# Patient Record
Sex: MALE | Race: WHITE | NOT HISPANIC OR LATINO | Employment: OTHER
[De-identification: names, ages, dates, MRNs, and addresses within clinical notes are randomized per-mention and may not be internally consistent; named-entity substitution may affect disease eponyms.]

---

## 2017-03-23 ENCOUNTER — RECORDS - HEALTHEAST (OUTPATIENT)
Dept: ADMINISTRATIVE | Facility: OTHER | Age: 72
End: 2017-03-23

## 2017-05-31 ENCOUNTER — OFFICE VISIT - HEALTHEAST (OUTPATIENT)
Dept: INTERNAL MEDICINE | Facility: CLINIC | Age: 72
End: 2017-05-31

## 2017-05-31 DIAGNOSIS — R51.9 HEAD PAIN: ICD-10-CM

## 2017-05-31 DIAGNOSIS — I34.1 MITRAL VALVE PROLAPSE: ICD-10-CM

## 2017-06-07 ENCOUNTER — HOSPITAL ENCOUNTER (OUTPATIENT)
Dept: CARDIOLOGY | Facility: HOSPITAL | Age: 72
Discharge: HOME | End: 2017-06-07
Attending: INTERNAL MEDICINE

## 2017-06-07 DIAGNOSIS — I34.1 MITRAL VALVE PROLAPSE: ICD-10-CM

## 2017-06-07 LAB
AORTIC ROOT: 3.9 CM
AORTIC VALVE MEAN VELOCITY: 88.8 CM/S
ASCENDING AORTA: 3.5 CM
AV DIMENSIONLESS INDEX VTI: 0.7
AV MEAN GRADIENT: 4 MMHG
AV PEAK GRADIENT: 6.9 MMHG
AV VALVE AREA: 3.1 CM2
AV VELOCITY RATIO: 0.8
BSA FOR ECHO PROCEDURE: 2.26 M2
CV BLOOD PRESSURE: NORMAL MMHG
CV ECHO HEIGHT: 72 IN
CV ECHO WEIGHT: 222 LBS
DOP CALC AO PEAK VEL: 131 CM/S
DOP CALC AO VTI: 25.3 CM
DOP CALC LVOT AREA: 4.15 CM2
DOP CALC LVOT DIAMETER: 2.3 CM
DOP CALC LVOT PEAK VEL: 103 CM/S
DOP CALC LVOT STROKE VOLUME: 77.2 CM3
DOP CALCLVOT PEAK VEL VTI: 18.6 CM
EJECTION FRACTION: 64 % (ref 55–75)
FRACTIONAL SHORTENING: 17.6 % (ref 28–44)
INTERVENTRICULAR SEPTUM IN END DIASTOLE: 1.48 CM (ref 0.6–1)
IVS/PW RATIO: 1.4
LA AREA 1: 28.8 CM2
LA AREA 2: 42.7 CM2
LEFT ATRIUM LENGTH: 6.1 CM
LEFT ATRIUM SIZE: 4 CM
LEFT ATRIUM TO AORTIC ROOT RATIO: 1.03 NO UNITS
LEFT ATRIUM VOLUME INDEX: 75.8 ML/M2
LEFT ATRIUM VOLUME: 171.4 CM3
LEFT VENTRICLE DIASTOLIC VOLUME INDEX: 61.5 CM3/M2 (ref 34–74)
LEFT VENTRICLE DIASTOLIC VOLUME: 139 CM3 (ref 62–150)
LEFT VENTRICLE HEART RATE: 52 BPM
LEFT VENTRICLE HEART RATE: 52 BPM
LEFT VENTRICLE MASS INDEX: 110.1 G/M2
LEFT VENTRICLE SYSTOLIC VOLUME INDEX: 22.1 CM3/M2 (ref 11–31)
LEFT VENTRICLE SYSTOLIC VOLUME: 49.9 CM3 (ref 21–61)
LEFT VENTRICULAR INTERNAL DIMENSION IN DIASTOLE: 4.89 CM (ref 4.2–5.8)
LEFT VENTRICULAR INTERNAL DIMENSION IN SYSTOLE: 4.03 CM (ref 2.5–4)
LEFT VENTRICULAR MASS: 248.7 G
LEFT VENTRICULAR OUTFLOW TRACT MEAN GRADIENT: 2 MMHG
LEFT VENTRICULAR OUTFLOW TRACT MEAN VELOCITY: 68.8 CM/S
LEFT VENTRICULAR OUTFLOW TRACT PEAK GRADIENT: 4 MMHG
LEFT VENTRICULAR POSTERIOR WALL IN END DIASTOLE: 1.09 CM (ref 0.6–1)
LV STROKE VOLUME INDEX: 34.2 ML/M2
MITRAL VALVE E/A RATIO: 1.2
MV AVERAGE E/E' RATIO: 15.5 CM/S
MV DECELERATION TIME: 283 MS
MV E'TISSUE VEL-LAT: 6.96 CM/S
MV E'TISSUE VEL-MED: 5.9 CM/S
MV LATERAL E/E' RATIO: 14.4
MV MEDIAL E/E' RATIO: 16.9
MV PEAK A VELOCITY: 85.9 CM/S
MV PEAK E VELOCITY: 99.9 CM/S
NUC REST DIASTOLIC VOLUME INDEX: 3552 LBS
NUC REST SYSTOLIC VOLUME INDEX: 72 IN
TRICUSPID VALVE ANULAR PLANE SYSTOLIC EXCURSION: 3.3 CM

## 2017-06-07 ASSESSMENT — MIFFLIN-ST. JEOR: SCORE: 1774.99

## 2017-06-11 ENCOUNTER — COMMUNICATION - HEALTHEAST (OUTPATIENT)
Dept: INTERNAL MEDICINE | Facility: CLINIC | Age: 72
End: 2017-06-11

## 2017-06-12 ENCOUNTER — AMBULATORY - HEALTHEAST (OUTPATIENT)
Dept: INTERNAL MEDICINE | Facility: CLINIC | Age: 72
End: 2017-06-12

## 2017-06-12 DIAGNOSIS — I34.1 MITRAL VALVE PROLAPSE: ICD-10-CM

## 2017-07-07 ENCOUNTER — OFFICE VISIT - HEALTHEAST (OUTPATIENT)
Dept: CARDIOLOGY | Facility: CLINIC | Age: 72
End: 2017-07-07

## 2017-07-07 DIAGNOSIS — I34.0 NON-RHEUMATIC MITRAL REGURGITATION: ICD-10-CM

## 2017-07-07 LAB
ATRIAL RATE - MUSE: 57 BPM
DIASTOLIC BLOOD PRESSURE - MUSE: NORMAL MMHG
INTERPRETATION ECG - MUSE: NORMAL
P AXIS - MUSE: 70 DEGREES
PR INTERVAL - MUSE: 230 MS
QRS DURATION - MUSE: 98 MS
QT - MUSE: 432 MS
QTC - MUSE: 420 MS
R AXIS - MUSE: -22 DEGREES
SYSTOLIC BLOOD PRESSURE - MUSE: NORMAL MMHG
T AXIS - MUSE: 35 DEGREES
VENTRICULAR RATE- MUSE: 57 BPM

## 2017-07-07 ASSESSMENT — MIFFLIN-ST. JEOR: SCORE: 1751.17

## 2017-07-13 ENCOUNTER — COMMUNICATION - HEALTHEAST (OUTPATIENT)
Dept: CARDIOLOGY | Facility: CLINIC | Age: 72
End: 2017-07-13

## 2017-07-18 ENCOUNTER — HOSPITAL ENCOUNTER (OUTPATIENT)
Dept: CARDIOLOGY | Facility: HOSPITAL | Age: 72
Discharge: HOME | End: 2017-07-18
Attending: INTERNAL MEDICINE

## 2017-07-18 DIAGNOSIS — I34.0 NON-RHEUMATIC MITRAL REGURGITATION: ICD-10-CM

## 2017-07-18 LAB
CV STRESS CURRENT BP HE: NORMAL
CV STRESS CURRENT HR HE: 100
CV STRESS CURRENT HR HE: 111
CV STRESS CURRENT HR HE: 123
CV STRESS CURRENT HR HE: 143
CV STRESS CURRENT HR HE: 145
CV STRESS CURRENT HR HE: 145
CV STRESS CURRENT HR HE: 146
CV STRESS CURRENT HR HE: 58
CV STRESS CURRENT HR HE: 59
CV STRESS CURRENT HR HE: 60
CV STRESS CURRENT HR HE: 61
CV STRESS CURRENT HR HE: 61
CV STRESS CURRENT HR HE: 63
CV STRESS CURRENT HR HE: 63
CV STRESS CURRENT HR HE: 68
CV STRESS CURRENT HR HE: 69
CV STRESS CURRENT HR HE: 75
CV STRESS CURRENT HR HE: 80
CV STRESS CURRENT HR HE: 81
CV STRESS CURRENT HR HE: 83
CV STRESS CURRENT HR HE: 85
CV STRESS CURRENT HR HE: 86
CV STRESS CURRENT HR HE: 88
CV STRESS CURRENT HR HE: 93
CV STRESS CURRENT HR HE: 97
CV STRESS CURRENT HR HE: 97
CV STRESS DEVIATION TIME HE: NORMAL
CV STRESS ECHO PERCENT HR HE: NORMAL
CV STRESS EXERCISE STAGE HE: NORMAL
CV STRESS FINAL RESTING BP HE: NORMAL
CV STRESS FINAL RESTING HR HE: 61
CV STRESS MAX HR HE: 147
CV STRESS MAX TREADMILL GRADE HE: 12
CV STRESS MAX TREADMILL SPEED HE: 2.5
CV STRESS PEAK DIA BP HE: NORMAL
CV STRESS PEAK SYS BP HE: NORMAL
CV STRESS PHASE HE: NORMAL
CV STRESS PROTOCOL HE: NORMAL
CV STRESS RESTING PT POSITION HE: NORMAL
CV STRESS RESTING PT POSITION HE: NORMAL
CV STRESS ST DEVIATION AMOUNT HE: NORMAL
CV STRESS ST DEVIATION ELEVATION HE: NORMAL
CV STRESS ST EVELATION AMOUNT HE: NORMAL
CV STRESS TEST TYPE HE: NORMAL
CV STRESS TOTAL STAGE TIME MIN 1 HE: NORMAL
ECHO EJECTION FRACTION ESTIMATED: 55 %
STRESS ECHO BASELINE BP: NORMAL
STRESS ECHO BASELINE HR: 58
STRESS ECHO CALCULATED PERCENT HR: 99 %
STRESS ECHO LAST STRESS BP: NORMAL
STRESS ECHO LAST STRESS HR: 146
STRESS ECHO POST ESTIMATED WORKLOAD: 7.1
STRESS ECHO POST EXERCISE DUR MIN: 5
STRESS ECHO POST EXERCISE DUR SEC: 10
STRESS ECHO TARGET HR: 126
TRICUSPID REGURGITATION PEAK PRESSURE GRADIENT: 31.4 MMHG
TRICUSPID VALVE PEAK REGURGITANT VELOCITY: 280 CM/S

## 2017-07-21 ENCOUNTER — AMBULATORY - HEALTHEAST (OUTPATIENT)
Dept: CARDIOLOGY | Facility: CLINIC | Age: 72
End: 2017-07-21

## 2017-07-21 DIAGNOSIS — R94.39 ABNORMAL STRESS ECHO: ICD-10-CM

## 2017-07-27 ENCOUNTER — COMMUNICATION - HEALTHEAST (OUTPATIENT)
Dept: CARDIOLOGY | Facility: CLINIC | Age: 72
End: 2017-07-27

## 2017-07-27 ENCOUNTER — SURGERY - HEALTHEAST (OUTPATIENT)
Dept: CARDIOLOGY | Facility: CLINIC | Age: 72
End: 2017-07-27

## 2017-08-03 ENCOUNTER — SURGERY - HEALTHEAST (OUTPATIENT)
Dept: CARDIOLOGY | Facility: CLINIC | Age: 72
End: 2017-08-03

## 2017-08-24 ENCOUNTER — OFFICE VISIT - HEALTHEAST (OUTPATIENT)
Dept: CARDIOLOGY | Facility: CLINIC | Age: 72
End: 2017-08-24

## 2017-08-24 DIAGNOSIS — I34.0 NON-RHEUMATIC MITRAL REGURGITATION: ICD-10-CM

## 2017-08-24 ASSESSMENT — MIFFLIN-ST. JEOR: SCORE: 1767.04

## 2017-08-25 ENCOUNTER — OFFICE VISIT - HEALTHEAST (OUTPATIENT)
Dept: CARDIOLOGY | Facility: CLINIC | Age: 72
End: 2017-08-25

## 2017-08-25 DIAGNOSIS — I34.0 NON-RHEUMATIC MITRAL REGURGITATION: ICD-10-CM

## 2017-08-25 ASSESSMENT — MIFFLIN-ST. JEOR: SCORE: 1771.58

## 2017-08-30 ENCOUNTER — COMMUNICATION - HEALTHEAST (OUTPATIENT)
Dept: INTERNAL MEDICINE | Facility: CLINIC | Age: 72
End: 2017-08-30

## 2017-08-30 ENCOUNTER — OFFICE VISIT - HEALTHEAST (OUTPATIENT)
Dept: INTERNAL MEDICINE | Facility: CLINIC | Age: 72
End: 2017-08-30

## 2017-08-30 DIAGNOSIS — Z01.818 PREOP EXAMINATION: ICD-10-CM

## 2017-08-30 DIAGNOSIS — Z12.5 PROSTATE CANCER SCREENING: ICD-10-CM

## 2017-08-30 LAB — PSA SERPL-MCNC: 0.4 NG/ML (ref 0–6.5)

## 2017-08-30 ASSESSMENT — MIFFLIN-ST. JEOR: SCORE: 1748.9

## 2017-09-15 ENCOUNTER — SURGERY - HEALTHEAST (OUTPATIENT)
Dept: CARDIOLOGY | Facility: CLINIC | Age: 72
End: 2017-09-15

## 2017-09-15 DIAGNOSIS — I34.0 MITRAL REGURGITATION: ICD-10-CM

## 2017-09-18 ENCOUNTER — HOSPITAL ENCOUNTER (OUTPATIENT)
Dept: RADIOLOGY | Facility: CLINIC | Age: 72
Discharge: HOME | End: 2017-09-18

## 2017-09-18 ENCOUNTER — HOSPITAL ENCOUNTER (OUTPATIENT)
Dept: ULTRASOUND IMAGING | Facility: CLINIC | Age: 72
Discharge: HOME | End: 2017-09-18

## 2017-09-18 ENCOUNTER — HOSPITAL ENCOUNTER (OUTPATIENT)
Dept: SURGERY | Facility: CLINIC | Age: 72
Discharge: HOME | End: 2017-09-18

## 2017-09-18 ENCOUNTER — ANESTHESIA - HEALTHEAST (OUTPATIENT)
Dept: SURGERY | Facility: CLINIC | Age: 72
End: 2017-09-18

## 2017-09-18 DIAGNOSIS — I34.0 MITRAL REGURGITATION: ICD-10-CM

## 2017-09-18 LAB
ATRIAL RATE - MUSE: 53 BPM
DIASTOLIC BLOOD PRESSURE - MUSE: NORMAL MMHG
HBA1C MFR BLD: 5.5 % (ref 4.2–6.1)
INTERPRETATION ECG - MUSE: NORMAL
P AXIS - MUSE: 67 DEGREES
PR INTERVAL - MUSE: 248 MS
QRS DURATION - MUSE: 92 MS
QT - MUSE: 450 MS
QTC - MUSE: 422 MS
R AXIS - MUSE: -13 DEGREES
SYSTOLIC BLOOD PRESSURE - MUSE: NORMAL MMHG
T AXIS - MUSE: 25 DEGREES
VENTRICULAR RATE- MUSE: 53 BPM

## 2017-09-18 RX ORDER — EPINEPHRINE 0.3 MG/.3ML
0.3 INJECTION SUBCUTANEOUS
Status: SHIPPED | COMMUNITY
Start: 2017-09-18

## 2017-09-18 ASSESSMENT — MIFFLIN-ST. JEOR: SCORE: 1771.78

## 2017-09-19 ENCOUNTER — HOSPITAL ENCOUNTER (INPATIENT)
Dept: INTENSIVE CARE | Facility: CLINIC | Age: 72
Discharge: HOME | End: 2017-09-27
Attending: THORACIC SURGERY (CARDIOTHORACIC VASCULAR SURGERY) | Admitting: THORACIC SURGERY (CARDIOTHORACIC VASCULAR SURGERY)

## 2017-09-19 ENCOUNTER — SURGERY - HEALTHEAST (OUTPATIENT)
Dept: SURGERY | Facility: CLINIC | Age: 72
End: 2017-09-19

## 2017-09-19 DIAGNOSIS — Z98.890 S/P MVR (MITRAL VALVE REPAIR): ICD-10-CM

## 2017-09-19 DIAGNOSIS — Z95.3 S/P MITRAL VALVE REPLACEMENT WITH TISSUE VALVE: ICD-10-CM

## 2017-09-19 DIAGNOSIS — I49.5 SSS (SICK SINUS SYNDROME) (H): ICD-10-CM

## 2017-09-19 LAB
MR PISA RADIUS: 0.6 CM
MR PISA RADIUS: 0.6 CM
MR PISA VN NYQUIST: 40.4 CM/S
PISA MR PEAK VEL: 461 CM/S
PISA MR PEAK VEL: 461 CM/S

## 2017-09-21 LAB
BLD PROD TYP BPU: NORMAL
BLOOD EXPIRATION DATE: NORMAL
BLOOD TYPE: 6200
CODING SYSTEM: NORMAL
COMPONENT (HISTORICAL CONVERSION): NORMAL
CROSSMATCH: NORMAL
ISSUE DATE AND TIME: NORMAL
STATUS (HISTORICAL CONVERSION): NORMAL
UNIT ABO/RH (HISTORICAL CONVERSION): NORMAL
UNIT NUMBER: NORMAL

## 2017-09-22 LAB
BLD PROD TYP BPU: NORMAL
BLOOD EXPIRATION DATE: NORMAL
BLOOD TYPE: 6200
CODING SYSTEM: NORMAL
COMPONENT (HISTORICAL CONVERSION): NORMAL
CROSSMATCH: NORMAL
ISSUE DATE AND TIME: NORMAL
LAB AP CHARGES (HE HISTORICAL CONVERSION): NORMAL
PATH REPORT.COMMENTS IMP SPEC: NORMAL
PATH REPORT.FINAL DX SPEC: NORMAL
PATH REPORT.GROSS SPEC: NORMAL
PATH REPORT.MICROSCOPIC SPEC OTHER STN: NORMAL
PATH REPORT.RELEVANT HX SPEC: NORMAL
RESULT FLAG (HE HISTORICAL CONVERSION): NORMAL
STATUS (HISTORICAL CONVERSION): NORMAL
UNIT ABO/RH (HISTORICAL CONVERSION): NORMAL
UNIT NUMBER: NORMAL

## 2017-09-22 ASSESSMENT — MIFFLIN-ST. JEOR
SCORE: 1776

## 2017-09-23 ASSESSMENT — MIFFLIN-ST. JEOR
SCORE: 1761.94

## 2017-09-24 LAB
ATRIAL RATE - MUSE: 56 BPM
DIASTOLIC BLOOD PRESSURE - MUSE: NORMAL MMHG
INTERPRETATION ECG - MUSE: NORMAL
P AXIS - MUSE: NORMAL DEGREES
PR INTERVAL - MUSE: NORMAL MS
QRS DURATION - MUSE: 96 MS
QT - MUSE: 440 MS
QTC - MUSE: 409 MS
R AXIS - MUSE: 34 DEGREES
SYSTOLIC BLOOD PRESSURE - MUSE: NORMAL MMHG
T AXIS - MUSE: 27 DEGREES
VENTRICULAR RATE- MUSE: 52 BPM

## 2017-09-24 ASSESSMENT — MIFFLIN-ST. JEOR
SCORE: 1697.53

## 2017-09-25 ENCOUNTER — SURGERY - HEALTHEAST (OUTPATIENT)
Dept: CARDIOLOGY | Facility: CLINIC | Age: 72
End: 2017-09-25

## 2017-09-25 ASSESSMENT — MIFFLIN-ST. JEOR
SCORE: 1761.94

## 2017-09-26 ASSESSMENT — MIFFLIN-ST. JEOR
SCORE: 1757.41

## 2017-09-27 LAB
CREAT SERPL-MCNC: 0.77 MG/DL (ref 0.7–1.3)
GFR SERPL CREATININE-BSD FRML MDRD: >60 ML/MIN/1.73M2

## 2017-09-27 ASSESSMENT — MIFFLIN-ST. JEOR
SCORE: 1750.15

## 2017-09-29 ENCOUNTER — AMBULATORY - HEALTHEAST (OUTPATIENT)
Dept: NURSING | Facility: CLINIC | Age: 72
End: 2017-09-29

## 2017-09-29 ENCOUNTER — OFFICE VISIT - HEALTHEAST (OUTPATIENT)
Dept: INTERNAL MEDICINE | Facility: CLINIC | Age: 72
End: 2017-09-29

## 2017-09-29 ENCOUNTER — COMMUNICATION - HEALTHEAST (OUTPATIENT)
Dept: NURSING | Facility: CLINIC | Age: 72
End: 2017-09-29

## 2017-09-29 DIAGNOSIS — I34.0 NON-RHEUMATIC MITRAL REGURGITATION: ICD-10-CM

## 2017-09-29 DIAGNOSIS — I49.5 TACHYCARDIA-BRADYCARDIA SYNDROME (H): ICD-10-CM

## 2017-09-29 DIAGNOSIS — Z98.890 S/P MVR (MITRAL VALVE REPAIR): ICD-10-CM

## 2017-09-29 DIAGNOSIS — Z79.899 MEDICATION MANAGEMENT: ICD-10-CM

## 2017-09-29 DIAGNOSIS — Z95.3 S/P MITRAL VALVE REPLACEMENT WITH TISSUE VALVE: ICD-10-CM

## 2017-09-29 ASSESSMENT — MIFFLIN-ST. JEOR: SCORE: 1757.41

## 2017-10-03 ENCOUNTER — AMBULATORY - HEALTHEAST (OUTPATIENT)
Dept: CARDIOLOGY | Facility: CLINIC | Age: 72
End: 2017-10-03

## 2017-10-03 ENCOUNTER — COMMUNICATION - HEALTHEAST (OUTPATIENT)
Dept: NURSING | Facility: CLINIC | Age: 72
End: 2017-10-03

## 2017-10-03 ENCOUNTER — COMMUNICATION - HEALTHEAST (OUTPATIENT)
Dept: INTERNAL MEDICINE | Facility: CLINIC | Age: 72
End: 2017-10-03

## 2017-10-03 ENCOUNTER — AMBULATORY - HEALTHEAST (OUTPATIENT)
Dept: LAB | Facility: CLINIC | Age: 72
End: 2017-10-03

## 2017-10-03 DIAGNOSIS — Z98.890 S/P MVR (MITRAL VALVE REPAIR): ICD-10-CM

## 2017-10-03 DIAGNOSIS — Z95.3 S/P MITRAL VALVE REPLACEMENT WITH TISSUE VALVE: ICD-10-CM

## 2017-10-03 DIAGNOSIS — Z95.0 CARDIAC PACEMAKER IN SITU: ICD-10-CM

## 2017-10-03 LAB — HCC DEVICE COMMENTS: NORMAL

## 2017-10-03 ASSESSMENT — MIFFLIN-ST. JEOR: SCORE: 1739.26

## 2017-10-06 ENCOUNTER — OFFICE VISIT - HEALTHEAST (OUTPATIENT)
Dept: INTERNAL MEDICINE | Facility: CLINIC | Age: 72
End: 2017-10-06

## 2017-10-06 ENCOUNTER — COMMUNICATION - HEALTHEAST (OUTPATIENT)
Dept: NURSING | Facility: CLINIC | Age: 72
End: 2017-10-06

## 2017-10-06 DIAGNOSIS — I49.5 TACHYCARDIA-BRADYCARDIA SYNDROME (H): ICD-10-CM

## 2017-10-06 DIAGNOSIS — Z98.890 S/P MVR (MITRAL VALVE REPAIR): ICD-10-CM

## 2017-10-06 DIAGNOSIS — Z95.3 S/P MITRAL VALVE REPLACEMENT WITH TISSUE VALVE: ICD-10-CM

## 2017-10-06 ASSESSMENT — MIFFLIN-ST. JEOR: SCORE: 1739.26

## 2017-10-09 ENCOUNTER — RECORDS - HEALTHEAST (OUTPATIENT)
Dept: ADMINISTRATIVE | Facility: OTHER | Age: 72
End: 2017-10-09

## 2017-10-09 ENCOUNTER — AMBULATORY - HEALTHEAST (OUTPATIENT)
Dept: CARDIAC REHAB | Facility: HOSPITAL | Age: 72
End: 2017-10-09

## 2017-10-09 DIAGNOSIS — Z95.2 S/P MVR (MITRAL VALVE REPLACEMENT): ICD-10-CM

## 2017-10-10 ENCOUNTER — AMBULATORY - HEALTHEAST (OUTPATIENT)
Dept: CARDIOLOGY | Facility: CLINIC | Age: 72
End: 2017-10-10

## 2017-10-10 ENCOUNTER — COMMUNICATION - HEALTHEAST (OUTPATIENT)
Dept: NURSING | Facility: CLINIC | Age: 72
End: 2017-10-10

## 2017-10-10 ENCOUNTER — AMBULATORY - HEALTHEAST (OUTPATIENT)
Dept: LAB | Facility: CLINIC | Age: 72
End: 2017-10-10

## 2017-10-10 DIAGNOSIS — Z95.2 S/P MVR (MITRAL VALVE REPLACEMENT): ICD-10-CM

## 2017-10-10 DIAGNOSIS — Z98.890 S/P MVR (MITRAL VALVE REPAIR): ICD-10-CM

## 2017-10-10 DIAGNOSIS — Z95.3 S/P MITRAL VALVE REPLACEMENT WITH TISSUE VALVE: ICD-10-CM

## 2017-10-13 ENCOUNTER — COMMUNICATION - HEALTHEAST (OUTPATIENT)
Dept: NURSING | Facility: CLINIC | Age: 72
End: 2017-10-13

## 2017-10-13 ENCOUNTER — AMBULATORY - HEALTHEAST (OUTPATIENT)
Dept: CARDIAC REHAB | Facility: HOSPITAL | Age: 72
End: 2017-10-13

## 2017-10-13 ENCOUNTER — AMBULATORY - HEALTHEAST (OUTPATIENT)
Dept: LAB | Facility: CLINIC | Age: 72
End: 2017-10-13

## 2017-10-13 DIAGNOSIS — Z95.3 S/P MITRAL VALVE REPLACEMENT WITH TISSUE VALVE: ICD-10-CM

## 2017-10-13 DIAGNOSIS — Z98.890 S/P MVR (MITRAL VALVE REPAIR): ICD-10-CM

## 2017-10-13 DIAGNOSIS — Z95.2 S/P MVR (MITRAL VALVE REPLACEMENT): ICD-10-CM

## 2017-10-17 ENCOUNTER — OFFICE VISIT - HEALTHEAST (OUTPATIENT)
Dept: CARDIOLOGY | Facility: CLINIC | Age: 72
End: 2017-10-17

## 2017-10-17 ENCOUNTER — AMBULATORY - HEALTHEAST (OUTPATIENT)
Dept: CARDIAC REHAB | Facility: HOSPITAL | Age: 72
End: 2017-10-17

## 2017-10-17 DIAGNOSIS — Z95.2 S/P MVR (MITRAL VALVE REPLACEMENT): ICD-10-CM

## 2017-10-17 DIAGNOSIS — Z95.3 S/P MITRAL VALVE REPLACEMENT WITH BIOPROSTHETIC VALVE: ICD-10-CM

## 2017-10-17 ASSESSMENT — MIFFLIN-ST. JEOR: SCORE: 1776.46

## 2017-10-20 ENCOUNTER — AMBULATORY - HEALTHEAST (OUTPATIENT)
Dept: LAB | Facility: CLINIC | Age: 72
End: 2017-10-20

## 2017-10-20 ENCOUNTER — COMMUNICATION - HEALTHEAST (OUTPATIENT)
Dept: NURSING | Facility: CLINIC | Age: 72
End: 2017-10-20

## 2017-10-20 ENCOUNTER — AMBULATORY - HEALTHEAST (OUTPATIENT)
Dept: CARDIAC REHAB | Facility: HOSPITAL | Age: 72
End: 2017-10-20

## 2017-10-20 DIAGNOSIS — Z95.3 S/P MITRAL VALVE REPLACEMENT WITH TISSUE VALVE: ICD-10-CM

## 2017-10-20 DIAGNOSIS — Z95.2 S/P MVR (MITRAL VALVE REPLACEMENT): ICD-10-CM

## 2017-10-20 DIAGNOSIS — Z98.890 S/P MVR (MITRAL VALVE REPAIR): ICD-10-CM

## 2017-10-25 ENCOUNTER — AMBULATORY - HEALTHEAST (OUTPATIENT)
Dept: CARDIAC REHAB | Facility: HOSPITAL | Age: 72
End: 2017-10-25

## 2017-10-25 DIAGNOSIS — Z95.2 S/P MVR (MITRAL VALVE REPLACEMENT): ICD-10-CM

## 2017-10-27 ENCOUNTER — COMMUNICATION - HEALTHEAST (OUTPATIENT)
Dept: NURSING | Facility: CLINIC | Age: 72
End: 2017-10-27

## 2017-10-27 ENCOUNTER — AMBULATORY - HEALTHEAST (OUTPATIENT)
Dept: LAB | Facility: CLINIC | Age: 72
End: 2017-10-27

## 2017-10-27 ENCOUNTER — AMBULATORY - HEALTHEAST (OUTPATIENT)
Dept: CARDIAC REHAB | Facility: HOSPITAL | Age: 72
End: 2017-10-27

## 2017-10-27 DIAGNOSIS — Z95.2 S/P MVR (MITRAL VALVE REPLACEMENT): ICD-10-CM

## 2017-10-27 DIAGNOSIS — Z98.890 S/P MVR (MITRAL VALVE REPAIR): ICD-10-CM

## 2017-10-27 DIAGNOSIS — Z95.3 S/P MITRAL VALVE REPLACEMENT WITH TISSUE VALVE: ICD-10-CM

## 2017-10-30 ENCOUNTER — AMBULATORY - HEALTHEAST (OUTPATIENT)
Dept: CARDIOLOGY | Facility: CLINIC | Age: 72
End: 2017-10-30

## 2017-10-30 ENCOUNTER — OFFICE VISIT - HEALTHEAST (OUTPATIENT)
Dept: CARDIOLOGY | Facility: CLINIC | Age: 72
End: 2017-10-30

## 2017-10-30 ENCOUNTER — HOSPITAL ENCOUNTER (OUTPATIENT)
Dept: RADIOLOGY | Facility: HOSPITAL | Age: 72
Discharge: HOME | End: 2017-10-30
Attending: INTERNAL MEDICINE

## 2017-10-30 DIAGNOSIS — I48.0 PAROXYSMAL ATRIAL FIBRILLATION (H): ICD-10-CM

## 2017-10-30 DIAGNOSIS — Z95.0 CARDIAC PACEMAKER IN SITU: ICD-10-CM

## 2017-10-30 DIAGNOSIS — Z98.890 S/P MVR (MITRAL VALVE REPAIR): ICD-10-CM

## 2017-10-30 ASSESSMENT — MIFFLIN-ST. JEOR: SCORE: 1771.92

## 2017-10-31 ENCOUNTER — AMBULATORY - HEALTHEAST (OUTPATIENT)
Dept: CARDIOLOGY | Facility: CLINIC | Age: 72
End: 2017-10-31

## 2017-10-31 DIAGNOSIS — I48.91 ATRIAL FIBRILLATION (H): ICD-10-CM

## 2017-10-31 DIAGNOSIS — J90 PLEURAL EFFUSION: ICD-10-CM

## 2017-10-31 LAB — HCC DEVICE COMMENTS: NORMAL

## 2017-11-01 ENCOUNTER — HOSPITAL ENCOUNTER (OUTPATIENT)
Dept: CARDIOLOGY | Facility: HOSPITAL | Age: 72
Discharge: HOME | End: 2017-11-01
Attending: INTERNAL MEDICINE

## 2017-11-01 ENCOUNTER — AMBULATORY - HEALTHEAST (OUTPATIENT)
Dept: CARDIAC REHAB | Facility: HOSPITAL | Age: 72
End: 2017-11-01

## 2017-11-01 DIAGNOSIS — I48.0 PAROXYSMAL ATRIAL FIBRILLATION (H): ICD-10-CM

## 2017-11-01 DIAGNOSIS — Z95.2 S/P MVR (MITRAL VALVE REPLACEMENT): ICD-10-CM

## 2017-11-01 ASSESSMENT — MIFFLIN-ST. JEOR: SCORE: 1771.01

## 2017-11-02 LAB
AORTIC ROOT: 3.8 CM
BSA FOR ECHO PROCEDURE: 2.25 M2
CV BLOOD PRESSURE: NORMAL MMHG
CV ECHO HEIGHT: 73.8 IN
CV ECHO WEIGHT: 215 LBS
DOP CALC LVOT AREA: 3.14 CM2
DOP CALC LVOT DIAMETER: 2 CM
DOP CALC LVOT PEAK VEL: 106 CM/S
DOP CALC LVOT STROKE VOLUME: 65.9 CM3
DOP CALC MV VTI: 82.6 CM
DOP CALCLVOT PEAK VEL VTI: 21 CM
ECHO EJECTION FRACTION ESTIMATED: 45 %
EJECTION FRACTION: 53 % (ref 55–75)
FRACTIONAL SHORTENING: 19.9 % (ref 28–44)
INTERVENTRICULAR SEPTUM IN END DIASTOLE: 1.04 CM (ref 0.6–1)
IVS/PW RATIO: 1.1
LEFT ATRIUM SIZE: 5 CM
LEFT ATRIUM TO AORTIC ROOT RATIO: 1.32 NO UNITS
LEFT VENTRICLE CARDIAC INDEX: 1.9 L/MIN/M2
LEFT VENTRICLE CARDIAC OUTPUT: 4.2 L/MIN
LEFT VENTRICLE DIASTOLIC VOLUME INDEX: 51.1 CM3/M2 (ref 34–74)
LEFT VENTRICLE DIASTOLIC VOLUME: 115 CM3 (ref 62–150)
LEFT VENTRICLE HEART RATE: 64 BPM
LEFT VENTRICLE MASS INDEX: 94 G/M2
LEFT VENTRICLE SYSTOLIC VOLUME INDEX: 24.2 CM3/M2 (ref 11–31)
LEFT VENTRICLE SYSTOLIC VOLUME: 54.5 CM3 (ref 21–61)
LEFT VENTRICULAR INTERNAL DIMENSION IN DIASTOLE: 5.48 CM (ref 4.2–5.8)
LEFT VENTRICULAR INTERNAL DIMENSION IN SYSTOLE: 4.39 CM (ref 2.5–4)
LEFT VENTRICULAR MASS: 211.5 G
LEFT VENTRICULAR OUTFLOW TRACT MEAN GRADIENT: 2 MMHG
LEFT VENTRICULAR OUTFLOW TRACT MEAN VELOCITY: 70.3 CM/S
LEFT VENTRICULAR OUTFLOW TRACT PEAK GRADIENT: 4 MMHG
LEFT VENTRICULAR POSTERIOR WALL IN END DIASTOLE: 0.96 CM (ref 0.6–1)
LV STROKE VOLUME INDEX: 29.3 ML/M2
MITRAL VALVE DECELERATION SLOPE: 1920 MM/S2
MITRAL VALVE E/A RATIO: 1.3
MITRAL VALVE MEAN INFLOW VELOCITY: 148 CM/S
MITRAL VALVE PEAK VELOCITY: 198 CM/S
MITRAL VALVE PRESSURE HALF-TIME: 204 MS
MV AREA VTI: 0.8 CM2
MV AVERAGE E/E' RATIO: 14.4 CM/S
MV DECELERATION TIME: 320 MS
MV E'TISSUE VEL-LAT: 8.32 CM/S
MV E'TISSUE VEL-MED: 7.54 CM/S
MV LATERAL E/E' RATIO: 13.7
MV MEAN GRADIENT: 9 MMHG
MV MEDIAL E/E' RATIO: 15.1
MV PEAK A VELOCITY: 85.2 CM/S
MV PEAK E VELOCITY: 114 CM/S
MV PEAK GRADIENT: 15.7 MMHG
MV VALVE AREA BY CONTINUITY EQUATION: 0.8 CM2
MV VALVE AREA PRESSURE 1/2 METHOD: 1.1 CM2
NUC REST DIASTOLIC VOLUME INDEX: 3440 LBS
NUC REST SYSTOLIC VOLUME INDEX: 73.75 IN
TRICUSPID REGURGITATION PEAK PRESSURE GRADIENT: 25.4 MMHG
TRICUSPID VALVE ANULAR PLANE SYSTOLIC EXCURSION: 1.8 CM
TRICUSPID VALVE PEAK REGURGITANT VELOCITY: 252 CM/S

## 2017-11-03 ENCOUNTER — COMMUNICATION - HEALTHEAST (OUTPATIENT)
Dept: CARDIOLOGY | Facility: CLINIC | Age: 72
End: 2017-11-03

## 2017-11-03 ENCOUNTER — AMBULATORY - HEALTHEAST (OUTPATIENT)
Dept: CARDIAC REHAB | Facility: HOSPITAL | Age: 72
End: 2017-11-03

## 2017-11-03 ENCOUNTER — AMBULATORY - HEALTHEAST (OUTPATIENT)
Dept: CARDIOLOGY | Facility: CLINIC | Age: 72
End: 2017-11-03

## 2017-11-03 DIAGNOSIS — I31.39 PERICARDIAL EFFUSION: ICD-10-CM

## 2017-11-03 DIAGNOSIS — Z95.0 CARDIAC PACEMAKER IN SITU: ICD-10-CM

## 2017-11-03 DIAGNOSIS — Z95.2 S/P MVR (MITRAL VALVE REPLACEMENT): ICD-10-CM

## 2017-11-03 LAB — HCC DEVICE COMMENTS: NORMAL

## 2017-11-06 ENCOUNTER — COMMUNICATION - HEALTHEAST (OUTPATIENT)
Dept: NURSING | Facility: CLINIC | Age: 72
End: 2017-11-06

## 2017-11-06 ENCOUNTER — OFFICE VISIT - HEALTHEAST (OUTPATIENT)
Dept: INTERNAL MEDICINE | Facility: CLINIC | Age: 72
End: 2017-11-06

## 2017-11-06 DIAGNOSIS — Z98.890 S/P MVR (MITRAL VALVE REPAIR): ICD-10-CM

## 2017-11-06 DIAGNOSIS — R06.00 DYSPNEA: ICD-10-CM

## 2017-11-06 DIAGNOSIS — I48.0 PAROXYSMAL ATRIAL FIBRILLATION (H): ICD-10-CM

## 2017-11-06 DIAGNOSIS — Z95.3 S/P MITRAL VALVE REPLACEMENT WITH TISSUE VALVE: ICD-10-CM

## 2017-11-06 ASSESSMENT — MIFFLIN-ST. JEOR: SCORE: 1743.8

## 2017-11-08 ENCOUNTER — AMBULATORY - HEALTHEAST (OUTPATIENT)
Dept: CARDIAC REHAB | Facility: HOSPITAL | Age: 72
End: 2017-11-08

## 2017-11-08 DIAGNOSIS — Z95.2 S/P MVR (MITRAL VALVE REPLACEMENT): ICD-10-CM

## 2017-11-09 ENCOUNTER — COMMUNICATION - HEALTHEAST (OUTPATIENT)
Dept: CARDIOLOGY | Facility: CLINIC | Age: 72
End: 2017-11-09

## 2017-11-10 ENCOUNTER — HOSPITAL ENCOUNTER (OUTPATIENT)
Dept: CARDIOLOGY | Facility: HOSPITAL | Age: 72
Discharge: HOME | End: 2017-11-10
Attending: INTERNAL MEDICINE

## 2017-11-10 ENCOUNTER — AMBULATORY - HEALTHEAST (OUTPATIENT)
Dept: CARDIAC REHAB | Facility: HOSPITAL | Age: 72
End: 2017-11-10

## 2017-11-10 DIAGNOSIS — I31.39 PERICARDIAL EFFUSION: ICD-10-CM

## 2017-11-10 DIAGNOSIS — Z95.2 S/P MVR (MITRAL VALVE REPLACEMENT): ICD-10-CM

## 2017-11-10 LAB
BSA FOR ECHO PROCEDURE: 2.19 M2
CV BLOOD PRESSURE: NORMAL MMHG
CV ECHO HEIGHT: 73.8 IN
CV ECHO WEIGHT: 203 LBS
ECHO EJECTION FRACTION ESTIMATED: 35 %
NUC REST DIASTOLIC VOLUME INDEX: 3248 LBS
NUC REST SYSTOLIC VOLUME INDEX: 73.75 IN

## 2017-11-10 ASSESSMENT — MIFFLIN-ST. JEOR: SCORE: 1716.58

## 2017-11-13 ENCOUNTER — AMBULATORY - HEALTHEAST (OUTPATIENT)
Dept: CARDIOLOGY | Facility: CLINIC | Age: 72
End: 2017-11-13

## 2017-11-13 DIAGNOSIS — I50.9 CHF (CONGESTIVE HEART FAILURE) (H): ICD-10-CM

## 2017-11-14 ENCOUNTER — COMMUNICATION - HEALTHEAST (OUTPATIENT)
Dept: CARDIOLOGY | Facility: CLINIC | Age: 72
End: 2017-11-14

## 2017-11-15 ENCOUNTER — COMMUNICATION - HEALTHEAST (OUTPATIENT)
Dept: NURSING | Facility: CLINIC | Age: 72
End: 2017-11-15

## 2017-11-15 ENCOUNTER — AMBULATORY - HEALTHEAST (OUTPATIENT)
Dept: CARDIOLOGY | Facility: CLINIC | Age: 72
End: 2017-11-15

## 2017-11-15 ENCOUNTER — OFFICE VISIT - HEALTHEAST (OUTPATIENT)
Dept: CARDIOLOGY | Facility: CLINIC | Age: 72
End: 2017-11-15

## 2017-11-15 ENCOUNTER — HOSPITAL ENCOUNTER (OUTPATIENT)
Dept: RADIOLOGY | Facility: CLINIC | Age: 72
Discharge: HOME | End: 2017-11-15
Attending: INTERNAL MEDICINE

## 2017-11-15 ENCOUNTER — AMBULATORY - HEALTHEAST (OUTPATIENT)
Dept: CARDIAC REHAB | Facility: HOSPITAL | Age: 72
End: 2017-11-15

## 2017-11-15 ENCOUNTER — AMBULATORY - HEALTHEAST (OUTPATIENT)
Dept: LAB | Facility: CLINIC | Age: 72
End: 2017-11-15

## 2017-11-15 DIAGNOSIS — I42.0 DILATED CARDIOMYOPATHY (H): ICD-10-CM

## 2017-11-15 DIAGNOSIS — J90 PLEURAL EFFUSION: ICD-10-CM

## 2017-11-15 DIAGNOSIS — Z95.3 S/P MITRAL VALVE REPLACEMENT WITH TISSUE VALVE: ICD-10-CM

## 2017-11-15 DIAGNOSIS — Z98.890 S/P MVR (MITRAL VALVE REPAIR): ICD-10-CM

## 2017-11-15 DIAGNOSIS — Z95.2 S/P MVR (MITRAL VALVE REPLACEMENT): ICD-10-CM

## 2017-11-15 DIAGNOSIS — Z95.0 CARDIAC PACEMAKER IN SITU: ICD-10-CM

## 2017-11-15 ASSESSMENT — MIFFLIN-ST. JEOR: SCORE: 1738.81

## 2017-11-16 ENCOUNTER — AMBULATORY - HEALTHEAST (OUTPATIENT)
Dept: CARDIOLOGY | Facility: CLINIC | Age: 72
End: 2017-11-16

## 2017-11-16 DIAGNOSIS — J90 PLEURAL EFFUSION: ICD-10-CM

## 2017-11-16 LAB — HCC DEVICE COMMENTS: NORMAL

## 2017-11-17 ENCOUNTER — COMMUNICATION - HEALTHEAST (OUTPATIENT)
Dept: NURSING | Facility: CLINIC | Age: 72
End: 2017-11-17

## 2017-11-17 ENCOUNTER — AMBULATORY - HEALTHEAST (OUTPATIENT)
Dept: CARDIAC REHAB | Facility: HOSPITAL | Age: 72
End: 2017-11-17

## 2017-11-17 ENCOUNTER — AMBULATORY - HEALTHEAST (OUTPATIENT)
Dept: CARDIOLOGY | Facility: CLINIC | Age: 72
End: 2017-11-17

## 2017-11-17 ENCOUNTER — COMMUNICATION - HEALTHEAST (OUTPATIENT)
Dept: CARDIOLOGY | Facility: CLINIC | Age: 72
End: 2017-11-17

## 2017-11-17 ENCOUNTER — HOSPITAL ENCOUNTER (OUTPATIENT)
Dept: ULTRASOUND IMAGING | Facility: HOSPITAL | Age: 72
Discharge: HOME | End: 2017-11-17
Attending: INTERNAL MEDICINE

## 2017-11-17 DIAGNOSIS — Z95.2 S/P MVR (MITRAL VALVE REPLACEMENT): ICD-10-CM

## 2017-11-17 DIAGNOSIS — Z79.01 LONG-TERM (CURRENT) USE OF ANTICOAGULANTS: ICD-10-CM

## 2017-11-17 LAB
ATRIAL RATE - MUSE: 68 BPM
DIASTOLIC BLOOD PRESSURE - MUSE: NORMAL MMHG
INTERPRETATION ECG - MUSE: NORMAL
P AXIS - MUSE: 18 DEGREES
PR INTERVAL - MUSE: 228 MS
QRS DURATION - MUSE: 82 MS
QT - MUSE: 432 MS
QTC - MUSE: 459 MS
R AXIS - MUSE: 0 DEGREES
SYSTOLIC BLOOD PRESSURE - MUSE: NORMAL MMHG
T AXIS - MUSE: 70 DEGREES
VENTRICULAR RATE- MUSE: 68 BPM

## 2017-11-20 ENCOUNTER — HOSPITAL ENCOUNTER (OUTPATIENT)
Dept: ULTRASOUND IMAGING | Facility: HOSPITAL | Age: 72
Discharge: HOME | End: 2017-11-20
Attending: INTERNAL MEDICINE

## 2017-11-20 DIAGNOSIS — J90 PLEURAL EFFUSION: ICD-10-CM

## 2017-11-21 ENCOUNTER — AMBULATORY - HEALTHEAST (OUTPATIENT)
Dept: CARDIOLOGY | Facility: CLINIC | Age: 72
End: 2017-11-21

## 2017-11-21 DIAGNOSIS — J90 PLEURAL EFFUSION: ICD-10-CM

## 2017-11-22 ENCOUNTER — COMMUNICATION - HEALTHEAST (OUTPATIENT)
Dept: NURSING | Facility: CLINIC | Age: 72
End: 2017-11-22

## 2017-11-22 ENCOUNTER — AMBULATORY - HEALTHEAST (OUTPATIENT)
Dept: LAB | Facility: CLINIC | Age: 72
End: 2017-11-22

## 2017-11-22 ENCOUNTER — HOSPITAL ENCOUNTER (OUTPATIENT)
Dept: RADIOLOGY | Facility: HOSPITAL | Age: 72
Discharge: HOME | End: 2017-11-22
Attending: INTERNAL MEDICINE

## 2017-11-22 ENCOUNTER — AMBULATORY - HEALTHEAST (OUTPATIENT)
Dept: CARDIOLOGY | Facility: CLINIC | Age: 72
End: 2017-11-22

## 2017-11-22 ENCOUNTER — AMBULATORY - HEALTHEAST (OUTPATIENT)
Dept: CARDIAC REHAB | Facility: HOSPITAL | Age: 72
End: 2017-11-22

## 2017-11-22 ENCOUNTER — COMMUNICATION - HEALTHEAST (OUTPATIENT)
Dept: CARDIOLOGY | Facility: CLINIC | Age: 72
End: 2017-11-22

## 2017-11-22 DIAGNOSIS — Z95.3 S/P MITRAL VALVE REPLACEMENT WITH TISSUE VALVE: ICD-10-CM

## 2017-11-22 DIAGNOSIS — Z95.2 S/P MVR (MITRAL VALVE REPLACEMENT): ICD-10-CM

## 2017-11-22 DIAGNOSIS — J90 PLEURAL EFFUSION: ICD-10-CM

## 2017-11-22 DIAGNOSIS — Z98.890 S/P MVR (MITRAL VALVE REPAIR): ICD-10-CM

## 2017-11-24 ENCOUNTER — AMBULATORY - HEALTHEAST (OUTPATIENT)
Dept: CARDIAC REHAB | Facility: HOSPITAL | Age: 72
End: 2017-11-24

## 2017-11-24 DIAGNOSIS — Z95.2 S/P MVR (MITRAL VALVE REPLACEMENT): ICD-10-CM

## 2017-11-27 ENCOUNTER — OFFICE VISIT - HEALTHEAST (OUTPATIENT)
Dept: CARDIOLOGY | Facility: CLINIC | Age: 72
End: 2017-11-27

## 2017-11-27 ENCOUNTER — COMMUNICATION - HEALTHEAST (OUTPATIENT)
Dept: CARDIOLOGY | Facility: CLINIC | Age: 72
End: 2017-11-27

## 2017-11-27 DIAGNOSIS — I49.5 TACHYCARDIA-BRADYCARDIA SYNDROME (H): ICD-10-CM

## 2017-11-27 DIAGNOSIS — Z95.3 S/P MITRAL VALVE REPLACEMENT WITH TISSUE VALVE: ICD-10-CM

## 2017-11-27 DIAGNOSIS — J90 PLEURAL EFFUSION: ICD-10-CM

## 2017-11-27 ASSESSMENT — MIFFLIN-ST. JEOR: SCORE: 1703.88

## 2017-11-29 ENCOUNTER — AMBULATORY - HEALTHEAST (OUTPATIENT)
Dept: LAB | Facility: CLINIC | Age: 72
End: 2017-11-29

## 2017-11-29 ENCOUNTER — COMMUNICATION - HEALTHEAST (OUTPATIENT)
Dept: NURSING | Facility: CLINIC | Age: 72
End: 2017-11-29

## 2017-11-29 ENCOUNTER — AMBULATORY - HEALTHEAST (OUTPATIENT)
Dept: CARDIAC REHAB | Facility: HOSPITAL | Age: 72
End: 2017-11-29

## 2017-11-29 DIAGNOSIS — Z95.2 S/P MVR (MITRAL VALVE REPLACEMENT): ICD-10-CM

## 2017-11-29 DIAGNOSIS — Z95.3 S/P MITRAL VALVE REPLACEMENT WITH TISSUE VALVE: ICD-10-CM

## 2017-11-29 DIAGNOSIS — J90 PLEURAL EFFUSION: ICD-10-CM

## 2017-11-29 DIAGNOSIS — Z98.890 S/P MVR (MITRAL VALVE REPAIR): ICD-10-CM

## 2017-12-01 ENCOUNTER — HOSPITAL ENCOUNTER (OUTPATIENT)
Dept: RADIOLOGY | Facility: HOSPITAL | Age: 72
Discharge: HOME | End: 2017-12-01
Attending: INTERNAL MEDICINE

## 2017-12-01 ENCOUNTER — AMBULATORY - HEALTHEAST (OUTPATIENT)
Dept: CARDIAC REHAB | Facility: HOSPITAL | Age: 72
End: 2017-12-01

## 2017-12-01 DIAGNOSIS — Z95.2 S/P MVR (MITRAL VALVE REPLACEMENT): ICD-10-CM

## 2017-12-01 DIAGNOSIS — J90 PLEURAL EFFUSION: ICD-10-CM

## 2017-12-05 ENCOUNTER — AMBULATORY - HEALTHEAST (OUTPATIENT)
Dept: CARDIOLOGY | Facility: CLINIC | Age: 72
End: 2017-12-05

## 2017-12-05 DIAGNOSIS — Z79.899 LONG TERM USE OF DRUG: ICD-10-CM

## 2017-12-06 ENCOUNTER — COMMUNICATION - HEALTHEAST (OUTPATIENT)
Dept: NURSING | Facility: CLINIC | Age: 72
End: 2017-12-06

## 2017-12-06 ENCOUNTER — AMBULATORY - HEALTHEAST (OUTPATIENT)
Dept: CARDIAC REHAB | Facility: HOSPITAL | Age: 72
End: 2017-12-06

## 2017-12-06 ENCOUNTER — AMBULATORY - HEALTHEAST (OUTPATIENT)
Dept: LAB | Facility: CLINIC | Age: 72
End: 2017-12-06

## 2017-12-06 DIAGNOSIS — Z95.3 S/P MITRAL VALVE REPLACEMENT WITH TISSUE VALVE: ICD-10-CM

## 2017-12-06 DIAGNOSIS — Z98.890 S/P MVR (MITRAL VALVE REPAIR): ICD-10-CM

## 2017-12-06 DIAGNOSIS — Z95.2 S/P MVR (MITRAL VALVE REPLACEMENT): ICD-10-CM

## 2017-12-08 ENCOUNTER — OFFICE VISIT - HEALTHEAST (OUTPATIENT)
Dept: CARDIOLOGY | Facility: CLINIC | Age: 72
End: 2017-12-08

## 2017-12-08 ENCOUNTER — HOSPITAL ENCOUNTER (OUTPATIENT)
Dept: RADIOLOGY | Facility: CLINIC | Age: 72
Discharge: HOME | End: 2017-12-08
Attending: SURGERY

## 2017-12-08 ENCOUNTER — AMBULATORY - HEALTHEAST (OUTPATIENT)
Dept: CARDIAC REHAB | Facility: HOSPITAL | Age: 72
End: 2017-12-08

## 2017-12-08 ENCOUNTER — AMBULATORY - HEALTHEAST (OUTPATIENT)
Dept: CARDIOLOGY | Facility: CLINIC | Age: 72
End: 2017-12-08

## 2017-12-08 DIAGNOSIS — J90 RECURRENT LEFT PLEURAL EFFUSION: ICD-10-CM

## 2017-12-08 DIAGNOSIS — J90 PLEURAL EFFUSION: ICD-10-CM

## 2017-12-08 DIAGNOSIS — Z95.2 S/P MVR (MITRAL VALVE REPLACEMENT): ICD-10-CM

## 2017-12-08 ASSESSMENT — MIFFLIN-ST. JEOR: SCORE: 1693.9

## 2017-12-11 ENCOUNTER — AMBULATORY - HEALTHEAST (OUTPATIENT)
Dept: CARDIOLOGY | Facility: CLINIC | Age: 72
End: 2017-12-11

## 2017-12-11 DIAGNOSIS — J90 PLEURAL EFFUSION: ICD-10-CM

## 2017-12-11 DIAGNOSIS — Z95.2 S/P MVR (MITRAL VALVE REPLACEMENT): ICD-10-CM

## 2017-12-12 ENCOUNTER — AMBULATORY - HEALTHEAST (OUTPATIENT)
Dept: CARDIOLOGY | Facility: CLINIC | Age: 72
End: 2017-12-12

## 2017-12-12 ENCOUNTER — COMMUNICATION - HEALTHEAST (OUTPATIENT)
Dept: NURSING | Facility: CLINIC | Age: 72
End: 2017-12-12

## 2017-12-15 ENCOUNTER — COMMUNICATION - HEALTHEAST (OUTPATIENT)
Dept: NURSING | Facility: CLINIC | Age: 72
End: 2017-12-15

## 2017-12-15 ENCOUNTER — COMMUNICATION - HEALTHEAST (OUTPATIENT)
Dept: INTERNAL MEDICINE | Facility: CLINIC | Age: 72
End: 2017-12-15

## 2017-12-15 ENCOUNTER — AMBULATORY - HEALTHEAST (OUTPATIENT)
Dept: CARDIAC REHAB | Facility: HOSPITAL | Age: 72
End: 2017-12-15

## 2017-12-15 DIAGNOSIS — Z95.2 S/P MVR (MITRAL VALVE REPLACEMENT): ICD-10-CM

## 2017-12-18 ENCOUNTER — COMMUNICATION - HEALTHEAST (OUTPATIENT)
Dept: NURSING | Facility: CLINIC | Age: 72
End: 2017-12-18

## 2017-12-18 ENCOUNTER — AMBULATORY - HEALTHEAST (OUTPATIENT)
Dept: LAB | Facility: CLINIC | Age: 72
End: 2017-12-18

## 2017-12-18 DIAGNOSIS — J90 PLEURAL EFFUSION: ICD-10-CM

## 2017-12-19 ENCOUNTER — AMBULATORY - HEALTHEAST (OUTPATIENT)
Dept: CARDIOLOGY | Facility: CLINIC | Age: 72
End: 2017-12-19

## 2017-12-19 ENCOUNTER — COMMUNICATION - HEALTHEAST (OUTPATIENT)
Dept: NURSING | Facility: CLINIC | Age: 72
End: 2017-12-19

## 2017-12-19 DIAGNOSIS — J90 PLEURAL EFFUSION ON LEFT: ICD-10-CM

## 2017-12-20 ENCOUNTER — HOSPITAL ENCOUNTER (OUTPATIENT)
Dept: RADIOLOGY | Facility: HOSPITAL | Age: 72
Discharge: HOME | End: 2017-12-20
Attending: PHYSICIAN ASSISTANT

## 2017-12-20 ENCOUNTER — AMBULATORY - HEALTHEAST (OUTPATIENT)
Dept: CARDIAC REHAB | Facility: HOSPITAL | Age: 72
End: 2017-12-20

## 2017-12-20 DIAGNOSIS — Z95.2 S/P MVR (MITRAL VALVE REPLACEMENT): ICD-10-CM

## 2017-12-20 DIAGNOSIS — J90 PLEURAL EFFUSION ON LEFT: ICD-10-CM

## 2017-12-22 ENCOUNTER — AMBULATORY - HEALTHEAST (OUTPATIENT)
Dept: CARDIAC REHAB | Facility: HOSPITAL | Age: 72
End: 2017-12-22

## 2017-12-22 DIAGNOSIS — Z95.2 S/P MVR (MITRAL VALVE REPLACEMENT): ICD-10-CM

## 2017-12-26 ENCOUNTER — AMBULATORY - HEALTHEAST (OUTPATIENT)
Dept: CARDIOLOGY | Facility: CLINIC | Age: 72
End: 2017-12-26

## 2017-12-26 DIAGNOSIS — J90 PLEURAL EFFUSION: ICD-10-CM

## 2017-12-27 ENCOUNTER — AMBULATORY - HEALTHEAST (OUTPATIENT)
Dept: CARDIAC REHAB | Facility: HOSPITAL | Age: 72
End: 2017-12-27

## 2017-12-27 DIAGNOSIS — Z95.2 S/P MVR (MITRAL VALVE REPLACEMENT): ICD-10-CM

## 2017-12-29 ENCOUNTER — AMBULATORY - HEALTHEAST (OUTPATIENT)
Dept: CARDIOLOGY | Facility: CLINIC | Age: 72
End: 2017-12-29

## 2017-12-29 ENCOUNTER — COMMUNICATION - HEALTHEAST (OUTPATIENT)
Dept: NURSING | Facility: CLINIC | Age: 72
End: 2017-12-29

## 2017-12-29 ENCOUNTER — AMBULATORY - HEALTHEAST (OUTPATIENT)
Dept: LAB | Facility: CLINIC | Age: 72
End: 2017-12-29

## 2017-12-29 DIAGNOSIS — Z95.2 S/P MVR (MITRAL VALVE REPLACEMENT): ICD-10-CM

## 2017-12-29 DIAGNOSIS — Z95.0 CARDIAC PACEMAKER IN SITU: ICD-10-CM

## 2017-12-29 LAB — HCC DEVICE COMMENTS: NORMAL

## 2017-12-29 ASSESSMENT — MIFFLIN-ST. JEOR: SCORE: 1725.65

## 2018-01-02 ENCOUNTER — HOSPITAL ENCOUNTER (OUTPATIENT)
Dept: RADIOLOGY | Facility: CLINIC | Age: 73
Discharge: HOME | End: 2018-01-02
Attending: PHYSICIAN ASSISTANT

## 2018-01-02 ENCOUNTER — OFFICE VISIT - HEALTHEAST (OUTPATIENT)
Dept: CARDIOLOGY | Facility: CLINIC | Age: 73
End: 2018-01-02

## 2018-01-02 DIAGNOSIS — Z93.8 S/P CHEST TUBE PLACEMENT (H): ICD-10-CM

## 2018-01-02 ASSESSMENT — MIFFLIN-ST. JEOR: SCORE: 1731.1

## 2018-01-03 ENCOUNTER — AMBULATORY - HEALTHEAST (OUTPATIENT)
Dept: CARDIAC REHAB | Facility: HOSPITAL | Age: 73
End: 2018-01-03

## 2018-01-03 ENCOUNTER — AMBULATORY - HEALTHEAST (OUTPATIENT)
Dept: LAB | Facility: CLINIC | Age: 73
End: 2018-01-03

## 2018-01-03 ENCOUNTER — COMMUNICATION - HEALTHEAST (OUTPATIENT)
Dept: INTERNAL MEDICINE | Facility: CLINIC | Age: 73
End: 2018-01-03

## 2018-01-03 DIAGNOSIS — Z98.890 S/P MVR (MITRAL VALVE REPAIR): ICD-10-CM

## 2018-01-03 DIAGNOSIS — Z95.3 S/P MITRAL VALVE REPLACEMENT WITH TISSUE VALVE: ICD-10-CM

## 2018-01-03 DIAGNOSIS — Z95.2 S/P MVR (MITRAL VALVE REPLACEMENT): ICD-10-CM

## 2018-01-03 LAB — INR PPP: 2.4 (ref 0.9–1.1)

## 2018-01-05 ENCOUNTER — COMMUNICATION - HEALTHEAST (OUTPATIENT)
Dept: INTERNAL MEDICINE | Facility: CLINIC | Age: 73
End: 2018-01-05

## 2018-01-05 ENCOUNTER — OFFICE VISIT - HEALTHEAST (OUTPATIENT)
Dept: CARDIOLOGY | Facility: CLINIC | Age: 73
End: 2018-01-05

## 2018-01-05 ENCOUNTER — AMBULATORY - HEALTHEAST (OUTPATIENT)
Dept: CARDIAC REHAB | Facility: HOSPITAL | Age: 73
End: 2018-01-05

## 2018-01-05 ENCOUNTER — AMBULATORY - HEALTHEAST (OUTPATIENT)
Dept: CARDIOLOGY | Facility: CLINIC | Age: 73
End: 2018-01-05

## 2018-01-05 DIAGNOSIS — Z95.2 S/P MVR (MITRAL VALVE REPLACEMENT): ICD-10-CM

## 2018-01-05 DIAGNOSIS — I48.3 TYPICAL ATRIAL FLUTTER (H): ICD-10-CM

## 2018-01-05 ASSESSMENT — MIFFLIN-ST. JEOR: SCORE: 1721.12

## 2018-01-09 ENCOUNTER — AMBULATORY - HEALTHEAST (OUTPATIENT)
Dept: CARDIOLOGY | Facility: CLINIC | Age: 73
End: 2018-01-09

## 2018-01-10 ENCOUNTER — COMMUNICATION - HEALTHEAST (OUTPATIENT)
Dept: NURSING | Facility: CLINIC | Age: 73
End: 2018-01-10

## 2018-01-10 ENCOUNTER — AMBULATORY - HEALTHEAST (OUTPATIENT)
Dept: CARDIAC REHAB | Facility: HOSPITAL | Age: 73
End: 2018-01-10

## 2018-01-10 ENCOUNTER — AMBULATORY - HEALTHEAST (OUTPATIENT)
Dept: LAB | Facility: CLINIC | Age: 73
End: 2018-01-10

## 2018-01-10 DIAGNOSIS — Z95.3 S/P MITRAL VALVE REPLACEMENT WITH TISSUE VALVE: ICD-10-CM

## 2018-01-10 DIAGNOSIS — Z95.2 S/P MVR (MITRAL VALVE REPLACEMENT): ICD-10-CM

## 2018-01-10 DIAGNOSIS — Z98.890 S/P MVR (MITRAL VALVE REPAIR): ICD-10-CM

## 2018-01-10 LAB — INR PPP: 3.1 (ref 0.9–1.1)

## 2018-01-12 ENCOUNTER — AMBULATORY - HEALTHEAST (OUTPATIENT)
Dept: CARDIAC REHAB | Facility: HOSPITAL | Age: 73
End: 2018-01-12

## 2018-01-12 DIAGNOSIS — Z95.2 S/P MVR (MITRAL VALVE REPLACEMENT): ICD-10-CM

## 2018-01-17 ENCOUNTER — AMBULATORY - HEALTHEAST (OUTPATIENT)
Dept: LAB | Facility: CLINIC | Age: 73
End: 2018-01-17

## 2018-01-17 ENCOUNTER — AMBULATORY - HEALTHEAST (OUTPATIENT)
Dept: CARDIOLOGY | Facility: CLINIC | Age: 73
End: 2018-01-17

## 2018-01-17 ENCOUNTER — SURGERY - HEALTHEAST (OUTPATIENT)
Dept: CARDIOLOGY | Facility: CLINIC | Age: 73
End: 2018-01-17

## 2018-01-17 ENCOUNTER — COMMUNICATION - HEALTHEAST (OUTPATIENT)
Dept: NURSING | Facility: CLINIC | Age: 73
End: 2018-01-17

## 2018-01-17 ENCOUNTER — AMBULATORY - HEALTHEAST (OUTPATIENT)
Dept: CARDIAC REHAB | Facility: HOSPITAL | Age: 73
End: 2018-01-17

## 2018-01-17 DIAGNOSIS — Z95.2 S/P MVR (MITRAL VALVE REPLACEMENT): ICD-10-CM

## 2018-01-17 DIAGNOSIS — Z98.890 S/P MVR (MITRAL VALVE REPAIR): ICD-10-CM

## 2018-01-17 DIAGNOSIS — Z95.3 S/P MITRAL VALVE REPLACEMENT WITH TISSUE VALVE: ICD-10-CM

## 2018-01-17 LAB — INR PPP: 3 (ref 0.9–1.1)

## 2018-01-18 ENCOUNTER — AMBULATORY - HEALTHEAST (OUTPATIENT)
Dept: CARDIOLOGY | Facility: CLINIC | Age: 73
End: 2018-01-18

## 2018-01-18 ENCOUNTER — ANESTHESIA - HEALTHEAST (OUTPATIENT)
Dept: CARDIOLOGY | Facility: CLINIC | Age: 73
End: 2018-01-18

## 2018-01-18 DIAGNOSIS — Z95.0 CARDIAC PACEMAKER IN SITU: ICD-10-CM

## 2018-01-19 ENCOUNTER — COMMUNICATION - HEALTHEAST (OUTPATIENT)
Dept: NURSING | Facility: CLINIC | Age: 73
End: 2018-01-19

## 2018-01-19 ENCOUNTER — AMBULATORY - HEALTHEAST (OUTPATIENT)
Dept: CARDIAC REHAB | Facility: HOSPITAL | Age: 73
End: 2018-01-19

## 2018-01-19 DIAGNOSIS — Z95.2 S/P MVR (MITRAL VALVE REPLACEMENT): ICD-10-CM

## 2018-01-22 ENCOUNTER — COMMUNICATION - HEALTHEAST (OUTPATIENT)
Dept: NURSING | Facility: CLINIC | Age: 73
End: 2018-01-22

## 2018-01-22 DIAGNOSIS — I48.3 TYPICAL ATRIAL FLUTTER (H): ICD-10-CM

## 2018-01-22 DIAGNOSIS — I48.0 PAROXYSMAL ATRIAL FIBRILLATION (H): ICD-10-CM

## 2018-01-22 DIAGNOSIS — Z95.3 S/P MITRAL VALVE REPLACEMENT WITH TISSUE VALVE: ICD-10-CM

## 2018-01-24 ENCOUNTER — COMMUNICATION - HEALTHEAST (OUTPATIENT)
Dept: NURSING | Facility: CLINIC | Age: 73
End: 2018-01-24

## 2018-01-24 ENCOUNTER — AMBULATORY - HEALTHEAST (OUTPATIENT)
Dept: LAB | Facility: CLINIC | Age: 73
End: 2018-01-24

## 2018-01-24 ENCOUNTER — AMBULATORY - HEALTHEAST (OUTPATIENT)
Dept: CARDIAC REHAB | Facility: HOSPITAL | Age: 73
End: 2018-01-24

## 2018-01-24 DIAGNOSIS — I48.0 PAROXYSMAL ATRIAL FIBRILLATION (H): ICD-10-CM

## 2018-01-24 DIAGNOSIS — Z95.2 S/P MVR (MITRAL VALVE REPLACEMENT): ICD-10-CM

## 2018-01-24 DIAGNOSIS — Z95.3 S/P MITRAL VALVE REPLACEMENT WITH TISSUE VALVE: ICD-10-CM

## 2018-01-24 DIAGNOSIS — I48.3 TYPICAL ATRIAL FLUTTER (H): ICD-10-CM

## 2018-01-24 LAB — INR PPP: 3.2 (ref 0.9–1.1)

## 2018-01-26 ENCOUNTER — AMBULATORY - HEALTHEAST (OUTPATIENT)
Dept: CARDIAC REHAB | Facility: HOSPITAL | Age: 73
End: 2018-01-26

## 2018-01-26 DIAGNOSIS — Z95.2 S/P MVR (MITRAL VALVE REPLACEMENT): ICD-10-CM

## 2018-01-31 ENCOUNTER — AMBULATORY - HEALTHEAST (OUTPATIENT)
Dept: CARDIOLOGY | Facility: CLINIC | Age: 73
End: 2018-01-31

## 2018-01-31 ENCOUNTER — AMBULATORY - HEALTHEAST (OUTPATIENT)
Dept: LAB | Facility: CLINIC | Age: 73
End: 2018-01-31

## 2018-01-31 ENCOUNTER — COMMUNICATION - HEALTHEAST (OUTPATIENT)
Dept: INTERNAL MEDICINE | Facility: CLINIC | Age: 73
End: 2018-01-31

## 2018-01-31 ENCOUNTER — AMBULATORY - HEALTHEAST (OUTPATIENT)
Dept: CARDIAC REHAB | Facility: HOSPITAL | Age: 73
End: 2018-01-31

## 2018-01-31 DIAGNOSIS — Z95.2 S/P MVR (MITRAL VALVE REPLACEMENT): ICD-10-CM

## 2018-01-31 DIAGNOSIS — I48.0 PAROXYSMAL ATRIAL FIBRILLATION (H): ICD-10-CM

## 2018-01-31 DIAGNOSIS — Z95.3 S/P MITRAL VALVE REPLACEMENT WITH TISSUE VALVE: ICD-10-CM

## 2018-01-31 DIAGNOSIS — I48.3 TYPICAL ATRIAL FLUTTER (H): ICD-10-CM

## 2018-01-31 LAB
HCC DEVICE COMMENTS: NORMAL
INR PPP: 2.7 (ref 0.9–1.1)

## 2018-02-02 ENCOUNTER — AMBULATORY - HEALTHEAST (OUTPATIENT)
Dept: CARDIAC REHAB | Facility: HOSPITAL | Age: 73
End: 2018-02-02

## 2018-02-02 DIAGNOSIS — Z95.2 S/P MVR (MITRAL VALVE REPLACEMENT): ICD-10-CM

## 2018-02-06 ENCOUNTER — AMBULATORY - HEALTHEAST (OUTPATIENT)
Dept: CARDIOLOGY | Facility: CLINIC | Age: 73
End: 2018-02-06

## 2018-02-06 DIAGNOSIS — Z95.0 CARDIAC PACEMAKER IN SITU: ICD-10-CM

## 2018-02-06 DIAGNOSIS — I48.0 PAROXYSMAL ATRIAL FIBRILLATION (H): ICD-10-CM

## 2018-02-06 DIAGNOSIS — I48.92 ATRIAL FLUTTER, UNSPECIFIED TYPE (H): ICD-10-CM

## 2018-02-06 DIAGNOSIS — I49.5 TACHYCARDIA-BRADYCARDIA SYNDROME (H): ICD-10-CM

## 2018-02-06 DIAGNOSIS — I42.9 CARDIOMYOPATHY (H): ICD-10-CM

## 2018-02-06 LAB — HCC DEVICE COMMENTS: NORMAL

## 2018-02-06 ASSESSMENT — MIFFLIN-ST. JEOR: SCORE: 1736.43

## 2018-02-07 ENCOUNTER — COMMUNICATION - HEALTHEAST (OUTPATIENT)
Dept: INTENSIVE CARE | Facility: CLINIC | Age: 73
End: 2018-02-07

## 2018-02-07 ENCOUNTER — AMBULATORY - HEALTHEAST (OUTPATIENT)
Dept: CARDIAC REHAB | Facility: HOSPITAL | Age: 73
End: 2018-02-07

## 2018-02-07 DIAGNOSIS — Z95.3 S/P MITRAL VALVE REPLACEMENT WITH TISSUE VALVE: ICD-10-CM

## 2018-02-07 DIAGNOSIS — Z95.2 S/P MVR (MITRAL VALVE REPLACEMENT): ICD-10-CM

## 2018-02-09 ENCOUNTER — AMBULATORY - HEALTHEAST (OUTPATIENT)
Dept: CARDIAC REHAB | Facility: HOSPITAL | Age: 73
End: 2018-02-09

## 2018-02-09 DIAGNOSIS — Z95.2 S/P MVR (MITRAL VALVE REPLACEMENT): ICD-10-CM

## 2018-02-14 ENCOUNTER — COMMUNICATION - HEALTHEAST (OUTPATIENT)
Dept: NURSING | Facility: CLINIC | Age: 73
End: 2018-02-14

## 2018-02-14 ENCOUNTER — AMBULATORY - HEALTHEAST (OUTPATIENT)
Dept: CARDIAC REHAB | Facility: HOSPITAL | Age: 73
End: 2018-02-14

## 2018-02-14 ENCOUNTER — AMBULATORY - HEALTHEAST (OUTPATIENT)
Dept: LAB | Facility: CLINIC | Age: 73
End: 2018-02-14

## 2018-02-14 DIAGNOSIS — Z95.2 S/P MVR (MITRAL VALVE REPLACEMENT): ICD-10-CM

## 2018-02-14 DIAGNOSIS — Z95.3 S/P MITRAL VALVE REPLACEMENT WITH TISSUE VALVE: ICD-10-CM

## 2018-02-14 DIAGNOSIS — I48.3 TYPICAL ATRIAL FLUTTER (H): ICD-10-CM

## 2018-02-14 DIAGNOSIS — I48.0 PAROXYSMAL ATRIAL FIBRILLATION (H): ICD-10-CM

## 2018-02-14 LAB — INR PPP: 3.2 (ref 0.9–1.1)

## 2018-02-16 ENCOUNTER — AMBULATORY - HEALTHEAST (OUTPATIENT)
Dept: CARDIAC REHAB | Facility: HOSPITAL | Age: 73
End: 2018-02-16

## 2018-02-16 DIAGNOSIS — Z95.2 S/P MVR (MITRAL VALVE REPLACEMENT): ICD-10-CM

## 2018-02-21 ENCOUNTER — AMBULATORY - HEALTHEAST (OUTPATIENT)
Dept: CARDIAC REHAB | Facility: HOSPITAL | Age: 73
End: 2018-02-21

## 2018-02-21 DIAGNOSIS — Z95.2 S/P MVR (MITRAL VALVE REPLACEMENT): ICD-10-CM

## 2018-02-23 ENCOUNTER — AMBULATORY - HEALTHEAST (OUTPATIENT)
Dept: LAB | Facility: CLINIC | Age: 73
End: 2018-02-23

## 2018-02-23 ENCOUNTER — COMMUNICATION - HEALTHEAST (OUTPATIENT)
Dept: INTERNAL MEDICINE | Facility: CLINIC | Age: 73
End: 2018-02-23

## 2018-02-23 ENCOUNTER — AMBULATORY - HEALTHEAST (OUTPATIENT)
Dept: CARDIAC REHAB | Facility: HOSPITAL | Age: 73
End: 2018-02-23

## 2018-02-23 DIAGNOSIS — I48.3 TYPICAL ATRIAL FLUTTER (H): ICD-10-CM

## 2018-02-23 DIAGNOSIS — Z95.3 S/P MITRAL VALVE REPLACEMENT WITH TISSUE VALVE: ICD-10-CM

## 2018-02-23 DIAGNOSIS — Z95.2 S/P MVR (MITRAL VALVE REPLACEMENT): ICD-10-CM

## 2018-02-23 DIAGNOSIS — I48.0 PAROXYSMAL ATRIAL FIBRILLATION (H): ICD-10-CM

## 2018-02-23 LAB — INR PPP: 2.9 (ref 0.9–1.1)

## 2018-03-07 ENCOUNTER — AMBULATORY - HEALTHEAST (OUTPATIENT)
Dept: LAB | Facility: CLINIC | Age: 73
End: 2018-03-07

## 2018-03-07 ENCOUNTER — COMMUNICATION - HEALTHEAST (OUTPATIENT)
Dept: NURSING | Facility: CLINIC | Age: 73
End: 2018-03-07

## 2018-03-07 DIAGNOSIS — I48.3 TYPICAL ATRIAL FLUTTER (H): ICD-10-CM

## 2018-03-07 DIAGNOSIS — I48.0 PAROXYSMAL ATRIAL FIBRILLATION (H): ICD-10-CM

## 2018-03-07 DIAGNOSIS — Z95.3 S/P MITRAL VALVE REPLACEMENT WITH TISSUE VALVE: ICD-10-CM

## 2018-03-07 LAB — INR PPP: 2.4 (ref 0.9–1.1)

## 2018-04-04 ENCOUNTER — HOSPITAL ENCOUNTER (OUTPATIENT)
Dept: CARDIOLOGY | Facility: HOSPITAL | Age: 73
Discharge: HOME | End: 2018-04-04
Attending: NURSE PRACTITIONER

## 2018-04-04 DIAGNOSIS — I42.9 CARDIOMYOPATHY (H): ICD-10-CM

## 2018-04-04 LAB
BSA FOR ECHO PROCEDURE: 2.2 M2
CV BLOOD PRESSURE: NORMAL MMHG
CV ECHO HEIGHT: 73 IN
CV ECHO WEIGHT: 208 LBS
DOP CALC MV VTI: 59.3 CM
ECHO EJECTION FRACTION ESTIMATED: 50 %
EJECTION FRACTION: 36 % (ref 55–75)
FRACTIONAL SHORTENING: 34.7 % (ref 28–44)
INTERVENTRICULAR SEPTUM IN END DIASTOLE: 1.4 CM (ref 0.6–1)
IVS/PW RATIO: 1
LEFT VENTRICLE DIASTOLIC VOLUME INDEX: 30 CM3/M2 (ref 34–74)
LEFT VENTRICLE DIASTOLIC VOLUME: 66 CM3 (ref 62–150)
LEFT VENTRICLE MASS INDEX: 128.4 G/M2
LEFT VENTRICLE SYSTOLIC VOLUME INDEX: 19.1 CM3/M2 (ref 11–31)
LEFT VENTRICLE SYSTOLIC VOLUME: 42 CM3 (ref 21–61)
LEFT VENTRICULAR INTERNAL DIMENSION IN DIASTOLE: 4.9 CM (ref 4.2–5.8)
LEFT VENTRICULAR INTERNAL DIMENSION IN SYSTOLE: 3.2 CM (ref 2.5–4)
LEFT VENTRICULAR MASS: 282.6 G
LEFT VENTRICULAR POSTERIOR WALL IN END DIASTOLE: 1.4 CM (ref 0.6–1)
MITRAL VALVE DECELERATION SLOPE: 1380 MM/S2
MITRAL VALVE MEAN INFLOW VELOCITY: 87 CM/S
MITRAL VALVE PEAK VELOCITY: 149 CM/S
MITRAL VALVE PRESSURE HALF-TIME: 208 MS
MV MEAN GRADIENT: 3 MMHG
MV PEAK GRADIENT: 8.9 MMHG
MV VALVE AREA PRESSURE 1/2 METHOD: 1.1 CM2
NUC REST DIASTOLIC VOLUME INDEX: 3328 LBS
NUC REST SYSTOLIC VOLUME INDEX: 73 IN
TRICUSPID REGURGITATION PEAK PRESSURE GRADIENT: 19.9 MMHG
TRICUSPID VALVE PEAK REGURGITANT VELOCITY: 223 CM/S

## 2018-04-04 ASSESSMENT — MIFFLIN-ST. JEOR: SCORE: 1727.36

## 2018-04-11 ENCOUNTER — AMBULATORY - HEALTHEAST (OUTPATIENT)
Dept: LAB | Facility: CLINIC | Age: 73
End: 2018-04-11

## 2018-04-11 ENCOUNTER — COMMUNICATION - HEALTHEAST (OUTPATIENT)
Dept: ANTICOAGULATION | Facility: CLINIC | Age: 73
End: 2018-04-11

## 2018-04-11 ENCOUNTER — AMBULATORY - HEALTHEAST (OUTPATIENT)
Dept: CARDIOLOGY | Facility: CLINIC | Age: 73
End: 2018-04-11

## 2018-04-11 DIAGNOSIS — I48.3 TYPICAL ATRIAL FLUTTER (H): ICD-10-CM

## 2018-04-11 DIAGNOSIS — Z79.899 LONG TERM USE OF DRUG: ICD-10-CM

## 2018-04-11 DIAGNOSIS — Z95.3 S/P MITRAL VALVE REPLACEMENT WITH TISSUE VALVE: ICD-10-CM

## 2018-04-11 DIAGNOSIS — I48.0 PAROXYSMAL ATRIAL FIBRILLATION (H): ICD-10-CM

## 2018-04-11 LAB
ALT SERPL W P-5'-P-CCNC: 27 U/L (ref 0–45)
INR PPP: 4.1 (ref 0.9–1.1)
TSH SERPL DL<=0.005 MIU/L-ACNC: 4.06 UIU/ML (ref 0.3–5)

## 2018-04-13 LAB
AMIODARONE SERPL-MCNC: 0.8 MCG/ML
DESETHYLAMIODARONE SERPL-MCNC: 0.8 MCG/ML

## 2018-04-16 ENCOUNTER — OFFICE VISIT - HEALTHEAST (OUTPATIENT)
Dept: CARDIOLOGY | Facility: CLINIC | Age: 73
End: 2018-04-16

## 2018-04-16 DIAGNOSIS — I48.0 PAROXYSMAL ATRIAL FIBRILLATION (H): ICD-10-CM

## 2018-04-16 DIAGNOSIS — I34.0 NON-RHEUMATIC MITRAL REGURGITATION: ICD-10-CM

## 2018-04-16 DIAGNOSIS — I42.0 DILATED CARDIOMYOPATHY (H): ICD-10-CM

## 2018-04-16 ASSESSMENT — MIFFLIN-ST. JEOR: SCORE: 1738.7

## 2018-04-18 ENCOUNTER — AMBULATORY - HEALTHEAST (OUTPATIENT)
Dept: LAB | Facility: CLINIC | Age: 73
End: 2018-04-18

## 2018-04-18 ENCOUNTER — COMMUNICATION - HEALTHEAST (OUTPATIENT)
Dept: ANTICOAGULATION | Facility: CLINIC | Age: 73
End: 2018-04-18

## 2018-04-18 DIAGNOSIS — Z95.3 S/P MITRAL VALVE REPLACEMENT WITH TISSUE VALVE: ICD-10-CM

## 2018-04-18 DIAGNOSIS — I48.3 TYPICAL ATRIAL FLUTTER (H): ICD-10-CM

## 2018-04-18 DIAGNOSIS — I48.0 PAROXYSMAL ATRIAL FIBRILLATION (H): ICD-10-CM

## 2018-04-18 LAB — INR PPP: 2.2 (ref 0.9–1.1)

## 2018-04-26 ENCOUNTER — AMBULATORY - HEALTHEAST (OUTPATIENT)
Dept: CARDIOLOGY | Facility: CLINIC | Age: 73
End: 2018-04-26

## 2018-05-02 ENCOUNTER — AMBULATORY - HEALTHEAST (OUTPATIENT)
Dept: LAB | Facility: CLINIC | Age: 73
End: 2018-05-02

## 2018-05-02 ENCOUNTER — COMMUNICATION - HEALTHEAST (OUTPATIENT)
Dept: ANTICOAGULATION | Facility: CLINIC | Age: 73
End: 2018-05-02

## 2018-05-02 DIAGNOSIS — I48.3 TYPICAL ATRIAL FLUTTER (H): ICD-10-CM

## 2018-05-02 DIAGNOSIS — Z95.3 S/P MITRAL VALVE REPLACEMENT WITH TISSUE VALVE: ICD-10-CM

## 2018-05-02 DIAGNOSIS — I48.0 PAROXYSMAL ATRIAL FIBRILLATION (H): ICD-10-CM

## 2018-05-02 LAB — INR PPP: 2.9 (ref 0.9–1.1)

## 2018-05-07 ENCOUNTER — AMBULATORY - HEALTHEAST (OUTPATIENT)
Dept: CARDIOLOGY | Facility: CLINIC | Age: 73
End: 2018-05-07

## 2018-05-07 DIAGNOSIS — Z95.0 CARDIAC PACEMAKER IN SITU: ICD-10-CM

## 2018-05-08 ENCOUNTER — COMMUNICATION - HEALTHEAST (OUTPATIENT)
Dept: CARDIOLOGY | Facility: CLINIC | Age: 73
End: 2018-05-08

## 2018-05-09 LAB
HCC DEVICE COMMENTS: NORMAL
HCC DEVICE IMPLANTING PROVIDER: NORMAL
HCC DEVICE MANUFACTURE: NORMAL
HCC DEVICE MODEL: NORMAL
HCC DEVICE SERIAL NUMBER: NORMAL
HCC DEVICE TYPE: NORMAL

## 2018-05-30 ENCOUNTER — COMMUNICATION - HEALTHEAST (OUTPATIENT)
Dept: ANTICOAGULATION | Facility: CLINIC | Age: 73
End: 2018-05-30

## 2018-05-30 ENCOUNTER — AMBULATORY - HEALTHEAST (OUTPATIENT)
Dept: LAB | Facility: CLINIC | Age: 73
End: 2018-05-30

## 2018-05-30 DIAGNOSIS — I48.0 PAROXYSMAL ATRIAL FIBRILLATION (H): ICD-10-CM

## 2018-05-30 DIAGNOSIS — Z95.3 S/P MITRAL VALVE REPLACEMENT WITH TISSUE VALVE: ICD-10-CM

## 2018-05-30 DIAGNOSIS — I48.3 TYPICAL ATRIAL FLUTTER (H): ICD-10-CM

## 2018-05-30 LAB — INR PPP: 1.9 (ref 0.9–1.1)

## 2018-06-13 ENCOUNTER — AMBULATORY - HEALTHEAST (OUTPATIENT)
Dept: LAB | Facility: CLINIC | Age: 73
End: 2018-06-13

## 2018-06-13 ENCOUNTER — COMMUNICATION - HEALTHEAST (OUTPATIENT)
Dept: ANTICOAGULATION | Facility: CLINIC | Age: 73
End: 2018-06-13

## 2018-06-13 DIAGNOSIS — Z95.3 S/P MITRAL VALVE REPLACEMENT WITH TISSUE VALVE: ICD-10-CM

## 2018-06-13 DIAGNOSIS — I48.0 PAROXYSMAL ATRIAL FIBRILLATION (H): ICD-10-CM

## 2018-06-13 DIAGNOSIS — I48.3 TYPICAL ATRIAL FLUTTER (H): ICD-10-CM

## 2018-06-13 LAB — INR PPP: 2.5 (ref 0.9–1.1)

## 2018-06-16 ENCOUNTER — COMMUNICATION - HEALTHEAST (OUTPATIENT)
Dept: INTENSIVE CARE | Facility: CLINIC | Age: 73
End: 2018-06-16

## 2018-06-16 DIAGNOSIS — I48.0 PAROXYSMAL ATRIAL FIBRILLATION (H): ICD-10-CM

## 2018-06-16 DIAGNOSIS — Z79.01 LONG-TERM (CURRENT) USE OF ANTICOAGULANTS: ICD-10-CM

## 2018-06-16 DIAGNOSIS — Z95.3 S/P MITRAL VALVE REPLACEMENT WITH TISSUE VALVE: ICD-10-CM

## 2018-06-26 ENCOUNTER — AMBULATORY - HEALTHEAST (OUTPATIENT)
Dept: LAB | Facility: CLINIC | Age: 73
End: 2018-06-26

## 2018-06-26 ENCOUNTER — COMMUNICATION - HEALTHEAST (OUTPATIENT)
Dept: ANTICOAGULATION | Facility: CLINIC | Age: 73
End: 2018-06-26

## 2018-06-26 DIAGNOSIS — I48.3 TYPICAL ATRIAL FLUTTER (H): ICD-10-CM

## 2018-06-26 DIAGNOSIS — I48.0 PAROXYSMAL ATRIAL FIBRILLATION (H): ICD-10-CM

## 2018-06-26 DIAGNOSIS — Z95.3 S/P MITRAL VALVE REPLACEMENT WITH TISSUE VALVE: ICD-10-CM

## 2018-06-26 LAB — INR PPP: 3.3 (ref 0.9–1.1)

## 2018-07-09 ENCOUNTER — OFFICE VISIT - HEALTHEAST (OUTPATIENT)
Dept: CARDIOLOGY | Facility: CLINIC | Age: 73
End: 2018-07-09

## 2018-07-09 DIAGNOSIS — I48.0 PAROXYSMAL ATRIAL FIBRILLATION (H): ICD-10-CM

## 2018-07-09 DIAGNOSIS — Z95.3 S/P MITRAL VALVE REPLACEMENT WITH TISSUE VALVE: ICD-10-CM

## 2018-07-09 ASSESSMENT — MIFFLIN-ST. JEOR: SCORE: 1752.3

## 2018-07-11 ENCOUNTER — COMMUNICATION - HEALTHEAST (OUTPATIENT)
Dept: ANTICOAGULATION | Facility: CLINIC | Age: 73
End: 2018-07-11

## 2018-07-11 ENCOUNTER — AMBULATORY - HEALTHEAST (OUTPATIENT)
Dept: LAB | Facility: CLINIC | Age: 73
End: 2018-07-11

## 2018-07-11 DIAGNOSIS — Z95.3 S/P MITRAL VALVE REPLACEMENT WITH TISSUE VALVE: ICD-10-CM

## 2018-07-11 DIAGNOSIS — I48.3 TYPICAL ATRIAL FLUTTER (H): ICD-10-CM

## 2018-07-11 DIAGNOSIS — I48.0 PAROXYSMAL ATRIAL FIBRILLATION (H): ICD-10-CM

## 2018-07-11 LAB — INR PPP: 4.4 (ref 0.9–1.1)

## 2018-07-18 ENCOUNTER — AMBULATORY - HEALTHEAST (OUTPATIENT)
Dept: LAB | Facility: CLINIC | Age: 73
End: 2018-07-18

## 2018-07-18 ENCOUNTER — COMMUNICATION - HEALTHEAST (OUTPATIENT)
Dept: ANTICOAGULATION | Facility: CLINIC | Age: 73
End: 2018-07-18

## 2018-07-18 DIAGNOSIS — Z95.3 S/P MITRAL VALVE REPLACEMENT WITH TISSUE VALVE: ICD-10-CM

## 2018-07-18 DIAGNOSIS — I48.0 PAROXYSMAL ATRIAL FIBRILLATION (H): ICD-10-CM

## 2018-07-18 DIAGNOSIS — I48.3 TYPICAL ATRIAL FLUTTER (H): ICD-10-CM

## 2018-07-18 LAB — INR PPP: 1.9 (ref 0.9–1.1)

## 2018-08-01 ENCOUNTER — COMMUNICATION - HEALTHEAST (OUTPATIENT)
Dept: ANTICOAGULATION | Facility: CLINIC | Age: 73
End: 2018-08-01

## 2018-08-01 ENCOUNTER — AMBULATORY - HEALTHEAST (OUTPATIENT)
Dept: LAB | Facility: CLINIC | Age: 73
End: 2018-08-01

## 2018-08-01 DIAGNOSIS — I48.3 TYPICAL ATRIAL FLUTTER (H): ICD-10-CM

## 2018-08-01 DIAGNOSIS — I48.0 PAROXYSMAL ATRIAL FIBRILLATION (H): ICD-10-CM

## 2018-08-01 DIAGNOSIS — Z95.3 S/P MITRAL VALVE REPLACEMENT WITH TISSUE VALVE: ICD-10-CM

## 2018-08-01 LAB — INR PPP: 1.8 (ref 0.9–1.1)

## 2018-08-02 ENCOUNTER — AMBULATORY - HEALTHEAST (OUTPATIENT)
Dept: CARDIOLOGY | Facility: CLINIC | Age: 73
End: 2018-08-02

## 2018-08-02 ENCOUNTER — COMMUNICATION - HEALTHEAST (OUTPATIENT)
Dept: CARDIOLOGY | Facility: CLINIC | Age: 73
End: 2018-08-02

## 2018-08-02 DIAGNOSIS — Z95.0 CARDIAC PACEMAKER IN SITU: ICD-10-CM

## 2018-08-02 DIAGNOSIS — I48.91 A-FIB (H): ICD-10-CM

## 2018-08-07 ENCOUNTER — AMBULATORY - HEALTHEAST (OUTPATIENT)
Dept: CARDIOLOGY | Facility: CLINIC | Age: 73
End: 2018-08-07

## 2018-08-13 ENCOUNTER — COMMUNICATION - HEALTHEAST (OUTPATIENT)
Dept: ANTICOAGULATION | Facility: CLINIC | Age: 73
End: 2018-08-13

## 2018-08-13 ENCOUNTER — OFFICE VISIT - HEALTHEAST (OUTPATIENT)
Dept: INTERNAL MEDICINE | Facility: CLINIC | Age: 73
End: 2018-08-13

## 2018-08-13 DIAGNOSIS — Z95.3 S/P MITRAL VALVE REPLACEMENT WITH TISSUE VALVE: ICD-10-CM

## 2018-08-13 DIAGNOSIS — Z12.5 SCREENING FOR PROSTATE CANCER: ICD-10-CM

## 2018-08-13 DIAGNOSIS — Z00.00 HEALTHCARE MAINTENANCE: ICD-10-CM

## 2018-08-13 DIAGNOSIS — I48.3 TYPICAL ATRIAL FLUTTER (H): ICD-10-CM

## 2018-08-13 DIAGNOSIS — I48.0 PAROXYSMAL ATRIAL FIBRILLATION (H): ICD-10-CM

## 2018-08-13 LAB
ALBUMIN SERPL-MCNC: 3.9 G/DL (ref 3.5–5)
ALP SERPL-CCNC: 49 U/L (ref 45–120)
ALT SERPL W P-5'-P-CCNC: 21 U/L (ref 0–45)
ANION GAP SERPL CALCULATED.3IONS-SCNC: 15 MMOL/L (ref 5–18)
AST SERPL W P-5'-P-CCNC: 26 U/L (ref 0–40)
BILIRUB SERPL-MCNC: 0.9 MG/DL (ref 0–1)
BUN SERPL-MCNC: 19 MG/DL (ref 8–28)
CALCIUM SERPL-MCNC: 9.6 MG/DL (ref 8.5–10.5)
CHLORIDE BLD-SCNC: 107 MMOL/L (ref 98–107)
CHOLEST SERPL-MCNC: 221 MG/DL
CO2 SERPL-SCNC: 18 MMOL/L (ref 22–31)
CREAT SERPL-MCNC: 1.02 MG/DL (ref 0.7–1.3)
FASTING STATUS PATIENT QL REPORTED: ABNORMAL
GFR SERPL CREATININE-BSD FRML MDRD: >60 ML/MIN/1.73M2
GLUCOSE BLD-MCNC: 80 MG/DL (ref 70–125)
HDLC SERPL-MCNC: 55 MG/DL
INR PPP: 2.8 (ref 0.9–1.1)
LDLC SERPL CALC-MCNC: 153 MG/DL
POTASSIUM BLD-SCNC: 4.1 MMOL/L (ref 3.5–5)
PROT SERPL-MCNC: 7 G/DL (ref 6–8)
PSA SERPL-MCNC: 0.4 NG/ML (ref 0–6.5)
SODIUM SERPL-SCNC: 140 MMOL/L (ref 136–145)
TRIGL SERPL-MCNC: 67 MG/DL

## 2018-08-13 ASSESSMENT — MIFFLIN-ST. JEOR: SCORE: 1717.15

## 2018-08-15 ENCOUNTER — COMMUNICATION - HEALTHEAST (OUTPATIENT)
Dept: INTERNAL MEDICINE | Facility: CLINIC | Age: 73
End: 2018-08-15

## 2018-08-20 ENCOUNTER — COMMUNICATION - HEALTHEAST (OUTPATIENT)
Dept: ANTICOAGULATION | Facility: CLINIC | Age: 73
End: 2018-08-20

## 2018-08-20 ENCOUNTER — AMBULATORY - HEALTHEAST (OUTPATIENT)
Dept: LAB | Facility: CLINIC | Age: 73
End: 2018-08-20

## 2018-08-20 DIAGNOSIS — I48.0 PAROXYSMAL ATRIAL FIBRILLATION (H): ICD-10-CM

## 2018-08-20 DIAGNOSIS — I48.3 TYPICAL ATRIAL FLUTTER (H): ICD-10-CM

## 2018-08-20 DIAGNOSIS — Z95.3 S/P MITRAL VALVE REPLACEMENT WITH TISSUE VALVE: ICD-10-CM

## 2018-08-20 LAB — INR PPP: 4.1 (ref 0.9–1.1)

## 2018-08-28 ENCOUNTER — COMMUNICATION - HEALTHEAST (OUTPATIENT)
Dept: INTERNAL MEDICINE | Facility: CLINIC | Age: 73
End: 2018-08-28

## 2018-08-28 DIAGNOSIS — I48.3 TYPICAL ATRIAL FLUTTER (H): ICD-10-CM

## 2018-08-28 DIAGNOSIS — I48.0 PAROXYSMAL ATRIAL FIBRILLATION (H): ICD-10-CM

## 2018-08-28 DIAGNOSIS — Z95.3 S/P MITRAL VALVE REPLACEMENT WITH TISSUE VALVE: ICD-10-CM

## 2018-08-28 DIAGNOSIS — Z79.01 LONG-TERM (CURRENT) USE OF ANTICOAGULANTS: ICD-10-CM

## 2018-08-29 ENCOUNTER — COMMUNICATION - HEALTHEAST (OUTPATIENT)
Dept: ANTICOAGULATION | Facility: CLINIC | Age: 73
End: 2018-08-29

## 2018-08-29 LAB — INR PPP: 2.8 (ref 0.9–1.1)

## 2018-09-18 ENCOUNTER — COMMUNICATION - HEALTHEAST (OUTPATIENT)
Dept: ANTICOAGULATION | Facility: CLINIC | Age: 73
End: 2018-09-18

## 2018-09-18 ENCOUNTER — COMMUNICATION - HEALTHEAST (OUTPATIENT)
Dept: INTERNAL MEDICINE | Facility: CLINIC | Age: 73
End: 2018-09-18

## 2018-09-18 DIAGNOSIS — I48.3 TYPICAL ATRIAL FLUTTER (H): ICD-10-CM

## 2018-09-18 DIAGNOSIS — Z79.01 LONG-TERM (CURRENT) USE OF ANTICOAGULANTS: ICD-10-CM

## 2018-09-18 DIAGNOSIS — I48.0 PAROXYSMAL ATRIAL FIBRILLATION (H): ICD-10-CM

## 2018-09-18 DIAGNOSIS — Z95.3 S/P MITRAL VALVE REPLACEMENT WITH TISSUE VALVE: ICD-10-CM

## 2018-09-18 LAB — INR PPP: 2 (ref 0.9–1.1)

## 2018-10-08 ENCOUNTER — COMMUNICATION - HEALTHEAST (OUTPATIENT)
Dept: ANTICOAGULATION | Facility: CLINIC | Age: 73
End: 2018-10-08

## 2018-10-08 ENCOUNTER — AMBULATORY - HEALTHEAST (OUTPATIENT)
Dept: CARDIOLOGY | Facility: CLINIC | Age: 73
End: 2018-10-08

## 2018-10-08 DIAGNOSIS — Z79.899 LONG TERM USE OF DRUG: ICD-10-CM

## 2018-10-08 DIAGNOSIS — Z79.01 LONG TERM (CURRENT) USE OF ANTICOAGULANTS: ICD-10-CM

## 2018-10-08 LAB
ALT SERPL W P-5'-P-CCNC: 20 U/L (ref 0–45)
POC INR - HE - HISTORICAL: 5 (ref 0.9–1.1)
TSH SERPL DL<=0.005 MIU/L-ACNC: 4.23 UIU/ML (ref 0.3–5)

## 2018-10-17 ENCOUNTER — AMBULATORY - HEALTHEAST (OUTPATIENT)
Dept: LAB | Facility: CLINIC | Age: 73
End: 2018-10-17

## 2018-10-17 ENCOUNTER — COMMUNICATION - HEALTHEAST (OUTPATIENT)
Dept: ANTICOAGULATION | Facility: CLINIC | Age: 73
End: 2018-10-17

## 2018-10-17 DIAGNOSIS — Z51.81 ADMISSION FOR LONG-TERM (CURRENT) USE OF ANTICOAGULANTS: ICD-10-CM

## 2018-10-17 DIAGNOSIS — I48.3 TYPICAL ATRIAL FLUTTER (H): ICD-10-CM

## 2018-10-17 DIAGNOSIS — Z95.3 S/P MITRAL VALVE REPLACEMENT WITH TISSUE VALVE: ICD-10-CM

## 2018-10-17 DIAGNOSIS — I48.0 PAROXYSMAL ATRIAL FIBRILLATION (H): ICD-10-CM

## 2018-10-17 DIAGNOSIS — Z79.01 ADMISSION FOR LONG-TERM (CURRENT) USE OF ANTICOAGULANTS: ICD-10-CM

## 2018-10-17 LAB — INR PPP: 2.5 (ref 0.9–1.1)

## 2018-10-19 ENCOUNTER — COMMUNICATION - HEALTHEAST (OUTPATIENT)
Dept: INTERNAL MEDICINE | Facility: CLINIC | Age: 73
End: 2018-10-19

## 2018-10-25 ENCOUNTER — OFFICE VISIT - HEALTHEAST (OUTPATIENT)
Dept: INTERNAL MEDICINE | Facility: CLINIC | Age: 73
End: 2018-10-25

## 2018-10-25 DIAGNOSIS — I48.0 PAROXYSMAL ATRIAL FIBRILLATION (H): ICD-10-CM

## 2018-10-25 DIAGNOSIS — Z23 NEED FOR IMMUNIZATION AGAINST INFLUENZA: ICD-10-CM

## 2018-10-25 DIAGNOSIS — G47.33 OSA (OBSTRUCTIVE SLEEP APNEA): ICD-10-CM

## 2018-10-25 DIAGNOSIS — Z23 NEED FOR VACCINATION FOR STREP PNEUMONIAE: ICD-10-CM

## 2018-10-25 DIAGNOSIS — E78.5 DYSLIPIDEMIA: ICD-10-CM

## 2018-10-25 DIAGNOSIS — I42.0 DILATED CARDIOMYOPATHY (H): ICD-10-CM

## 2018-10-25 ASSESSMENT — MIFFLIN-ST. JEOR: SCORE: 1726.22

## 2018-10-29 ENCOUNTER — COMMUNICATION - HEALTHEAST (OUTPATIENT)
Dept: ANTICOAGULATION | Facility: CLINIC | Age: 73
End: 2018-10-29

## 2018-10-29 ENCOUNTER — AMBULATORY - HEALTHEAST (OUTPATIENT)
Dept: LAB | Facility: CLINIC | Age: 73
End: 2018-10-29

## 2018-10-29 DIAGNOSIS — I48.0 PAROXYSMAL ATRIAL FIBRILLATION (H): ICD-10-CM

## 2018-10-29 DIAGNOSIS — Z51.81 ADMISSION FOR LONG-TERM (CURRENT) USE OF ANTICOAGULANTS: ICD-10-CM

## 2018-10-29 DIAGNOSIS — I48.3 TYPICAL ATRIAL FLUTTER (H): ICD-10-CM

## 2018-10-29 DIAGNOSIS — Z79.01 ADMISSION FOR LONG-TERM (CURRENT) USE OF ANTICOAGULANTS: ICD-10-CM

## 2018-10-29 DIAGNOSIS — Z95.3 S/P MITRAL VALVE REPLACEMENT WITH TISSUE VALVE: ICD-10-CM

## 2018-10-29 LAB — INR PPP: 2.5 (ref 0.9–1.1)

## 2018-11-08 ENCOUNTER — AMBULATORY - HEALTHEAST (OUTPATIENT)
Dept: CARDIOLOGY | Facility: CLINIC | Age: 73
End: 2018-11-08

## 2018-11-08 DIAGNOSIS — Z95.0 CARDIAC PACEMAKER IN SITU: ICD-10-CM

## 2018-11-12 ENCOUNTER — AMBULATORY - HEALTHEAST (OUTPATIENT)
Dept: LAB | Facility: CLINIC | Age: 73
End: 2018-11-12

## 2018-11-12 ENCOUNTER — COMMUNICATION - HEALTHEAST (OUTPATIENT)
Dept: ANTICOAGULATION | Facility: CLINIC | Age: 73
End: 2018-11-12

## 2018-11-12 DIAGNOSIS — I48.0 PAROXYSMAL ATRIAL FIBRILLATION (H): ICD-10-CM

## 2018-11-12 DIAGNOSIS — Z79.01 ADMISSION FOR LONG-TERM (CURRENT) USE OF ANTICOAGULANTS: ICD-10-CM

## 2018-11-12 DIAGNOSIS — Z51.81 ADMISSION FOR LONG-TERM (CURRENT) USE OF ANTICOAGULANTS: ICD-10-CM

## 2018-11-12 DIAGNOSIS — Z95.3 S/P MITRAL VALVE REPLACEMENT WITH TISSUE VALVE: ICD-10-CM

## 2018-11-12 DIAGNOSIS — I48.3 TYPICAL ATRIAL FLUTTER (H): ICD-10-CM

## 2018-11-12 LAB — INR PPP: 3.5 (ref 0.9–1.1)

## 2018-11-14 ENCOUNTER — AMBULATORY - HEALTHEAST (OUTPATIENT)
Dept: CARDIOLOGY | Facility: CLINIC | Age: 73
End: 2018-11-14

## 2018-11-14 DIAGNOSIS — Z79.899 LONG TERM USE OF DRUG: ICD-10-CM

## 2018-11-16 ENCOUNTER — COMMUNICATION - HEALTHEAST (OUTPATIENT)
Dept: ANTICOAGULATION | Facility: CLINIC | Age: 73
End: 2018-11-16

## 2018-11-16 DIAGNOSIS — I48.91 ATRIAL FIBRILLATION (H): ICD-10-CM

## 2018-11-28 ENCOUNTER — AMBULATORY - HEALTHEAST (OUTPATIENT)
Dept: LAB | Facility: CLINIC | Age: 73
End: 2018-11-28

## 2018-11-28 ENCOUNTER — COMMUNICATION - HEALTHEAST (OUTPATIENT)
Dept: ANTICOAGULATION | Facility: CLINIC | Age: 73
End: 2018-11-28

## 2018-11-28 DIAGNOSIS — I48.91 ATRIAL FIBRILLATION (H): ICD-10-CM

## 2018-11-28 LAB — INR PPP: 3.5 (ref 0.9–1.1)

## 2018-12-12 ENCOUNTER — COMMUNICATION - HEALTHEAST (OUTPATIENT)
Dept: ANTICOAGULATION | Facility: CLINIC | Age: 73
End: 2018-12-12

## 2018-12-12 ENCOUNTER — AMBULATORY - HEALTHEAST (OUTPATIENT)
Dept: LAB | Facility: CLINIC | Age: 73
End: 2018-12-12

## 2018-12-12 DIAGNOSIS — I48.91 ATRIAL FIBRILLATION (H): ICD-10-CM

## 2018-12-12 LAB — INR PPP: 2.6 (ref 0.9–1.1)

## 2018-12-28 ENCOUNTER — AMBULATORY - HEALTHEAST (OUTPATIENT)
Dept: LAB | Facility: CLINIC | Age: 73
End: 2018-12-28

## 2018-12-28 ENCOUNTER — COMMUNICATION - HEALTHEAST (OUTPATIENT)
Dept: ANTICOAGULATION | Facility: CLINIC | Age: 73
End: 2018-12-28

## 2018-12-28 DIAGNOSIS — I48.91 ATRIAL FIBRILLATION (H): ICD-10-CM

## 2018-12-28 LAB — INR PPP: 3.1 (ref 0.9–1.1)

## 2018-12-29 ENCOUNTER — COMMUNICATION - HEALTHEAST (OUTPATIENT)
Dept: INTENSIVE CARE | Facility: CLINIC | Age: 73
End: 2018-12-29

## 2018-12-29 DIAGNOSIS — Z95.3 S/P MITRAL VALVE REPLACEMENT WITH TISSUE VALVE: ICD-10-CM

## 2018-12-29 DIAGNOSIS — I48.0 PAROXYSMAL ATRIAL FIBRILLATION (H): ICD-10-CM

## 2018-12-29 DIAGNOSIS — Z79.01 LONG TERM CURRENT USE OF ANTICOAGULANT THERAPY: ICD-10-CM

## 2019-01-11 ENCOUNTER — AMBULATORY - HEALTHEAST (OUTPATIENT)
Dept: CARDIOLOGY | Facility: CLINIC | Age: 74
End: 2019-01-11

## 2019-01-11 ENCOUNTER — OFFICE VISIT - HEALTHEAST (OUTPATIENT)
Dept: CARDIOLOGY | Facility: CLINIC | Age: 74
End: 2019-01-11

## 2019-01-11 DIAGNOSIS — Z95.0 CARDIAC PACEMAKER IN SITU: ICD-10-CM

## 2019-01-11 DIAGNOSIS — I42.0 DILATED CARDIOMYOPATHY (H): ICD-10-CM

## 2019-01-11 DIAGNOSIS — Z95.3 S/P MITRAL VALVE REPLACEMENT WITH TISSUE VALVE: ICD-10-CM

## 2019-01-11 ASSESSMENT — MIFFLIN-ST. JEOR: SCORE: 1744.37

## 2019-01-16 ENCOUNTER — AMBULATORY - HEALTHEAST (OUTPATIENT)
Dept: INTERNAL MEDICINE | Facility: CLINIC | Age: 74
End: 2019-01-16

## 2019-01-16 ENCOUNTER — COMMUNICATION - HEALTHEAST (OUTPATIENT)
Dept: INTERNAL MEDICINE | Facility: CLINIC | Age: 74
End: 2019-01-16

## 2019-01-16 ENCOUNTER — COMMUNICATION - HEALTHEAST (OUTPATIENT)
Dept: ANTICOAGULATION | Facility: CLINIC | Age: 74
End: 2019-01-16

## 2019-01-16 ENCOUNTER — AMBULATORY - HEALTHEAST (OUTPATIENT)
Dept: LAB | Facility: CLINIC | Age: 74
End: 2019-01-16

## 2019-01-16 DIAGNOSIS — I48.91 ATRIAL FIBRILLATION (H): ICD-10-CM

## 2019-01-16 DIAGNOSIS — E78.5 HYPERLIPIDEMIA LDL GOAL <130: ICD-10-CM

## 2019-01-16 LAB — INR PPP: 2.8 (ref 0.9–1.1)

## 2019-01-17 ENCOUNTER — AMBULATORY - HEALTHEAST (OUTPATIENT)
Dept: CARDIOLOGY | Facility: CLINIC | Age: 74
End: 2019-01-17

## 2019-01-17 ENCOUNTER — AMBULATORY - HEALTHEAST (OUTPATIENT)
Dept: LAB | Facility: HOSPITAL | Age: 74
End: 2019-01-17

## 2019-01-17 ENCOUNTER — HOSPITAL ENCOUNTER (OUTPATIENT)
Dept: CARDIOLOGY | Facility: HOSPITAL | Age: 74
Discharge: HOME | End: 2019-01-17
Attending: INTERNAL MEDICINE

## 2019-01-17 ENCOUNTER — COMMUNICATION - HEALTHEAST (OUTPATIENT)
Dept: INTERNAL MEDICINE | Facility: CLINIC | Age: 74
End: 2019-01-17

## 2019-01-17 DIAGNOSIS — E78.5 HYPERLIPIDEMIA LDL GOAL <130: ICD-10-CM

## 2019-01-17 DIAGNOSIS — Z95.3 S/P MITRAL VALVE REPLACEMENT WITH TISSUE VALVE: ICD-10-CM

## 2019-01-17 DIAGNOSIS — I48.0 PAROXYSMAL ATRIAL FIBRILLATION (H): ICD-10-CM

## 2019-01-17 DIAGNOSIS — I42.0 DILATED CARDIOMYOPATHY (H): ICD-10-CM

## 2019-01-17 LAB
AORTIC ROOT: 2.9 CM
AORTIC VALVE MEAN VELOCITY: 88.5 CM/S
AV DIMENSIONLESS INDEX VTI: 0.7
AV MEAN GRADIENT: 3 MMHG
AV PEAK GRADIENT: 6 MMHG
AV VALVE AREA: 2.3 CM2
BSA FOR ECHO PROCEDURE: 2.23 M2
CHOLEST SERPL-MCNC: 206 MG/DL
CV BLOOD PRESSURE: NORMAL MMHG
CV ECHO HEIGHT: 71.5 IN
CV ECHO WEIGHT: 217 LBS
DOP CALC AO PEAK VEL: 122 CM/S
DOP CALC AO VTI: 26.7 CM
DOP CALC LVOT AREA: 3.46 CM2
DOP CALC LVOT DIAMETER: 2.1 CM
DOP CALC LVOT STROKE VOLUME: 61.3 CM3
DOP CALC MV VTI: 59.6 CM
DOP CALCLVOT PEAK VEL VTI: 17.7 CM
ECHO EJECTION FRACTION ESTIMATED: 50 %
EJECTION FRACTION: 62 % (ref 55–75)
FASTING STATUS PATIENT QL REPORTED: YES
FRACTIONAL SHORTENING: 38.6 % (ref 28–44)
HDLC SERPL-MCNC: 54 MG/DL
INTERVENTRICULAR SEPTUM IN END DIASTOLE: 0.8 CM (ref 0.6–1)
IVS/PW RATIO: 0.8
LA AREA 1: 26.4 CM2
LA AREA 2: 23.2 CM2
LDLC SERPL CALC-MCNC: 136 MG/DL
LEFT ATRIUM AREA: 23.2 CM2
LEFT ATRIUM LENGTH: 6.3 CM
LEFT ATRIUM SIZE: 4.2 CM
LEFT ATRIUM VOLUME INDEX: 37.1 ML/M2
LEFT ATRIUM VOLUME: 82.6 ML
LEFT VENTRICLE DIASTOLIC VOLUME INDEX: 42.2 CM3/M2 (ref 34–74)
LEFT VENTRICLE DIASTOLIC VOLUME: 94 CM3 (ref 62–150)
LEFT VENTRICLE MASS INDEX: 88.6 G/M2
LEFT VENTRICLE SYSTOLIC VOLUME INDEX: 16.1 CM3/M2 (ref 11–31)
LEFT VENTRICLE SYSTOLIC VOLUME: 36 CM3 (ref 21–61)
LEFT VENTRICULAR INTERNAL DIMENSION IN DIASTOLE: 5.7 CM (ref 4.2–5.8)
LEFT VENTRICULAR INTERNAL DIMENSION IN SYSTOLE: 3.5 CM (ref 2.5–4)
LEFT VENTRICULAR MASS: 197.5 G
LEFT VENTRICULAR OUTFLOW TRACT MEAN GRADIENT: 2 MMHG
LEFT VENTRICULAR OUTFLOW TRACT MEAN VELOCITY: 59.2 CM/S
LEFT VENTRICULAR POSTERIOR WALL IN END DIASTOLE: 1 CM (ref 0.6–1)
LV STROKE VOLUME INDEX: 27.5 ML/M2
MITRAL VALVE DECELERATION SLOPE: 9610 MM/S2
MITRAL VALVE E/A RATIO: 1.6
MITRAL VALVE MEAN INFLOW VELOCITY: 107 CM/S
MITRAL VALVE PEAK VELOCITY: 218 CM/S
MITRAL VALVE PRESSURE HALF-TIME: 75 MS
MV AREA VTI: 1.03 CM2
MV DECELERATION TIME: 246 MS
MV MEAN GRADIENT: 5 MMHG
MV PEAK A VELOCITY: 101 CM/S
MV PEAK E VELOCITY: 160 CM/S
MV PEAK GRADIENT: 19 MMHG
MV VALVE AREA BY CONTINUITY EQUATION: 1 CM2
MV VALVE AREA PRESSURE 1/2 METHOD: 2.9 CM2
NUC REST DIASTOLIC VOLUME INDEX: 3472 LBS
NUC REST SYSTOLIC VOLUME INDEX: 71.5 IN
TRICUSPID VALVE ANULAR PLANE SYSTOLIC EXCURSION: 1.3 CM
TRIGL SERPL-MCNC: 78 MG/DL

## 2019-01-17 ASSESSMENT — MIFFLIN-ST. JEOR: SCORE: 1744.37

## 2019-01-22 ENCOUNTER — COMMUNICATION - HEALTHEAST (OUTPATIENT)
Dept: INTERNAL MEDICINE | Facility: CLINIC | Age: 74
End: 2019-01-22

## 2019-01-28 ENCOUNTER — COMMUNICATION - HEALTHEAST (OUTPATIENT)
Dept: ANTICOAGULATION | Facility: CLINIC | Age: 74
End: 2019-01-28

## 2019-01-28 ENCOUNTER — AMBULATORY - HEALTHEAST (OUTPATIENT)
Dept: LAB | Facility: CLINIC | Age: 74
End: 2019-01-28

## 2019-01-28 DIAGNOSIS — I48.91 ATRIAL FIBRILLATION (H): ICD-10-CM

## 2019-01-28 LAB — INR PPP: 2.7 (ref 0.9–1.1)

## 2019-02-13 ENCOUNTER — COMMUNICATION - HEALTHEAST (OUTPATIENT)
Dept: ANTICOAGULATION | Facility: CLINIC | Age: 74
End: 2019-02-13

## 2019-02-13 ENCOUNTER — AMBULATORY - HEALTHEAST (OUTPATIENT)
Dept: LAB | Facility: CLINIC | Age: 74
End: 2019-02-13

## 2019-02-13 ENCOUNTER — COMMUNICATION - HEALTHEAST (OUTPATIENT)
Dept: CARDIOLOGY | Facility: CLINIC | Age: 74
End: 2019-02-13

## 2019-02-13 DIAGNOSIS — I49.5 TACHYCARDIA-BRADYCARDIA SYNDROME (H): ICD-10-CM

## 2019-02-13 DIAGNOSIS — I48.91 ATRIAL FIBRILLATION (H): ICD-10-CM

## 2019-02-13 LAB — INR PPP: 2.8 (ref 0.9–1.1)

## 2019-02-14 ENCOUNTER — COMMUNICATION - HEALTHEAST (OUTPATIENT)
Dept: CARDIOLOGY | Facility: CLINIC | Age: 74
End: 2019-02-14

## 2019-02-14 DIAGNOSIS — I42.0 DILATED CARDIOMYOPATHY (H): ICD-10-CM

## 2019-02-27 ENCOUNTER — COMMUNICATION - HEALTHEAST (OUTPATIENT)
Dept: ANTICOAGULATION | Facility: CLINIC | Age: 74
End: 2019-02-27

## 2019-02-27 ENCOUNTER — AMBULATORY - HEALTHEAST (OUTPATIENT)
Dept: LAB | Facility: CLINIC | Age: 74
End: 2019-02-27

## 2019-02-27 DIAGNOSIS — I48.91 ATRIAL FIBRILLATION (H): ICD-10-CM

## 2019-02-27 LAB — INR PPP: 2.7 (ref 0.9–1.1)

## 2019-03-11 ENCOUNTER — AMBULATORY - HEALTHEAST (OUTPATIENT)
Dept: LAB | Facility: CLINIC | Age: 74
End: 2019-03-11

## 2019-03-11 ENCOUNTER — COMMUNICATION - HEALTHEAST (OUTPATIENT)
Dept: ANTICOAGULATION | Facility: CLINIC | Age: 74
End: 2019-03-11

## 2019-03-11 DIAGNOSIS — I48.91 ATRIAL FIBRILLATION (H): ICD-10-CM

## 2019-03-11 LAB — INR PPP: 2.8 (ref 0.9–1.1)

## 2019-03-12 ENCOUNTER — COMMUNICATION - HEALTHEAST (OUTPATIENT)
Dept: TELEHEALTH | Facility: CLINIC | Age: 74
End: 2019-03-12

## 2019-04-03 ENCOUNTER — AMBULATORY - HEALTHEAST (OUTPATIENT)
Dept: LAB | Facility: CLINIC | Age: 74
End: 2019-04-03

## 2019-04-03 ENCOUNTER — COMMUNICATION - HEALTHEAST (OUTPATIENT)
Dept: ANTICOAGULATION | Facility: CLINIC | Age: 74
End: 2019-04-03

## 2019-04-03 DIAGNOSIS — I48.91 ATRIAL FIBRILLATION (H): ICD-10-CM

## 2019-04-03 LAB — INR PPP: 2.2 (ref 0.9–1.1)

## 2019-04-04 ENCOUNTER — AMBULATORY - HEALTHEAST (OUTPATIENT)
Dept: CARDIOLOGY | Facility: CLINIC | Age: 74
End: 2019-04-04

## 2019-04-04 DIAGNOSIS — Z95.0 CARDIAC PACEMAKER IN SITU: ICD-10-CM

## 2019-04-10 ENCOUNTER — OFFICE VISIT - HEALTHEAST (OUTPATIENT)
Dept: CARDIOLOGY | Facility: CLINIC | Age: 74
End: 2019-04-10

## 2019-04-10 DIAGNOSIS — R06.09 DYSPNEA ON EXERTION: ICD-10-CM

## 2019-04-10 DIAGNOSIS — I48.92 ATRIAL FLUTTER, UNSPECIFIED TYPE (H): ICD-10-CM

## 2019-04-10 LAB
ANION GAP SERPL CALCULATED.3IONS-SCNC: 9 MMOL/L (ref 5–18)
BNP SERPL-MCNC: 213 PG/ML (ref 0–74)
BUN SERPL-MCNC: 21 MG/DL (ref 8–28)
CALCIUM SERPL-MCNC: 9.3 MG/DL (ref 8.5–10.5)
CHLORIDE BLD-SCNC: 104 MMOL/L (ref 98–107)
CO2 SERPL-SCNC: 26 MMOL/L (ref 22–31)
CREAT SERPL-MCNC: 0.97 MG/DL (ref 0.7–1.3)
GFR SERPL CREATININE-BSD FRML MDRD: >60 ML/MIN/1.73M2
GLUCOSE BLD-MCNC: 70 MG/DL (ref 70–125)
POTASSIUM BLD-SCNC: 4.1 MMOL/L (ref 3.5–5)
SODIUM SERPL-SCNC: 139 MMOL/L (ref 136–145)

## 2019-04-10 ASSESSMENT — MIFFLIN-ST. JEOR: SCORE: 1789.73

## 2019-04-11 ENCOUNTER — COMMUNICATION - HEALTHEAST (OUTPATIENT)
Dept: CARDIOLOGY | Facility: CLINIC | Age: 74
End: 2019-04-11

## 2019-04-11 DIAGNOSIS — I42.0 DILATED CARDIOMYOPATHY (H): ICD-10-CM

## 2019-04-11 DIAGNOSIS — I48.0 PAROXYSMAL ATRIAL FIBRILLATION (H): ICD-10-CM

## 2019-04-22 ENCOUNTER — COMMUNICATION - HEALTHEAST (OUTPATIENT)
Dept: ANTICOAGULATION | Facility: CLINIC | Age: 74
End: 2019-04-22

## 2019-04-22 ENCOUNTER — AMBULATORY - HEALTHEAST (OUTPATIENT)
Dept: LAB | Facility: CLINIC | Age: 74
End: 2019-04-22

## 2019-04-22 DIAGNOSIS — I25.10 CORONARY ARTERY DISEASE INVOLVING NATIVE CORONARY ARTERY OF NATIVE HEART WITHOUT ANGINA PECTORIS: ICD-10-CM

## 2019-04-22 DIAGNOSIS — I42.0 DILATED CARDIOMYOPATHY (H): ICD-10-CM

## 2019-04-22 DIAGNOSIS — I48.91 ATRIAL FIBRILLATION (H): ICD-10-CM

## 2019-04-22 LAB
ANION GAP SERPL CALCULATED.3IONS-SCNC: 12 MMOL/L (ref 5–18)
BUN SERPL-MCNC: 26 MG/DL (ref 8–28)
CALCIUM SERPL-MCNC: 9.5 MG/DL (ref 8.5–10.5)
CHLORIDE BLD-SCNC: 105 MMOL/L (ref 98–107)
CO2 SERPL-SCNC: 24 MMOL/L (ref 22–31)
CREAT SERPL-MCNC: 1.06 MG/DL (ref 0.7–1.3)
GFR SERPL CREATININE-BSD FRML MDRD: >60 ML/MIN/1.73M2
GLUCOSE BLD-MCNC: 94 MG/DL (ref 70–125)
INR PPP: 2.6 (ref 0.9–1.1)
POTASSIUM BLD-SCNC: 4.6 MMOL/L (ref 3.5–5)
SODIUM SERPL-SCNC: 141 MMOL/L (ref 136–145)

## 2019-04-23 ENCOUNTER — COMMUNICATION - HEALTHEAST (OUTPATIENT)
Dept: CARDIOLOGY | Facility: CLINIC | Age: 74
End: 2019-04-23

## 2019-04-23 DIAGNOSIS — I25.10 CORONARY ARTERY DISEASE INVOLVING NATIVE CORONARY ARTERY OF NATIVE HEART WITHOUT ANGINA PECTORIS: ICD-10-CM

## 2019-04-23 LAB
CHOLEST SERPL-MCNC: 214 MG/DL
FASTING STATUS PATIENT QL REPORTED: YES
HDLC SERPL-MCNC: 45 MG/DL
LDLC SERPL CALC-MCNC: 149 MG/DL
TRIGL SERPL-MCNC: 100 MG/DL

## 2019-04-26 ENCOUNTER — COMMUNICATION - HEALTHEAST (OUTPATIENT)
Dept: CARDIOLOGY | Facility: CLINIC | Age: 74
End: 2019-04-26

## 2019-05-03 ENCOUNTER — COMMUNICATION - HEALTHEAST (OUTPATIENT)
Dept: CARDIOLOGY | Facility: CLINIC | Age: 74
End: 2019-05-03

## 2019-05-20 ENCOUNTER — COMMUNICATION - HEALTHEAST (OUTPATIENT)
Dept: ANTICOAGULATION | Facility: CLINIC | Age: 74
End: 2019-05-20

## 2019-05-20 ENCOUNTER — AMBULATORY - HEALTHEAST (OUTPATIENT)
Dept: LAB | Facility: CLINIC | Age: 74
End: 2019-05-20

## 2019-05-20 DIAGNOSIS — I48.91 ATRIAL FIBRILLATION (H): ICD-10-CM

## 2019-05-20 LAB — INR PPP: 1.8 (ref 0.9–1.1)

## 2019-05-29 ENCOUNTER — COMMUNICATION - HEALTHEAST (OUTPATIENT)
Dept: INTENSIVE CARE | Facility: CLINIC | Age: 74
End: 2019-05-29

## 2019-05-29 DIAGNOSIS — Z95.3 S/P MITRAL VALVE REPLACEMENT WITH TISSUE VALVE: ICD-10-CM

## 2019-05-29 DIAGNOSIS — Z79.01 LONG TERM CURRENT USE OF ANTICOAGULANT THERAPY: ICD-10-CM

## 2019-05-29 DIAGNOSIS — I48.0 PAROXYSMAL ATRIAL FIBRILLATION (H): ICD-10-CM

## 2019-05-30 ENCOUNTER — COMMUNICATION - HEALTHEAST (OUTPATIENT)
Dept: CARDIOLOGY | Facility: CLINIC | Age: 74
End: 2019-05-30

## 2019-06-05 ENCOUNTER — COMMUNICATION - HEALTHEAST (OUTPATIENT)
Dept: ANTICOAGULATION | Facility: CLINIC | Age: 74
End: 2019-06-05

## 2019-06-05 ENCOUNTER — AMBULATORY - HEALTHEAST (OUTPATIENT)
Dept: LAB | Facility: CLINIC | Age: 74
End: 2019-06-05

## 2019-06-05 DIAGNOSIS — I48.91 ATRIAL FIBRILLATION (H): ICD-10-CM

## 2019-06-05 LAB — INR PPP: 2 (ref 0.9–1.1)

## 2019-06-08 ENCOUNTER — COMMUNICATION - HEALTHEAST (OUTPATIENT)
Dept: INTENSIVE CARE | Facility: CLINIC | Age: 74
End: 2019-06-08

## 2019-06-08 DIAGNOSIS — Z79.01 LONG TERM CURRENT USE OF ANTICOAGULANT THERAPY: ICD-10-CM

## 2019-06-08 DIAGNOSIS — I48.0 PAROXYSMAL ATRIAL FIBRILLATION (H): ICD-10-CM

## 2019-06-08 DIAGNOSIS — Z95.3 S/P MITRAL VALVE REPLACEMENT WITH TISSUE VALVE: ICD-10-CM

## 2019-06-19 ENCOUNTER — AMBULATORY - HEALTHEAST (OUTPATIENT)
Dept: LAB | Facility: CLINIC | Age: 74
End: 2019-06-19

## 2019-06-19 ENCOUNTER — COMMUNICATION - HEALTHEAST (OUTPATIENT)
Dept: ANTICOAGULATION | Facility: CLINIC | Age: 74
End: 2019-06-19

## 2019-06-19 DIAGNOSIS — I48.91 ATRIAL FIBRILLATION (H): ICD-10-CM

## 2019-06-19 LAB — INR PPP: 1.8 (ref 0.9–1.1)

## 2019-07-03 ENCOUNTER — AMBULATORY - HEALTHEAST (OUTPATIENT)
Dept: LAB | Facility: CLINIC | Age: 74
End: 2019-07-03

## 2019-07-03 ENCOUNTER — COMMUNICATION - HEALTHEAST (OUTPATIENT)
Dept: ANTICOAGULATION | Facility: CLINIC | Age: 74
End: 2019-07-03

## 2019-07-03 DIAGNOSIS — I48.91 ATRIAL FIBRILLATION (H): ICD-10-CM

## 2019-07-03 LAB — INR PPP: 1.8 (ref 0.9–1.1)

## 2019-07-08 ENCOUNTER — AMBULATORY - HEALTHEAST (OUTPATIENT)
Dept: CARDIOLOGY | Facility: CLINIC | Age: 74
End: 2019-07-08

## 2019-07-08 DIAGNOSIS — Z95.0 CARDIAC PACEMAKER IN SITU: ICD-10-CM

## 2019-07-17 ENCOUNTER — COMMUNICATION - HEALTHEAST (OUTPATIENT)
Dept: ANTICOAGULATION | Facility: CLINIC | Age: 74
End: 2019-07-17

## 2019-07-17 ENCOUNTER — AMBULATORY - HEALTHEAST (OUTPATIENT)
Dept: LAB | Facility: CLINIC | Age: 74
End: 2019-07-17

## 2019-07-17 DIAGNOSIS — I48.91 ATRIAL FIBRILLATION (H): ICD-10-CM

## 2019-07-17 LAB — INR PPP: 2 (ref 0.9–1.1)

## 2019-07-22 ENCOUNTER — COMMUNICATION - HEALTHEAST (OUTPATIENT)
Dept: CARDIOLOGY | Facility: CLINIC | Age: 74
End: 2019-07-22

## 2019-07-23 ENCOUNTER — COMMUNICATION - HEALTHEAST (OUTPATIENT)
Dept: INTENSIVE CARE | Facility: CLINIC | Age: 74
End: 2019-07-23

## 2019-07-23 DIAGNOSIS — Z79.01 LONG TERM CURRENT USE OF ANTICOAGULANT THERAPY: ICD-10-CM

## 2019-07-23 DIAGNOSIS — Z95.3 S/P MITRAL VALVE REPLACEMENT WITH TISSUE VALVE: ICD-10-CM

## 2019-07-23 DIAGNOSIS — I48.0 PAROXYSMAL ATRIAL FIBRILLATION (H): ICD-10-CM

## 2019-07-24 ENCOUNTER — COMMUNICATION - HEALTHEAST (OUTPATIENT)
Dept: ADMINISTRATIVE | Facility: CLINIC | Age: 74
End: 2019-07-24

## 2019-08-07 ENCOUNTER — COMMUNICATION - HEALTHEAST (OUTPATIENT)
Dept: ANTICOAGULATION | Facility: CLINIC | Age: 74
End: 2019-08-07

## 2019-08-07 ENCOUNTER — AMBULATORY - HEALTHEAST (OUTPATIENT)
Dept: LAB | Facility: CLINIC | Age: 74
End: 2019-08-07

## 2019-08-07 DIAGNOSIS — I48.91 ATRIAL FIBRILLATION (H): ICD-10-CM

## 2019-08-07 LAB — INR PPP: 2.3 (ref 0.9–1.1)

## 2019-08-14 ENCOUNTER — OFFICE VISIT - HEALTHEAST (OUTPATIENT)
Dept: INTERNAL MEDICINE | Facility: CLINIC | Age: 74
End: 2019-08-14

## 2019-08-14 DIAGNOSIS — G47.30 SLEEP APNEA, UNSPECIFIED TYPE: ICD-10-CM

## 2019-08-14 DIAGNOSIS — Z12.5 SCREENING FOR PROSTATE CANCER: ICD-10-CM

## 2019-08-14 DIAGNOSIS — Z79.899 LONG TERM USE OF DRUG: ICD-10-CM

## 2019-08-14 DIAGNOSIS — I48.0 PAROXYSMAL ATRIAL FIBRILLATION (H): ICD-10-CM

## 2019-08-14 DIAGNOSIS — Z95.3 S/P MITRAL VALVE REPLACEMENT WITH TISSUE VALVE: ICD-10-CM

## 2019-08-14 DIAGNOSIS — E78.5 HYPERLIPIDEMIA LDL GOAL <100: ICD-10-CM

## 2019-08-14 DIAGNOSIS — R09.81 CONGESTION OF PARANASAL SINUS: ICD-10-CM

## 2019-08-14 DIAGNOSIS — Z00.00 HEALTHCARE MAINTENANCE: ICD-10-CM

## 2019-08-14 LAB
ALBUMIN SERPL-MCNC: 4.1 G/DL (ref 3.5–5)
ALP SERPL-CCNC: 58 U/L (ref 45–120)
ALT SERPL W P-5'-P-CCNC: 20 U/L (ref 0–45)
ANION GAP SERPL CALCULATED.3IONS-SCNC: 8 MMOL/L (ref 5–18)
AST SERPL W P-5'-P-CCNC: 27 U/L (ref 0–40)
BILIRUB SERPL-MCNC: 0.7 MG/DL (ref 0–1)
BUN SERPL-MCNC: 26 MG/DL (ref 8–28)
CALCIUM SERPL-MCNC: 9.9 MG/DL (ref 8.5–10.5)
CHLORIDE BLD-SCNC: 105 MMOL/L (ref 98–107)
CHOLEST SERPL-MCNC: 234 MG/DL
CO2 SERPL-SCNC: 28 MMOL/L (ref 22–31)
CREAT SERPL-MCNC: 1.26 MG/DL (ref 0.7–1.3)
FASTING STATUS PATIENT QL REPORTED: YES
GFR SERPL CREATININE-BSD FRML MDRD: 56 ML/MIN/1.73M2
GLUCOSE BLD-MCNC: 94 MG/DL (ref 70–125)
HDLC SERPL-MCNC: 47 MG/DL
LDLC SERPL CALC-MCNC: 165 MG/DL
POTASSIUM BLD-SCNC: 5 MMOL/L (ref 3.5–5)
PROT SERPL-MCNC: 7.6 G/DL (ref 6–8)
PSA SERPL-MCNC: 0.4 NG/ML (ref 0–6.5)
SODIUM SERPL-SCNC: 141 MMOL/L (ref 136–145)
TRIGL SERPL-MCNC: 108 MG/DL
TSH SERPL DL<=0.005 MIU/L-ACNC: 4.66 UIU/ML (ref 0.3–5)

## 2019-08-14 ASSESSMENT — MIFFLIN-ST. JEOR: SCORE: 1798.8

## 2019-08-16 ENCOUNTER — AMBULATORY - HEALTHEAST (OUTPATIENT)
Dept: INTERNAL MEDICINE | Facility: CLINIC | Age: 74
End: 2019-08-16

## 2019-08-16 ENCOUNTER — COMMUNICATION - HEALTHEAST (OUTPATIENT)
Dept: INTERNAL MEDICINE | Facility: CLINIC | Age: 74
End: 2019-08-16

## 2019-08-16 DIAGNOSIS — E78.5 HYPERLIPIDEMIA LDL GOAL <100: ICD-10-CM

## 2019-08-16 LAB
AMIODARONE SERPL-MCNC: <0.3 UG/ML (ref 0.5–2)
DESETHYLAMIODARONE SERPL-MCNC: <0.3 UG/ML

## 2019-08-19 ENCOUNTER — OFFICE VISIT - HEALTHEAST (OUTPATIENT)
Dept: OTOLARYNGOLOGY | Facility: CLINIC | Age: 74
End: 2019-08-19

## 2019-08-19 DIAGNOSIS — R09.81 NASAL CONGESTION: ICD-10-CM

## 2019-08-19 DIAGNOSIS — J34.2 DEVIATED NASAL SEPTUM: ICD-10-CM

## 2019-08-28 ENCOUNTER — COMMUNICATION - HEALTHEAST (OUTPATIENT)
Dept: OTOLARYNGOLOGY | Facility: CLINIC | Age: 74
End: 2019-08-28

## 2019-08-28 DIAGNOSIS — R09.81 NASAL CONGESTION: ICD-10-CM

## 2019-09-04 ENCOUNTER — COMMUNICATION - HEALTHEAST (OUTPATIENT)
Dept: ANTICOAGULATION | Facility: CLINIC | Age: 74
End: 2019-09-04

## 2019-09-04 ENCOUNTER — AMBULATORY - HEALTHEAST (OUTPATIENT)
Dept: LAB | Facility: CLINIC | Age: 74
End: 2019-09-04

## 2019-09-04 DIAGNOSIS — I48.91 ATRIAL FIBRILLATION (H): ICD-10-CM

## 2019-09-04 LAB — INR PPP: 2.1 (ref 0.9–1.1)

## 2019-09-11 ENCOUNTER — COMMUNICATION - HEALTHEAST (OUTPATIENT)
Dept: OTOLARYNGOLOGY | Facility: CLINIC | Age: 74
End: 2019-09-11

## 2019-09-11 DIAGNOSIS — R09.81 NASAL CONGESTION: ICD-10-CM

## 2019-09-24 ENCOUNTER — COMMUNICATION - HEALTHEAST (OUTPATIENT)
Dept: CARDIOLOGY | Facility: CLINIC | Age: 74
End: 2019-09-24

## 2019-09-24 DIAGNOSIS — I25.10 CORONARY ARTERY DISEASE INVOLVING NATIVE CORONARY ARTERY OF NATIVE HEART WITHOUT ANGINA PECTORIS: ICD-10-CM

## 2019-09-25 ENCOUNTER — AMBULATORY - HEALTHEAST (OUTPATIENT)
Dept: CARDIOLOGY | Facility: CLINIC | Age: 74
End: 2019-09-25

## 2019-09-25 DIAGNOSIS — I25.10 CORONARY ARTERY DISEASE INVOLVING NATIVE CORONARY ARTERY OF NATIVE HEART WITHOUT ANGINA PECTORIS: ICD-10-CM

## 2019-09-25 LAB
CHOLEST SERPL-MCNC: 139 MG/DL
FASTING STATUS PATIENT QL REPORTED: YES
HDLC SERPL-MCNC: 43 MG/DL
LDLC SERPL CALC-MCNC: 80 MG/DL
TRIGL SERPL-MCNC: 82 MG/DL

## 2019-09-26 ENCOUNTER — COMMUNICATION - HEALTHEAST (OUTPATIENT)
Dept: INTERNAL MEDICINE | Facility: CLINIC | Age: 74
End: 2019-09-26

## 2019-09-27 ENCOUNTER — COMMUNICATION - HEALTHEAST (OUTPATIENT)
Dept: CARDIOLOGY | Facility: CLINIC | Age: 74
End: 2019-09-27

## 2019-09-30 ENCOUNTER — AMBULATORY - HEALTHEAST (OUTPATIENT)
Dept: CARDIOLOGY | Facility: CLINIC | Age: 74
End: 2019-09-30

## 2019-09-30 ENCOUNTER — OFFICE VISIT - HEALTHEAST (OUTPATIENT)
Dept: CARDIOLOGY | Facility: CLINIC | Age: 74
End: 2019-09-30

## 2019-09-30 DIAGNOSIS — I42.0 DILATED CARDIOMYOPATHY (H): ICD-10-CM

## 2019-09-30 DIAGNOSIS — Z95.0 CARDIAC PACEMAKER IN SITU: ICD-10-CM

## 2019-09-30 DIAGNOSIS — I48.4 ATYPICAL ATRIAL FLUTTER (H): ICD-10-CM

## 2019-09-30 DIAGNOSIS — I25.119 CORONARY ARTERY DISEASE INVOLVING NATIVE CORONARY ARTERY OF NATIVE HEART WITH ANGINA PECTORIS (H): ICD-10-CM

## 2019-09-30 ASSESSMENT — MIFFLIN-ST. JEOR: SCORE: 1816.87

## 2019-10-02 ENCOUNTER — COMMUNICATION - HEALTHEAST (OUTPATIENT)
Dept: ANTICOAGULATION | Facility: CLINIC | Age: 74
End: 2019-10-02

## 2019-10-02 ENCOUNTER — AMBULATORY - HEALTHEAST (OUTPATIENT)
Dept: LAB | Facility: CLINIC | Age: 74
End: 2019-10-02

## 2019-10-02 DIAGNOSIS — I48.91 ATRIAL FIBRILLATION (H): ICD-10-CM

## 2019-10-02 LAB — INR PPP: 2.8 (ref 0.9–1.1)

## 2019-10-11 ENCOUNTER — COMMUNICATION - HEALTHEAST (OUTPATIENT)
Dept: ANTICOAGULATION | Facility: CLINIC | Age: 74
End: 2019-10-11

## 2019-10-11 ENCOUNTER — HOSPITAL ENCOUNTER (OUTPATIENT)
Dept: NUCLEAR MEDICINE | Facility: HOSPITAL | Age: 74
Discharge: HOME | End: 2019-10-11
Attending: INTERNAL MEDICINE

## 2019-10-11 ENCOUNTER — HOSPITAL ENCOUNTER (OUTPATIENT)
Dept: CARDIOLOGY | Facility: HOSPITAL | Age: 74
Discharge: HOME | End: 2019-10-11
Attending: INTERNAL MEDICINE

## 2019-10-11 DIAGNOSIS — I25.119 CORONARY ARTERY DISEASE INVOLVING NATIVE CORONARY ARTERY OF NATIVE HEART WITH ANGINA PECTORIS (H): ICD-10-CM

## 2019-10-11 DIAGNOSIS — I48.4 ATYPICAL ATRIAL FLUTTER (H): ICD-10-CM

## 2019-10-11 DIAGNOSIS — I48.0 PAROXYSMAL ATRIAL FIBRILLATION (H): ICD-10-CM

## 2019-10-11 DIAGNOSIS — Z95.3 S/P MITRAL VALVE REPLACEMENT WITH TISSUE VALVE: ICD-10-CM

## 2019-10-11 LAB
CV STRESS CURRENT BP HE: NORMAL
CV STRESS CURRENT HR HE: 60
CV STRESS CURRENT HR HE: 61
CV STRESS CURRENT HR HE: 63
CV STRESS CURRENT HR HE: 63
CV STRESS CURRENT HR HE: 64
CV STRESS CURRENT HR HE: 66
CV STRESS CURRENT HR HE: 66
CV STRESS CURRENT HR HE: 67
CV STRESS CURRENT HR HE: 68
CV STRESS DEVIATION TIME HE: NORMAL
CV STRESS ECHO PERCENT HR HE: NORMAL
CV STRESS EXERCISE STAGE HE: NORMAL
CV STRESS FINAL RESTING BP HE: NORMAL
CV STRESS FINAL RESTING HR HE: 64
CV STRESS MAX HR HE: 68
CV STRESS MAX TREADMILL GRADE HE: 0
CV STRESS MAX TREADMILL SPEED HE: 0
CV STRESS PEAK DIA BP HE: NORMAL
CV STRESS PEAK SYS BP HE: NORMAL
CV STRESS PHASE HE: NORMAL
CV STRESS PROTOCOL HE: NORMAL
CV STRESS RESTING PT POSITION HE: NORMAL
CV STRESS ST DEVIATION AMOUNT HE: NORMAL
CV STRESS ST DEVIATION ELEVATION HE: NORMAL
CV STRESS ST EVELATION AMOUNT HE: NORMAL
CV STRESS TEST TYPE HE: NORMAL
CV STRESS TOTAL STAGE TIME MIN 1 HE: NORMAL
NUC STRESS EJECTION FRACTION: 56 %
STRESS ECHO BASELINE BP: NORMAL
STRESS ECHO BASELINE HR: 60
STRESS ECHO CALCULATED PERCENT HR: 47 %
STRESS ECHO LAST STRESS BP: NORMAL
STRESS ECHO LAST STRESS HR: 61

## 2019-10-14 ENCOUNTER — AMBULATORY - HEALTHEAST (OUTPATIENT)
Dept: CARDIOLOGY | Facility: CLINIC | Age: 74
End: 2019-10-14

## 2019-10-14 ENCOUNTER — COMMUNICATION - HEALTHEAST (OUTPATIENT)
Dept: CARDIOLOGY | Facility: CLINIC | Age: 74
End: 2019-10-14

## 2019-10-14 ENCOUNTER — AMBULATORY - HEALTHEAST (OUTPATIENT)
Dept: LAB | Facility: HOSPITAL | Age: 74
End: 2019-10-14

## 2019-10-14 DIAGNOSIS — I42.0 DILATED CARDIOMYOPATHY (H): ICD-10-CM

## 2019-10-14 DIAGNOSIS — R06.09 DYSPNEA ON EXERTION: ICD-10-CM

## 2019-10-14 DIAGNOSIS — I42.0 CONGESTIVE CARDIOMYOPATHY (H): ICD-10-CM

## 2019-10-14 DIAGNOSIS — R06.89 HYPOVENTILATION, IDIOPATHIC: ICD-10-CM

## 2019-10-14 LAB
ANION GAP SERPL CALCULATED.3IONS-SCNC: 9 MMOL/L (ref 5–18)
BASOPHILS # BLD AUTO: 0 THOU/UL (ref 0–0.2)
BASOPHILS NFR BLD AUTO: 1 % (ref 0–2)
BNP SERPL-MCNC: 73 PG/ML (ref 0–74)
BUN SERPL-MCNC: 23 MG/DL (ref 8–28)
CALCIUM SERPL-MCNC: 9.5 MG/DL (ref 8.5–10.5)
CHLORIDE BLD-SCNC: 104 MMOL/L (ref 98–107)
CO2 SERPL-SCNC: 27 MMOL/L (ref 22–31)
CREAT SERPL-MCNC: 1.58 MG/DL (ref 0.7–1.3)
EOSINOPHIL # BLD AUTO: 0.2 THOU/UL (ref 0–0.4)
EOSINOPHIL NFR BLD AUTO: 2 % (ref 0–6)
ERYTHROCYTE [DISTWIDTH] IN BLOOD BY AUTOMATED COUNT: 12.9 % (ref 11–14.5)
GFR SERPL CREATININE-BSD FRML MDRD: 43 ML/MIN/1.73M2
GLUCOSE BLD-MCNC: 88 MG/DL (ref 70–125)
HCT VFR BLD AUTO: 37.6 % (ref 40–54)
HGB BLD-MCNC: 12.7 G/DL (ref 14–18)
LYMPHOCYTES # BLD AUTO: 1.4 THOU/UL (ref 0.8–4.4)
LYMPHOCYTES NFR BLD AUTO: 23 % (ref 20–40)
MCH RBC QN AUTO: 33.1 PG (ref 27–34)
MCHC RBC AUTO-ENTMCNC: 33.8 G/DL (ref 32–36)
MCV RBC AUTO: 98 FL (ref 80–100)
MONOCYTES # BLD AUTO: 0.7 THOU/UL (ref 0–0.9)
MONOCYTES NFR BLD AUTO: 11 % (ref 2–10)
NEUTROPHILS # BLD AUTO: 4 THOU/UL (ref 2–7.7)
NEUTROPHILS NFR BLD AUTO: 64 % (ref 50–70)
PLATELET # BLD AUTO: 160 THOU/UL (ref 140–440)
PMV BLD AUTO: 11.5 FL (ref 8.5–12.5)
POTASSIUM BLD-SCNC: 3.9 MMOL/L (ref 3.5–5)
RBC # BLD AUTO: 3.84 MILL/UL (ref 4.4–6.2)
SODIUM SERPL-SCNC: 140 MMOL/L (ref 136–145)
WBC: 6.3 THOU/UL (ref 4–11)

## 2019-10-15 ENCOUNTER — AMBULATORY - HEALTHEAST (OUTPATIENT)
Dept: CARDIOLOGY | Facility: CLINIC | Age: 74
End: 2019-10-15

## 2019-10-15 ENCOUNTER — COMMUNICATION - HEALTHEAST (OUTPATIENT)
Dept: CARDIOLOGY | Facility: CLINIC | Age: 74
End: 2019-10-15

## 2019-10-23 ENCOUNTER — COMMUNICATION - HEALTHEAST (OUTPATIENT)
Dept: ANTICOAGULATION | Facility: CLINIC | Age: 74
End: 2019-10-23

## 2019-10-23 ENCOUNTER — AMBULATORY - HEALTHEAST (OUTPATIENT)
Dept: LAB | Facility: CLINIC | Age: 74
End: 2019-10-23

## 2019-10-23 DIAGNOSIS — I48.4 ATYPICAL ATRIAL FLUTTER (H): ICD-10-CM

## 2019-10-23 DIAGNOSIS — Z95.3 S/P MITRAL VALVE REPLACEMENT WITH TISSUE VALVE: ICD-10-CM

## 2019-10-23 DIAGNOSIS — I48.0 PAROXYSMAL ATRIAL FIBRILLATION (H): ICD-10-CM

## 2019-10-23 LAB — INR PPP: 2.4 (ref 0.9–1.1)

## 2019-10-25 ENCOUNTER — COMMUNICATION - HEALTHEAST (OUTPATIENT)
Dept: CARDIOLOGY | Facility: CLINIC | Age: 74
End: 2019-10-25

## 2019-12-10 ENCOUNTER — AMBULATORY - HEALTHEAST (OUTPATIENT)
Dept: CARDIOLOGY | Facility: CLINIC | Age: 74
End: 2019-12-10

## 2019-12-10 ENCOUNTER — COMMUNICATION - HEALTHEAST (OUTPATIENT)
Dept: ANTICOAGULATION | Facility: CLINIC | Age: 74
End: 2019-12-10

## 2019-12-10 DIAGNOSIS — I25.119 CORONARY ARTERY DISEASE INVOLVING NATIVE CORONARY ARTERY OF NATIVE HEART WITH ANGINA PECTORIS (H): ICD-10-CM

## 2019-12-10 DIAGNOSIS — I48.0 PAROXYSMAL ATRIAL FIBRILLATION (H): ICD-10-CM

## 2019-12-10 DIAGNOSIS — Z95.3 S/P MITRAL VALVE REPLACEMENT WITH TISSUE VALVE: ICD-10-CM

## 2019-12-10 DIAGNOSIS — I48.4 ATYPICAL ATRIAL FLUTTER (H): ICD-10-CM

## 2019-12-10 LAB
CHOLEST SERPL-MCNC: 139 MG/DL
FASTING STATUS PATIENT QL REPORTED: YES
HDLC SERPL-MCNC: 44 MG/DL
LDLC SERPL CALC-MCNC: 84 MG/DL
POC INR - HE - HISTORICAL: 2 (ref 0.9–1.1)
TRIGL SERPL-MCNC: 56 MG/DL

## 2019-12-11 ENCOUNTER — RECORDS - HEALTHEAST (OUTPATIENT)
Dept: ADMINISTRATIVE | Facility: OTHER | Age: 74
End: 2019-12-11

## 2019-12-11 ENCOUNTER — COMMUNICATION - HEALTHEAST (OUTPATIENT)
Dept: CARDIOLOGY | Facility: CLINIC | Age: 74
End: 2019-12-11

## 2019-12-30 ENCOUNTER — AMBULATORY - HEALTHEAST (OUTPATIENT)
Dept: CARDIOLOGY | Facility: CLINIC | Age: 74
End: 2019-12-30

## 2019-12-30 DIAGNOSIS — Z95.0 CARDIAC PACEMAKER IN SITU: ICD-10-CM

## 2019-12-30 DIAGNOSIS — I49.5 SICK SINUS SYNDROME (H): ICD-10-CM

## 2020-01-22 ENCOUNTER — COMMUNICATION - HEALTHEAST (OUTPATIENT)
Dept: ANTICOAGULATION | Facility: CLINIC | Age: 75
End: 2020-01-22

## 2020-01-22 ENCOUNTER — AMBULATORY - HEALTHEAST (OUTPATIENT)
Dept: LAB | Facility: CLINIC | Age: 75
End: 2020-01-22

## 2020-01-22 DIAGNOSIS — I48.4 ATYPICAL ATRIAL FLUTTER (H): ICD-10-CM

## 2020-01-22 DIAGNOSIS — I48.0 PAROXYSMAL ATRIAL FIBRILLATION (H): ICD-10-CM

## 2020-01-22 DIAGNOSIS — Z95.3 S/P MITRAL VALVE REPLACEMENT WITH TISSUE VALVE: ICD-10-CM

## 2020-01-22 LAB — INR PPP: 1.6 (ref 0.9–1.1)

## 2020-01-28 ENCOUNTER — AMBULATORY - HEALTHEAST (OUTPATIENT)
Dept: LAB | Facility: CLINIC | Age: 75
End: 2020-01-28

## 2020-01-28 ENCOUNTER — COMMUNICATION - HEALTHEAST (OUTPATIENT)
Dept: ANTICOAGULATION | Facility: CLINIC | Age: 75
End: 2020-01-28

## 2020-01-28 DIAGNOSIS — Z95.3 S/P MITRAL VALVE REPLACEMENT WITH TISSUE VALVE: ICD-10-CM

## 2020-01-28 DIAGNOSIS — I48.4 ATYPICAL ATRIAL FLUTTER (H): ICD-10-CM

## 2020-01-28 DIAGNOSIS — I48.0 PAROXYSMAL ATRIAL FIBRILLATION (H): ICD-10-CM

## 2020-01-28 LAB — INR PPP: 1.74 (ref 0.9–1.1)

## 2020-02-11 ENCOUNTER — COMMUNICATION - HEALTHEAST (OUTPATIENT)
Dept: ANTICOAGULATION | Facility: CLINIC | Age: 75
End: 2020-02-11

## 2020-02-11 ENCOUNTER — AMBULATORY - HEALTHEAST (OUTPATIENT)
Dept: LAB | Facility: CLINIC | Age: 75
End: 2020-02-11

## 2020-02-11 DIAGNOSIS — I48.4 ATYPICAL ATRIAL FLUTTER (H): ICD-10-CM

## 2020-02-11 DIAGNOSIS — I48.0 PAROXYSMAL ATRIAL FIBRILLATION (H): ICD-10-CM

## 2020-02-11 DIAGNOSIS — Z95.3 S/P MITRAL VALVE REPLACEMENT WITH TISSUE VALVE: ICD-10-CM

## 2020-02-11 LAB — INR PPP: 2.1 (ref 0.9–1.1)

## 2020-02-13 ENCOUNTER — RECORDS - HEALTHEAST (OUTPATIENT)
Dept: ADMINISTRATIVE | Facility: OTHER | Age: 75
End: 2020-02-13

## 2020-02-24 ENCOUNTER — COMMUNICATION - HEALTHEAST (OUTPATIENT)
Dept: INTENSIVE CARE | Facility: CLINIC | Age: 75
End: 2020-02-24

## 2020-02-24 DIAGNOSIS — Z95.3 S/P MITRAL VALVE REPLACEMENT WITH TISSUE VALVE: ICD-10-CM

## 2020-02-24 DIAGNOSIS — Z79.01 LONG TERM CURRENT USE OF ANTICOAGULANT THERAPY: ICD-10-CM

## 2020-02-24 DIAGNOSIS — I48.0 PAROXYSMAL ATRIAL FIBRILLATION (H): ICD-10-CM

## 2020-02-28 ENCOUNTER — AMBULATORY - HEALTHEAST (OUTPATIENT)
Dept: LAB | Facility: CLINIC | Age: 75
End: 2020-02-28

## 2020-02-28 ENCOUNTER — COMMUNICATION - HEALTHEAST (OUTPATIENT)
Dept: ANTICOAGULATION | Facility: CLINIC | Age: 75
End: 2020-02-28

## 2020-02-28 DIAGNOSIS — Z95.3 S/P MITRAL VALVE REPLACEMENT WITH TISSUE VALVE: ICD-10-CM

## 2020-02-28 DIAGNOSIS — I48.0 PAROXYSMAL ATRIAL FIBRILLATION (H): ICD-10-CM

## 2020-02-28 DIAGNOSIS — I48.4 ATYPICAL ATRIAL FLUTTER (H): ICD-10-CM

## 2020-02-28 LAB — INR PPP: 2.3 (ref 0.9–1.1)

## 2020-03-18 ENCOUNTER — AMBULATORY - HEALTHEAST (OUTPATIENT)
Dept: LAB | Facility: CLINIC | Age: 75
End: 2020-03-18

## 2020-03-18 ENCOUNTER — COMMUNICATION - HEALTHEAST (OUTPATIENT)
Dept: ANTICOAGULATION | Facility: CLINIC | Age: 75
End: 2020-03-18

## 2020-03-18 DIAGNOSIS — Z95.3 S/P MITRAL VALVE REPLACEMENT WITH TISSUE VALVE: ICD-10-CM

## 2020-03-18 DIAGNOSIS — I48.0 PAROXYSMAL ATRIAL FIBRILLATION (H): ICD-10-CM

## 2020-03-18 DIAGNOSIS — I48.4 ATYPICAL ATRIAL FLUTTER (H): ICD-10-CM

## 2020-03-18 LAB — INR PPP: 2.2 (ref 0.9–1.1)

## 2020-03-30 ENCOUNTER — AMBULATORY - HEALTHEAST (OUTPATIENT)
Dept: CARDIOLOGY | Facility: CLINIC | Age: 75
End: 2020-03-30

## 2020-03-30 DIAGNOSIS — Z95.0 CARDIAC PACEMAKER IN SITU: ICD-10-CM

## 2020-03-30 DIAGNOSIS — I49.5 SICK SINUS SYNDROME (H): ICD-10-CM

## 2020-04-08 ENCOUNTER — COMMUNICATION - HEALTHEAST (OUTPATIENT)
Dept: ANTICOAGULATION | Facility: CLINIC | Age: 75
End: 2020-04-08

## 2020-04-08 ENCOUNTER — AMBULATORY - HEALTHEAST (OUTPATIENT)
Dept: LAB | Facility: CLINIC | Age: 75
End: 2020-04-08

## 2020-04-08 DIAGNOSIS — I48.4 ATYPICAL ATRIAL FLUTTER (H): ICD-10-CM

## 2020-04-08 DIAGNOSIS — Z95.3 S/P MITRAL VALVE REPLACEMENT WITH TISSUE VALVE: ICD-10-CM

## 2020-04-08 DIAGNOSIS — I48.0 PAROXYSMAL ATRIAL FIBRILLATION (H): ICD-10-CM

## 2020-04-08 LAB — INR PPP: 2.2 (ref 0.9–1.1)

## 2020-05-06 ENCOUNTER — COMMUNICATION - HEALTHEAST (OUTPATIENT)
Dept: ANTICOAGULATION | Facility: CLINIC | Age: 75
End: 2020-05-06

## 2020-05-06 ENCOUNTER — AMBULATORY - HEALTHEAST (OUTPATIENT)
Dept: LAB | Facility: CLINIC | Age: 75
End: 2020-05-06

## 2020-05-06 DIAGNOSIS — Z95.3 S/P MITRAL VALVE REPLACEMENT WITH TISSUE VALVE: ICD-10-CM

## 2020-05-06 DIAGNOSIS — I48.0 PAROXYSMAL ATRIAL FIBRILLATION (H): ICD-10-CM

## 2020-05-06 DIAGNOSIS — I48.4 ATYPICAL ATRIAL FLUTTER (H): ICD-10-CM

## 2020-05-06 LAB — INR PPP: 2.3 (ref 0.9–1.1)

## 2020-05-20 ENCOUNTER — OFFICE VISIT - HEALTHEAST (OUTPATIENT)
Dept: CARDIOLOGY | Facility: CLINIC | Age: 75
End: 2020-05-20

## 2020-05-20 DIAGNOSIS — I42.0 DILATED CARDIOMYOPATHY (H): ICD-10-CM

## 2020-05-20 DIAGNOSIS — Z95.3 S/P MITRAL VALVE REPLACEMENT WITH TISSUE VALVE: ICD-10-CM

## 2020-05-20 DIAGNOSIS — I48.0 PAROXYSMAL ATRIAL FIBRILLATION (H): ICD-10-CM

## 2020-05-20 ASSESSMENT — MIFFLIN-ST. JEOR: SCORE: 1824.43

## 2020-05-27 ENCOUNTER — COMMUNICATION - HEALTHEAST (OUTPATIENT)
Dept: CARDIOLOGY | Facility: CLINIC | Age: 75
End: 2020-05-27

## 2020-06-04 ENCOUNTER — COMMUNICATION - HEALTHEAST (OUTPATIENT)
Dept: INTERNAL MEDICINE | Facility: CLINIC | Age: 75
End: 2020-06-04

## 2020-06-04 ENCOUNTER — COMMUNICATION - HEALTHEAST (OUTPATIENT)
Dept: CARDIOLOGY | Facility: CLINIC | Age: 75
End: 2020-06-04

## 2020-06-17 ENCOUNTER — COMMUNICATION - HEALTHEAST (OUTPATIENT)
Dept: ANTICOAGULATION | Facility: CLINIC | Age: 75
End: 2020-06-17

## 2020-06-17 ENCOUNTER — AMBULATORY - HEALTHEAST (OUTPATIENT)
Dept: LAB | Facility: CLINIC | Age: 75
End: 2020-06-17

## 2020-06-17 DIAGNOSIS — I48.4 ATYPICAL ATRIAL FLUTTER (H): ICD-10-CM

## 2020-06-17 DIAGNOSIS — I48.0 PAROXYSMAL ATRIAL FIBRILLATION (H): ICD-10-CM

## 2020-06-17 DIAGNOSIS — Z95.3 S/P MITRAL VALVE REPLACEMENT WITH TISSUE VALVE: ICD-10-CM

## 2020-06-17 LAB — INR PPP: 2.2 (ref 0.9–1.1)

## 2020-06-30 ENCOUNTER — AMBULATORY - HEALTHEAST (OUTPATIENT)
Dept: CARDIOLOGY | Facility: CLINIC | Age: 75
End: 2020-06-30

## 2020-06-30 DIAGNOSIS — Z95.0 CARDIAC PACEMAKER IN SITU: ICD-10-CM

## 2020-06-30 DIAGNOSIS — I49.5 SICK SINUS SYNDROME (H): ICD-10-CM

## 2020-07-02 ENCOUNTER — COMMUNICATION - HEALTHEAST (OUTPATIENT)
Dept: CARDIOLOGY | Facility: CLINIC | Age: 75
End: 2020-07-02

## 2020-07-14 ENCOUNTER — COMMUNICATION - HEALTHEAST (OUTPATIENT)
Dept: CARDIOLOGY | Facility: CLINIC | Age: 75
End: 2020-07-14

## 2020-07-15 ENCOUNTER — AMBULATORY - HEALTHEAST (OUTPATIENT)
Dept: CARDIOLOGY | Facility: CLINIC | Age: 75
End: 2020-07-15

## 2020-07-15 ENCOUNTER — OFFICE VISIT - HEALTHEAST (OUTPATIENT)
Dept: CARDIOLOGY | Facility: CLINIC | Age: 75
End: 2020-07-15

## 2020-07-15 DIAGNOSIS — I25.119 CORONARY ARTERY DISEASE INVOLVING NATIVE CORONARY ARTERY OF NATIVE HEART WITH ANGINA PECTORIS (H): ICD-10-CM

## 2020-07-15 DIAGNOSIS — I42.0 DILATED CARDIOMYOPATHY (H): ICD-10-CM

## 2020-07-15 DIAGNOSIS — Z79.899 LONG TERM USE OF DRUG: ICD-10-CM

## 2020-07-15 DIAGNOSIS — R06.09 DYSPNEA ON EXERTION: ICD-10-CM

## 2020-07-16 ENCOUNTER — AMBULATORY - HEALTHEAST (OUTPATIENT)
Dept: LAB | Facility: HOSPITAL | Age: 75
End: 2020-07-16

## 2020-07-16 ENCOUNTER — AMBULATORY - HEALTHEAST (OUTPATIENT)
Dept: CARDIOLOGY | Facility: CLINIC | Age: 75
End: 2020-07-16

## 2020-07-16 DIAGNOSIS — Z79.899 LONG TERM USE OF DRUG: ICD-10-CM

## 2020-07-16 DIAGNOSIS — I42.0 DILATED CARDIOMYOPATHY (H): ICD-10-CM

## 2020-07-16 DIAGNOSIS — I25.119 CORONARY ARTERY DISEASE INVOLVING NATIVE CORONARY ARTERY OF NATIVE HEART WITH ANGINA PECTORIS (H): ICD-10-CM

## 2020-07-16 DIAGNOSIS — R06.09 DYSPNEA ON EXERTION: ICD-10-CM

## 2020-07-16 LAB
ANION GAP SERPL CALCULATED.3IONS-SCNC: 9 MMOL/L (ref 5–18)
BNP SERPL-MCNC: 89 PG/ML (ref 0–76)
BUN SERPL-MCNC: 19 MG/DL (ref 8–28)
CALCIUM SERPL-MCNC: 9 MG/DL (ref 8.5–10.5)
CHLORIDE BLD-SCNC: 106 MMOL/L (ref 98–107)
CHOLEST SERPL-MCNC: 128 MG/DL
CO2 SERPL-SCNC: 23 MMOL/L (ref 22–31)
CREAT SERPL-MCNC: 0.86 MG/DL (ref 0.7–1.3)
ERYTHROCYTE [DISTWIDTH] IN BLOOD BY AUTOMATED COUNT: 13.2 % (ref 11–14.5)
FASTING STATUS PATIENT QL REPORTED: YES
GFR SERPL CREATININE-BSD FRML MDRD: >60 ML/MIN/1.73M2
GLUCOSE BLD-MCNC: 98 MG/DL (ref 70–125)
HCT VFR BLD AUTO: 37.6 % (ref 40–54)
HDLC SERPL-MCNC: 41 MG/DL
HGB BLD-MCNC: 12.8 G/DL (ref 14–18)
LDLC SERPL CALC-MCNC: 70 MG/DL
MCH RBC QN AUTO: 32.3 PG (ref 27–34)
MCHC RBC AUTO-ENTMCNC: 34 G/DL (ref 32–36)
MCV RBC AUTO: 95 FL (ref 80–100)
PLATELET # BLD AUTO: 169 THOU/UL (ref 140–440)
PMV BLD AUTO: 11.2 FL (ref 8.5–12.5)
POTASSIUM BLD-SCNC: 4.3 MMOL/L (ref 3.5–5)
RBC # BLD AUTO: 3.96 MILL/UL (ref 4.4–6.2)
SODIUM SERPL-SCNC: 138 MMOL/L (ref 136–145)
TRIGL SERPL-MCNC: 85 MG/DL
TSH SERPL DL<=0.005 MIU/L-ACNC: 6.12 UIU/ML (ref 0.3–5)
WBC: 5.4 THOU/UL (ref 4–11)

## 2020-07-28 ENCOUNTER — COMMUNICATION - HEALTHEAST (OUTPATIENT)
Dept: CARDIOLOGY | Facility: CLINIC | Age: 75
End: 2020-07-28

## 2020-07-29 ENCOUNTER — COMMUNICATION - HEALTHEAST (OUTPATIENT)
Dept: INTENSIVE CARE | Facility: CLINIC | Age: 75
End: 2020-07-29

## 2020-07-29 ENCOUNTER — AMBULATORY - HEALTHEAST (OUTPATIENT)
Dept: LAB | Facility: CLINIC | Age: 75
End: 2020-07-29

## 2020-07-29 ENCOUNTER — COMMUNICATION - HEALTHEAST (OUTPATIENT)
Dept: ANTICOAGULATION | Facility: CLINIC | Age: 75
End: 2020-07-29

## 2020-07-29 DIAGNOSIS — Z79.01 LONG TERM CURRENT USE OF ANTICOAGULANT THERAPY: ICD-10-CM

## 2020-07-29 DIAGNOSIS — Z95.3 S/P MITRAL VALVE REPLACEMENT WITH TISSUE VALVE: ICD-10-CM

## 2020-07-29 DIAGNOSIS — I48.0 PAROXYSMAL ATRIAL FIBRILLATION (H): ICD-10-CM

## 2020-07-29 DIAGNOSIS — I48.4 ATYPICAL ATRIAL FLUTTER (H): ICD-10-CM

## 2020-07-29 LAB — INR PPP: 2.2 (ref 0.9–1.1)

## 2020-08-05 ENCOUNTER — HOSPITAL ENCOUNTER (OUTPATIENT)
Dept: CARDIOLOGY | Facility: HOSPITAL | Age: 75
Discharge: HOME | End: 2020-08-05
Attending: INTERNAL MEDICINE

## 2020-08-05 DIAGNOSIS — R06.09 DYSPNEA ON EXERTION: ICD-10-CM

## 2020-08-05 LAB
AORTIC ROOT: 3.8 CM
AORTIC VALVE MEAN VELOCITY: 85.8 CM/S
ASCENDING AORTA: 3.6 CM
AV DIMENSIONLESS INDEX VTI: 0.7
AV MEAN GRADIENT: 3 MMHG
AV PEAK GRADIENT: 6.1 MMHG
AV VALVE AREA: 2.5 CM2
BSA FOR ECHO PROCEDURE: 2.3 M2
CV BLOOD PRESSURE: ABNORMAL MMHG
CV ECHO HEIGHT: 73 IN
CV ECHO WEIGHT: 226 LBS
DOP CALC AO PEAK VEL: 123 CM/S
DOP CALC AO VTI: 25.6 CM
DOP CALC LVOT AREA: 3.46 CM2
DOP CALC LVOT DIAMETER: 2.1 CM
DOP CALC LVOT STROKE VOLUME: 65.1 CM3
DOP CALC MV VTI: 47.7 CM
DOP CALCLVOT PEAK VEL VTI: 18.8 CM
EJECTION FRACTION: 62 % (ref 55–75)
FRACTIONAL SHORTENING: 29.1 % (ref 28–44)
INTERVENTRICULAR SEPTUM IN END DIASTOLE: 1.2 CM (ref 0.6–1)
IVS/PW RATIO: 1.3
LA AREA 1: 24.3 CM2
LA AREA 2: 21.5 CM2
LEFT ATRIUM LENGTH: 5.81 CM
LEFT ATRIUM SIZE: 4.4 CM
LEFT ATRIUM TO AORTIC ROOT RATIO: 1.16 NO UNITS
LEFT ATRIUM VOLUME INDEX: 33.2 ML/M2
LEFT ATRIUM VOLUME: 76.4 ML
LEFT VENTRICLE CARDIAC INDEX: 1.8 L/MIN/M2
LEFT VENTRICLE CARDIAC OUTPUT: 4.2 L/MIN
LEFT VENTRICLE DIASTOLIC VOLUME INDEX: 36.5 CM3/M2 (ref 34–74)
LEFT VENTRICLE DIASTOLIC VOLUME: 84 CM3 (ref 62–150)
LEFT VENTRICLE HEART RATE: 64 BPM
LEFT VENTRICLE MASS INDEX: 98.9 G/M2
LEFT VENTRICLE SYSTOLIC VOLUME INDEX: 13.9 CM3/M2 (ref 11–31)
LEFT VENTRICLE SYSTOLIC VOLUME: 32 CM3 (ref 21–61)
LEFT VENTRICULAR INTERNAL DIMENSION IN DIASTOLE: 5.5 CM (ref 4.2–5.8)
LEFT VENTRICULAR INTERNAL DIMENSION IN SYSTOLE: 3.9 CM (ref 2.5–4)
LEFT VENTRICULAR MASS: 227.4 G
LEFT VENTRICULAR OUTFLOW TRACT MEAN GRADIENT: 2 MMHG
LEFT VENTRICULAR OUTFLOW TRACT MEAN VELOCITY: 56.8 CM/S
LEFT VENTRICULAR POSTERIOR WALL IN END DIASTOLE: 0.9 CM (ref 0.6–1)
LV STROKE VOLUME INDEX: 28.3 ML/M2
MITRAL VALVE DECELERATION SLOPE: 6380 MM/S2
MITRAL VALVE E/A RATIO: 1.3
MITRAL VALVE MEAN INFLOW VELOCITY: 99.3 CM/S
MITRAL VALVE PEAK VELOCITY: 163 CM/S
MITRAL VALVE PRESSURE HALF-TIME: 55 MS
MV AREA VTI: 1.36 CM2
MV AVERAGE E/E' RATIO: 21.4 CM/S
MV DECELERATION TIME: 201 MS
MV E'TISSUE VEL-LAT: 8.81 CM/S
MV E'TISSUE VEL-MED: 6.86 CM/S
MV LATERAL E/E' RATIO: 19.1
MV MEAN GRADIENT: 5 MMHG
MV MEDIAL E/E' RATIO: 24.5
MV PEAK A VELOCITY: 132 CM/S
MV PEAK E VELOCITY: 168 CM/S
MV PEAK GRADIENT: 10.6 MMHG
MV VALVE AREA BY CONTINUITY EQUATION: 1.4 CM2
MV VALVE AREA PRESSURE 1/2 METHOD: 4 CM2
NUC REST DIASTOLIC VOLUME INDEX: 3616 LBS
NUC REST SYSTOLIC VOLUME INDEX: 73 IN
PR MAX PG: 5 MMHG
PR PEAK VELOCITY: 107 CM/S
TRICUSPID VALVE ANULAR PLANE SYSTOLIC EXCURSION: 1.7 CM

## 2020-08-05 ASSESSMENT — MIFFLIN-ST. JEOR: SCORE: 1809.01

## 2020-08-27 ENCOUNTER — COMMUNICATION - HEALTHEAST (OUTPATIENT)
Dept: INTERNAL MEDICINE | Facility: CLINIC | Age: 75
End: 2020-08-27

## 2020-09-09 ENCOUNTER — COMMUNICATION - HEALTHEAST (OUTPATIENT)
Dept: ANTICOAGULATION | Facility: CLINIC | Age: 75
End: 2020-09-09

## 2020-09-09 ENCOUNTER — COMMUNICATION - HEALTHEAST (OUTPATIENT)
Dept: INTERNAL MEDICINE | Facility: CLINIC | Age: 75
End: 2020-09-09

## 2020-09-09 ENCOUNTER — OFFICE VISIT - HEALTHEAST (OUTPATIENT)
Dept: INTERNAL MEDICINE | Facility: CLINIC | Age: 75
End: 2020-09-09

## 2020-09-09 DIAGNOSIS — Z00.00 HEALTHCARE MAINTENANCE: ICD-10-CM

## 2020-09-09 DIAGNOSIS — I25.119 CORONARY ARTERY DISEASE INVOLVING NATIVE CORONARY ARTERY OF NATIVE HEART WITH ANGINA PECTORIS (H): ICD-10-CM

## 2020-09-09 DIAGNOSIS — Z95.3 S/P MITRAL VALVE REPLACEMENT WITH TISSUE VALVE: ICD-10-CM

## 2020-09-09 DIAGNOSIS — I48.0 PAROXYSMAL ATRIAL FIBRILLATION (H): ICD-10-CM

## 2020-09-09 DIAGNOSIS — R53.83 FATIGUE, UNSPECIFIED TYPE: ICD-10-CM

## 2020-09-09 DIAGNOSIS — Z12.5 SCREENING FOR PROSTATE CANCER: ICD-10-CM

## 2020-09-09 DIAGNOSIS — E78.5 HYPERLIPIDEMIA LDL GOAL <130: ICD-10-CM

## 2020-09-09 DIAGNOSIS — I48.4 ATYPICAL ATRIAL FLUTTER (H): ICD-10-CM

## 2020-09-09 DIAGNOSIS — Z23 NEED FOR VACCINATION: ICD-10-CM

## 2020-09-09 LAB
CHOLEST SERPL-MCNC: 138 MG/DL
FASTING STATUS PATIENT QL REPORTED: YES
HDLC SERPL-MCNC: 45 MG/DL
INR PPP: 3.3 (ref 0.9–1.1)
LDLC SERPL CALC-MCNC: 81 MG/DL
PSA SERPL-MCNC: 0.3 NG/ML (ref 0–6.5)
TRIGL SERPL-MCNC: 60 MG/DL
TSH SERPL DL<=0.005 MIU/L-ACNC: 4.65 UIU/ML (ref 0.3–5)

## 2020-09-09 ASSESSMENT — MIFFLIN-ST. JEOR: SCORE: 1773.85

## 2020-09-23 ENCOUNTER — AMBULATORY - HEALTHEAST (OUTPATIENT)
Dept: LAB | Facility: CLINIC | Age: 75
End: 2020-09-23

## 2020-09-23 ENCOUNTER — COMMUNICATION - HEALTHEAST (OUTPATIENT)
Dept: ANTICOAGULATION | Facility: CLINIC | Age: 75
End: 2020-09-23

## 2020-09-23 DIAGNOSIS — I48.4 ATYPICAL ATRIAL FLUTTER (H): ICD-10-CM

## 2020-09-23 DIAGNOSIS — I48.0 PAROXYSMAL ATRIAL FIBRILLATION (H): ICD-10-CM

## 2020-09-23 DIAGNOSIS — Z95.3 S/P MITRAL VALVE REPLACEMENT WITH TISSUE VALVE: ICD-10-CM

## 2020-09-23 LAB — INR PPP: 2 (ref 0.9–1.1)

## 2020-09-30 ENCOUNTER — COMMUNICATION - HEALTHEAST (OUTPATIENT)
Dept: CARDIOLOGY | Facility: CLINIC | Age: 75
End: 2020-09-30

## 2020-09-30 DIAGNOSIS — I48.4 ATYPICAL ATRIAL FLUTTER (H): ICD-10-CM

## 2020-10-01 ENCOUNTER — COMMUNICATION - HEALTHEAST (OUTPATIENT)
Dept: CARDIOLOGY | Facility: CLINIC | Age: 75
End: 2020-10-01

## 2020-10-07 ENCOUNTER — AMBULATORY - HEALTHEAST (OUTPATIENT)
Dept: LAB | Facility: CLINIC | Age: 75
End: 2020-10-07

## 2020-10-07 ENCOUNTER — COMMUNICATION - HEALTHEAST (OUTPATIENT)
Dept: ANTICOAGULATION | Facility: CLINIC | Age: 75
End: 2020-10-07

## 2020-10-07 DIAGNOSIS — I48.0 PAROXYSMAL ATRIAL FIBRILLATION (H): ICD-10-CM

## 2020-10-07 DIAGNOSIS — I48.4 ATYPICAL ATRIAL FLUTTER (H): ICD-10-CM

## 2020-10-07 DIAGNOSIS — Z95.3 S/P MITRAL VALVE REPLACEMENT WITH TISSUE VALVE: ICD-10-CM

## 2020-10-07 LAB — INR PPP: 2.7 (ref 0.9–1.1)

## 2020-10-30 ENCOUNTER — OFFICE VISIT - HEALTHEAST (OUTPATIENT)
Dept: INTERNAL MEDICINE | Facility: CLINIC | Age: 75
End: 2020-10-30

## 2020-10-30 ENCOUNTER — COMMUNICATION - HEALTHEAST (OUTPATIENT)
Dept: ANTICOAGULATION | Facility: CLINIC | Age: 75
End: 2020-10-30

## 2020-10-30 ENCOUNTER — AMBULATORY - HEALTHEAST (OUTPATIENT)
Dept: ANTICOAGULATION | Facility: CLINIC | Age: 75
End: 2020-10-30

## 2020-10-30 DIAGNOSIS — Z79.899 LONG TERM USE OF DRUG: ICD-10-CM

## 2020-10-30 DIAGNOSIS — E03.8 SUBCLINICAL HYPOTHYROIDISM: ICD-10-CM

## 2020-10-30 DIAGNOSIS — Z95.3 S/P MITRAL VALVE REPLACEMENT WITH TISSUE VALVE: ICD-10-CM

## 2020-10-30 DIAGNOSIS — I48.0 PAROXYSMAL ATRIAL FIBRILLATION (H): ICD-10-CM

## 2020-10-30 DIAGNOSIS — I25.119 CORONARY ARTERY DISEASE INVOLVING NATIVE CORONARY ARTERY OF NATIVE HEART WITH ANGINA PECTORIS (H): ICD-10-CM

## 2020-10-30 DIAGNOSIS — Z95.0 CARDIAC PACEMAKER IN SITU: ICD-10-CM

## 2020-10-30 DIAGNOSIS — I34.0 NON-RHEUMATIC MITRAL REGURGITATION: ICD-10-CM

## 2020-10-30 DIAGNOSIS — D64.9 NORMOCYTIC ANEMIA: ICD-10-CM

## 2020-10-30 DIAGNOSIS — I49.5 SICK SINUS SYNDROME (H): ICD-10-CM

## 2020-10-30 LAB
FERRITIN SERPL-MCNC: 287 NG/ML (ref 27–300)
HAPTOGLOB SERPL-MCNC: 178 MG/DL (ref 33–171)
INR PPP: 2.6 (ref 0.9–1.1)
RETICS # AUTO: 0.04 MILL/UL (ref 0.01–0.11)
RETICS/RBC NFR AUTO: 0.92 % (ref 0.8–2.7)
T3 SERPL-MCNC: 112 NG/DL (ref 45–175)
T4 FREE SERPL-MCNC: 0.9 NG/DL (ref 0.7–1.8)
TSH SERPL DL<=0.005 MIU/L-ACNC: 5.25 UIU/ML (ref 0.3–5)
VIT B12 SERPL-MCNC: 601 PG/ML (ref 213–816)

## 2020-10-30 ASSESSMENT — MIFFLIN-ST. JEOR: SCORE: 1826.02

## 2020-11-03 LAB — THYROID PEROXIDASE ANTIBODIES - HISTORICAL: 1639.8 IU/ML (ref 0–5.6)

## 2020-11-04 ENCOUNTER — COMMUNICATION - HEALTHEAST (OUTPATIENT)
Dept: CARDIOLOGY | Facility: CLINIC | Age: 75
End: 2020-11-04

## 2020-11-05 ENCOUNTER — COMMUNICATION - HEALTHEAST (OUTPATIENT)
Dept: ANTICOAGULATION | Facility: CLINIC | Age: 75
End: 2020-11-05

## 2020-11-05 DIAGNOSIS — I48.0 PAROXYSMAL ATRIAL FIBRILLATION (H): ICD-10-CM

## 2020-11-05 DIAGNOSIS — Z95.3 S/P MITRAL VALVE REPLACEMENT WITH TISSUE VALVE: ICD-10-CM

## 2020-11-05 DIAGNOSIS — Z79.01 LONG TERM (CURRENT) USE OF ANTICOAGULANTS: ICD-10-CM

## 2020-11-05 DIAGNOSIS — I48.4 ATYPICAL ATRIAL FLUTTER (H): ICD-10-CM

## 2020-11-06 ENCOUNTER — OFFICE VISIT - HEALTHEAST (OUTPATIENT)
Dept: CARDIOLOGY | Facility: CLINIC | Age: 75
End: 2020-11-06

## 2020-11-06 DIAGNOSIS — I48.0 PAROXYSMAL ATRIAL FIBRILLATION (H): ICD-10-CM

## 2020-11-06 DIAGNOSIS — I34.0 NON-RHEUMATIC MITRAL REGURGITATION: ICD-10-CM

## 2020-11-06 DIAGNOSIS — I49.5 SICK SINUS SYNDROME (H): ICD-10-CM

## 2020-11-06 ASSESSMENT — MIFFLIN-ST. JEOR: SCORE: 1799.94

## 2020-11-10 ENCOUNTER — COMMUNICATION - HEALTHEAST (OUTPATIENT)
Dept: CARDIOLOGY | Facility: CLINIC | Age: 75
End: 2020-11-10

## 2020-11-10 ENCOUNTER — AMBULATORY - HEALTHEAST (OUTPATIENT)
Dept: CARDIOLOGY | Facility: CLINIC | Age: 75
End: 2020-11-10

## 2020-11-10 DIAGNOSIS — I49.5 SICK SINUS SYNDROME (H): ICD-10-CM

## 2020-11-10 DIAGNOSIS — I48.0 PAROXYSMAL ATRIAL FIBRILLATION (H): ICD-10-CM

## 2020-11-10 DIAGNOSIS — Z95.0 CARDIAC PACEMAKER IN SITU: ICD-10-CM

## 2020-11-11 ENCOUNTER — COMMUNICATION - HEALTHEAST (OUTPATIENT)
Dept: OTOLARYNGOLOGY | Facility: CLINIC | Age: 75
End: 2020-11-11

## 2020-11-11 DIAGNOSIS — E03.9 ACQUIRED HYPOTHYROIDISM: ICD-10-CM

## 2020-11-12 ENCOUNTER — AMBULATORY - HEALTHEAST (OUTPATIENT)
Dept: CARDIOLOGY | Facility: CLINIC | Age: 75
End: 2020-11-12

## 2020-11-12 ENCOUNTER — AMBULATORY - HEALTHEAST (OUTPATIENT)
Dept: LAB | Facility: CLINIC | Age: 75
End: 2020-11-12

## 2020-11-12 ENCOUNTER — COMMUNICATION - HEALTHEAST (OUTPATIENT)
Dept: ANTICOAGULATION | Facility: CLINIC | Age: 75
End: 2020-11-12

## 2020-11-12 DIAGNOSIS — Z79.01 LONG TERM (CURRENT) USE OF ANTICOAGULANTS: ICD-10-CM

## 2020-11-12 DIAGNOSIS — I48.0 PAROXYSMAL ATRIAL FIBRILLATION (H): ICD-10-CM

## 2020-11-12 DIAGNOSIS — I48.4 ATYPICAL ATRIAL FLUTTER (H): ICD-10-CM

## 2020-11-12 DIAGNOSIS — Z95.3 S/P MITRAL VALVE REPLACEMENT WITH TISSUE VALVE: ICD-10-CM

## 2020-11-12 LAB — INR PPP: 2.4 (ref 0.9–1.1)

## 2020-11-19 ENCOUNTER — AMBULATORY - HEALTHEAST (OUTPATIENT)
Dept: LAB | Facility: CLINIC | Age: 75
End: 2020-11-19

## 2020-11-19 ENCOUNTER — SURGERY - HEALTHEAST (OUTPATIENT)
Dept: CARDIOLOGY | Facility: CLINIC | Age: 75
End: 2020-11-19

## 2020-11-19 ENCOUNTER — AMBULATORY - HEALTHEAST (OUTPATIENT)
Dept: CARDIOLOGY | Facility: CLINIC | Age: 75
End: 2020-11-19

## 2020-11-19 ENCOUNTER — COMMUNICATION - HEALTHEAST (OUTPATIENT)
Dept: ANTICOAGULATION | Facility: CLINIC | Age: 75
End: 2020-11-19

## 2020-11-19 DIAGNOSIS — I48.4 ATYPICAL ATRIAL FLUTTER (H): ICD-10-CM

## 2020-11-19 DIAGNOSIS — I48.0 PAROXYSMAL ATRIAL FIBRILLATION (H): ICD-10-CM

## 2020-11-19 DIAGNOSIS — Z95.3 S/P MITRAL VALVE REPLACEMENT WITH TISSUE VALVE: ICD-10-CM

## 2020-11-19 DIAGNOSIS — E03.9 ACQUIRED HYPOTHYROIDISM: ICD-10-CM

## 2020-11-19 DIAGNOSIS — Z79.01 LONG TERM (CURRENT) USE OF ANTICOAGULANTS: ICD-10-CM

## 2020-11-19 LAB
INR PPP: 2.5 (ref 0.9–1.1)
TSH SERPL DL<=0.005 MIU/L-ACNC: 4.63 UIU/ML (ref 0.3–5)

## 2020-11-20 ENCOUNTER — COMMUNICATION - HEALTHEAST (OUTPATIENT)
Dept: SCHEDULING | Facility: CLINIC | Age: 75
End: 2020-11-20

## 2020-11-20 LAB
SARS-COV-2 PCR COMMENT: NORMAL
SARS-COV-2 RNA SPEC QL NAA+PROBE: NEGATIVE
SARS-COV-2 VIRUS SPECIMEN SOURCE: NORMAL

## 2020-11-23 ENCOUNTER — ANESTHESIA - HEALTHEAST (OUTPATIENT)
Dept: CARDIOLOGY | Facility: CLINIC | Age: 75
End: 2020-11-23

## 2020-11-23 ENCOUNTER — AMBULATORY - HEALTHEAST (OUTPATIENT)
Dept: CARDIOLOGY | Facility: CLINIC | Age: 75
End: 2020-11-23

## 2020-11-23 DIAGNOSIS — Z95.0 CARDIAC PACEMAKER IN SITU: ICD-10-CM

## 2020-11-23 DIAGNOSIS — I49.5 SICK SINUS SYNDROME (H): ICD-10-CM

## 2020-11-24 ENCOUNTER — AMBULATORY - HEALTHEAST (OUTPATIENT)
Dept: ANTICOAGULATION | Facility: CLINIC | Age: 75
End: 2020-11-24

## 2020-11-30 ENCOUNTER — COMMUNICATION - HEALTHEAST (OUTPATIENT)
Dept: CARDIOLOGY | Facility: CLINIC | Age: 75
End: 2020-11-30

## 2020-12-02 ENCOUNTER — AMBULATORY - HEALTHEAST (OUTPATIENT)
Dept: LAB | Facility: CLINIC | Age: 75
End: 2020-12-02

## 2020-12-02 ENCOUNTER — COMMUNICATION - HEALTHEAST (OUTPATIENT)
Dept: ANTICOAGULATION | Facility: CLINIC | Age: 75
End: 2020-12-02

## 2020-12-02 DIAGNOSIS — I48.0 PAROXYSMAL ATRIAL FIBRILLATION (H): ICD-10-CM

## 2020-12-02 DIAGNOSIS — Z95.3 S/P MITRAL VALVE REPLACEMENT WITH TISSUE VALVE: ICD-10-CM

## 2020-12-02 DIAGNOSIS — Z79.01 LONG TERM (CURRENT) USE OF ANTICOAGULANTS: ICD-10-CM

## 2020-12-02 DIAGNOSIS — I48.4 ATYPICAL ATRIAL FLUTTER (H): ICD-10-CM

## 2020-12-02 LAB — INR PPP: 2.8 (ref 0.9–1.1)

## 2020-12-07 ENCOUNTER — COMMUNICATION - HEALTHEAST (OUTPATIENT)
Dept: SCHEDULING | Facility: CLINIC | Age: 75
End: 2020-12-07

## 2020-12-07 DIAGNOSIS — E03.9 HYPOTHYROIDISM, UNSPECIFIED TYPE: ICD-10-CM

## 2020-12-08 ENCOUNTER — COMMUNICATION - HEALTHEAST (OUTPATIENT)
Dept: CARDIOLOGY | Facility: CLINIC | Age: 75
End: 2020-12-08

## 2020-12-09 ENCOUNTER — AMBULATORY - HEALTHEAST (OUTPATIENT)
Dept: CARDIOLOGY | Facility: CLINIC | Age: 75
End: 2020-12-09

## 2020-12-09 DIAGNOSIS — I49.5 SICK SINUS SYNDROME (H): ICD-10-CM

## 2020-12-09 DIAGNOSIS — Z95.0 CARDIAC PACEMAKER IN SITU: ICD-10-CM

## 2020-12-10 ENCOUNTER — AMBULATORY - HEALTHEAST (OUTPATIENT)
Dept: CARDIOLOGY | Facility: CLINIC | Age: 75
End: 2020-12-10

## 2020-12-10 ENCOUNTER — COMMUNICATION - HEALTHEAST (OUTPATIENT)
Dept: ANTICOAGULATION | Facility: CLINIC | Age: 75
End: 2020-12-10

## 2020-12-10 DIAGNOSIS — I48.19 PERSISTENT ATRIAL FIBRILLATION (H): ICD-10-CM

## 2020-12-10 DIAGNOSIS — I48.4 ATYPICAL ATRIAL FLUTTER (H): ICD-10-CM

## 2020-12-10 DIAGNOSIS — Z95.3 S/P MITRAL VALVE REPLACEMENT WITH TISSUE VALVE: ICD-10-CM

## 2020-12-10 DIAGNOSIS — I48.0 PAROXYSMAL ATRIAL FIBRILLATION (H): ICD-10-CM

## 2020-12-10 DIAGNOSIS — Z79.01 LONG TERM (CURRENT) USE OF ANTICOAGULANTS: ICD-10-CM

## 2020-12-10 DIAGNOSIS — Z95.0 CARDIAC PACEMAKER IN SITU: ICD-10-CM

## 2020-12-10 DIAGNOSIS — E03.9 HYPOTHYROIDISM, UNSPECIFIED TYPE: ICD-10-CM

## 2020-12-10 LAB
POC INR - HE - HISTORICAL: 2.1 (ref 0.9–1.1)
TSH SERPL DL<=0.005 MIU/L-ACNC: 4.35 UIU/ML (ref 0.3–5)

## 2020-12-10 ASSESSMENT — MIFFLIN-ST. JEOR: SCORE: 1790.87

## 2020-12-21 ENCOUNTER — OFFICE VISIT (OUTPATIENT)
Dept: URBAN - METROPOLITAN AREA CLINIC 29 | Facility: CLINIC | Age: 75
End: 2020-12-21
Payer: MEDICARE

## 2020-12-21 DIAGNOSIS — S05.01XA CORNEAL ABRASION W/O FB OF RIGHT EYE, INITIAL ENCOUNTER: Primary | ICD-10-CM

## 2020-12-21 PROCEDURE — 92002 INTRM OPH EXAM NEW PATIENT: CPT | Performed by: OPTOMETRIST

## 2020-12-21 RX ORDER — ERYTHROMYCIN 5 MG/G
OINTMENT OPHTHALMIC
Qty: 1 | Refills: 1 | Status: ACTIVE
Start: 2020-12-21

## 2020-12-21 ASSESSMENT — INTRAOCULAR PRESSURE
OD: 14
OS: 14

## 2020-12-21 NOTE — IMPRESSION/PLAN
Impression: Corneal abrasion w/o FB of right eye, initial encounter: S05. 01XA. Right. Plan: Pt ed re: condition. Begin erythromycin jeannette TID OD, AT's prn. RTC 1 week x follow up.

## 2020-12-23 ENCOUNTER — COMMUNICATION - HEALTHEAST (OUTPATIENT)
Dept: INTERNAL MEDICINE | Facility: CLINIC | Age: 75
End: 2020-12-23

## 2020-12-26 ENCOUNTER — COMMUNICATION - HEALTHEAST (OUTPATIENT)
Dept: CARDIOLOGY | Facility: CLINIC | Age: 75
End: 2020-12-26

## 2020-12-26 DIAGNOSIS — I48.4 ATYPICAL ATRIAL FLUTTER (H): ICD-10-CM

## 2020-12-28 ENCOUNTER — OFFICE VISIT (OUTPATIENT)
Dept: URBAN - METROPOLITAN AREA CLINIC 29 | Facility: CLINIC | Age: 75
End: 2020-12-28
Payer: MEDICARE

## 2020-12-28 ENCOUNTER — COMMUNICATION - HEALTHEAST (OUTPATIENT)
Dept: INTERNAL MEDICINE | Facility: CLINIC | Age: 75
End: 2020-12-28

## 2020-12-28 ENCOUNTER — RECORDS - HEALTHEAST (OUTPATIENT)
Dept: ADMINISTRATIVE | Facility: OTHER | Age: 75
End: 2020-12-28

## 2020-12-28 DIAGNOSIS — S05.01XD CORNEAL ABRASION W/O FB OF RIGHT EYE, SUBSEQUENT ENCOUNTER: Primary | ICD-10-CM

## 2020-12-28 PROCEDURE — 92012 INTRM OPH EXAM EST PATIENT: CPT | Performed by: OPTOMETRIST

## 2020-12-28 NOTE — IMPRESSION/PLAN
Impression: Corneal abrasion w/o FB of right eye, subsequent encounter: S05. 01XD. Right. Plan: Pt ed re: condition. d/c erythromycin jeannette. Use AT's prn x lubrication. RTC 1 year x CEE, or sooner if condition worsens.

## 2020-12-30 ENCOUNTER — COMMUNICATION - HEALTHEAST (OUTPATIENT)
Dept: SCHEDULING | Facility: CLINIC | Age: 75
End: 2020-12-30

## 2021-01-04 ENCOUNTER — COMMUNICATION - HEALTHEAST (OUTPATIENT)
Dept: CARDIOLOGY | Facility: CLINIC | Age: 76
End: 2021-01-04

## 2021-01-04 DIAGNOSIS — E78.5 HYPERLIPIDEMIA LDL GOAL <100: ICD-10-CM

## 2021-01-04 RX ORDER — ATORVASTATIN CALCIUM 40 MG/1
40 TABLET, FILM COATED ORAL DAILY
Qty: 90 TABLET | Refills: 1 | Status: SHIPPED | OUTPATIENT
Start: 2021-01-04 | End: 2021-09-10

## 2021-01-27 ENCOUNTER — COMMUNICATION - HEALTHEAST (OUTPATIENT)
Dept: INTERNAL MEDICINE | Facility: CLINIC | Age: 76
End: 2021-01-27

## 2021-02-02 ENCOUNTER — COMMUNICATION - HEALTHEAST (OUTPATIENT)
Dept: INTENSIVE CARE | Facility: CLINIC | Age: 76
End: 2021-02-02

## 2021-02-02 DIAGNOSIS — Z79.01 LONG TERM CURRENT USE OF ANTICOAGULANT THERAPY: ICD-10-CM

## 2021-02-02 DIAGNOSIS — I48.0 PAROXYSMAL ATRIAL FIBRILLATION (H): ICD-10-CM

## 2021-02-02 DIAGNOSIS — Z95.3 S/P MITRAL VALVE REPLACEMENT WITH TISSUE VALVE: ICD-10-CM

## 2021-02-02 RX ORDER — WARFARIN SODIUM 5 MG/1
TABLET ORAL
Qty: 120 TABLET | Refills: 1 | Status: SHIPPED | OUTPATIENT
Start: 2021-02-02 | End: 2021-08-18

## 2021-02-09 ENCOUNTER — COMMUNICATION - HEALTHEAST (OUTPATIENT)
Dept: ANTICOAGULATION | Facility: CLINIC | Age: 76
End: 2021-02-09

## 2021-02-10 ENCOUNTER — COMMUNICATION - HEALTHEAST (OUTPATIENT)
Dept: ANTICOAGULATION | Facility: CLINIC | Age: 76
End: 2021-02-10

## 2021-02-10 LAB — INR PPP: 2.1 (ref 0.9–1.1)

## 2021-02-16 ENCOUNTER — AMBULATORY - HEALTHEAST (OUTPATIENT)
Dept: ANTICOAGULATION | Facility: CLINIC | Age: 76
End: 2021-02-16

## 2021-03-10 ENCOUNTER — AMBULATORY - HEALTHEAST (OUTPATIENT)
Dept: CARDIOLOGY | Facility: CLINIC | Age: 76
End: 2021-03-10

## 2021-03-10 DIAGNOSIS — Z95.0 CARDIAC PACEMAKER IN SITU: ICD-10-CM

## 2021-03-10 DIAGNOSIS — I48.19 PERSISTENT ATRIAL FIBRILLATION (H): ICD-10-CM

## 2021-04-14 ENCOUNTER — COMMUNICATION - HEALTHEAST (OUTPATIENT)
Dept: ANTICOAGULATION | Facility: CLINIC | Age: 76
End: 2021-04-14

## 2021-04-14 LAB — INR PPP: 2.1 (ref 0.9–1.1)

## 2021-04-20 ENCOUNTER — COMMUNICATION - HEALTHEAST (OUTPATIENT)
Dept: CARDIOLOGY | Facility: CLINIC | Age: 76
End: 2021-04-20

## 2021-04-21 ENCOUNTER — AMBULATORY - HEALTHEAST (OUTPATIENT)
Dept: NURSING | Facility: CLINIC | Age: 76
End: 2021-04-21

## 2021-05-03 ENCOUNTER — RECORDS - HEALTHEAST (OUTPATIENT)
Dept: INTERNAL MEDICINE | Facility: CLINIC | Age: 76
End: 2021-05-03

## 2021-05-04 ENCOUNTER — COMMUNICATION - HEALTHEAST (OUTPATIENT)
Dept: ANTICOAGULATION | Facility: CLINIC | Age: 76
End: 2021-05-04

## 2021-05-04 ENCOUNTER — AMBULATORY - HEALTHEAST (OUTPATIENT)
Dept: LAB | Facility: CLINIC | Age: 76
End: 2021-05-04

## 2021-05-04 DIAGNOSIS — Z79.01 LONG TERM (CURRENT) USE OF ANTICOAGULANTS: ICD-10-CM

## 2021-05-04 DIAGNOSIS — Z95.3 S/P MITRAL VALVE REPLACEMENT WITH TISSUE VALVE: ICD-10-CM

## 2021-05-04 DIAGNOSIS — I48.0 PAROXYSMAL ATRIAL FIBRILLATION (H): ICD-10-CM

## 2021-05-04 DIAGNOSIS — I48.4 ATYPICAL ATRIAL FLUTTER (H): ICD-10-CM

## 2021-05-04 LAB — INR PPP: 2.5 (ref 0.9–1.1)

## 2021-05-05 ENCOUNTER — OFFICE VISIT - HEALTHEAST (OUTPATIENT)
Dept: INTERNAL MEDICINE | Facility: CLINIC | Age: 76
End: 2021-05-05

## 2021-05-05 DIAGNOSIS — R42 VERTIGO: ICD-10-CM

## 2021-05-05 DIAGNOSIS — I48.19 PERSISTENT ATRIAL FIBRILLATION (H): ICD-10-CM

## 2021-05-05 DIAGNOSIS — I25.119 CORONARY ARTERY DISEASE INVOLVING NATIVE CORONARY ARTERY OF NATIVE HEART WITH ANGINA PECTORIS (H): ICD-10-CM

## 2021-05-05 DIAGNOSIS — J30.9 ALLERGIC RHINITIS, UNSPECIFIED SEASONALITY, UNSPECIFIED TRIGGER: ICD-10-CM

## 2021-05-05 DIAGNOSIS — E03.8 SUBCLINICAL HYPOTHYROIDISM: ICD-10-CM

## 2021-05-05 LAB
T4 FREE SERPL-MCNC: 0.9 NG/DL (ref 0.7–1.8)
TSH SERPL DL<=0.005 MIU/L-ACNC: 5.83 UIU/ML (ref 0.3–5)

## 2021-05-05 RX ORDER — HYDROXYZINE HYDROCHLORIDE 10 MG/5ML
4 SYRUP ORAL EVERY 6 HOURS PRN
Qty: 30 TABLET | Refills: 11 | Status: SHIPPED | OUTPATIENT
Start: 2021-05-05

## 2021-05-05 ASSESSMENT — MIFFLIN-ST. JEOR: SCORE: 1813.55

## 2021-05-12 ENCOUNTER — AMBULATORY - HEALTHEAST (OUTPATIENT)
Dept: NURSING | Facility: CLINIC | Age: 76
End: 2021-05-12

## 2021-05-27 VITALS
HEART RATE: 60 BPM | SYSTOLIC BLOOD PRESSURE: 142 MMHG | BODY MASS INDEX: 30.09 KG/M2 | OXYGEN SATURATION: 99 % | WEIGHT: 227 LBS | DIASTOLIC BLOOD PRESSURE: 86 MMHG | HEIGHT: 73 IN

## 2021-05-27 NOTE — TELEPHONE ENCOUNTER
Pt returned call discussed with pt WTZ recommendations. Pt agreeable to plan. lab order placed. Lasix ordered per protocol and sent to preferred pharmacy. Message sent to schedulers to arrange lab appointment in 1 week. Instructed patient to call with any new symptoms or any further questions.

## 2021-05-27 NOTE — TELEPHONE ENCOUNTER
ANTICOAGULATION  MANAGEMENT    Assessment     Today's INR result of 2.2 is Therapeutic (goal INR of 2.0-3.0)        Warfarin taken as previously instructed    No new diet changes affecting INR    No new medication/supplements affecting INR    Continues to tolerate warfarin with no reported s/s of bleeding or thromboembolism     Previous INR was Therapeutic    Plan:     Left a detailed message for Obed regarding INR result and instructed:     Warfarin Dosing Instructions:  Continue current warfarin dose    2.5 mg every Wed; 5 mg all other days      (0 % change)    Instructed patient to follow up no later than: 4-6 weeks.    Education provided: importance of therapeutic range      Instructed to call the [facility] for any changes, questions or concerns. [phone])   ?   Latasha Traylor RN    Subjective/Objective:      Obed Haley, a 74 y.o. male is on warfarin.     Obed reports:     Home warfarin dose: as updated on anticoagulation calendar per template     Missed doses: No     Medication changes:  No     S/S of bleeding or thromboembolism:  No     New Injury or illness:  No     Changes in diet or alcohol consumption:  No     Upcoming surgery, procedure or cardioversion:  No    Anticoagulation Episode Summary     Current INR goal:   2.0-3.0   TTR:   62.5 % (1.5 y)   Next INR check:   5/15/2019   INR from last check:   2.20 (4/3/2019)   Weekly max warfarin dose:      Target end date:   9/29/2017   INR check location:      Preferred lab:      Send INR reminders to:   ANTICOAGULATION POOL C (DTN,VAD,CGR,GAV)       Comments:            Anticoagulation Care Providers     Provider Role Specialty Phone number    Franko Franz MD Referring Internal Medicine [phone]

## 2021-05-27 NOTE — TELEPHONE ENCOUNTER
Obed called back and reported that he received a voicemail message - discussed INR result of 2.2 and instructed to continue current warfarin doses.  Recommended INR check 4-6 weeks- patient stated that he will be out of town from April 23- May 15th.    INR check scheduled on 4/22 at [facility].    Patient has no other concerns a this time.    Latasha Traylor RN

## 2021-05-27 NOTE — PATIENT INSTRUCTIONS - HE
Obed Haley,    It was a pleasure to see you today at the [facility].     My recommendations after this visit include:      Blood work      DANNY Henao MD, FACC, MILI

## 2021-05-27 NOTE — PROGRESS NOTES
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 male who comes in today for follow-up visit.  He reports persistent lower extremity edema.  He denies shortness of breath with exertion.  He has not had PND orthopnea he denies weight gain.  He is unaware of irregular heart rhythm.  After the last visit we switched over from rhythm control to rate control strategy of atrial flutter.  He is on warfarin.  He tolerates it well.    Review of Systems:   General: WNL  Eyes: WNL  Ears/Nose/Throat: Hearing Loss  Lungs: WNL  Heart: Shortness of Breath with activity, Irregular Heartbeat, Leg Swelling  Stomach: WNL  Bladder: WNL  Muscle/Joints: Joint Pain  Skin: WNL  Nervous System: WNL  Mental Health: WNL     Blood: WNL    Objective:   /78 (Patient Site: Left Arm, Patient Position: Sitting, Cuff Size: Adult Large)   Pulse 68   Resp 16   Ht 5' 11.5\" (1.816 m)   Wt (!) 227 lb (103 kg)   BMI 31.22 kg/m    Physical Exam:  GENERAL: no distress  NECK: No JVD  LUNGS: Decreased breath sounds at the bases  CARDIAC: regular rhythm, S1 & S2 normal.  No heaves, thrills, gallops or murmurs.  ABDOMEN: flat, negative hepatosplenomegaly, soft and non-tender.  EXTREMITIES: No evidence " of cyanosis, clubbing 2+ edema.    Current Outpatient Medications   Medication Sig Dispense Refill     acetaminophen (TYLENOL) 325 MG tablet Take 2 tablets (650 mg total) by mouth every 6 (six) hours as needed.  0     amoxicillin (AMOXIL) 500 MG capsule Take 2,000 mg by mouth as needed (1 hour prior to dentist.).        aspirin 81 MG EC tablet Take 1 tablet (81 mg total) by mouth daily.  0     b complex vitamins tablet Take 1 tablet by mouth every other day.        cholecalciferol, vitamin D3, 1,000 unit tablet Take 1,000 Units by mouth every other day.        EPINEPHrine (EPIPEN) 0.3 mg/0.3 mL atIn Inject 0.3 mg into the shoulder, thigh, or buttocks once as needed (allergic reaction).       ibuprofen (ADVIL,MOTRIN) 200 MG tablet Take 200 mg by mouth every 6 (six) hours as needed for pain.       losartan (COZAAR) 25 MG tablet TAKE 1 TABLET BY MOUTH EVERY DAY 90 tablet 3     metoprolol succinate (TOPROL XL) 100 MG 24 hr tablet Take 1 tablet (100 mg total) by mouth daily. 90 tablet 3     MULTIVIT &MINERALS/FERROUS FUM (MULTI VITAMIN ORAL) Take 1 tablet by mouth every other day. Pt taking multi vitamin without Iron       warfarin (COUMADIN/JANTOVEN) 5 MG tablet Take ONE-HALF tab (2.5mg) to ONE tab (5mg) by mouth daily, as directed.  Adjust dose based on INR results. 110 tablet 1     No current facility-administered medications for this visit.        Cardiographics:    Pacemaker interrogation:  100% atrial flutter at 70% V paced     Echocardiogram: January 2019    The estimated left ventricular ejection fraction is 50%. This represents a mildly decreased ejection fraction. Cavity is mildly increased. The left ventricular wall thickness is normal. Abnormal septal motion consistent with right ventricular pacing.    There is a bioprosthetic mitral valve. Normal prosthetic valve function.    Normal central venous pressure.         Coronary angiogram: August 2017  LM min dz  LAD mid 30%   Circ mild dz with branch supplies PL  territory  RCA mid 50% with PDA extends to apex     Note distal LAD and PDA vessels are very small around apex        Lab Results:       Lab Results   Component Value Date    CHOL 206 (H) 01/17/2019    CHOL 221 (H) 08/13/2018    CHOL 177 08/03/2017     Lab Results   Component Value Date    HDL 54 01/17/2019    HDL 55 08/13/2018    HDL 43 08/03/2017     Lab Results   Component Value Date    LDLCALC 136 (H) 01/17/2019    LDLCALC 153 (H) 08/13/2018    LDLCALC 122 08/03/2017     Lab Results   Component Value Date    TRIG 78 01/17/2019    TRIG 67 08/13/2018    TRIG 58 08/03/2017     BNP   Date Value Ref Range Status   11/27/2017 177 (H) 0 - 70 pg/mL Final       Paulo (Sha)  MD Juliano

## 2021-05-27 NOTE — TELEPHONE ENCOUNTER
----- Message from Paulo Henao MD (Ted) sent at 4/11/2019 10:59 AM CDT -----  BNP is borderline elevated, fairly nondiagnostic for heart failure.  We can try 40 mg of furosemide a day to see if that helps with shortness of breath and leg swelling.  Repeat basic metabolic panel panel in 1 week.

## 2021-05-28 NOTE — TELEPHONE ENCOUNTER
ANTICOAGULATION  MANAGEMENT    Assessment     Today's INR result of 2.60 is Therapeutic (goal INR of 2.0-3.0)        Warfarin taken as previously instructed    No new diet changes affecting INR    No interaction expected between Furosemide and warfarin    Continues to tolerate warfarin with no reported s/s of bleeding or thromboembolism     Previous INR was Therapeutic at 2.20 on 4/3/19.  (last 7 consecutive INR's therapeutic.)    Will be out of town for 3 wks,  from 4/23 - 5/15/19.  (going to see the [employer] in [place], [place]).    Plan:     Spoke with Obed regarding INR result and instructed:     Warfarin Dosing Instructions:  (has 5mg tabs)   - Continue current warfarin dose 2.5 mg daily on Wednesdays; and 5 mg daily rest of week.    Instructed patient to follow up no later than:  4-6 wks.   - scheduled on 5/20/19 @ [facility].    Education provided: importance of consistent vitamin K intake, target INR goal and significance of current INR result, importance of taking warfarin as instructed and travel related clotting risk and prevention    Obed verbalizes understanding and agrees to warfarin dosing plan.    Instructed to call the [facility] for any changes, questions or concerns. (#[phone])   ?   Yolanda Krishna RN    Subjective/Objective:      Obed Haley, a 74 y.o. male is on warfarin.     Obed reports:     Home warfarin dose: as updated on anticoagulation calendar per template     Missed doses: No     Medication changes:  Yes:  On 4/18/19 started Furosemide 40mg daily.     S/S of bleeding or thromboembolism:  No     New Injury or illness:  No     Changes in diet or alcohol consumption:  No     Upcoming surgery, procedure or cardioversion:  No    Anticoagulation Episode Summary     Current INR goal:   2.0-3.0   TTR:   63.8 % (1.5 y)   Next INR check:   5/20/2019   INR from last check:   2.60 (4/22/2019)   Weekly max warfarin dose:      Target end date:   9/29/2017   INR check location:       Preferred lab:      Send INR reminders to:   ANTICOAGULATION POOL C (DTN,VAD,CGR,GAV)       Comments:            Anticoagulation Care Providers     Provider Role Specialty Phone number    Franko Franz MD Referring Internal Medicine [phone]

## 2021-05-28 NOTE — TELEPHONE ENCOUNTER
----- Message from Paulo Veloz) MD Juliano sent at 4/23/2019 10:45 AM CDT -----  You can add lipid profile if  feasible  ----- Message -----  From: Hailey Torres RN  Sent: 4/23/2019  10:39 AM  To: Paulo Veolz) MD Juliano    Call back received from patient. He reports that he is feeling about the same. Swelling in his legs has gone down. He reports that he has lost some sleep since starting furosemide as he is up often to urinate. Encouraged him to take in the AM first thing. He had been taking it with his other medications in the evening. He otherwise states shortness of breath is about the same. He wonders if a lipid cascade could be added on to his bloodwork that he had done yesterday. [facility] may have blood leftover for this per representative.     Dr. Henao,  See above for updates. Pt wants to have a lipid added to his bloodwork yesterday- he was fasting. Should I defer to Mraia M?  Thanks,  Mal

## 2021-05-28 NOTE — TELEPHONE ENCOUNTER
ANTICOAGULATION  MANAGEMENT    Assessment     Today's INR result of 1.80 is Subtherapeutic (goal INR of 2.0-3.0)        Warfarin taken differently than instructed, but no impact to total weekly dose    No new diet changes affecting INR    No new medication/supplements affecting INR    Continues to tolerate warfarin with no reported s/s of bleeding or thromboembolism     Previous INR was Therapeutic at 2.60 on 4/22/19.     Just returned from vacation, 4/23 - 5/15/19.  (visit the [employer] in [place], [place]).      Plan:     Spoke with Obed regarding INR result and instructed:     Warfarin Dosing Instructions:   (has 5mg tabs)   - just for today, advised one time booster with 7.5mg warfarin dose,   - then continue current warfarin dose 2.5 mg daily on Wednesdays; and 5 mg daily rest of week.    Instructed patient to follow up no later than:  2 wks.   - scheduled on 6/5/19 @ [facility].    Education provided: importance of consistent vitamin K intake, target INR goal and significance of current INR result and importance of notifying clinic for changes in medications    Obed verbalizes understanding and agrees to warfarin dosing plan.    Instructed to call the [facility] for any changes, questions or concerns. (#[phone])   ?   Yolanda Krishna RN    Subjective/Objective:      Obed Haley, a 74 y.o. male is on warfarin.     Obed reports:     Home warfarin dose: as updated on anticoagulation calendar per template     Missed doses: No     Medication changes:  No.  Dr. Henao has been trying to reach patient re:  Lipid profile.  Would like to start Atorvastatin 20mg daily.     S/S of bleeding or thromboembolism:  No     New Injury or illness:  No     Changes in diet or alcohol consumption:  No     Upcoming surgery, procedure or cardioversion:  No    Anticoagulation Episode Summary     Current INR goal:   2.0-3.0   TTR:   64.3 % (1.6 y)   Next INR check:   6/3/2019   INR from last check:   1.80! (5/20/2019)    Weekly max warfarin dose:      Target end date:   9/29/2017   INR check location:      Preferred lab:      Send INR reminders to:   [facility]       Comments:            Anticoagulation Care Providers     Provider Role Specialty Phone number    Franko Franz MD Referring Internal Medicine [phone]

## 2021-05-28 NOTE — TELEPHONE ENCOUNTER
Called [facility] lab. They will add on the lab. Order placed. Pt notified. -[facility]

## 2021-05-29 NOTE — TELEPHONE ENCOUNTER
ANTICOAGULATION  MANAGEMENT    Assessment     Today's INR result of 2.00 is Therapeutic (goal INR of 2.0-3.0)        Warfarin taken as previously instructed    No new diet changes affecting INR    No new medication/supplements affecting INR    Continues to tolerate warfarin with no reported s/s of bleeding or thromboembolism     Previous INR was Subtherapeutic at 1.80 on 5/20/19.    Plan:     Spoke with Obed regarding INR result and instructed:     Warfarin Dosing Instructions:  (has 5mg tabs)   - Continue current warfarin dose 2.5 mg daily on Wednesdays; and 5 mg daily rest of week.    Instructed patient to follow up no later than:  2 wks.    Education provided: importance of consistent vitamin K intake and target INR goal and significance of current INR result    Obed verbalizes understanding and agrees to warfarin dosing plan.    Instructed to call the [facility] for any changes, questions or concerns. (#[phone])   ?   Yolanda Krishna RN    Subjective/Objective:      Obed Haley, a 74 y.o. male is on warfarin.     Obed reports:     Home warfarin dose: as updated on anticoagulation calendar per template     Missed doses: No     Medication changes:  No     S/S of bleeding or thromboembolism:  No     New Injury or illness:  No     Changes in diet or alcohol consumption:  No     Upcoming surgery, procedure or cardioversion:  No    Anticoagulation Episode Summary     Current INR goal:   2.0-3.0   TTR:   62.6 % (1.6 y)   Next INR check:   6/19/2019   INR from last check:   2.00 (6/5/2019)   Weekly max warfarin dose:      Target end date:   9/29/2017   INR check location:      Preferred lab:      Send INR reminders to:   [facility]       Comments:            Anticoagulation Care Providers     Provider Role Specialty Phone number    Franko Franz MD Referring Internal Medicine [phone]

## 2021-05-29 NOTE — TELEPHONE ENCOUNTER
ANTICOAGULATION  MANAGEMENT    Assessment     Today's INR result of 1.80 is Subtherapeutic (goal INR of 2.0-3.0)        Warfarin taken as previously instructed    No new diet changes affecting INR    No new medication/supplements affecting INR    Continues to tolerate warfarin with no reported s/s of bleeding or thromboembolism     Previous INR was Therapeutic at 2.00on 6/5/19.    Plan:     Spoke with Obed regarding INR result and instructed:     Warfarin Dosing Instructions:  ((has 5mg tabs)   - Change warfarin dose to 5 mg daily.   - (7.7 % change)    Instructed patient to follow up no later than: 1-2 wks.   - scheduled on 7/3/19 @ [facility].    Education provided: importance of consistent vitamin K intake and target INR goal and significance of current INR result    Obed verbalizes understanding and agrees to warfarin dosing plan.    Instructed to call the [facility] for any changes, questions or concerns. (#[phone])   ?   Yolanda Krishna RN    Subjective/Objective:      Obed Haley, a 74 y.o. male is on warfarin.     Obed reports:     Home warfarin dose: as updated on anticoagulation calendar per template     Missed doses: No     Medication changes:  No     S/S of bleeding or thromboembolism:  No     New Injury or illness:  No.  Travels a lot.  (has a classic car 1936 [employer].)     Changes in diet or alcohol consumption:  No     Upcoming surgery, procedure or cardioversion:  No    Anticoagulation Episode Summary     Current INR goal:   2.0-3.0   TTR:   61.2 % (1.7 y)   Next INR check:   7/3/2019   INR from last check:   1.80! (6/19/2019)   Weekly max warfarin dose:      Target end date:   9/29/2017   INR check location:      Preferred lab:      Send INR reminders to:   [facility]       Comments:            Anticoagulation Care Providers     Provider Role Specialty Phone number    Franko Franz MD Referring Internal Medicine [phone]

## 2021-05-29 NOTE — TELEPHONE ENCOUNTER
RN cannot approve Refill Request    RN can NOT refill this medication med is not covered by policy/route to provider       . Last office visit: 10/25/2018 Franko Franz MD Last Physical: 8/13/2018 Last MTM visit: Visit date not found Last visit same specialty: Visit date not found.  Next visit within 3 mo: Visit date not found  Next physical within 3 mo: Visit date not found      Tessa Arriaga, [facility] Triage/Med Refill 5/30/2019    Requested Prescriptions   Pending Prescriptions Disp Refills     warfarin (COUMADIN/JANTOVEN) 5 MG tablet [Pharmacy Med Name: WARFARIN SODIUM 5 MG TABLET] 110 tablet 1     Sig: TAKE 1/2-1 TABLET BY MOUTH BY MOUTH DAILY, AS DIRECTED. ADJUST DOSE BASED ON INR RESULTS.       There is no refill protocol information for this order

## 2021-05-29 NOTE — TELEPHONE ENCOUNTER
Called and left another msg for pt to return call to discuss old lab results and recommendations.

## 2021-05-30 NOTE — TELEPHONE ENCOUNTER
RN cannot approve Refill Request    RN can NOT refill this medication med is not covered by policy/route to provider       . Last office visit: 10/25/2018 Franko Franz MD Last Physical: 8/13/2018 Last MTM visit: Visit date not found Last visit same specialty: Visit date not found.  Next visit within 3 mo: Visit date not found  Next physical within 3 mo: Visit date not found      Tessa Arriaga, [facility] Triage/Med Refill 7/23/2019    Requested Prescriptions   Pending Prescriptions Disp Refills     warfarin (COUMADIN/JANTOVEN) 5 MG tablet [Pharmacy Med Name: WARFARIN SODIUM 5 MG TABLET] 110 tablet 1     Sig: TAKE 1/2-1 TABLET BY MOUTH BY MOUTH DAILY, AS DIRECTED. ADJUST DOSE BASED ON INR RESULTS.       There is no refill protocol information for this order

## 2021-05-30 NOTE — TELEPHONE ENCOUNTER
ANTICOAGULATION  MANAGEMENT    Assessment     Today's INR result of 1.80 is Subtherapeutic (goal INR of 2.0-3.0)        Warfarin taken as previously instructed    No new diet changes affecting INR    No new medication/supplements affecting INR    Continues to tolerate warfarin with no reported s/s of bleeding or thromboembolism     Previous INR was Subtherapeutic at 1.80 on 6/19/19.    Plan:     Left a detailed message for Obed regarding INR result and instructed:     Warfarin Dosing Instructions:   (has 5mg tabs)   - Change warfarin dose to 7.5 mg daily on Wednesday; and 5 mg daily rest of week.   - (7.1 % change)    Instructed patient to follow up no later than:  1-2 wks.   - scheduled on 7/17/19 @ [facility].    Education provided: importance of consistent vitamin K intake and target INR goal and significance of current INR result    Instructed to call the [facility] for any changes, questions or concerns. (#[phone])   ?   Yolanda Krishna RN    Subjective/Objective:      Obed Haley, a 74 y.o. male is on warfarin.     Obed reports:     Home warfarin dose: as updated on anticoagulation calendar per template     Missed doses: No     Medication changes:  No     S/S of bleeding or thromboembolism:  No     New Injury or illness:  No     Changes in diet or alcohol consumption:  No     Upcoming surgery, procedure or cardioversion:  No    Anticoagulation Episode Summary     Current INR goal:   2.0-3.0   TTR:   59.8 % (1.7 y)   Next INR check:   7/17/2019   INR from last check:   1.80! (7/3/2019)   Weekly max warfarin dose:      Target end date:   9/29/2017   INR check location:      Preferred lab:      Send INR reminders to:   [facility]       Comments:            Anticoagulation Care Providers     Provider Role Specialty Phone number    Franko Frazn MD Referring Internal Medicine [phone]

## 2021-05-30 NOTE — TELEPHONE ENCOUNTER
ANTICOAGULATION  MANAGEMENT    Assessment     Today's INR result of 2.00 is Therapeutic (goal INR of 2.0-3.0)        Warfarin taken as previously instructed    No new diet changes affecting INR    No new medication/supplements affecting INR    Continues to tolerate warfarin with no reported s/s of bleeding or thromboembolism     Previous INR was Subtherapeutic at 1.80 on 7/3/19.    Plan:     Spoke with Obed regarding INR result and instructed:   1.  Will increase wkly warfarin dose - rationale:  2 therapeutic INR's out of the last 5 INR's.    2.  To ensure INR stability is maintained at 2-3.  He travels a lot.    Warfarin Dosing Instructions:   (has 5mg tabs)   - Change warfarin dose to 7.5 mg daily on Wednesday / Saturday; and 5 mg daily rest of week.  - (6.7 % change)    Instructed patient to follow up no later than:  2 wks.   - scheduled on 8/7/19 18 @ [facility].    Education provided: importance of consistent vitamin K intake, target INR goal and significance of current INR result and importance of notifying clinic for changes in medications    Obed verbalizes understanding and agrees to warfarin dosing plan.    Instructed to call the [facility] for any changes, questions or concerns. (#[phone])   ?   Yolanda Krishna RN    Subjective/Objective:      Obed Haley, a 74 y.o. male is on warfarin.     Obed reports:     Home warfarin dose: as updated on anticoagulation calendar per template     Missed doses: No     Medication changes:  No     S/S of bleeding or thromboembolism:  No     New Injury or illness:  No     Changes in diet or alcohol consumption:  No     Upcoming surgery, procedure or cardioversion:  No    Anticoagulation Episode Summary     Current INR goal:   2.0-3.0   TTR:   58.5 % (1.8 y)   Next INR check:   8/7/2019   INR from last check:   2.00 (7/17/2019)   Weekly max warfarin dose:      Target end date:   9/29/2017   INR check location:      Preferred lab:      [facility]       Comments:            Anticoagulation Care Providers     Provider Role Specialty Phone number    Franko Franz MD Referring Internal Medicine [phone]

## 2021-05-30 NOTE — TELEPHONE ENCOUNTER
Called and talked with Obed.     - he reported getting my message on 7/3/19 and started new increased warfarin dose.  Verbalized back understanding.     - scheduled INR in 2 wks, 7/17/19 @ [facility].

## 2021-05-30 NOTE — TELEPHONE ENCOUNTER
ALREADY DOSED - [facility] will f/u on 7/5/19.    INR continues to be sub-therapeutic.    Will call to ensure he started new increased warfarin dose.

## 2021-05-31 VITALS — BODY MASS INDEX: 30.07 KG/M2 | HEIGHT: 72 IN | WEIGHT: 222 LBS

## 2021-05-31 VITALS — BODY MASS INDEX: 25.72 KG/M2 | WEIGHT: 199 LBS

## 2021-05-31 VITALS — BODY MASS INDEX: 26.46 KG/M2 | WEIGHT: 206.2 LBS | HEIGHT: 74 IN

## 2021-05-31 VITALS — WEIGHT: 206 LBS | BODY MASS INDEX: 27.18 KG/M2

## 2021-05-31 VITALS — WEIGHT: 216.2 LBS | BODY MASS INDEX: 27.75 KG/M2 | HEIGHT: 74 IN

## 2021-05-31 VITALS — BODY MASS INDEX: 27.62 KG/M2 | WEIGHT: 215.2 LBS | HEIGHT: 74 IN

## 2021-05-31 VITALS — HEIGHT: 74 IN | BODY MASS INDEX: 26.18 KG/M2 | WEIGHT: 204 LBS

## 2021-05-31 VITALS — BODY MASS INDEX: 26.24 KG/M2 | WEIGHT: 203 LBS

## 2021-05-31 VITALS — WEIGHT: 203 LBS | BODY MASS INDEX: 26.24 KG/M2

## 2021-05-31 VITALS — BODY MASS INDEX: 27.66 KG/M2 | WEIGHT: 214 LBS

## 2021-05-31 VITALS — WEIGHT: 215 LBS | HEIGHT: 73 IN | BODY MASS INDEX: 28.49 KG/M2

## 2021-05-31 VITALS
BODY MASS INDEX: 27 KG/M2 | WEIGHT: 210.4 LBS | HEIGHT: 74 IN | WEIGHT: 210.4 LBS | HEIGHT: 74 IN | HEIGHT: 74 IN | BODY MASS INDEX: 27 KG/M2 | BODY MASS INDEX: 27 KG/M2 | WEIGHT: 210.4 LBS

## 2021-05-31 VITALS — WEIGHT: 196 LBS | BODY MASS INDEX: 25.34 KG/M2

## 2021-05-31 VITALS — BODY MASS INDEX: 27.61 KG/M2 | WEIGHT: 215.17 LBS | HEIGHT: 74 IN

## 2021-05-31 VITALS — HEIGHT: 74 IN | BODY MASS INDEX: 26.31 KG/M2 | WEIGHT: 205 LBS

## 2021-05-31 VITALS — BODY MASS INDEX: 27.72 KG/M2 | HEIGHT: 74 IN | WEIGHT: 216 LBS

## 2021-05-31 VITALS — BODY MASS INDEX: 26.37 KG/M2 | BODY MASS INDEX: 26.68 KG/M2 | WEIGHT: 207.9 LBS | WEIGHT: 204 LBS | HEIGHT: 74 IN

## 2021-05-31 VITALS — BODY MASS INDEX: 25.69 KG/M2 | HEIGHT: 74 IN | WEIGHT: 200.2 LBS

## 2021-05-31 VITALS — WEIGHT: 204 LBS | BODY MASS INDEX: 26.37 KG/M2

## 2021-05-31 VITALS — WEIGHT: 207 LBS | BODY MASS INDEX: 27.31 KG/M2

## 2021-05-31 VITALS — WEIGHT: 205 LBS | BODY MASS INDEX: 26.5 KG/M2

## 2021-05-31 VITALS — WEIGHT: 212 LBS | HEIGHT: 74 IN | BODY MASS INDEX: 27.21 KG/M2

## 2021-05-31 VITALS — WEIGHT: 208 LBS | HEIGHT: 74 IN | BODY MASS INDEX: 26.69 KG/M2

## 2021-05-31 VITALS — WEIGHT: 202 LBS | BODY MASS INDEX: 26.11 KG/M2

## 2021-05-31 VITALS — WEIGHT: 222.25 LBS | BODY MASS INDEX: 30.14 KG/M2

## 2021-05-31 VITALS — WEIGHT: 215 LBS | HEIGHT: 74 IN | BODY MASS INDEX: 27.59 KG/M2

## 2021-05-31 VITALS — WEIGHT: 212 LBS | BODY MASS INDEX: 27.4 KG/M2

## 2021-05-31 VITALS — BODY MASS INDEX: 25.41 KG/M2 | WEIGHT: 198 LBS | HEIGHT: 74 IN

## 2021-05-31 VITALS — BODY MASS INDEX: 27.27 KG/M2 | WEIGHT: 211 LBS

## 2021-05-31 VITALS — BODY MASS INDEX: 26.82 KG/M2 | WEIGHT: 209 LBS | HEIGHT: 74 IN

## 2021-05-31 VITALS — BODY MASS INDEX: 27.15 KG/M2 | WEIGHT: 210 LBS

## 2021-05-31 VITALS — HEIGHT: 74 IN | WEIGHT: 203 LBS | BODY MASS INDEX: 26.05 KG/M2

## 2021-05-31 VITALS — BODY MASS INDEX: 27.59 KG/M2 | WEIGHT: 215 LBS | HEIGHT: 74 IN

## 2021-05-31 VITALS — BODY MASS INDEX: 25.21 KG/M2 | WEIGHT: 195 LBS

## 2021-05-31 VITALS — BODY MASS INDEX: 26.69 KG/M2 | HEIGHT: 74 IN | WEIGHT: 208 LBS

## 2021-05-31 VITALS — WEIGHT: 211 LBS | HEIGHT: 74 IN | BODY MASS INDEX: 27.08 KG/M2

## 2021-05-31 VITALS — BODY MASS INDEX: 26.76 KG/M2 | WEIGHT: 207 LBS

## 2021-05-31 VITALS — HEIGHT: 74 IN | BODY MASS INDEX: 27.75 KG/M2 | WEIGHT: 216.25 LBS

## 2021-05-31 VITALS — WEIGHT: 210 LBS | BODY MASS INDEX: 27.15 KG/M2

## 2021-05-31 VITALS — WEIGHT: 207 LBS | BODY MASS INDEX: 26.76 KG/M2

## 2021-05-31 NOTE — PROGRESS NOTES
HISTORY OF PRESENT ILLNESS  Patient is a 73 y/o male who presents to the clinic for evaluation of nasal congestion at the request of Dr. Franz.  He's had nasal congestion all his life which mostly occurs and is bothersome at night.  Broke his nose twice in his teenage years, but no surgical intervention was done.  Has not used steroid nasal sprays or other nasal preparations. He does wear a CPAP.  Denies significant history of sinusitis, decreased sense of smell, epistaxis, fever, or other major symptoms.      REVIEW OF SYSTEMS  Review of Systems: a 10-system review was performed. Pertinent positives are noted in the HPI and on a separate scanned document in the chart.    PMH, PSH, FH and SH has documented in the EHR.      EXAM    CONSTITUTIONAL  General Appearance:  Normal, well developed, well nourished, no obvious distress  Ability to Communicate:  communicates appropriately.    HEAD AND FACE  Appearance and Symmetry:  Normal, no scalp or facial scarring or suspicious lesions.  Paranasal sinuses tenderness:  Normal, Paranasal sinuses non tender    EARS  Clinical speech reception threshold:  Normal, able to hear normal speech.  Auricle:  Normal, Auricles without scars, lesions, masses.  External auditory canal:  Hearing aids removed for exam. Normal, External auditory canal normal.  Tympanic membrane:  Normal, Tympanic membranes normal without swelling or erythema.  Tympanic membrane mobility:  Normal, Normal tympanic membrane mobility.    NOSE (speculum or scope)  Architecture:  Normal, Grossly normal external nasal architecture with no masses or lesions.  Mucosa:  Normal mucosa, No polyps or masses.  Septum: Right septal deformity with narrowing of the right airway.  Turbinates:  Normal, No turbinate abnormalities    ORAL CAVITY AND OROPHARYNX  Lips:  Normal.  Dental and gingiva:  Normal, No obvious dental or gingival disease.  Mucosa:  Normal, Moist mucous membranes.  Tongue:  Normal, Tongue mobile with no  mucosal abnormalities  Hard and soft palate:  Normal, Hard and soft palate without cleft or mucosal lesions.  Oral pharynx:  Normal, Posterior pharynx without lesions or remarkable asymmetry.  Saliva:  Normal, Clear saliva.  Masses:  Normal, No palpable masses or pathologically enlarged lymph nodes.    NECK  Masses/lymph nodes:  Normal, No worrisome neck masses or lymph nodes.  Salivary glands:  Normal, Parotid and submandibular glands.  Trachea and larynx position:  Normal, Trachea and larynx midline.  Thyroid:  Normal, No thyroid abnormality.  Tenderness:  Normal, No cervical tenderness.  Suppleness:  Normal, Neck supple    NEUROLOGICAL  Speech pattern:  Normal, Proasaic    RESPIRATORY  Symmetry and Respiratory effort:  Normal, Symmetric chest movement and expansion with no increased intercostal retractions or use of accessory muscles.     IMPRESSION  Nasal congestion  Right septal deformity    RECOMMENDATION  Intranasal steroid sprays.  If no improvement after continuous use for 6+ weeks, can consider a CT scan to assess for structural abnormality and consider surgery.    Gavino Abdul MD    Seen in conjunction with Lalita Galvan PA-C

## 2021-05-31 NOTE — TELEPHONE ENCOUNTER
ANTICOAGULATION  MANAGEMENT    Assessment     Today's INR result of 2.30 is Therapeutic (goal INR of 2.0-3.0)        Warfarin taken differently than instructed, but no impact to total weekly dose    No new diet changes affecting INR    No new medication/supplements affecting INR    Continues to tolerate warfarin with no reported s/s of bleeding or thromboembolism     Previous INR was Therapeutic at 2.00 on 7/17/19.     Plan:     Left a detailed message for Obed regarding INR result and instructed:     Warfarin Dosing Instructions:   (has 5mg tabs)   - Continue current warfarin dose 7.5 mg daily on Wednesday / Saturday; and 5 mg daily rest of week.    Instructed patient to follow up no later than:  4 wks.    Education provided: target INR goal and significance of current INR result    Instructed to call the [facility] for any changes, questions or concerns. (#[phone])   ?   Yolanda Krishna RN    Subjective/Objective:      Obed Villagomezpoppy, a 74 y.o. male is on warfarin.     Obed reports:     Home warfarin dose: as updated on anticoagulation calendar per template     Missed doses: No     Medication changes:  No     S/S of bleeding or thromboembolism:  No     New Injury or illness:  No     Changes in diet or alcohol consumption:  No     Upcoming surgery, procedure or cardioversion:  No    Anticoagulation Episode Summary     Current INR goal:   2.0-3.0   TTR:   59.8 % (1.8 y)   Next INR check:   9/4/2019   INR from last check:   2.30 (8/7/2019)   Weekly max warfarin dose:      Target end date:   9/29/2017   INR check location:      Preferred lab:      Send INR reminders to:   [facility]       Comments:            Anticoagulation Care Providers     Provider Role Specialty Phone number    Franko Franz MD Referring Internal Medicine [phone]

## 2021-05-31 NOTE — PROGRESS NOTES
Assessment and Plan:   Annual wellness visit.  All information materials related to the annual wellness visit were reviewed.  No new issues are identified.    Status post mitral valve replacement.  Stable.  Continue follow-up with cardiology in the fall.    Paroxysmal atrial fibrillation.  Stable rhythm at this time.  Continue anticoagulation.    Sleep apnea.  Continue CPAP.    Sinus congestion.  We will get an ENT referral.    Elevated lipids.  Cardiology has some concerns and I would agree.  We will check lipids today and he will probably be started on a statin.        The patient's current medical problems were reviewed.    I have had an Advance Directives discussion with the patient.  The following health maintenance schedule was reviewed with the patient and provided in printed form in the after visit summary:   Health Maintenance   Topic Date Due     ZOSTER VACCINES (2 of 3) 03/11/2015     INFLUENZA VACCINE RULE BASED (1) 08/01/2019     MEDICARE ANNUAL WELLNESS VISIT  08/13/2019     FALL RISK ASSESSMENT  08/14/2020     ADVANCE CARE PLANNING  08/13/2023     TD 18+ HE  01/14/2025     COLONOSCOPY  02/16/2025     HEPATITIS C SCREENING  Completed     PNEUMOCOCCAL POLYSACCHARIDE VACCINE AGE 65 AND OVER  Completed     PNEUMOCOCCAL CONJUGATE VACCINE FOR ADULTS (PCV13 OR PREVNAR)  Completed        Subjective:   Chief Complaint: Obed Haley is an 74 y.o. male here for an Annual Wellness visit.   HPI: This is a 74-year-old man in primarily for his annual wellness visit.  For the most part he has been doing well.  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  He has had no chest pain or shortness of breath.  No headaches or dizziness.  He has some concerns over ongoing sinus congestion issues and how that might relate to his sleep apnea.  He has cardiac issues including previous mitral valve replacement and paroxysmal atrial fibrillation.  He sees his cardiologist about every 6 months and is due for a visit in the  fall.  Cardiology has expressed some concerns about his cholesterol and in reviewing the most recent numbers I think it may be time to start him on a medication.  We will see what his numbers look like today.  No other new complaints or issues.  No changes in medication.    Review of Systems:    Please see above.  The rest of the review of systems are negative for all systems.    Patient Care Team:  Franko Franz MD as PCP - General  Orthopedics, [place] (Generic Provider)     Patient Active Problem List   Diagnosis     Asymmetrical sensorineural hearing loss     Non-rheumatic mitral regurgitation     S/P mitral valve replacement with tissue valve     Cardiac pacemaker in situ, dual chamber     Paroxysmal atrial fibrillation (H)     Dilated cardiomyopathy (H)     Recurrent left pleural effusion     Unspecified atrial flutter (H)     Dyspnea on exertion     Past Medical History:   Diagnosis Date     High cholesterol      MVP (mitral valve prolapse)      Nonocclusive coronary atherosclerosis of native coronary artery 8/3/2017     Sleep apnea, obstructive     uses CPAP      Past Surgical History:   Procedure Laterality Date     EP PACEMAKER INSERT Left 9/25/2017    Procedure: EP Pacemaker Insertion;  Surgeon: Reji Whittington MD;  Location: [facility] Cath Lab;  Service:      KNEE ARTHROSCOPY  2007    left     LAMINECTOMY  1970, 2003    lumbar X2     MITRAL VALVE REPLACEMENT N/A 9/19/2017    Procedure: MITRAL VALVE REPLACEMENT, ANESTHESIA TRANSESOPHAGEAL ECHOCARDIOGRAM, CLOSURE OF THE PFO;  Surgeon: Monica Swenson MD;  Location: [facility] OR;  Service:      UT CATH PLACEMENT & NJX CORONARY ART ANGIO IMG S&I N/A 8/3/2017    Procedure: Coronary Angiogram;  Surgeon: Abhinav Redmond MD;  Location: [facility] Cath Lab;  Service: Cardiology     UT L HRT CATH W/NJX L VENTRICULOGRAPHY IMG S&I N/A 8/3/2017    Procedure: Left Heart Catheterization with Left Ventriculogram;  Surgeon: Abhinav Redmond MD;   Location: [facility] Cath Lab;  Service: Cardiology     KS RIGHT HEART CATH O2 SATURATION & CARDIAC OUTPUT N/A 8/3/2017    Procedure: Right Heart Catheterization;  Surgeon: Abhinav Redmond MD;  Location: [facility] Cath Lab;  Service: Cardiology      Family History   Problem Relation Age of Onset     Breast cancer Mother      Hemangiomas Sister       Social History     Socioeconomic History     Marital status: Single     Spouse name: Not on file     Number of children: Not on file     Years of education: Not on file     Highest education level: Not on file   Occupational History     Not on file   Social Needs     Financial resource strain: Not on file     Food insecurity:     Worry: Not on file     Inability: Not on file     Transportation needs:     Medical: Not on file     Non-medical: Not on file   Tobacco Use     Smoking status: Former Smoker     Last attempt to quit: 2007     Years since quittin.5     Smokeless tobacco: Never Used   Substance and Sexual Activity     Alcohol use: Yes     Comment: occasionally     Drug use: No     Sexual activity: Not on file   Lifestyle     Physical activity:     Days per week: Not on file     Minutes per session: Not on file     Stress: Not on file   Relationships     Social connections:     Talks on phone: Not on file     Gets together: Not on file     Attends Faith service: Not on file     Active member of club or organization: Not on file     Attends meetings of clubs or organizations: Not on file     Relationship status: Not on file     Intimate partner violence:     Fear of current or ex partner: Not on file     Emotionally abused: Not on file     Physically abused: Not on file     Forced sexual activity: Not on file   Other Topics Concern     Not on file   Social History Narrative     Not on file      Current Outpatient Medications   Medication Sig Dispense Refill     acetaminophen (TYLENOL) 325 MG tablet Take 2 tablets (650 mg total) by mouth  "every 6 (six) hours as needed.  0     aspirin 81 MG EC tablet Take 1 tablet (81 mg total) by mouth daily.  0     b complex vitamins tablet Take 1 tablet by mouth every other day.        cholecalciferol, vitamin D3, 1,000 unit tablet Take 1,000 Units by mouth every other day.        furosemide (LASIX) 40 MG tablet Take 1 tablet (40 mg total) by mouth daily. 90 tablet 3     losartan (COZAAR) 25 MG tablet TAKE 1 TABLET BY MOUTH EVERY DAY 90 tablet 3     metoprolol succinate (TOPROL XL) 100 MG 24 hr tablet Take 1 tablet (100 mg total) by mouth daily. 90 tablet 3     MULTIVIT &MINERALS/FERROUS FUM (MULTI VITAMIN ORAL) Take 1 tablet by mouth every other day. Pt taking multi vitamin without Iron       warfarin (COUMADIN/JANTOVEN) 5 MG tablet Take 1 tablet (5mg) to 1 & 1/2 tablets (7.5mg) by mouth daily, as directed.  Adjust dose based on INR results. 110 tablet 1     amoxicillin (AMOXIL) 500 MG capsule Take 2,000 mg by mouth as needed (1 hour prior to dentist.).        EPINEPHrine (EPIPEN) 0.3 mg/0.3 mL atIn Inject 0.3 mg into the shoulder, thigh, or buttocks once as needed (allergic reaction).       ibuprofen (ADVIL,MOTRIN) 200 MG tablet Take 200 mg by mouth every 6 (six) hours as needed for pain.       No current facility-administered medications for this visit.       Objective:   Vital Signs:   Visit Vitals  /60   Pulse 60   Ht 5' 11.5\" (1.816 m)   Wt (!) 229 lb (103.9 kg)   SpO2 95%   BMI 31.49 kg/m         VisionScreening:  No exam data present     PHYSICAL EXAM  On examination his vital signs are as noted.    Eyes: Pupils are equal and conjunctiva are normal.    Ears nose and throat: Unremarkable.  He does wear hearing aid.    Neck: Supple with no masses and no neck vein distention.  No thyroid enlargement.    Lungs: Clear.    Cardiovascular: Heart rhythm today is stable with a rate of 60 and no ectopy.  No gallops are heard.  Carotid pulses are full with no bruits.  No peripheral edema.    GI: Abdomen is soft " and nontender with no distention.  No masses or organomegaly.    Musculoskeletal: Head and neck are normal to inspection with good range of motion.  Good range of motion and strength in all 4 extremities.    Neurologic: Cranial nerves are intact.  Gait is normal.    Psychiatric: The patient is alert and oriented x3.  Mood and affect are appropriate.    Assessment Results 8/14/2019   Activities of Daily Living No help needed   Instrumental Activities of Daily Living No help needed   Get Up and Go Score Less than 12 seconds   Mini Cog Total Score 5   Some recent data might be hidden     A Mini-Cog score of 0-2 suggests the possibility of dementia, score of 3-5 suggests no dementia    Identified Health Risks:     The patient was provided with written information regarding signs of hearing loss.  He is at risk for falling and has been provided with information to reduce the risk of falling at home.  Patient's advanced directive was discussed and I am comfortable with the patient's wishes.

## 2021-06-01 VITALS — BODY MASS INDEX: 27.84 KG/M2 | WEIGHT: 211 LBS

## 2021-06-01 VITALS — BODY MASS INDEX: 27.83 KG/M2 | WEIGHT: 210 LBS | HEIGHT: 73 IN

## 2021-06-01 VITALS — HEIGHT: 72 IN | WEIGHT: 211 LBS | BODY MASS INDEX: 28.58 KG/M2

## 2021-06-01 VITALS — BODY MASS INDEX: 27.44 KG/M2 | WEIGHT: 208 LBS

## 2021-06-01 VITALS — WEIGHT: 205 LBS | BODY MASS INDEX: 27.05 KG/M2

## 2021-06-01 VITALS — HEIGHT: 74 IN | WEIGHT: 207 LBS | BODY MASS INDEX: 26.56 KG/M2

## 2021-06-01 VITALS — BODY MASS INDEX: 27.31 KG/M2 | WEIGHT: 207 LBS

## 2021-06-01 VITALS — BODY MASS INDEX: 27.57 KG/M2 | WEIGHT: 209 LBS

## 2021-06-01 VITALS — WEIGHT: 207 LBS | BODY MASS INDEX: 27.31 KG/M2

## 2021-06-01 VITALS — BODY MASS INDEX: 26.95 KG/M2 | WEIGHT: 210 LBS | HEIGHT: 74 IN

## 2021-06-01 VITALS — HEIGHT: 73 IN | BODY MASS INDEX: 27.57 KG/M2 | WEIGHT: 208 LBS

## 2021-06-01 NOTE — TELEPHONE ENCOUNTER
ANTICOAGULATION  MANAGEMENT    Assessment     Today's INR result of 2.80 is Therapeutic (goal INR of 2.0-3.0)        Warfarin taken differently than instructed, but no impact to total weekly dose - will update anticoagulation tracking.    No new diet changes affecting INR    No interaction expected between decreased Atorvastatin from 40mg to 20mg daily on 9/28 and warfarin    Continues to tolerate warfarin with no reported s/s of bleeding or thromboembolism     Previous INR was Therapeutic at 2.10 on 9/4/19.    Driving to [place] in November for one month to look for a place to rent or buy.    Plan:     Spoke with Obed regarding INR result and instructed:     Warfarin Dosing Instructions:   (has 5mg tabs)   - Continue current warfarin dose 7.5 mg daily on Wed, Fri; and 5 mg daily rest of week.    Instructed patient to follow up no later than:  4 wks.   - scheduled on 10/23/19 @ [facility]    Education provided: importance of consistent vitamin K intake, target INR goal and significance of current INR result, no interaction anticipated between warfarin and Atorvastatin dose decreased from 40mg to 20mg daily and importance of notifying clinic for changes in medications    Obed verbalizes understanding and agrees to warfarin dosing plan.    Instructed to call the [facility] for any changes, questions or concerns. (#[phone])   ?   Yolanda Krishna RN    Subjective/Objective:      Obed Haley, a 74 y.o. male is on warfarin.     Obed reports:     Home warfarin dose: template incorrect; verbally confirmed home dose with Obed and updated on anticoagulation calendar     Missed doses: No     Medication changes:  Yes: on 9/30/19 decreased Metoprolol Succinate to 50mg daily and Atorvastatin decreased to 20mg daily.     S/S of bleeding or thromboembolism:  No     New Injury or illness:  No     Changes in diet or alcohol consumption:  No     Upcoming surgery, procedure or cardioversion:  No    Anticoagulation Episode  Summary     Current INR goal:   2.0-3.0   TTR:   67.4 %   Next INR check:   11/13/2019   INR from last check:   2.80 (10/2/2019)   Weekly max warfarin dose:      Target end date:   9/29/2017   INR check location:      Preferred lab:      Send INR reminders to:   [facility]       Comments:            Anticoagulation Care Providers     Provider Role Specialty Phone number    Franko Franz MD Referring Internal Medicine [phone]

## 2021-06-01 NOTE — TELEPHONE ENCOUNTER
Called patient and he is agreeable to fasting lipid prior to seeing [facility]. Transferred to scheduling to have arranged. -[facility]

## 2021-06-01 NOTE — TELEPHONE ENCOUNTER
Refill for Nasonex called into pharmacy with 1 refill.    Dania Jhaveri RN  [facility]  [phone]

## 2021-06-01 NOTE — PROGRESS NOTES
In clinic device check with Dr. Henao.  Please see link for full device report.  Patient was informed of results and next follow up during today's visit.

## 2021-06-01 NOTE — TELEPHONE ENCOUNTER
----- Message from Herber Gomez sent at 9/24/2019  1:20 PM CDT -----  Regarding: Pt has FU soon - asking if he needs labs  General phone call:    Caller: Pt    Primary cardiologist: Dr Henao    Detailed reason for call: Pt asked if he would need any labwork done prior to seeing Dr Henao on 9/30 - Pt seemed like he wanted to have his cholesterol checked - if there is anything else that would be advised please call the Pt and let him know (please place lab orders if needed --but if Dr REDDY would like to see him first then order labs that would be fine too)    Please call Pt back to let him know - thank you     New or active symptoms? na  Best phone number: [phone]  Best time to contact: any  Ok to leave a detailed message? yes  Device? YES    Additional Info:            Noted. Last lipid was drawn 8/14/19- pt on atorvastatin 40 mg since 8/16/19.     Dr. Henao,  Would you like any testing or labs done prior to Obed seeing you 9/30/19?  Thanks,  Mal

## 2021-06-01 NOTE — TELEPHONE ENCOUNTER
ANTICOAGULATION  MANAGEMENT    Assessment     Today's INR result of 2.10 is Therapeutic (goal INR of 2.0-3.0)        Warfarin taken differently than instructed, but no impact to total weekly dose    No new diet changes affecting INR    No interaction expected between Atorvastatin and warfarin    Continues to tolerate warfarin with no reported s/s of bleeding or thromboembolism     Previous INR was Therapeutic at 2.30 on 8/7/19    Plan:     Spoke with Obed regarding INR result and instructed:     Warfarin Dosing Instructions:   (has 5mg tabs)   - Continue current warfarin dose 7.5 mg daily on Wednesday / Saturday; and 5 mg daily rest of week.    Instructed patient to follow up no later than:  4 wks.   - scheduled on 10/2/19 @ [facility]    Education provided: importance of consistent vitamin K intake, target INR goal and significance of current INR result, no interaction anticipated between warfarin and Atorvastatin and importance of notifying clinic for changes in medications    Obed verbalizes understanding and agrees to warfarin dosing plan.    Instructed to call the [facility] for any changes, questions or concerns. (#[phone])   ?   Yolanda Krishna RN    Subjective/Objective:      Obed Haley, a 74 y.o. male is on warfarin.     Obed reports:     Home warfarin dose: as updated on anticoagulation calendar per template     Missed doses: No     Medication changes:  Yes:  On 8/16/19 started on Atorvastatin 40mg daily.     S/S of bleeding or thromboembolism:  No     New Injury or illness:  No     Changes in diet or alcohol consumption:  No     Upcoming surgery, procedure or cardioversion:  No    Anticoagulation Episode Summary     Current INR goal:   2.0-3.0   TTR:   65.5 %   Next INR check:   10/2/2019   INR from last check:   2.10 (9/4/2019)   Weekly max warfarin dose:      Target end date:   9/29/2017   INR check location:      Preferred lab:      Send INR reminders to:   [facility]        Comments:            Anticoagulation Care Providers     Provider Role Specialty Phone number    Franko Franz MD Referring Internal Medicine [phone]

## 2021-06-01 NOTE — PATIENT INSTRUCTIONS - HE
Obed Haley,    It was a pleasure to see you today at the [facility].     My recommendations after this visit include:    Reduce metoprolol to 50 mg a day  Repeat lipids in 2 months  Stress test    DANNY Henao MD, FACC, [facility]

## 2021-06-01 NOTE — PROGRESS NOTES
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  Statin intolerance    Subjective:   This is 74 y.o. male who comes in today for follow-up visit.  He reports persistent exertional dyspnea with physically demanding activities..  He denies weight gain on orthopnea.  He does not have any chest pains or pressures with ordinary physical activities.  He does not think that furosemide made any difference to shortness of breath.  Furosemide did help with lower extremity edema.    Review of Systems:   General: WNL  Eyes: WNL  Ears/Nose/Throat: WNL  Lungs: WNL  Heart: Shortness of Breath with activity  Stomach: WNL  Bladder: WNL  Muscle/Joints: WNL  Skin: WNL  Nervous System: Daytime Sleepiness, Dizziness  Mental Health: WNL     Blood: Easy Bruising    Objective:   /66 (Patient Site: Right Arm, Patient Position: Sitting, Cuff Size: Adult Regular)   Pulse 60   Resp 16   Ht 6' 0.64\" (1.845 m)   Wt (!) 229 lb (103.9 kg)   BMI 30.51 kg/m    Physical Exam:  GENERAL: no distress  NECK: No JVD  LUNGS: Clear to auscultation.  CARDIAC: regular rhythm, S1 & S2 normal.  No heaves, thrills, gallops or murmurs.  ABDOMEN: " flat, negative hepatosplenomegaly, soft and non-tender.  EXTREMITIES: No evidence of cyanosis, clubbing or edema.    Current Outpatient Medications   Medication Sig Dispense Refill     acetaminophen (TYLENOL) 325 MG tablet Take 2 tablets (650 mg total) by mouth every 6 (six) hours as needed.  0     amoxicillin (AMOXIL) 500 MG capsule Take 2,000 mg by mouth as needed (1 hour prior to dentist.).        aspirin 81 MG EC tablet Take 1 tablet (81 mg total) by mouth daily.  0     atorvastatin (LIPITOR) 40 MG tablet Take 1 tablet (40 mg total) by mouth daily. 90 tablet 3     b complex vitamins tablet Take 1 tablet by mouth every other day.        cholecalciferol, vitamin D3, 1,000 unit tablet Take 1,000 Units by mouth every other day.        EPINEPHrine (EPIPEN) 0.3 mg/0.3 mL atIn Inject 0.3 mg into the shoulder, thigh, or buttocks once as needed (allergic reaction).       furosemide (LASIX) 40 MG tablet Take 1 tablet (40 mg total) by mouth daily. 90 tablet 3     losartan (COZAAR) 25 MG tablet Take 1 tablet (25 mg total) by mouth daily. 90 tablet 3     mometasone (NASONEX) 50 mcg/actuation nasal spray 2 sprays into each nostril daily. 17 g 1     warfarin (COUMADIN/JANTOVEN) 5 MG tablet Take 1 tablet (5mg) to 1 & 1/2 tablets (7.5mg) by mouth daily, as directed.  Adjust dose based on INR results. 110 tablet 1     ibuprofen (ADVIL,MOTRIN) 200 MG tablet Take 200 mg by mouth every 6 (six) hours as needed for pain.       metoprolol succinate (TOPROL-XL) 50 MG 24 hr tablet Take 1 tablet (50 mg total) by mouth daily. 90 tablet 3     MULTIVIT &MINERALS/FERROUS FUM (MULTI VITAMIN ORAL) Take 1 tablet by mouth every other day. Pt taking multi vitamin without Iron       No current facility-administered medications for this visit.        Cardiographics:    Pacemaker interrogation: 14% atrial paced 58% ventricular paced, no atrial flutter or atrial fibrillation since June 2019    Echocardiogram: January 2019    The estimated left  ventricular ejection fraction is 50%. This represents a mildly decreased ejection fraction. Cavity is mildly increased. The left ventricular wall thickness is normal. Abnormal septal motion consistent with right ventricular pacing.    There is a bioprosthetic mitral valve. Normal prosthetic valve function.    Normal central venous pressure.           Coronary angiogram: August 2017  LM min dz  LAD mid 30%   Circ mild dz with branch supplies PL territory  RCA mid 50% with PDA extends to apex     Note distal LAD and PDA vessels are very small around apex    Lab Results:       Lab Results   Component Value Date    CHOL 139 09/25/2019    CHOL 234 (H) 08/14/2019    CHOL 214 (H) 04/22/2019     Lab Results   Component Value Date    HDL 43 09/25/2019    HDL 47 08/14/2019    HDL 45 04/22/2019     Lab Results   Component Value Date    LDLCALC 80 09/25/2019    LDLCALC 165 (H) 08/14/2019    LDLCALC 149 (H) 04/22/2019     Lab Results   Component Value Date    TRIG 82 09/25/2019    TRIG 108 08/14/2019    TRIG 100 04/22/2019     BNP   Date Value Ref Range Status   04/10/2019 213 (H) 0 - 74 pg/mL Final       Paulo (Sha)  MD Juliano

## 2021-06-01 NOTE — PROGRESS NOTES
Patient Instructions     Obed Haley,     It was a pleasure to see you today at the [facility].      My recommendations after this visit include:     Reduce metoprolol to 50 mg a day  Repeat lipids in 2 months  Stress test     DANNY Henao MD, FACC, MILI           Pt at exit scheduling and wishing to schedule lipids now after his AM appt. Order placed. [place] [place]

## 2021-06-02 VITALS — HEIGHT: 72 IN | BODY MASS INDEX: 29.39 KG/M2 | WEIGHT: 217 LBS

## 2021-06-02 VITALS — WEIGHT: 227 LBS | BODY MASS INDEX: 30.75 KG/M2 | HEIGHT: 72 IN

## 2021-06-02 VITALS — BODY MASS INDEX: 29.39 KG/M2 | HEIGHT: 72 IN | WEIGHT: 217 LBS

## 2021-06-02 VITALS — BODY MASS INDEX: 28.85 KG/M2 | WEIGHT: 213 LBS | HEIGHT: 72 IN

## 2021-06-02 NOTE — PROGRESS NOTES
Notes recorded by Paulo Henao MD (Ted) on 10/15/2019 at 9:34 AM CDT  BNP much improved, elevated creatinine reflects volume contraction.  He should stop furosemide and watch for weight gain and increasing shortness of breath        Furosemide d/c'd from medication list. -[facility] [place]

## 2021-06-02 NOTE — TELEPHONE ENCOUNTER
Called patient and he is agreeable to lab work. He requests  location. Transferred to scheduling to have arranged. -[facility] [place]

## 2021-06-02 NOTE — TELEPHONE ENCOUNTER
ANTICOAGULATION  MANAGEMENT    Assessment     Today's INR result of 2.40 is Therapeutic (goal INR of 2.0-3.0)        Warfarin taken as previously instructed    No new diet changes affecting INR    No interaction expected between Furosemide / Iron supplement and warfarin    Continues to tolerate warfarin with no reported s/s of bleeding or thromboembolism     Previous INR was Therapeutic at 2.80 on 10/2/19.   (last 5 INR's therapeutic).    Driving to [place] in November for one month (or longer) to look for a place to rent or buy.    Plan:     Spoke with Obed regarding INR result and instructed:    1.  Advised to call [facility], if he has any concerns.  We can find nearest lab in [place] to check INR.    Warfarin Dosing Instructions:  (has 5mg tabs)   - Continue current warfarin dose 7.5 mg daily on Wed/Fri; and 5 mg daily rest of week.    Instructed patient to follow up no later than:  4-6 wks.   - scheduled on 12/10/19 @ [facility].    Education provided: importance of consistent vitamin K intake, target INR goal and significance of current INR result, importance of taking warfarin as instructed, importance of notifying clinic for changes in medications, monitoring for bleeding signs and symptoms, monitoring for clotting signs and symptoms, when to seek medical attention/emergency care, importance of notifying clinic for diarrhea, nausea/vomiting, reduced intake and/or illness and travel related clotting risk and prevention    Obed verbalizes understanding and agrees to warfarin dosing plan.    Instructed to call the [facility] for any changes, questions or concerns. (#[phone])   ?   Yolanda Krishna RN    Subjective/Objective:      Obed Haley, a 74 y.o. male is on warfarin.     Obed reports:     Home warfarin dose: as updated on anticoagulation calendar per template     Missed doses: No     Medication changes:  Yes:  On 10/15/19, Furosemide was stopped and decreased Metoprolol Succinate to 50mg  daily, per Dr. Henao due to dizziness / fatigue, and started Iron supplement every other day.     S/S of bleeding or thromboembolism:  No     New Injury or illness:  No     Changes in diet or alcohol consumption:  No     Upcoming surgery, procedure or cardioversion:  No    Anticoagulation Episode Summary     Current INR goal:   2.0-3.0   TTR:   71.2 %   Next INR check:   12/4/2019   INR from last check:   2.40 (10/23/2019)   Weekly max warfarin dose:      Target end date:   9/29/2017   INR check location:      Preferred lab:      Send INR reminders to:   [facility]       Comments:            Anticoagulation Care Providers     Provider Role Specialty Phone number    Franko Franz MD Referring Internal Medicine [phone]

## 2021-06-02 NOTE — TELEPHONE ENCOUNTER
----- Message from Hawa Waller sent at 10/25/2019  9:29 AM CDT -----  General phone call:    Caller:Pt  Primary cardiologist: Juliano  Detailed reason for call: Pt reporting back with weight readings after stopping his furosemide. Please call back.  Best phone number:   Best time to contact: Any  Ok to leave a detailed message? Yes  Device? Yes    Additional Info:     Called and LM for pt to return call to discuss -kcl    
Called back Obed and updated on response from Dr. Henao. He verbalized understanding and will continue to monitor weight and symptoms. -[facility] [place]  
I would not restart furosemide unless he becomes more short of breath or weight continues to go up.  
Pt called in again and transferred to writer. Pt reports that after stopping his furosemide on 10/15/19, he has gained approximately 6 lbs. He reports that he has some swelling to his legs but it is not terrible. He has shortness of breath with exertion but this is not new. He reports that his fatigue is a little better and his alertness is definitely better. He just was more concerned with the fact that he has gained the 6 lbs and has some mild edema. Will update Dr. Henao.      Dr. Henao,  See above update on Obed after we stopped his furosemide on 10/15/19 based on BMP results. 6 lb weight gain- mild LE edema. shortness of breath but it is unchanged from previous. Slight improvement in fatigue and improvement in overall alertness. Any Recs?  Thanks,  Hailey  
No

## 2021-06-02 NOTE — TELEPHONE ENCOUNTER
Anticoagulation Annual Referral Renewal Review    Obed Haley's chart reviewed for annual renewal of referral to anticoagulation monitoring.        Criteria for anticoagulation nurse and/or pharmacist renewal met   Warfarin indication: Atrial Fibrillation, paroxysmal Yes, per indication   Current with INR monitoring/compliant Yes Yes   Date of last office visit 08/14/2019 Yes, had office visit within last year   Time in Therapeutic Range (TTR) 89.25 % Yes, TTR > 60%       Obed Haley met all criteria for anticoagulation management program initiated renewal.  New INR standing orders and anticoagulation referral renewal placed.      Yolanda Krishna RN  1:47 PM

## 2021-06-02 NOTE — TELEPHONE ENCOUNTER
----- Message from Paulo Veloz) MD Juliano sent at 10/14/2019 11:37 AM CDT -----  We should recheck labs including BMP, BNP and CBC  ----- Message -----  From: Hailey Torres RN  Sent: 10/14/2019  10:37 AM CDT  To: Paulo Veloz) MD Juliano    Called patient and updated on stable results.  He verbalized understanding and is pleased with results but inquires why he is still so fatigued all the time. He wonders if further testing needs to be done and wishes that this message be passed along to Dr. Henao. -[facility] [place]      Dr. Henao,  See above- Obed still c/o generalized fatigue throughout the day. Thoughts?  Thanks,  Mal    NUCLEAR STRESS TEST RESULTS  Notes recorded by Paulo Henao MD (Ted) on 10/11/2019 at 5:39 PM CDT  Normal, reassurance

## 2021-06-03 VITALS
HEIGHT: 73 IN | RESPIRATION RATE: 16 BRPM | HEART RATE: 60 BPM | BODY MASS INDEX: 30.35 KG/M2 | DIASTOLIC BLOOD PRESSURE: 66 MMHG | SYSTOLIC BLOOD PRESSURE: 108 MMHG | WEIGHT: 229 LBS

## 2021-06-03 VITALS — BODY MASS INDEX: 31.02 KG/M2 | HEIGHT: 72 IN | WEIGHT: 229 LBS

## 2021-06-04 VITALS
HEART RATE: 70 BPM | RESPIRATION RATE: 21 BRPM | HEIGHT: 73 IN | SYSTOLIC BLOOD PRESSURE: 120 MMHG | DIASTOLIC BLOOD PRESSURE: 75 MMHG | BODY MASS INDEX: 30.4 KG/M2 | WEIGHT: 229.4 LBS

## 2021-06-04 VITALS — HEIGHT: 73 IN | WEIGHT: 226 LBS | BODY MASS INDEX: 29.95 KG/M2

## 2021-06-04 VITALS
RESPIRATION RATE: 16 BRPM | SYSTOLIC BLOOD PRESSURE: 106 MMHG | HEART RATE: 60 BPM | BODY MASS INDEX: 29.82 KG/M2 | DIASTOLIC BLOOD PRESSURE: 60 MMHG | WEIGHT: 226 LBS | OXYGEN SATURATION: 97 %

## 2021-06-05 VITALS
HEART RATE: 62 BPM | OXYGEN SATURATION: 98 % | BODY MASS INDEX: 29.16 KG/M2 | SYSTOLIC BLOOD PRESSURE: 124 MMHG | WEIGHT: 220 LBS | HEIGHT: 73 IN | DIASTOLIC BLOOD PRESSURE: 80 MMHG

## 2021-06-05 VITALS
SYSTOLIC BLOOD PRESSURE: 126 MMHG | OXYGEN SATURATION: 98 % | HEART RATE: 62 BPM | BODY MASS INDEX: 30.45 KG/M2 | HEIGHT: 73 IN | WEIGHT: 229.75 LBS | DIASTOLIC BLOOD PRESSURE: 82 MMHG

## 2021-06-05 VITALS
DIASTOLIC BLOOD PRESSURE: 70 MMHG | SYSTOLIC BLOOD PRESSURE: 122 MMHG | BODY MASS INDEX: 29.69 KG/M2 | HEIGHT: 73 IN | RESPIRATION RATE: 14 BRPM | HEART RATE: 67 BPM | WEIGHT: 224 LBS

## 2021-06-05 VITALS
BODY MASS INDEX: 29.42 KG/M2 | OXYGEN SATURATION: 97 % | HEIGHT: 73 IN | WEIGHT: 222 LBS | RESPIRATION RATE: 16 BRPM | SYSTOLIC BLOOD PRESSURE: 128 MMHG | HEART RATE: 60 BPM | DIASTOLIC BLOOD PRESSURE: 80 MMHG

## 2021-06-05 NOTE — TELEPHONE ENCOUNTER
"ANTICOAGULATION  MANAGEMENT    Assessment     Today's INR result of 1.60 is Subtherapeutic (goal INR of 2.0-3.0)        Missed dose(s) may be affecting INR    - possibly missed warfarin dose on 1/19.    Decreased greens/vitamin K intake may be affecting INR    No new medication/supplements affecting INR    Continues to tolerate warfarin with no reported s/s of bleeding or thromboembolism     Previous INR was Therapeutic at 2.00 on 12/10/19.    They found / bought a place in [place] and will close in Spring. Will still spend summers in [place].    Traveling back to [place] on 1/29/2020.    Plan:     Spoke with Obed regarding INR result and instructed:     Warfarin Dosing Instructions:  (has 5mg tabs)   - Change warfarin dose to 7.5 mg daily on Mon/Wed/Sat; and 5 mg daily rest of week.   - (6.3 % change)    Instructed patient to follow up no later than:  1-2 wks.   - scheduled on 1/28/20@ [facility].    Education provided: importance of consistent vitamin K intake, impact of vitamin K foods on INR, target INR goal and significance of current INR result and importance of taking warfarin as instructed    Obed verbalizes understanding and agrees to warfarin dosing plan.    Instructed to call the [facility] for any changes, questions or concerns. (#[phone])   ?   Yolanda Krishna RN    Subjective/Objective:      Obed Haley, a 74 y.o. male is on warfarin.     Obed reports:     Home warfarin dose: as updated on anticoagulation calendar per template     Missed doses: Yes:  Possibly missed warfarin dose on 1/19.     Medication changes:  No     S/S of bleeding or thromboembolism:  No     New Injury or illness:  No     Changes in diet or alcohol consumption:  Yes:  Reported, \"ate less greens\"     Upcoming surgery, procedure or cardioversion:  No    Anticoagulation Episode Summary     Current INR goal:   2.0-3.0   TTR:   70.4 % (1 y)   Next INR check:   2/5/2020   INR from last check:   1.60! (1/22/2020)   Weekly max warfarin dose:    "   Target end date:   9/29/2017   INR check location:      Preferred lab:      Send INR reminders to:   [facility]       Comments:            Anticoagulation Care Providers     Provider Role Specialty Phone number    Franko Franz MD Referring Internal Medicine [phone]

## 2021-06-05 NOTE — TELEPHONE ENCOUNTER
ANTICOAGULATION  MANAGEMENT    Assessment     Today's INR result of 1.74 is Subtherapeutic (goal INR of 2.0-3.0)        Warfarin taken as previously instructed    No new diet changes affecting INR    No new medication/supplements affecting INR    Continues to tolerate warfarin with no reported s/s of bleeding or thromboembolism     Previous INR was Subtherapeutic at 1.60 on 1/22/2020.    They found / bought a place in [place] and will close in Spring. Will still spend otney in [place].    Driving a truck to [place] on 1/29/2020 for a couple days, then flying back to [place].       Plan:     Spoke with Obed regarding INR result and instructed:     Warfarin Dosing Instructions:  (has 5mg tabs)   - Change warfarin dose to 5 mg daily on Mon/Wed/Fri; and 7.5 mg daily rest of week.   - (5.9 % change)    Instructed patient to follow up no later than:  1-2 wks.   - scheduled on 2/11/2020 @ [facility].    Education provided: importance of consistent vitamin K intake, target INR goal and significance of current INR result, importance of notifying clinic for changes in medications and travel related clotting risk and prevention    Obed verbalizes understanding and agrees to warfarin dosing plan.    Instructed to call the [facility] for any changes, questions or concerns. (#[phone])   ?   Yolanda Krishna RN    Subjective/Objective:      Obed Haley, a 74 y.o. male is on warfarin.     Obed reports:     Home warfarin dose: as updated on anticoagulation calendar per template     Missed doses: No     Medication changes:  No     S/S of bleeding or thromboembolism:  No     New Injury or illness:  No     Changes in diet or alcohol consumption:  No     Upcoming surgery, procedure or cardioversion:  No    Anticoagulation Episode Summary     Current INR goal:   2.0-3.0   TTR:   68.8 % (1 y)   Next INR check:   2/11/2020   INR from last check:   1.74! (1/28/2020)   Weekly max warfarin dose:      Target end date:   9/29/2017   INR check location:       Preferred lab:      Send INR reminders to:   [facility]       Comments:            Anticoagulation Care Providers     Provider Role Specialty Phone number    Franko Franz MD Referring Internal Medicine [phone]

## 2021-06-06 NOTE — TELEPHONE ENCOUNTER
RN cannot approve Refill Request    RN can NOT refill this medication med is not covered by policy/route to provider. Last office visit: 10/25/2018 Franko Franz MD Last Physical: 8/14/2019 Last MTM visit: Visit date not found Last visit same specialty: Visit date not found.  Next visit within 3 mo: Visit date not found  Next physical within 3 mo: Visit date not found      Stephanie Cornell, [facility] Triage/Med Refill 2/24/2020    Requested Prescriptions   Pending Prescriptions Disp Refills     warfarin ANTICOAGULANT (COUMADIN/JANTOVEN) 5 MG tablet [Pharmacy Med Name: WARFARIN SODIUM 5 MG TABLET] 110 tablet 1     Sig: TAKE 1 TABLET (5MG) TO 1 & 1/2 TABLETS (7.5MG)DAILY, AS DIRECTED. ADJUST DOSE BASED ON INR RESULTS.       There is no refill protocol information for this order

## 2021-06-06 NOTE — TELEPHONE ENCOUNTER
ANTICOAGULATION  MANAGEMENT    Assessment     Today's INR result of 2.10 is Therapeutic (goal INR of 2.0-3.0)        Warfarin taken as previously instructed    No new diet changes affecting INR    No new medication/supplements affecting INR    Continues to tolerate warfarin with no reported s/s of bleeding or thromboembolism     Previous INR was Subtherapeutic at 1.74 on 1/28/2020.    Has been traveling from [place] to [place].      Plan:     Spoke with Vito regarding INR result and instructed:     Warfarin Dosing Instructions:  (has 5mg tabs)   - Continue current warfarin dose 5 mg daily on Mon/Wed/Fri; and 7.5 mg daily rest of week.    Instructed patient to follow up no later than:  1-2 wks.   - scheduled on 2/26/2020 @ [facility].    Education provided: importance of consistent vitamin K intake and target INR goal and significance of current INR result    Vito verbalizes understanding and agrees to warfarin dosing plan.    Instructed to call the [facility] for any changes, questions or concerns. (#[phone])   ?   Yolanda Krishna RN    Subjective/Objective:      Vito Haley, a 74 y.o. male is on warfarin.     Vito reports:     Home warfarin dose: as updated on anticoagulation calendar per template     Missed doses: No     Medication changes:  No     S/S of bleeding or thromboembolism:  No     New Injury or illness:  No     Changes in diet or alcohol consumption:  No     Upcoming surgery, procedure or cardioversion:  No    Anticoagulation Episode Summary     Current INR goal:   2.0-3.0   TTR:   66.0 % (1 y)   Next INR check:   2/25/2020   INR from last check:   2.10 (2/11/2020)   Weekly max warfarin dose:      Target end date:   9/29/2017   INR check location:      Preferred lab:      Send INR reminders to:   [facility]       Comments:            Anticoagulation Care Providers     Provider Role Specialty Phone number    Franko Franz MD Referring Internal Medicine [phone]

## 2021-06-06 NOTE — TELEPHONE ENCOUNTER
ANTICOAGULATION  MANAGEMENT    Assessment     Today's INR result of 2.30 is Therapeutic (goal INR of 2.0-3.0)        Less warfarin taken than instructed which may be affecting INR - per template for the last 2 wks.   - will update anticoagulation tracking.    No new diet changes affecting INR    No new medication/supplements affecting INR    Continues to tolerate warfarin with no reported s/s of bleeding or thromboembolism     Previous INR was Therapeutic at 2.10 on 2/11/2020.    Will be driving to [place] in April 2020.    Plan:     Spoke with Obed regarding INR result and instructed:    1.  Will have Obed continue same warfarin dose, he is currently taking since INR is therapeutic.   2.  Warfarin dose was previously adjusted due to driving from [place] to [place] a few times in Jan / Feb.that could have affected INR.    Warfarin Dosing Instructions:  (has 5mg tabs)   - Continue current warfarin dose 7.5 mg daily on Mon/Wed/Fri; and 5 mg daily rest of week.    Instructed patient to follow up no later than:  2-3 wks.   - scheduled on 3/18/20 @ [facility].    Education provided: importance of consistent vitamin K intake, target INR goal and significance of current INR result, importance of taking warfarin as instructed and importance of notifying clinic for changes in medications    Obed verbalizes understanding and agrees to warfarin dosing plan.    Instructed to call the [facility] for any changes, questions or concerns. (#[phone])   ?   Yolanda Krishna RN    Subjective/Objective:      Obed Haley, a 74 y.o. male is on warfarin.     Obed reports:     Home warfarin dose: template incorrect; verbally confirmed home dose with Obed and updated on anticoagulation calendar - had been taking 42.5mg/wk of warfarin for the past 2 wks.     Missed doses: No     Medication changes:  No     S/S of bleeding or thromboembolism:  No     New Injury or illness:  No     Changes in diet or alcohol consumption:  No     Upcoming surgery,  procedure or cardioversion:  No    Anticoagulation Episode Summary     Current INR goal:   2.0-3.0   TTR:   66.0 % (1 y)   Next INR check:   3/27/2020   INR from last check:   2.30 (2/28/2020)   Weekly max warfarin dose:      Target end date:   9/29/2017   INR check location:      Preferred lab:      Send INR reminders to:   [facility] [place]       Comments:            Anticoagulation Care Providers     Provider Role Specialty Phone number    Franko Franz MD Referring Internal Medicine [phone]

## 2021-06-07 NOTE — TELEPHONE ENCOUNTER
ANTICOAGULATION  MANAGEMENT    Assessment     Today's INR result of 2.2 is Therapeutic (goal INR of 2.0-3.0)        Warfarin taken as previously instructed    No new diet changes affecting INR    No new medication/supplements affecting INR    Continues to tolerate warfarin with no reported s/s of bleeding or thromboembolism     Previous INR was Therapeutic    Plan:     Spoke with Obed regarding INR result and instructed:     Warfarin Dosing Instructions:  Continue current warfarin dose 7.5 mg daily on Mon/Wed/Fri; and 5 mg daily rest of week  (0 % change)    Instructed patient to follow up no later than: 4 weeks    Education provided: importance of therapeutic range    Obed verbalizes understanding and agrees to warfarin dosing plan.    Instructed to call the [facility] for any changes, questions or concerns. (#[phone])   ?   Cherie Mora RN    Subjective/Objective:      Obed Haley, a 75 y.o. male is on warfarin.     Obed reports:     Home warfarin dose: as updated on anticoagulation calendar per template     Missed doses: No     Medication changes:  No     S/S of bleeding or thromboembolism:  No     New Injury or illness:  No     Changes in diet or alcohol consumption:  No     Upcoming surgery, procedure or cardioversion:  No    Anticoagulation Episode Summary     Current INR goal:   2.0-3.0   TTR:   66.0 % (1 y)   Next INR check:   5/6/2020   INR from last check:   2.20 (4/8/2020)   Weekly max warfarin dose:      Target end date:   9/29/2017   INR check location:      Preferred lab:      Send INR reminders to:   [facility]       Comments:            Anticoagulation Care Providers     Provider Role Specialty Phone number    Franko Franz MD Referring Internal Medicine [phone]

## 2021-06-08 NOTE — PATIENT INSTRUCTIONS - HE
Obed Haley,    It was a pleasure to see you today at the [facility].     My recommendations after this visit include:    Hold losartan for 1 week.  Call us to let us know if you feel any different/better    DANNY Henao MD, FACC, MILI

## 2021-06-08 NOTE — TELEPHONE ENCOUNTER
Who is calling:  Patient  Reason for Call:  The patient is calling [facility] to update that his Atorvastatin was discontinued 2 weeks back. Please advise if he should wait until 6/17 for his next INR which he has already scheduled.  Okay to leave a detailed message: Yes

## 2021-06-08 NOTE — TELEPHONE ENCOUNTER
ANTICOAGULATION  MANAGEMENT    Assessment     Today's INR result of 2.20 is Therapeutic (goal INR of 2.0-3.0)        Warfarin taken as previously instructed    No new diet changes affecting INR    No interaction expected between stopped Losartan and warfarin    Continues to tolerate warfarin with no reported s/s of bleeding or thromboembolism     Previous INR was Therapeutic at 2.30 on 5/6/20.  (last 6 INR's therapeutic).    Reported all is going well.    Plan:     Spoke with Obed regarding INR result and instructed:     Warfarin Dosing Instructions:  (has 5mg tabs)   - Continue current warfarin dose 7.5 mg daily on Mon/Wed/Fri; and 5 mg daily rest of week.    Instructed patient to follow up no later than:  6 wks.   - INR scheduled on 7/29/20 @ [place].    Education provided: importance of consistent vitamin K intake, target INR goal and significance of current INR result and importance of notifying clinic for changes in medications    Obed verbalizes understanding and agrees to warfarin dosing plan.    Instructed to call the [facility] for any changes, questions or concerns. (#[phone])   ?   Yolanda Krishna RN    Subjective/Objective:      Obed Haley, a 75 y.o. male is on warfarin.     Obed reports:     Home warfarin dose: as updated on anticoagulation calendar per template     Missed doses: No     Medication changes:  Yes:  Previously reported stopping Losartan.     S/S of bleeding or thromboembolism:  No     New Injury or illness:  No     Changes in diet or alcohol consumption:  No     Upcoming surgery, procedure or cardioversion:  No    Anticoagulation Episode Summary     Current INR goal:   2.0-3.0   TTR:   75.8 % (1 y)   Next INR check:   7/29/2020   INR from last check:   2.20 (6/17/2020)   Weekly max warfarin dose:      Target end date:   9/29/2017   INR check location:      Preferred lab:      Send INR reminders to:   [facility]       Comments:            Anticoagulation Care  Providers     Provider Role Specialty Phone number    Franko Franz MD Referring Internal Medicine [phone]

## 2021-06-08 NOTE — TELEPHONE ENCOUNTER
----- Message from Stephanie Mariluz sent at 6/4/2020  9:35 AM CDT -----  Regarding: JENNI pt  Caller: Obed    Primary cardiologist: Dr. Henao    Detailed reason for call: Obed states he had a recent Rx change and he is calling back to report as requested. Please call back.     Best phone number: [phone]     Best time to contact: As soon as possible     Ok to leave a detailed message? No    Device? Yes          Noted. See telephone encounter dated 5/27/2020. Obed was called back and states that he is really doing quite well, as discussed last week. He is feeling well off the Losartan and is happy to be off it. BP is a little higher in the AM- typically around 138/80 right when he gets up but otherwise is around 120 systolic throughout the day. He still has fatigue when he exerts himself physically but still overall feels better off Losartan. Will update [facility].       Dr. Henao,  Essentially same update as last week on 5/27- overall feels better off losartan. Update only unless you have anything further.  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  He was instructed to call me in 1 week to let me know if he feels any different

## 2021-06-08 NOTE — TELEPHONE ENCOUNTER
ANTICOAGULATION  MANAGEMENT    Assessment     Today's INR result of 2.30 is Therapeutic (goal INR of 2.0-3.0)        Warfarin taken as previously instructed    No new diet changes affecting INR    No new medication/supplements affecting INR    Continues to tolerate warfarin with no reported s/s of bleeding or thromboembolism     Previous INR was Therapeutic at 2.20 on 4/8/20.    Plan:     Spoke with Obed regarding INR result and instructed:     Warfarin Dosing Instructions:  (has 5mg tabs)   - Continue current warfarin dose 7.5 mg daily on Mon/Wed/Fri; and 5 mg daily rest of week.    Instructed patient to follow up no later than:  6 wks.   - INR scheduled on 6/17/20 @ [place].    Education provided: importance of consistent vitamin K intake, target INR goal and significance of current INR result and importance of notifying clinic for changes in medications    Obed verbalizes understanding and agrees to warfarin dosing plan.    Instructed to call the [facility] for any changes, questions or concerns. (#[phone])   ?   Yolanda Krishna RN    Subjective/Objective:      Obed Haley, a 75 y.o. male is on warfarin.     Obed reports:     Home warfarin dose: as updated on anticoagulation calendar per template     Missed doses: No     Medication changes:  No     S/S of bleeding or thromboembolism:  No     New Injury or illness:  No     Changes in diet or alcohol consumption:  No     Upcoming surgery, procedure or cardioversion:  No    Anticoagulation Episode Summary     Current INR goal:   2.0-3.0   TTR:   66.0 % (1 y)   Next INR check:   6/17/2020   INR from last check:   2.30 (5/6/2020)   Weekly max warfarin dose:      Target end date:   9/29/2017   INR check location:      Preferred lab:      Send INR reminders to:   [facility]       Comments:            Anticoagulation Care Providers     Provider Role Specialty Phone number    Franko Franz MD Referring Internal Medicine [phone]

## 2021-06-08 NOTE — TELEPHONE ENCOUNTER
Called and talked with Obed.     - he meant his Losartan was discontinued.     - he continues with Atorvastatin.     - no known interaction with stopping Losartan.     - INR scheduled on 6/17/20

## 2021-06-08 NOTE — TELEPHONE ENCOUNTER
----- Message from Meredith Silverio sent at 5/27/2020  3:31 PM CDT -----      Caller: patient  Primary cardiologist: Dr. Henao    Detailed reason for call: Patient was calling to report how he is doing on the change on his Losartan. Please advise    Best phone number: [phone]  Best time to contact: today  Ok to leave a detailed message? yes  Device? yes    Additional Info:        Called back Obed to get an update on how he is feeling based on holding Losartan for a week. He says that his blood pressure is staying in a good range- between 137-135 systolic either in the AM or afternoon. He states that he no longer feels like his hands are falling asleep. He has a clearer mind. He doesn't feel any change in his level of fatigue or exercise tolerance. He will call back in a week to see if this has changed at all. Will update [place]. [place] [place]        Dr. Henao,  See above update on Obed. He will call me next week to update on if his exercise tolerance has improved or if his fatigue as lessened since being off losartan.   Mal

## 2021-06-09 NOTE — PROGRESS NOTES
Plan:  Basic metabolic panel CBC BMP and TSH today to rule out metabolic causes of fatigue  Echo to reassess LV function after discontinuation of losartan          Orders for blood work replaced- [facility] unable to obtain blood x3 and orders show up as clinic collect. Pt will utilize outpatient lab at [facility] tomorrow. Replaced BMP, BNP, HM2, LIP, TSH. [place] [place]

## 2021-06-09 NOTE — TELEPHONE ENCOUNTER
Wellness Screening Tool  Symptom Screening:  Do you have one of the following NEW symptoms:    Fever (subjective or >100.0)?  No    A new cough?  No    Shortness of breath?  No     Chills? No     New loss of taste or smell? No     Generalized body aches? No     New persistent headache? No     New sore throat? No     Nausea, vomiting, or diarrhea?  No    Within the past 3 weeks, have you been exposed to someone with a known positive illness below:    COVID-19 (known or suspected)?  No    Chicken pox?  No    Mealses?  No    Pertussis?  No    Patient notified of visitor policy- They may have one person accompany them to their appointment, but they will need to wear a mask and will be screened upon arrival for symptoms: Yes  Pt informed to wear a mask: Yes  Pt notified if they develop any symptoms listed above, prior to their appointment, they are to call the clinic directly at [phone] for further instructions.  Yes  Patient's appointment status: Patient will be seen in clinic as scheduled on [facility] tomorrow

## 2021-06-09 NOTE — TELEPHONE ENCOUNTER
Called Obed back and let him know that AVS was mailed home. Also, updated on response from [facility]- he is agreeable to next available clinic with him. No device needed per device RN. Transferred to scheduling to have this arranged. [place] [place]

## 2021-06-09 NOTE — TELEPHONE ENCOUNTER
He should be evaluated in person.  Can wait to see me when I am back or can come to rapid access clinic to see physician

## 2021-06-09 NOTE — PATIENT INSTRUCTIONS - HE
Obed Haley,    It was a pleasure to see you today at the [facility].     My recommendations after this visit include:    Blood work and echo    WFlorence Henao MD, FACC, MILI

## 2021-06-09 NOTE — TELEPHONE ENCOUNTER
"----- Message from Stephanie Mcgraw sent at 7/2/2020 12:37 PM CDT -----  Regarding: BP and questions / [facility]  Caller: Obed    Primary cardiologist: Dr. Henao    Detailed reason for call: Obed states he has blood pressures to report as instructed and also, he received his device check report but needs interpretation as he has questions regarding the device check. Please call back.     Best phone number: [phone]      Best time to contact: today    Ok to leave a detailed message? Yes    Device? Yes      Called back patient to address concerns. He reports that he still remains off Losartan and BP is wonderful. BP was 121/74 this AM. He still feels \"extreme fatigue\" with any exercise. Pt has called in twice with these similar updates- he was expecting to have more of a change over time being off of it. Writer has sent multiple updates to [facility] in regards to this fatigue. Pt was instructed to follow up in 6 months and AVS was mailed home to him per his request, although should have been made available on [employer] and pt is active on this.       Dr. Henao,  Obed called in again to say that his BP's are good off Losartan but yet is still experiencing \"extreme fatigue\" with any sort of physical exercise. Refer to PMD for other reasons for fatigue?  Thanks,  Mal   "

## 2021-06-10 ENCOUNTER — AMBULATORY - HEALTHEAST (OUTPATIENT)
Dept: CARDIOLOGY | Facility: CLINIC | Age: 76
End: 2021-06-10

## 2021-06-10 ENCOUNTER — COMMUNICATION - HEALTHEAST (OUTPATIENT)
Dept: CARDIOLOGY | Facility: CLINIC | Age: 76
End: 2021-06-10

## 2021-06-10 DIAGNOSIS — I48.19 PERSISTENT ATRIAL FIBRILLATION (H): ICD-10-CM

## 2021-06-10 DIAGNOSIS — Z95.0 CARDIAC PACEMAKER IN SITU: ICD-10-CM

## 2021-06-10 NOTE — TELEPHONE ENCOUNTER
ANTICOAGULATION  MANAGEMENT    Assessment     Today's INR result of 2.20 is Therapeutic (goal INR of 2.0-3.0)        Warfarin taken as previously instructed    No new diet changes affecting INR    No new medication/supplements affecting INR    Continues to tolerate warfarin with no reported s/s of bleeding or thromboembolism     Previous INR was Therapeutic at 2.20 on 6/17/20.   (last 7 INR's therapeutic).    Plan:     Spoke with Obed regarding INR result and instructed:     Warfarin Dosing Instructions:  (has 5mg tabs)   - Continue current warfarin dose 7.5 mg daily on Mon/Wed/Fri; and 5 mg daily rest of week.    Instructed patient to follow up no later than:  8 wks.   - INR scheduled on 9/23/20 @ [place].    Education provided: importance of consistent vitamin K intake, target INR goal and significance of current INR result and importance of notifying clinic for changes in medications    Obed verbalizes understanding and agrees to warfarin dosing plan.    Instructed to call the [facility] for any changes, questions or concerns. (#[phone])   ?   Yolanda Krishna RN    Subjective/Objective:      Obed Haley, a 75 y.o. male is on warfarin.     Obed reports:     Home warfarin dose: as updated on anticoagulation calendar per template     Missed doses: No     Medication changes:  No     S/S of bleeding or thromboembolism:  No     New Injury or illness:  No     Changes in diet or alcohol consumption:  No     Upcoming surgery, procedure or cardioversion:  No    Anticoagulation Episode Summary     Current INR goal:   2.0-3.0   TTR:   83.8 % (1 y)   Next INR check:   9/23/2020   INR from last check:   2.20 (7/29/2020)   Weekly max warfarin dose:      Target end date:   9/29/2017   INR check location:      Preferred lab:      Send INR reminders to:   [facility]       Comments:            Anticoagulation Care Providers     Provider Role Specialty Phone number    Franko Franz MD Referring  Internal Medicine [phone]

## 2021-06-10 NOTE — TELEPHONE ENCOUNTER
RN cannot approve Refill Request    RN can NOT refill this medication med is not covered by policy/route to provider     . Last office visit: 10/25/2018 Franko Franz MD Last Physical: 8/14/2019 Last MTM visit: Visit date not found Last visit same specialty: Visit date not found.  Next visit within 3 mo: Visit date not found  Next physical within 3 mo: Visit date not found      Tessa Arriaga, [facility] Triage/Med Refill 7/30/2020    Requested Prescriptions   Pending Prescriptions Disp Refills     warfarin ANTICOAGULANT (COUMADIN/JANTOVEN) 5 MG tablet [Pharmacy Med Name: WARFARIN SODIUM 5 MG TABLET] 110 tablet 1     Sig: TAKE 1 TABLET (5MG) TO 1 & 1/2 TABLETS (7.5MG)DAILY, AS DIRECTED. ADJUST DOSE BASED ON INR RESULTS.       There is no refill protocol information for this order

## 2021-06-11 NOTE — PROGRESS NOTES
[facility] Follow Up Note    Obed Edmondsalexa   72 y.o. male    Date of Visit: 2017    Chief Complaint   Patient presents with     Concussion     bumped head last Thursday, no bleeding pain and tightness Right side of head     Heart Murmur     Subjective  This is a 72-year-old man who is generally in very good health.  Last Thursday while doing some work he bumped the frontotemporal region of his head.  The next morning he awoke with a funny tingling sensation in the right temporal region that radiated towards his ear and down the right side of his face.  He thought that this discomfort worsened on Saturday and .  Yesterday he thought it should be looked at in phone for an appointment but since then the symptoms are feeling better.  He did not notice any bruising or swelling.  He was particularly concerned because he had a sister who  of a brain hemorrhage.  He denied any visual problems he had no true headaches and no dizziness.    Second issue is some concern over mitral prolapse.  He was diagnosed in the past with mitral valve prolapse but has never had any particular issues.  Over the last several weeks she has noticed that he is fatiguing more easily.  He notices occasional heavy sweating and rare fluid retention as well as very occasional shortness of breath with activity.  He denies any chest pain.  He cannot relate any of this to activity.  He is wondering if the mitral valve needs to be rechecked.  It has been many years since he had an ultrasound done.    ROS A comprehensive review of systems was performed and was otherwise negative    Medications, allergies, and problem list were reviewed and updated    Exam  General Appearance:   On examination today his blood pressure is 126/74.  Weight is 222 pounds and BMI is 30.14.    Heart is in a sinus rhythm with a rate of 60 and no ectopy.  I do not hear any murmurs today.    Cranial nerves are intact.  There is no skin  discoloration in the forehead or facial region.  I do not see any swelling.    Pupils are equal.    The patient is alert and oriented ×3.      Assessment/Plan  1. Head pain     2. Mitral valve prolapse       Mild head discomfort or tingly feeling across the right side of the face.  Given the lack of findings and the fact that it is improving leads me to believe that he possibly had a bit of superficial neuropathy from banging his head.  I reassured him that if he felt discomfort in moving his face it was not likely the brain that was involved.  Again, he is improving and so I would not do any additional workup.    Number of symptoms including fatigue and shortness of breath and a man with a previous diagnosis of mitral valve prolapse.  We discussed the issue and I think it would be worthwhile to do an echocardiogram.  We will set that up and follow-up with him regarding the results.  Body Mass Index was not assessed due to The patient was in with an acute medical issue..    Franko Franz MD      Current Outpatient Prescriptions on File Prior to Visit   Medication Sig     EPINEPHRINE (EPIPEN INJ) Inject as directed.     MULTIVIT &MINERALS/FERROUS FUM (MULTI VITAMIN ORAL) Take by mouth.     No current facility-administered medications on file prior to visit.      No Known Allergies  Social History   Substance Use Topics     Smoking status: Former Smoker     Quit date: 1/14/2007     Smokeless tobacco: None     Alcohol use None

## 2021-06-11 NOTE — TELEPHONE ENCOUNTER
"ANTICOAGULATION  MANAGEMENT    Assessment     Today's INR result of 3.30 is Supratherapeutic (goal INR of 2.0-3.0)        Warfarin taken as previously instructed    Decreased greens/vitamin K intake may be affecting INR    - was also fasting 12 hrs. for his physical labs.    No new medication/supplements affecting INR    Continues to tolerate warfarin with no reported s/s of bleeding or thromboembolism     Previous INR was Therapeutic at 2.20 on 7/29/20.    Physical exam today with Dr. Franz - (will establish care with Patrick Dumas on 10/30/20).    Plan:     Spoke on phone with  regarding INR result and instructed:     Warfarin Dosing Instructions:  (has 5mg tabs)   - just for today, advised one time lower dose with 2.5mg warfarin,   - then continue current warfarin dose 7.5 mg daily on Mon/Wed/Fri; and 5 mg daily rest of week.    Instructed patient to follow up no later than:  1-2 wks.   - INR scheduled on 9/23/20 @ [place].    Education provided: impact of vitamin K foods on INR, target INR goal and significance of current INR result and monitoring for bleeding signs and symptoms    Obed verbalizes understanding and agrees to warfarin dosing plan.    Instructed to call the [facility] for any changes, questions or concerns. (#[phone])   ?   Yolanda Krishna RN    Subjective/Objective:      Obed Haley, a 75 y.o. male is on warfarin.     Obed reports:     Home warfarin dose: as updated on anticoagulation calendar per template     Missed doses: No     Medication changes:  No     S/S of bleeding or thromboembolism:  No     New Injury or illness:  No     Changes in diet or alcohol consumption:  Yes:  Not eating his usual vegetables, due to \"looking bad at the store or market.\"  (not as appetizing)     Upcoming surgery, procedure or cardioversion:  No    Anticoagulation Episode Summary     Current INR goal:   2.0-3.0   TTR:   80.7 % (1 y)   Next INR check:   9/23/2020   INR from last check:   3.30! (9/9/2020) "   Weekly max warfarin dose:      Target end date:   9/29/2017   INR check location:      Preferred lab:      Send INR reminders to:   [facility]       Comments:            Anticoagulation Care Providers     Provider Role Specialty Phone number    Franko Franz MD Referring Internal Medicine [phone]

## 2021-06-11 NOTE — TELEPHONE ENCOUNTER
ANTICOAGULATION  MANAGEMENT    Assessment     Today's INR result of 2.00 is Therapeutic (goal INR of 2.0-3.0)        Warfarin taken as previously instructed    Decreased greens/vitamin K and alcohol intake may be affecting INR    - reported a decreased in his vegetables / salads, as well as alcohol intake.    Potential interaction between Iboprofen and warfarin which may affect subsequent INRs    - reported taking some Ibuprofen 5 days ago.  He hurt his back.    Continues to tolerate warfarin with no reported s/s of bleeding or thromboembolism     Previous INR was Supratherapeutic at 3.30 on 9/9/20.    Plan:     Spoke on phone with Obed regarding INR result and instructed:    1.  Advised not to use Ibuprofen, due to its known interaction with Warfarin.  OK to take Tylenol for pain.    Warfarin Dosing Instructions:   (has 5mg tabs)   - Continue current warfarin dose 7.5 mg daily on Mon/Wed/Fri; and 5 mg daily rest of week.    Instructed patient to follow up no later than: 2 wks.   - INR scheduled on 10/7/20 @ [place].    Education provided: importance of consistent vitamin K intake, potential interaction between warfarin and alcohol, target INR goal and significance of current INR result, potential interaction between warfarin and Ibuprofen and importance of notifying clinic for changes in medications    Obed verbalizes understanding and agrees to warfarin dosing plan.    Instructed to call the [facility] for any changes, questions or concerns. (#[phone])   ?   Yolanda Krishna RN    Subjective/Objective:      Obed Haley, a 75 y.o. male is on warfarin.     Obed reports:     Home warfarin dose: as updated on anticoagulation calendar per template     Missed doses: No     Medication changes:  Yes:  Reported taking some Ibuprofen.     S/S of bleeding or thromboembolism:  No     New Injury or illness:  Yes:  Reported hurting his back.  But back pain is better now.     Changes in diet or alcohol consumption:   Yes. Decreased intake of Vitamin-K foods and Alcohol.     Upcoming surgery, procedure or cardioversion:  No    Anticoagulation Episode Summary     Current INR goal:   2.0-3.0   TTR:   79.8 % (1 y)   Next INR check:   10/7/2020   INR from last check:   2.00 (9/23/2020)   Weekly max warfarin dose:      Target end date:   9/29/2017   INR check location:      Preferred lab:      Send INR reminders to:   [facility]       Comments:            Anticoagulation Care Providers     Provider Role Specialty Phone number    Franko Franz MD Referring Internal Medicine [phone]

## 2021-06-11 NOTE — TELEPHONE ENCOUNTER
----- Message from Lanny Chao V sent at 10/1/2020  2:13 PM CDT -----  Regarding: Warfarin registration  While I was scheduling Coty rodriguez/ Dr. Henao he mentioned that his warfarin is registered under Dr. Franz, but he retired today.  He wanted Dr. Henao's nurse to contact him to see if he can have his warfrarin registered under Dr. Henao.  His mobile number is the best number to reach him at.           Noted. Will forward to ACM team and Dr. Henao and see about changing over provider due to Dr. Franz retiring. -[facility] [place]        Dr. Henao,  Patient's PMD is retiring and he would prefer you manage his warfarin/oversee dosing. Is that ok with you? If so, I will update the ACM team.  Thanks,  Mal

## 2021-06-11 NOTE — PROGRESS NOTES
Assessment/Plan:        See note    Annual wellness visit.  All information and material related to the annual wellness visit was reviewed.  No new issues are identified.    Coronary artery disease.  Continue follow-up with cardiology.    Paroxysmal atrial fibrillation.  Also continue up with cardiology.    Fatigue.  TSH about 6 weeks ago was slightly elevated.  We will recheck that today.    We will also check a PSA today.    We also discussed establishing care with 1 of my younger partners.    Subjective:    Patient ID: Obed Haley is a 75 y.o. male.    HPI  This is a 75-year-old man who comes in today primarily for his annual wellness visit.  He has a history of coronary artery disease and paroxysmal atrial fibrillation.  He sees cardiology on a regular visit.  I have reviewed the most recent notes.  He has a follow-up with him again in later October or early November.  No chest pain or shortness of breath.  No headache or dizziness.  Appetite is stable and he is doing well with weight.  He has noted a little bit of fatigue as he did with cardiology.  At that time his TSH was slightly elevated.  We should probably recheck.  No other new issues or complaints.      Review of Systems  Negative in all other areas.        Objective:    Physical Exam  On examination his blood pressure is 124/80.  Weight is 220 pounds.  O2 saturation is 98%.    Eyes: Pupils are equal.    Ears: External ears canals and tympanic membranes are normal.  He does wear hearing aids.    Neck: Supple with no masses and no neck vein distention.  No thyroid enlargement.    Lungs: Clear.    Cardiovascular: Heart rhythm is somewhat irregular with a rate in the 60s.  No gallops or murmurs.  Carotid pulses are full.  No peripheral edema.    GI: Abdomen is soft and nontender with no distention.  No masses or organomegaly.    Musculoskeletal: Head and neck are normal to inspection with good range of motion.  Good range of motion of all 4  extremities.    Neurologic: Cranial nerves are intact.  Gait is normal.    Psychiatric: The patient is alert and oriented x3.  Mood and affect are appropriate.

## 2021-06-12 NOTE — TELEPHONE ENCOUNTER
Hi,  Dr. Franz is retiring and Obed requests his anticoagulation be under Dr. Henao. Dr. Henao is aware.  Thanks!  Hailey

## 2021-06-12 NOTE — CONSULTS
DATE OF SERVICE: 08/25/2017    DATE OF CONSULTATION:  08/25/2017     Asked to evaluate this patient for mitral valve repair or replacement by Dr. Sha Henao.    HISTORY OF PRESENT ILLNESS:  Mr. Haley is a 72-year-old gentleman who has  experienced progressive exertional dyspnea.  He had a stress test and coronary  angiogram as well as a transesophageal echo done.  He was found to have mild to  moderate nonobstructive coronary artery disease.  Additionally, he had prolapse of  the posterior leaflet of the mitral valve with severe mitral regurgitation.  He has  not had PND, orthopnea or pedal edema or syncope.    PAST SURGICAL AND MEDICAL HISTORY:  Surgical procedures, none.  Medical Problems:  1.  Nonobstructive coronary artery disease.  2.  Severe mitral regurgitation.    ALLERGIES:  He is allergic to bee venom from the honey bee.    CURRENT MEDICATIONS:  See chart.    FAMILY HISTORY:  Noncontributory.    SOCIAL HISTORY:  He is retired.  He is accompanied by his wife.  He does not smoke  or ingest much alcohol.    REVIEW OF SYSTEMS:  CONSTITUTIONAL:  Weight has been stable.  HEENT:  No recent headaches or visual disturbances.  RESPIRATORY:  No shortness of breath.  CARDIOVASCULAR:  See HPI.  GASTROINTESTINAL:  Noncontributory.  GENITOURINARY:  Noncontributory.  NEUROLOGIC:  No symptoms of stroke or TIA.  MUSCULOSKELETAL:  Noncontributory.    PHYSICAL EXAMINATION:  APPEARANCE:  Normal sized elderly gentleman in no acute distress.  Height is 6 feet  1.5 inches.  Weight is 95.7 kilograms.  VITAL SIGNS:  Temperature afebrile, respiratory rate 20, heart rate is 68, blood  pressure 138/68.  SKIN:  Scattered senile changes.  LYMPH NODES:  No palpable lymphadenopathy.  HEENT:  Normocephalic.  PERRL.  EOMs intact.  ENT unremarkable.  NECK:  Supple, without carotid bruits.  CHEST:  Without deformity.  LUNGS:  Clear to auscultation.  HEART:  Regular rate and rhythm with a grade 3/6 holosystolic murmur heard best  at  the apex.  Pulses 3+ and symmetrical.  ABDOMEN:  Soft, nontender, without palpable organomegaly, normoactive bowel sounds.  GENITOURINARY AND RECTAL:  Deferred.  NEUROLOGIC:  Grossly intact.  BACK:  Without deformity.  EXTREMITIES:  Full range of motion without clubbing, cyanosis, edema.    ASSESSMENT:  I reviewed his echocardiogram and it would appear that he has  involvement of both the anterior leaflet and posterior leaflet of the mitral valve.   The valve at least at first glance does not appear to be repairable, but it will be  evaluated at the time of operation.  He understands that the risks and benefits of  mitral valve repair or replacement include: bleeding, infection, stroke, sternal  dehiscence, myocardial infarction, arrhythmias, the need for a pacemaker, prolonged  ventilation, pneumonia, liver/renal failure, aortic dissection and an operative  mortality of approximately 2%.  He accepts these risks and is agreeable with the  plan of proceeding with surgery.  Surgery has been scheduled for 09/19/2017.      RAYMON FREDERICK MD  ca  D 09/12/2017 07:17:04  T 09/12/2017 08:07:08  R 09/12/2017 08:07:08  [id number]        cc: SUZIE FREDERICK MD

## 2021-06-12 NOTE — TELEPHONE ENCOUNTER
ANTICOAGULATION  MANAGEMENT    Assessment     Today's INR result of 2.60 is Therapeutic (goal INR of 2.0-3.0)        Warfarin taken as previously instructed    No new diet changes affecting INR    No new medication/supplements affecting INR    Continues to tolerate warfarin with no reported s/s of bleeding or thromboembolism     Previous INR was Therapeutic at 2.70 on 10/7/20.    Plan:     Spoke on phone with Obed regarding INR result and instructed:     Warfarin Dosing Instructions:  (has 5mg tabs)   - Continue current warfarin dose 7.5 mg daily on Mon/Wed/Fri; and 5 mg daily rest of week.    Instructed patient to follow up no later than: 4 wks.   - INR scheduled on 12/2/20 @ [facility].    Education provided: importance of consistent vitamin K intake, target INR goal and significance of current INR result and importance of notifying clinic for changes in medications    Obed verbalizes understanding and agrees to warfarin dosing plan.    Instructed to call the [facility] for any changes, questions or concerns. (#[phone])   ?   Yolanda Krishna RN    Subjective/Objective:      Obed Haley, a 75 y.o. male is on warfarin.     Obed reports:     Home warfarin dose: as updated on anticoagulation calendar per template     Missed doses: No     Medication changes:  No     S/S of bleeding or thromboembolism:  No     New Injury or illness:  No     Changes in diet or alcohol consumption:  No     Upcoming surgery, procedure or cardioversion:  No    Anticoagulation Episode Summary     Current INR goal:  2.0-3.0   TTR:  79.8 % (1 y)   Next INR check:  11/27/2020   INR from last check:  2.60 (10/30/2020)   Weekly max warfarin dose:     Target end date:  9/29/2017   INR check location:     Preferred lab:     Send INR reminders to:  [facility]       Comments:           Anticoagulation Care Providers     Provider Role Specialty Phone number    Franko Franz MD Referring Internal Medicine [phone]

## 2021-06-12 NOTE — PATIENT INSTRUCTIONS - HE
Obed Haley,    It was a pleasure to see you today at the [facility].     My recommendations after this visit include:    Same medications    WFlorence Henao MD, FACC, MILI

## 2021-06-12 NOTE — TELEPHONE ENCOUNTER
Pieter GALE.     - I am the anticoagulation nurse for [facility].     - Obed Haley's former PCP Dr. Franz has now retired on 9/29/20.  However, Obed has an appt on 10/30/20 to establish care with Dr. Franko Nguyen @ [facility].     -  We can continue to monitor patient in [facility] program.     - Obed was worried, there wont be anybody to refill his warfarin tablets.     - thank you.

## 2021-06-12 NOTE — TELEPHONE ENCOUNTER
Wellness Screening Tool  Symptom Screening:  Do you have one of the following NEW symptoms:    Fever (subjective or >100.0)?  No    A new cough?  No    Shortness of breath?  No     Chills? No     New loss of taste or smell? No     Generalized body aches? No     New persistent headache? No     New sore throat? No     Nausea, vomiting, or diarrhea?  No    Within the past 2 weeks, have you been exposed to someone with a known positive illness below:    COVID-19 (known or suspected)?  No    Chicken pox?  No    Mealses?  No    Pertussis?  No    Patient notified of visitor policy- They may have one person accompany them to their appointment, but they will need to wear a mask and will be screened upon arrival for symptoms: Yes  Pt informed to wear a mask: Yes  Pt notified if they develop any symptoms listed above, prior to their appointment, they are to call the clinic directly at [phone] for further instructions.  Yes  Patient's appointment status: Patient will be seen in clinic as scheduled on 11/6/20

## 2021-06-12 NOTE — TELEPHONE ENCOUNTER
ANTICOAGULATION  MANAGEMENT    Assessment     Today's INR result of 2.70 is Therapeutic (goal INR of 2.0-3.0)        Warfarin taken as previously instructed    No new diet changes affecting INR    No new medication/supplements affecting INR    Continues to tolerate warfarin with no reported s/s of bleeding or thromboembolism     Previous INR was Therapeutic at 2.00 on 9/23/20.    Former patient of Dr. Franz - Establishing care with Dr. Nguyen on 10/30/20.    Plan:     Spoke on phone with Obed regarding INR result and instructed:     Warfarin Dosing Instructions:  (has 5mg tabs)   - Continue current warfarin dose 7.5 mg daily on Mon/Wed/Fri; and 5 mg daily rest of week.     Instructed patient to follow up no later than:  4 wks.   - INR scheduled on 10/30/20 during OV with Dr. Nguyen @ [facility].    Education provided: importance of consistent vitamin K intake, target INR goal and significance of current INR result and importance of notifying clinic for changes in medications    Obed verbalizes understanding and agrees to warfarin dosing plan.    Instructed to call the [facility] for any changes, questions or concerns. (#[phone])   ?   Yolanda Krishna RN    Subjective/Objective:      Obed Haley, a 75 y.o. male is on warfarin.     Obed reports:     Home warfarin dose: as updated on anticoagulation calendar per template     Missed doses: No     Medication changes:  No     S/S of bleeding or thromboembolism:  No     New Injury or illness:  No     Changes in diet or alcohol consumption:  No     Upcoming surgery, procedure or cardioversion:  No    Anticoagulation Episode Summary     Current INR goal:  2.0-3.0   TTR:  79.8 % (1 y)   Next INR check:  11/4/2020   INR from last check:  2.70 (10/7/2020)   Weekly max warfarin dose:     Target end date:  9/29/2017   INR check location:     Preferred lab:     Send INR reminders to:  [facility] [place]       Comments:           Anticoagulation Care Providers      Provider Role Specialty Phone number    Franko Franz MD Referring Internal Medicine [phone]

## 2021-06-12 NOTE — PROGRESS NOTES
"Cardiology Progress Note    Assessment:  Mitral valve prolapse with severe mitral regurgitation  Coronary artery disease, mild to moderate, nonobstructive  Sinus bradycardia with first-degree AV block  Hyperlipidemia    Plan:  Patient is scheduled to see CV surgeon later this week.  I believe he would be best served with surgical repair or replacement of the mitral valve.  I do not think that he would be a good candidate for mitral clip procedure.  He should go on on statin after valve surgery.    Subjective:   This is 72 y.o. male who comes in today for follow-up visit.  I saw him in July of this year because of progressive exertional dyspnea.  He underwent cardiac evaluation including stress echo, coronary angiogram, ZANE.  He was found to have mild to moderate nonobstructive coronary artery disease.  ZANE confirmed prolapse of the posterior leaflet of the mitral valve with severe mitral regurgitation.  Today he has no new complaints.  He continues to experience shortness of breath with exertion.  He denies PND orthopnea or pedal edema.  He has not had syncope.    Review of Systems:   General: WNL  Eyes: WNL  Ears/Nose/Throat: WNL  Lungs: WNL  Heart: WNL  Stomach: WNL  Bladder: WNL  Muscle/Joints: WNL  Skin: WNL  Nervous System: WNL  Mental Health: WNL     Blood: WNL    Objective:   /68 (Patient Site: Right Arm, Patient Position: Sitting, Cuff Size: Adult Large)  Pulse 68  Resp 18  Ht 6' 1.5\" (1.867 m)  Wt 215 lb (97.5 kg)  BMI 27.98 kg/m2  Physical Exam:  GENERAL: no distress  NECK: No JVD  LUNGS: Clear to auscultation.  CARDIAC: regular rhythm, S1 & S2 normal.  No heaves, thrills, gallops 3/6 holosystolic murmur at the apex  ABDOMEN: flat, negative hepatosplenomegaly, soft and non-tender.  EXTREMITIES: No evidence of cyanosis, clubbing or edema.    Current Outpatient Prescriptions   Medication Sig Dispense Refill     amoxicillin (AMOXIL) 500 MG capsule Take 500 mg by mouth as needed (prior to dental " visit.  4 capsules 1 hour prior to dentist.).        aspirin 81 MG EC tablet Take 1 tablet (81 mg total) by mouth daily.  0     b complex vitamins tablet Take 1 tablet by mouth daily.       cholecalciferol, vitamin D3, 1,000 unit tablet Take 1,000 Units by mouth daily.       EPINEPHRINE (EPIPEN INJ) Inject as directed.       MULTIVIT &MINERALS/FERROUS FUM (MULTI VITAMIN ORAL) Take 1 tablet by mouth every other day.       OMEGA-3/DHA/EPA/FISH OIL (FISH OIL-OMEGA-3 FATTY ACIDS) 300-1,000 mg capsule Take 2 g by mouth daily.       No current facility-administered medications for this visit.        Cardiographics:    Stress echo July 2017    1.The patient's exercise tolerance was mildly impaired.    2.The stress electrocardiogram was abnormal with 2 mm of horizontal ST segment depression in the II, III, aVF and V6 leads, beginning at 5 minutes of stress, and returning to baseline after 1.48 minutes into the recovery phase.    3.Left ventricle ejection fraction is normal. The estimated left ventricular ejection fraction is 55%.    4.Stress echocardiogram is negative for inducible ischemia.    5.Mild anterior mitral valve leaflet prolapse noted with mild mitral regurgitation at rest.    6.Normal pulmonic pressure estimated at 27 before exercise, after exercise borderline pulmonary hypertension develops with peak systolic pressure estimated at 41 mmHg.    7.No previous study for comparison.    Coronary angiogram: July 2017    The left ventricular size is normal. The left ventricular systolic function is normal. LV systolic pressure is normal. LV end diastolic pressure is elevated.    The left ventricular ejection fraction is grossly normal.    Estimated blood loss was <20 ml.    The LM vessel was large.    The LAD vessel was moderate .    The left circumflex was large.    The RCA was moderate.    Mid RCA lesion 50% stenosed.    Prox LAD to Mid LAD lesion 35% stenosed.      Pt with hx of MVP/MR with progressive fatigue and  dyspnea and abn ECG on stress echo     RHC:   RA mean 5  PA mean 18mmHg  PCWP 13mmHg with V wave  LVEDP 15mmHg     EF 65% no WMA wthi mild to mod MR     Anatomy:  LM min dz  LAD mid 30%   Circ mild dz with branch supplies PL territory  RCA mid 50% with PDA extends to apex     Note distal LAD and PDA vessels are very small around apex     Imp/plan:  1. Dyspnea with hx of MR - noted mild to mod today but given v wave possible could be severe - will arrange for ZANE with follow up with Dr. Henao   2. CAD - start asp 81mg and atorvastatin 40mg to prevent/slow progression    Transesophageal echocardiogram: August 2017  1.  Bileaflet mitral valve prolapse with A2 and P2 involvement. There is partial rupture of prolapsed P2 leading moderate flail.  There is an eccentric jet directed to anterior wall of LA. Put all together, the findings are consistent with severe mitral valve regurgitation.  2.  Normal left ventricular size, wall motion and function.  The estimated left ventricular ejection fraction is 60%.  3.  Normal right ventricular size and function.  4.  Moderately enlarged left atrium.  Lab Results:       Lab Results   Component Value Date    CHOL 177 08/03/2017    CHOL 220 (H) 08/24/2016    CHOL 226 (H) 01/14/2015     Lab Results   Component Value Date    HDL 43 08/03/2017    HDL 52 08/24/2016    HDL 53 01/14/2015     Lab Results   Component Value Date    LDLCALC 122 08/03/2017    LDLCALC 158 (H) 08/24/2016    LDLCALC 163 (H) 01/14/2015     Lab Results   Component Value Date    TRIG 58 08/03/2017    TRIG 52 08/24/2016    TRIG 51 01/14/2015     No components found for: CHOLHDL  BNP   Date Value Ref Range Status   07/07/2017 22 0 - 70 pg/mL Final       Paulo (Kira Henao MD

## 2021-06-12 NOTE — PROGRESS NOTES
"  Office Visit - New Patient   Obed Haley   75 y.o.  male    Date of visit: 10/30/2020  Physician: Franko Nguyen MD     Assessment and Plan   1. Paroxysmal atrial fibrillation (H)  Continue with rate control strategy and warfarin for stroke prevention  - T4, Free  - T3, Total  - Thyroid Peroxidase Antibody  - INR    2. Coronary artery disease involving native coronary artery of native heart with angina pectoris (H)  Continue secondary prevention    3. Cardiac pacemaker in situ, dual chamber  Per cardiology    4. S/P mitral valve replacement with tissue valve  5. Non-rheumatic mitral regurgitation  Stable  - INR    6. Sick sinus syndrome (H)  Pacemaker    7. Subclinical hypothyroidism  Labs as above    8. Normocytic anemia  - Ferritin  - Vitamin B12  - Reticulocytes  - Haptoglobin    9. Long term use of drug  - TSH      Return in about 9 months (around 7/30/2021) for annual physical.     Chief Complaint   Chief Complaint   Patient presents with     [facility]        Patient Profile   Social History     Social History Narrative    Lives with \"life )Ani.  Retired mortician.  Two sons, Lei and Henry. Five grandchildren.  [facility].           Past Medical History   Patient Active Problem List   Diagnosis     Asymmetrical sensorineural hearing loss     Non-rheumatic mitral regurgitation     S/P mitral valve replacement with tissue valve     Cardiac pacemaker in situ, dual chamber     Paroxysmal atrial fibrillation (H)     Coronary artery disease involving native coronary artery of native heart with angina pectoris (H)     Sick sinus syndrome (H)       Past Surgical History  He has a past surgical history that includes Laminectomy (1970, 2003); Knee arthroscopy (2007); pr l hrt cath w/njx l ventriculography img s&i (N/A, 8/3/2017); pr cath placement & njx coronary art angio img s&i (N/A, 8/3/2017); pr right heart cath o2 saturation & cardiac output (N/A, 8/3/2017); Mitral valve " replacement (N/A, 9/19/2017); and EP Pacemaker Insertion (Left, 9/25/2017).     History of Present Illness   This 75 y.o. old man comes in to establish care.  His medical history is reviewed come electronic medical records obtained reflectance no.  Overall doing quite well.  No specific complaints today has a history of atrial fibrillation is on warfarin with a rate control strategy.  Has had subclinical hypothyroidism not on levothyroxine.  History of mitral valve repair with a bioprosthetic valve.  History of coronary heart disease, managed with medical therapy.  He has not had any stents.  Family history of cerebral aneurysm with hemorrhage in his sister only 1 first-degree relative, MRI of the brain previously looked okay no aneurysms detected although it was not an MRA.    Review of Systems: A comprehensive review of systems was negative except as noted.     Medications and Allergies   Current Outpatient Medications   Medication Sig Dispense Refill     acetaminophen (TYLENOL) 325 MG tablet Take 2 tablets (650 mg total) by mouth every 6 (six) hours as needed.  0     amoxicillin (AMOXIL) 500 MG capsule Take 2,000 mg by mouth as needed (1 hour prior to dentist.).        aspirin 81 MG EC tablet Take 1 tablet (81 mg total) by mouth daily.  0     atorvastatin (LIPITOR) 40 MG tablet Take 1 tablet (40 mg total) by mouth daily. (Patient taking differently: Take 20 mg by mouth daily.       ) 90 tablet 3     b complex vitamins tablet Take 1 tablet by mouth every other day.        cholecalciferol, vitamin D3, 1,000 unit tablet Take 1,000 Units by mouth every other day.        EPINEPHrine (EPIPEN) 0.3 mg/0.3 mL atIn Inject 0.3 mg into the shoulder, thigh, or buttocks once as needed (allergic reaction).       metoprolol succinate (TOPROL-XL) 50 MG 24 hr tablet Take 1 tablet (50 mg total) by mouth daily. 90 tablet 0     MULTIVIT &MINERALS/FERROUS FUM (MULTI VITAMIN ORAL) Take 1 tablet by mouth every other day. Pt taking multi  "vitamin without Iron       warfarin ANTICOAGULANT (COUMADIN/JANTOVEN) 5 MG tablet TAKE 1 TABLET (5MG) TO 1 & 1/2 TABLETS (7.5MG)DAILY, AS DIRECTED. ADJUST DOSE BASED ON INR RESULTS. 120 tablet 1     ibuprofen (ADVIL,MOTRIN) 200 MG tablet Take 200 mg by mouth every 6 (six) hours as needed for pain.       No current facility-administered medications for this visit.      Allergies   Allergen Reactions     Bee Venom Protein (Honey Bee) Anaphylaxis        Family and Social History   Family History   Problem Relation Age of Onset     Breast cancer Mother      Cerebral aneurysm Sister      Other Father         train acciendt     No Medical Problems Sister      No Medical Problems Son      Lymphoma Son         Social History     Tobacco Use     Smoking status: Former Smoker     Quit date: 2007     Years since quittin.8     Smokeless tobacco: Never Used   Substance Use Topics     Alcohol use: Yes     Alcohol/week: 3.0 standard drinks     Types: 3 Standard drinks or equivalent per week     Comment: occasionally     Drug use: No        Physical Exam   General Appearance:   No acute distress    /82 (Patient Site: Left Arm, Patient Position: Sitting, Cuff Size: Adult Regular)   Pulse 62   Ht 6' 1\" (1.854 m)   Wt (!) 229 lb 12 oz (104.2 kg)   SpO2 98%   BMI 30.31 kg/m      EYES: Eyelids, conjunctiva, and sclera were normal. Pupils were normal. Cornea, iris, and lens were normal bilaterally.  HEAD, EARS, NOSE, MOUTH, AND THROAT: Head and face were normal. Hearing was normal to voice and the ears were normal to external exam. Nose appearance was normal and there was no discharge. Oropharynx was normal.  NECK: Neck appearance was normal. There were no neck masses and the thyroid was not enlarged.  RESPIRATORY: Breathing pattern was normal and the chest moved symmetrically.  Percussion/auscultatory percussion was normal.  Lung sounds were normal and there were no abnormal sounds.  CARDIOVASCULAR: Heart rate and " rhythm were normal.  S1 and S2 were normal and there were no extra sounds or murmurs. Peripheral pulses in arms and legs were normal.  Jugular venous pressure was normal.  There was no peripheral edema.  GASTROINTESTINAL: The abdomen was normal in contour.  Bowel sounds were present.  Percussion detected no organ enlargement or tenderness.  Palpation detected no tenderness, mass, or enlarged organs.   MUSCULOSKELETAL: Skeletal configuration was normal and muscle mass was normal for age. Joint appearance was overall normal.  LYMPHATIC: There were no enlarged nodes.  SKIN/HAIR/NAILS: Skin color was normal.  There were no skin lesions.  Hair and nails were normal.  NEUROLOGIC: The patient was alert and oriented to person, place, time, and circumstance. Speech was normal. Cranial nerves were normal. Motor strength was normal for age. The patient was normally coordinated.  PSYCHIATRIC:  Mood and affect were normal and the patient had normal recent and remote memory. The patient's judgment and insight were normal.         Additional Information   Review and/or order of clinical lab tests: Reviewed  Review and/or order of radiology tests: Reviewed  Review and/or order of medicine tests:  Discussion of test results with performing physician:  Decision to obtain old records and/or obtain history from someone other than the patient:  Review and summarization of old records and/or obtaining history from someone other than the patient and.or discussion of case with another health care provider: Extensive review of records as summarized above  Independent visualization of image, tracing or specimen itself:    Time:      Franko Nguyen MD  Internal Medicine  Contact me at [phone]

## 2021-06-12 NOTE — PROGRESS NOTES
[facility] Follow Up Note    Obed Haley   72 y.o. male    Date of Visit: 8/30/2017    Chief Complaint   Patient presents with     Pre-op Exam     9/19, [facility], heart, Dr Messi Peterson  This is a 72-year-old man who is in today primarily for preoperative evaluation.  He has had a long-standing history of mitral valve prolapse.  Throughout this year he has had increased problems with dyspnea on exertion.  No chest pain.  Otherwise she had been feeling in his usual state of health.  Earlier this summer we did do an echocardiogram and referred him to cardiology because of the mitral valve issue.  He underwent thorough evaluation and was determined to have significant mitral regurgitation.  He was subsequently referred to Dr. Swenson for evaluation for mitral valve surgery.  The decision was to go ahead with surgery and this is scheduled for September 19 at [facility].  He is generally feeling about the same at this time.  He continues to have the dyspnea with exertion but it does not seem to be any worse.  Otherwise he tells me that he is feeling good and remains relatively active and busy.  Medications are as listed.    Past medical history: Surgeries have included 2 back procedures.  Other than the mitral valve prolapse history his medical status has been very stable.  No diabetes and no other cardiovascular issues.  No known drug allergies.  He is allergic to bee venom.    Social history: The patient is not a smoker.  He is retired.    Family history: No diabetes.  No significant cardiovascular disease.    ROS A comprehensive review of systems was performed and was otherwise negative    Medications, allergies, and problem list were reviewed and updated    Exam  General Appearance:   On examination today his blood pressure is 132/70.  Weight is 211 pounds and height is 73.5 inches.  BMI is 27.46.    Eyes: Pupils are equal and conjunctiva are normal.    Ears nose and  throat: Normal.    Neck: Supple with no masses and no neck vein distention.  No thyroid enlargement.    Lungs: Clear.    Cardiovascular: Heart is in a sinus rhythm with a rate of 56 and no ectopy.  I hear no gallops.  It is of a grade 3 systolic murmur.  Carotid pulses are full and I hear no bruits.  No peripheral edema.    GI: Abdomen is soft and nontender with no distention.  No masses or organomegaly.    Musculoskeletal: Head and neck are normal to inspection with good range of motion.  Good strength and range of motion in all 4 extremities.    Neurologic: Cranial nerves are intact.  Gait is normal.    Psychiatric: The patient is alert and oriented ×3.      Assessment/Plan  1. Preop examination  Hemoglobin   2. Prostate cancer screening  PSA (Prostatic-Specific Antigen), Annual Screen     I do not see any contraindication to the proposed surgery.  He has not, to the best of my knowledge, had any issues with bleeding or anesthesia with previous procedures.  He recently underwent extensive blood work and that is available in the chart.  We will check his hemoglobin today.  At his request for prostate cancer screening we will do a PSA as it is been over a year.  I have no other recommendations to make at this time.  I will see him back following surgery.    Total time of this preop visit was 40 minutes with greater than 50% of the time spent in care coordination and patient counseling.  Body Mass Index was not assessed due to The patient was here for preoperative evaluation..    Franko Franz MD      Current Outpatient Prescriptions on File Prior to Visit   Medication Sig     amoxicillin (AMOXIL) 500 MG capsule Take 500 mg by mouth as needed (prior to dental visit.  4 capsules 1 hour prior to dentist.).      aspirin 81 MG EC tablet Take 1 tablet (81 mg total) by mouth daily.     b complex vitamins tablet Take 1 tablet by mouth daily.     cholecalciferol, vitamin D3, 1,000 unit tablet Take 1,000 Units by mouth  daily.     EPINEPHRINE (EPIPEN INJ) Inject as directed.     MULTIVIT &MINERALS/FERROUS FUM (MULTI VITAMIN ORAL) Take 1 tablet by mouth every other day.     OMEGA-3/DHA/EPA/FISH OIL (FISH OIL-OMEGA-3 FATTY ACIDS) 300-1,000 mg capsule Take 2 g by mouth daily.     No current facility-administered medications on file prior to visit.      Allergies   Allergen Reactions     Bee Venom Protein (Honey Bee) Anaphylaxis     Social History   Substance Use Topics     Smoking status: Former Smoker     Quit date: 1/14/2007     Smokeless tobacco: Never Used     Alcohol use No

## 2021-06-12 NOTE — TELEPHONE ENCOUNTER
Anticoagulation Annual Referral Renewal Review    Obed Haley's chart reviewed for annual renewal of referral to anticoagulation monitoring.        Criteria for anticoagulation nurse and/or pharmacist renewal met   Warfarin indication: Atrial Fibrillation, paroxysmal,  Atrial Flutter and Mechanical MVR Yes, per indication   Current with INR monitoring/compliant Yes Yes   Date of last office visit 10/30/20 Yes, had office visit within last year   Time in Therapeutic Range (TTR) 79.8 % Yes, TTR > 60%       Obed Haley met all criteria for anticoagulation management program initiated renewal.  New INR standing orders and anticoagulation referral renewal placed.      Yolanda Krishna RN  12:34 PM

## 2021-06-13 NOTE — PROGRESS NOTES
Progress Note    CV Surg   DOS 9/19/2017  POD # 3  EF 60%  A1c 5.5    Assessment/Plan    9/22 Atrial fib rate variable :- trial low dose metoprolol. Dr. Hall changed pacer to atrial paced rate of 68 from Vent paced at 80.     9/21 Has had three 3-5 second pauses, despite Amio being off since this morning, discussed with Dr. Swenson. Plan is to V pace at a rate of 80. Hold on Cardiology consult for now.  Atrial fib last night and treated with Amio bolus and gtt. Currently Atrial fib with rate of 50-60 with back up pacer needed when rate drops to 35. Amio stopped.  9/20 Was A-V paced  With underlying slow junctional rate    1. S/P MVR-tissue- asa, heparin, BB   Pre op HR SBrady   At discharge: Coumadin for 3 month per Dr. Swenson routine  2. Acute resp failure- Supplemental O2  3l/nc  3. Acute blood loss anemia-8.0 - follow  4. Volume overload- weight up - lasix  5. Atrial fib- anticoagulation if remains in atrial fib  - heparin gtt.     PLAN  Atrial paced at 68   Low dose of metoprolol  Continue diuresis   Nelson out  CT out tolerated well  Heparin gtt started for atrial fib      Follow rhythm  BMP in am    Subjective/Objective  Up in chair, NAD  Neuro's grossly intact    CXR: Poststernotomy changes are noted. Milton-Daniela catheter has been removed. Mediastinal tubes remain. There are small, bilateral pleural effusions, left greater than right. Tiny right apical cap. Left basilar atelectasis. Heart is enlarged. No signs   of failure.  Vital signs in last 24 hours  /70  Pulse 80  Temp 100.2  F (37.9  C) (Oral)   Resp 26  Wt 216 lb 1.6 oz (98 kg)  SpO2 97%  BMI 27.93 kg/m2  O2 Sat's on 3L/nc 95%    Weight:   216 lb 1.6 oz (98 kg)  Yesterday 98.4 kg  Pre op 95.3 kg  Admit 95.3 kg    Intake/Output this shift:  Urine output  524 cc/noc and 2,225 cc/24 hours  CT output: 50 cc/noc and 110 cc/24 hours    Ambulation: 5-7 minutes ambulation yesterday  Physical Exam  Neuro: A & O DEJESUS = tristian  Lungs: decreased  bs at bases  Cor: paced,   CT: no air leak, output sero-sang- minimal output  INC: intact  ABD: soft, hypoactive,   EXT: trace edema LE, puffy hands/fingers      Pertinent Labs   Lab Results: personally reviewed.   Lab Results   Component Value Date     (L) 09/22/2017    K 4.1 09/22/2017     09/22/2017    CO2 23 09/22/2017    BUN 19 09/22/2017    CREATININE 0.72 09/22/2017    CALCIUM 8.1 (L) 09/22/2017     Lab Results   Component Value Date    WBC 8.9 09/21/2017    HGB 8.7 (L) 09/21/2017    HCT 25.8 (L) 09/21/2017    MCV 94 09/21/2017    PLT 98 (L) 09/21/2017

## 2021-06-13 NOTE — PROGRESS NOTES
Heart teaching done.  Patient performed IS at 2250 ml.  Flutter valve instruct done patient performed well.  Heart pillow splinting taught and performed well.  Jamin Taylor, LRT

## 2021-06-13 NOTE — TELEPHONE ENCOUNTER
===View-only below this line===  ----- Message -----  From: Paulo Henao MD (Ted)  Sent: 11/10/2020  10:23 AM CST  To: Hailey Torres RN    We should set him up for cardioversion  ----- Message -----  From: Cecilia Bhandari RN  Sent: 11/10/2020  10:10 AM CST  To: Paulo Veloz) MD Juliano        Order placed for cardioversion and chart routed to Bertha. -[facility]    Bertha  I placed orders for the cardioversion.   Thanks!  Mal

## 2021-06-13 NOTE — PROGRESS NOTES
Pt weaned for 125 minutes. Pt extubated at 1605 to 3L NC spo2 100%. Bilateral breath sounds clear and diminished. No stridor heard. Pt's home cpap is set up and is at bedside. RT will continue to monitor.    HUSSEIN NjT

## 2021-06-13 NOTE — TELEPHONE ENCOUNTER
Type: routine remote pacemaker transmission.  Presenting rhythm: atrial fib/flutter with ventricular pacing, rate 60 bpm.  Battery/lead status: stable  Arrhythmias: since 6/30/20, 968 mode switches, EGMs confirm AT/AFL, in progress since 7/30/20, burden 29% (since 9/30/19), V-rates do not exceed 120 bpm. Two nonsustained ventricular high rate episodes, 14bt NSVT on 11/9/2020 and 21bt NSVT on 7/21/2020, rate 180's.  Anticoagulant: warfarin  Comments: normal magnet and pacemaker function. Routed to device RN. prd    Last echo EF 8/2020 62%.     Pt denies any symptoms or awareness    Routed to Dr. Henao.   Cecilia Bhandari RN BSN PHN  Device Nurse   [facility]

## 2021-06-13 NOTE — PROGRESS NOTES
"Cardiac Rehab  Phase II Assessment    Assessment Date: 10-13-17    Diagnosis: Non-rheumatic mitral regurgitation and SSS  Date of Onset: \"I have had  this for years\"  Procedure: MVR- tissue valve, and( PPM-9-25-17)  Date of Onset: 9-19-17  ICD/Pacemaker: Yes Parameters: DDDR   Post-op Complications: Asystole- Code Blue and compression, Respiratory failure, At fib, SSS, Tachy/ Oscar syndrome, Anemia, Fever   ECG History: SR, At-fib, SSS, Tachy/ Oscar syndrome, Asystole, Atrial paced EF%:60%  Past Medical History:   Past Medical History:   Diagnosis Date     High cholesterol      MVP (mitral valve prolapse)      Sleep apnea, obstructive     uses CPAP     Patient Active Problem List   Diagnosis     Asymmetrical sensorineural hearing loss     Non-rheumatic mitral regurgitation     Abnormal stress echo     S/P MVR (mitral valve repair)     S/P mitral valve replacement with tissue valve     Cardiac asystole     Tachycardia-bradycardia syndrome     Coronary artery disease involving native coronary artery of native heart without angina pectoris     SSS (sick sinus syndrome)     Cardiac pacemaker in situ, dual chamber     Past Surgical History:   Procedure Laterality Date     EP PACEMAKER INSERT Left 9/25/2017    Procedure: EP Pacemaker Insertion;  Surgeon: Reji Whittington MD;  Location: [facility] Cath Lab;  Service:      KNEE ARTHROSCOPY  2007    left     LAMINECTOMY  1970, 2003    lumbar X2     MITRAL VALVE REPLACEMENT N/A 9/19/2017    Procedure: MITRAL VALVE REPLACEMENT, ANESTHESIA TRANSESOPHAGEAL ECHOCARDIOGRAM, CLOSURE OF THE PFO;  Surgeon: Monica Swenson MD;  Location: [facility] OR;  Service:      AK CATH PLACEMENT & NJX CORONARY ART ANGIO IM S&I N/A 8/3/2017    Procedure: Coronary Angiogram;  Surgeon: Abhinav Redmond MD;  Location: [facility] Cath Lab;  Service: Cardiology     AK L HRT CATH W/NJX L VENTRICULOGRAPHY IMG S&I N/A 8/3/2017    Procedure: Left Heart Catheterization with Left " "Ventriculogram;  Surgeon: Abhinav Redmond MD;  Location: [facility] Cath Lab;  Service: Cardiology     CT RIGHT HEART CATH O2 SATURATION & CARDIAC OUTPUT N/A 8/3/2017    Procedure: Right Heart Catheterization;  Surgeon: Abhinav Redmond MD;  Location: [facility] Cath Lab;  Service: Cardiology       Physical Assessment  Precautions/ Physical Limitations: Sternal/ Post Pacer  Oxygen: No  O2 Sats: 97-98% Lung Sounds: Clear with diminished left base Edema: +1 Bilateral ankle edema  Incisions: Clean and dry, No redness  Sleeping Pattern: good   Appetite: good   Nutrition Risk Screen: no risk at this time    Pain  Location: Right shoulder and arm discomfort  Characteristics:\"I have had this same pain in the past.  Intensity: (0-10 scale) 2  Current Pain Management: Advil (INR nurse is aware of pt taking Advil)  Response: Good relief    Psychosocial/ Emotional Health  1. In the past 12 months, have you been in a relationship where you have been abused physically, emotionally, sexually or financially? No  notified: NA  2. Who do you turn to for emotional support?: ROSLYN- Ani  3. Do you have cultural or spiritual needs? No  4. Have there been any major life changes in the past 12 months? No    Referral Information  Primary Physician: Franko Franz MD  Cardiologist: Dr Henao  Surgeon: Dr Swenson    Home exercise/Equipment: none    Patient's long-term goal(s): \"Be completely recovered by 12-26-17\"    1. Living Accommodations: Home Steps: Yes      Support people at home: [employer]   2. Marital Status: SO  3. Family is able to assist with cares      Sikh/Community involvement: yes  4. Recreation/Hobbies: Travel, Road trips, Golf, Woodworking, Restoring old cars, Boating, Fishing        See Doc Flowsheet  "

## 2021-06-13 NOTE — PROGRESS NOTES
Pulmonary Critical Care Progress Note    Patient to receive dual chamber pacemaker today.  Will sign off.    Aleida Gillespie CNP   [facility] Pulmonary Critical Care

## 2021-06-13 NOTE — TELEPHONE ENCOUNTER
ANTICOAGULATION  MANAGEMENT    Assessment     Today's INR result of 2.10 is Therapeutic (goal INR of 2.0-3.0)        Warfarin taken as previously instructed    No new diet changes affecting INR    Potential interaction between Amiodarone and warfarin which may affect subsequent INRs    - Amiodarone discontinued on 11/20/20.    Continues to tolerate warfarin with no reported s/s of bleeding or thromboembolism     Previous INR was Therapeutic at 2.80 on 12/2/20.    S/p Cardioversion on 11/23/20 for persistent Atrial Fib / flutter.    Driving to [place] on 12/13/20 thru April 2021.    Obed would like to get INR's tested in [place].  ([facility] in [place], [place].  I can send an INR standing order).    Plan:     Spoke on phone with Obed  regarding INR result and instructed:    - continue to adjust warfarin dose, based on INR results.    Warfarin Dosing Instructions:    - Continue current warfarin dose 5 mg daily on Mon/Wed/Fri; and 7.5 mg daily rest of week.    Instructed patient to follow up no later than:  2-3 wks.    Education provided: importance of consistent vitamin K intake and target INR goal and significance of current INR result     Obed verbalizes understanding and agrees to warfarin dosing plan.    Instructed to call the [facility] for any changes, questions or concerns. (#[phone])   ?   Yolanda Krishna RN    Subjective/Objective:      Obed SG Haley, a 75 y.o. male is on warfarin.     Obed reports:     Home warfarin dose: as updated on anticoagulation calendar per template     Missed doses: No     Medication changes:  Yes: Amiodarone discontinued.     S/S of bleeding or thromboembolism:  No     New Injury or illness:  No     Changes in diet or alcohol consumption:  No     Upcoming surgery, procedure or cardioversion:  No    Anticoagulation Episode Summary     Current INR goal:  2.0-3.0   TTR:  83.6 % (11.5 mo)   Next INR check:  1/27/2021   INR from last check:  No new INR was available at last  check   Weekly max warfarin dose:     Target end date:  9/29/2017   INR check location:     Preferred lab:     Send INR reminders to:  [facility]       Comments:           Anticoagulation Care Providers     Provider Role Specialty Phone number    Franko Franz MD Referring Internal Medicine [phone]

## 2021-06-13 NOTE — PROGRESS NOTES
Patient was seen by Cardiac Rehab staff for pre-op evaluation and orientation to Inpatient Cardiac Rehab.

## 2021-06-13 NOTE — PROGRESS NOTES
"  Assessment:       1. S/P mitral valve replacement with bioprosthetic valve          Plan:       Continue ibuprofen and heat for discomfort   No further follow up with CV Surgery  Coumadin therapy will be completed after three months   Follow up with Device clinic and Dr. Henao  Questions addressed regarding possible follow up ECHO, and MRI safe pacer.        Subjective:        Patient ID: Obed Haley is a 72 y.o. male.    Chief Complaint:  HPI  The following portions of the patient's history were reviewed and updated as appropriate: allergies, medication list and Vital signs   ROS       Objective:     Physical Exam  Exam  /78 (Patient Site: Left Arm, Patient Position: Sitting, Cuff Size: Adult Large)  Pulse 90  Temp 98.1  F (36.7  C)  Resp 16  Ht 6' 1.75\" (1.873 m)  Wt 216 lb 3.2 oz (98.1 kg)  BMI 27.95 kg/m2  Lungs: Clear  Sternum stable no movement noted with cough  Cor: Reg  Ext: trace edema. Edema worse when feet haven't been elevated   Appetite: good   Bowels: reg, no issues  Weight:216 lbs,   Lab Review:   Lab Results   Component Value Date     (L) 09/24/2017    K 4.2 09/29/2017    CL 99 09/24/2017    CO2 29 09/24/2017    BUN 19 09/24/2017    CREATININE 0.77 09/27/2017    CALCIUM 8.8 09/24/2017     Lab Results   Component Value Date    WBC 6.5 09/26/2017    HGB 10.2 (L) 09/29/2017    HCT 25.9 (L) 09/26/2017    MCV 94 09/26/2017     09/26/2017         "

## 2021-06-13 NOTE — PROGRESS NOTES
Respiratory Therapy Note:  Pt denies shortness of breath.  Respirations normal.  BS clear and decreased.  SpO2 97% on RA.  Using Aerobika for secretion clearance, pt has a strong productive cough.  Using home CPAP at night.  IS to 1500.  Plan is to continue IS/aerobika for 24 hrs and reassess per protocol. Minerva Nova, HUSSEINT, 9/20/2017

## 2021-06-13 NOTE — TELEPHONE ENCOUNTER
ANTICOAGULATION  MANAGEMENT    Assessment     Today's INR result of 2.40 is Therapeutic (goal INR of 2.0-3.0)        Warfarin taken as previously instructed    No new diet changes affecting INR    No new medication/supplements affecting INR    Continues to tolerate warfarin with no reported s/s of bleeding or thromboembolism     Previous INR was Therapeutic at 2.60 on 10/30/2020.    Dr. Henao recommended Cardioversion for atrial fib / flutter showed on his device check.  Already scheduled on 11/23/20.    needs wkly INR's till procedure.  INR needs to be 2.5 - 3.5 range.    Plan:     Spoke on phone with Obed regarding INR result and instructed:     Warfarin Dosing Instructions:  (has 5mg tabs)   - today, advised one time booster with 7.5mg warfarin dose.   - Continue current warfarin dose 7.5 mg daily on Mon/Wed/Fri; and 5 mg daily rest of week.    Instructed patient to follow up no later than:  One wk.  Schedule INR for 11/19/20.   - wkly INR's to prep for cardioversion on 11/23/20.    Education provided: importance of consistent vitamin K intake, target INR goal and significance of current INR result and importance of notifying clinic of upcoming surgeries and procedures 2 weeks in advance    Obed verbalizes understanding and agrees to warfarin dosing plan.    Instructed to call the [facility] for any changes, questions or concerns. (#[phone])   ?   Yolanda Krishna RN    Subjective/Objective:      Obed Haley, a 75 y.o. male is on warfarin.     Obed reports:     Home warfarin dose: as updated on anticoagulation calendar per template     Missed doses: No     Medication changes:  No     S/S of bleeding or thromboembolism:  No     New Injury or illness:  Yes:  Device check showed atrial fibrillation / flutter.     Changes in diet or alcohol consumption:  No     Upcoming surgery, procedure or cardioversion:  Yes: Cardioversion scheduled on 11/23/20.    Anticoagulation Episode Summary     Current INR  goal:  2.0-3.0   TTR:  79.8 % (1 y)   Next INR check:  11/19/2020   INR from last check:  2.40 (11/12/2020)   Weekly max warfarin dose:     Target end date:  9/29/2017   INR check location:     Preferred lab:     Send INR reminders to:  [facility]       Comments:           Anticoagulation Care Providers     Provider Role Specialty Phone number    Franko Franz MD Referring Internal Medicine [phone]

## 2021-06-13 NOTE — ANESTHESIA PREPROCEDURE EVALUATION
Anesthesia Evaluation      Patient summary reviewed   No history of anesthetic complications     Airway   Mallampati: II  Neck ROM: full   Pulmonary - normal exam    breath sounds clear to auscultation  (+) sleep apnea, a smoker (remote hx)                         Cardiovascular   Exercise tolerance: > or = 4 METS  (+) valvular problems/murmurs (s/p MVR), , hypercholesterolemia,     ECG reviewed  Rhythm: irregular  Rate: normal,         Neuro/Psych - negative ROS   (-) no seizures, no CVA    Endo/Other - negative ROS   (+) obesity,   (-) no diabetes, hypothyroidism     GI/Hepatic/Renal - negative ROS   (-) GERD     Other findings:   Echo 8/5/20:    When compared to the previous study dated 1/17/2019, left ventricular systolic function is improved.    Normal left ventricular size, wall thickness. Septal motion is consistent with artifact of right ventricular pacing lead. Left ventricle ejection fraction is normal. The calculated left ventricular ejection fraction is 62%.    Normal right ventricular size with mildly reduced right ventricular systolic function.    Moderately enlarged both atria.    Normal functioning of bioprosthetic mitral valve    Remote device check 11/10/20:  Type: routine remote pacemaker transmission.  Presenting rhythm: atrial fib/flutter with ventricular pacing, rate 60 bpm.  Battery/lead status: stable  Arrhythmias: since 6/30/20, 968 mode switches, EGMs confirm AT/AFL, in progress since 7/30/20, burden 29% (since 9/30/19), V-rates do not exceed 120 bpm. Two nonsustained ventricular high rate episodes, 14bt NSVT on 11/9/2020 and 21bt NSVT on 7/21/2020,   rate 180's.  Anticoagulant: warfarin  Comments: normal magnet and pacemaker     COVID negative 11/19/20  INR 2.5      Dental - normal exam                          Anesthesia Plan  Planned anesthetic: general mask  Brevital  ASA 3   Induction: intravenous   Anesthetic plan and risks discussed with: patient and spouse    Post-op plan: routine  recovery

## 2021-06-13 NOTE — PROGRESS NOTES
PULMONARY / CRITICAL CARE PROGRESS NOTE      ASSESSMENT  1. Severe MR  2. Nonobstructive CAD  3. MVR    PLAN  Pulmonary:  Extubated 9/19.  On 2 ln/c.  Home CPAP at night.  Achieving 1500 on IS   -Wean supplemental O2 as tolerated; goal O2 sat > 92%.    -HOB > 30 degrees to limit aspiration risk.    -Aggressive pulmonary hygiene  Cardiovascular: was bradycardic, requiring pacing on 9/20.  Converted to atrial fibrillation with RVR on 9/21; placed on amiodarone bolus and drip then discontinued due to bradycardia;  Now with occassional pauses pacemaker on back up rate of 50   -Cardiac monitoring.   -Epicardial pacemaker on back up rate of 50  Neurological:    -Neuro checks per ICU protocol.    -Pain control: aggressive while avoiding oversedation.   GI/:    -bowel protocol early in setting of narcotic pain medications.    -GI prophylaxis: PPI  Renal:   -Monitor I/O's. Electrolyte repletion PRN. Avoid/limit nephrotoxic agents.   Heme/Coag:     -Monitor H/H. Transfuse as needed.    -DVT prophylaxis: scds/subcutaneous heparin   Infectious disease:    -General precautions. Remove lines and drains as soon as no longer needed.   Endocrine: FSBG Q4H. Critical care insulin if blood sugars > 180 x 2.   Musculoskeletal:  Increase activity.       Dispo:  Will sign off.  Patient hemodynamically stable.  CV managing pacemaker.  Please call us back if needed.    Lines: Chest tubes x 2, urethral catheter all placed on 9/19/2017    Activity: increase as tolerated    The patient and/or the family were educated about the above plan of care.  They indicated understanding.  This patient is considered critically ill and requires ICU level of care due to post-op cardiovascular surgery    TOTAL TIME 25 minutes.    Aleida Gillespie CNP  Pulmonary & Critical Care Medicine  9/22/2017  10:41 AM    Can patient transfer out of MICU? no    FAST HUG:    Feeding:  Advance per cvs  Nelson: yes  Analgesia/Sedation: n/a  Thromboembolic prophylaxis:  Heparin  "subcutaneous, scds  HOB>30:  yes  Stress Ulcer Protocol: ppi  Glycemic Control: ssi/insulin drip    Chief Complaint MVR       HPI    Obed Haley is a 72 y.o. old male with a history of nonobstructive CAD and severe MR who has been experiencing increased exertional dyspnea presents for a MVR on 9/19/2017.    History is provided by review of the records.       Review of Systems   Pertinent items are noted in HPI.     Allergies:  Bee Venom-Anaphylaxis    PHYSICAL EXAM   VITALS:  /66  Pulse 90  Temp 99.5  F (37.5  C) (Oral)   Resp 24  Ht 6' 1.75\" (1.873 m)  Wt 216 lb 1.6 oz (98 kg)  SpO2 95%  BMI 27.93 kg/m2  Temp:  [97.6  F (36.4  C)-100.9  F (38.3  C)] 99.5  F (37.5  C)  Heart Rate:  [] 90  Resp:  [15-26] 24  BP: ()/(53-71) 105/66  SpO2:  [92 %-98 %] 95 %  PHYSICAL EXAM:   General:  Sitting in chair, no acute distress  Skin:  Midline sternotomy intact  Head:  Normocephalic, atraumatic  Eyes:  Sclera anicteric,   Mouth/Throat:  Oropharynx clear  Neck:  Supple, trachea midine  Heart: S1S2, irregulary irregular, pericardial rub present  Lungs: CTA through out  Abdomen:  Soft, round, bs active all four quadrants  Vascular:  Pt and DP palpable  Neurologic:  Grossly non-focal     I&O:      Intake/Output Summary (Last 24 hours) at 09/22/17 1104  Last data filed at 09/22/17 1000   Gross per 24 hour   Intake          1515.93 ml   Output             2660 ml   Net         -1144.07 ml     Pertinent Labs   Lab Results: personally reviewed.     Serum Glucose range:  No results for input(s): POCGLU in the last 72 hours.  ABG:  ABGs:   Lab Results   Component Value Date    PH 7.36 09/20/2017     CBC:    Results from last 7 days  Lab Units 09/21/17  0453 09/20/17  2244 09/20/17  0448  09/19/17  1227 09/19/17  1050   LN-WHITE BLOOD CELL COUNT thou/uL 8.9  --  7.9  --  7.8 7.0   LN-HEMOGLOBIN g/dL 8.7* 7.8* 8.0*  < > 9.6* 9.0*   LN-HEMATOCRIT % 25.8*  --  22.8*  --  27.5* 25.2*   LN-PLATELET COUNT thou/uL 98* "  --  108*  --  131* 75*   LN-NEUTROPHILS RELATIVE PERCENT %  --   --  79*  --  86* 76*   LN-MONOCYTES RELATIVE PERCENT %  --   --  9  --  3 1*   < > = values in this interval not displayed.  Chemistry:    Results from last 7 days  Lab Units 09/22/17  0547 09/21/17  0453 09/20/17  2145 09/20/17  0448   LN-SODIUM mmol/L 135* 136  --  140   LN-POTASSIUM mmol/L 4.1 4.3 4.1 4.1   LN-CHLORIDE mmol/L 102 103  --  111*   LN-CO2 mmol/L 23 25  --  24   LN-BLOOD UREA NITROGEN mg/dL 19 21  --  17   LN-CREATININE mg/dL 0.72 0.86  --  0.79   LN-CALCIUM mg/dL 8.1* 8.4*  --  8.2*   LN-MAGNESIUM mg/dL 2.0 2.6 2.0 2.1     Coags:  Lab Results   Component Value Date    INR 1.34 (H) 09/20/2017    INR 1.36 (H) 09/19/2017    INR 1.47 (H) 09/19/2017     Microbiology  MRSA screeen on 9/18 negative     Outside reports reviewed:  Historical medical records

## 2021-06-13 NOTE — PROGRESS NOTES
Pharmacy Consult: Warfarin Management    Pharmacy consulted to dose warfarin for Obed Haley, a 72 y.o. male    Ordering provider: Dr. Whittington    Reason for warfarin therapy: Atrial Fibrillation and Aortic Valve Replacement  Goal INR Range: 2-3    Subjective  Medication lists (home and hospital) were reviewed.      medications that may increase bleeding risk/INR:  Omeprazole, melatonin, asa      Home Warfarin Dosing: none, new start    Other Anticoagulants: none  Patient being bridged: No    Patient Active Problem List   Diagnosis     Asymmetrical sensorineural hearing loss     Non-rheumatic mitral regurgitation     Abnormal stress echo     S/P MVR (mitral valve repair)     S/P mitral valve replacement with tissue valve     Cardiac asystole     Tachycardia-bradycardia syndrome     Coronary artery disease involving native coronary artery of native heart without angina pectoris     SSS (sick sinus syndrome)    Past Medical History:   Diagnosis Date     High cholesterol      MVP (mitral valve prolapse)      Sleep apnea, obstructive     uses CPAP        Social History   Substance Use Topics     Smoking status: Former Smoker     Quit date: 1/14/2007     Smokeless tobacco: Never Used     Alcohol use No       Objective   Labs:  Last 3 days:    Recent Labs      09/24/17   0215  09/24/17   0503  09/25/17   0421  09/26/17   0831   CREATININE  0.81  0.78   --    --    HGB  8.8*   --    --   8.7*   HCT   --    --    --   25.9*   PLT  169   --   173  210     Last 7 days:   Recent labs: (last 7 days)      09/20/17   0448  09/22/17   1625  09/25/17   0421  09/26/17   0632   INR  1.34*  1.15*  1.21*  1.14*       Warfarin Dosing History:    Date INR Warfarin Dose Comment   9/25 1.21 7.5mg Dose per MD   9/26/17 1.14 7.5 mg                  Assessment  The patient is initiating warfarin for the indication of Atrial Fibrillation and Aortic Valve Replacement with a goal INR of 2-3. INR today is subtherapeutic.    Plan  1. Administer  warfarin 7.5 mg PO today per MD.  2. Check INR daily or as appropriate.  3. Continue to follow the patient's INR, PLT, and HGB as available.  4. Monitor for potential drug/disease interactions.    Thank you for the consult,  Elli Richard, PharmD 9/26/2017 1:59 PM

## 2021-06-13 NOTE — PROGRESS NOTES
PULMONARY / CRITICAL CARE PROGRESS NOTE      ASSESSMENT  1. Severe MR  2. Nonobstructive CAD  3. MVR    PLAN  Pulmonary:  Extubated 9/19.  On 2 ln/c.  Home CPAP at night.  Achieving 1500 on IS   -Wean supplemental O2 as tolerated; goal O2 sat > 92%.    -HOB > 30 degrees to limit aspiration risk.    -Aggressive pulmonary hygiene  Cardiovascular: was bradycardic, requiring pacing on 9/20.  Converted to atrial fibrillation with RVR last night; placed on amiodarone bolus and drip.  Drip discontinued this morning due to bradycardia; rate now in the 60's.   -Cardiac monitoring.   -Epicardial pacemaker on back up rate  Neurological:    -Neuro checks per ICU protocol.    -Pain control: aggressive while avoiding oversedation.   GI/:    -bowel protocol early in setting of narcotic pain medications.    -GI prophylaxis: PPI  Renal:   -Monitor I/O's. Electrolyte repletion PRN. Avoid/limit nephrotoxic agents.   Heme/Coag:  Hgb 7.8 - received 1 unit of PRBC overnight-now 8.9   -Monitor H/H. Monitor chest tube output hourly. Transfuse as needed.    -DVT prophylaxis: scds/subcutaneous heparin   Infectious disease:    -General precautions. Remove lines and drains as soon as no longer needed.   Endocrine: FSBG Q4H. Critical care insulin if blood sugars > 180 x 2.   Musculoskeletal:  Increase activity.     Lines: Chest tubes x 2, urethral catheter all placed on 9/19/2017    Activity: increase as tolerated    The patient and/or the family were educated about the above plan of care.  They indicated understanding.  This patient is considered critically ill and requires ICU level of care due to post-op cardiovascular surgery    TOTAL TIME 25 minutes.    Aleida Gillespie CNP  Pulmonary & Critical Care Medicine  9/21/2017  10:41 AM    ICU DAILY CHECKLIST   Can patient transfer out of MICU? no    FAST HUG:    Feeding:  Advance per cvs  Nelson: yes  Analgesia/Sedation: n/a  Thromboembolic prophylaxis:  Heparin subcutaneous, scds  HOB>30:   yes  Stress Ulcer Protocol: ppi  Glycemic Control: ssi/insulin drip    Chief Complaint MVR       HPI    Obed Haley is a 72 y.o. old male with a history of nonobstructive CAD and severe MR who has been experiencing increased exertional dyspnea presents for a MVR on 9/19/2017.    History is provided by review of the records.       Review of Systems   Pertinent items are noted in HPI.     Allergies:  Bee Venom-Anaphylaxis    PHYSICAL EXAM   VITALS:  /59  Pulse 67  Temp 98.4  F (36.9  C) (Oral)   Resp 15  Wt 217 lb (98.4 kg)  SpO2 94%  BMI 28.05 kg/m2  Temp:  [98.4  F (36.9  C)-99.3  F (37.4  C)] 98.4  F (36.9  C)  Heart Rate:  [57-90] 67  Resp:  [14-18] 15  BP: ()/(50-80) 127/59  SpO2:  [90 %-100 %] 94 %  PHYSICAL EXAM:   General:  Sitting in chair, no acute distress  Skin:  Midline sternotomy intact  Head:  Normocephalic, atraumatic  Eyes:  Sclera anicteric,   Mouth/Throat:  Oropharynx clear  Neck:  Supple, trachea midine  Heart: S1S2, rrr, pericardial rub present  Lungs: CTA through out  Abdomen:  Soft, round, bs active all four quadrants  Vascular:  Pt and DP palpable  Neurologic:  Grossly non-focal     I&O:      Intake/Output Summary (Last 24 hours) at 09/21/17 0958  Last data filed at 09/21/17 0645   Gross per 24 hour   Intake             1852 ml   Output             1760 ml   Net               92 ml     Pertinent Labs   Lab Results: personally reviewed.     Serum Glucose range:  Recent Labs      09/19/17   0743  09/19/17   0814  09/19/17   0846  09/19/17   0921  09/19/17   1043   POCGLU  98  114  111  110  123     ABG:  ABGs:   Lab Results   Component Value Date    PH 7.36 09/20/2017     CBC:    Results from last 7 days  Lab Units 09/21/17  0453 09/20/17  2244 09/20/17  0448  09/19/17  1227 09/19/17  1050   LN-WHITE BLOOD CELL COUNT thou/uL 8.9  --  7.9  --  7.8 7.0   LN-HEMOGLOBIN g/dL 8.7* 7.8* 8.0*  < > 9.6* 9.0*   LN-HEMATOCRIT % 25.8*  --  22.8*  --  27.5* 25.2*   LN-PLATELET COUNT  thou/uL 98*  --  108*  --  131* 75*   LN-NEUTROPHILS RELATIVE PERCENT %  --   --  79*  --  86* 76*   LN-MONOCYTES RELATIVE PERCENT %  --   --  9  --  3 1*   < > = values in this interval not displayed.  Chemistry:    Results from last 7 days  Lab Units 09/21/17  0453 09/20/17  2145 09/20/17  0448 09/19/17  1227   LN-SODIUM mmol/L 136  --  140 142   LN-POTASSIUM mmol/L 4.3 4.1 4.1 4.9   LN-CHLORIDE mmol/L 103  --  111* 114*   LN-CO2 mmol/L 25  --  24 22   LN-BLOOD UREA NITROGEN mg/dL 21  --  17 17   LN-CREATININE mg/dL 0.86  --  0.79 0.82   LN-CALCIUM mg/dL 8.4*  --  8.2* 8.3*   LN-MAGNESIUM mg/dL 2.6 2.0 2.1 2.3     Coags:  Lab Results   Component Value Date    INR 1.34 (H) 09/20/2017    INR 1.36 (H) 09/19/2017    INR 1.47 (H) 09/19/2017     Microbiology  MRSA screeen on 9/18 negative     Outside reports reviewed:  Historical medical records

## 2021-06-13 NOTE — TELEPHONE ENCOUNTER
ANTICOAGULATION  MANAGEMENT- Travel planning    Obed Haley reports upcoming travel plans:    Departure date: 12/13/2020  Anticipated return date: April 2021  Phone number patient can be reached at: [phone]      INR monitoring plan:     [facility] to monitor and dose while traveling: Yes     INR standing order faxed/mailed: Yes     - Maryam Rojo, [place], [place]    - [phone]    - [phone]     - walk-in's accepted.     If no, provider name and phone number to manage warfarin: N/A.          Instructed to call the [facility] for any changes, questions or concerns. (#[phone])   ?   Yolanda Krishna RN

## 2021-06-13 NOTE — PROGRESS NOTES
Patient Name: Obed Haley   MRN: [id number]   Date of Admission: 9/19/2017    Procedure: MITRAL VALVE REPLACEMENT, ANESTHESIA TRANSESOPHAGEAL ECHOCARDIOGRAM, CLOSURE OF THE PFO    Post Op day #:4     Subjective (Patient focus/Primary Problem for shift): Pain control with oral medication, increased activity, movement out of ICU.           Pain Goal 2 Pain Rating 2           Pain Medication/ Regime effective to reduce patient pain Oxycodone 5 mg po q 4-6 hours PRN .    Objective (Physical assessment):           Rhythm: junctional. At times dysrhythmic, but bradycardic in the 40's to 50's.            Bowel Activity: yes if Yes indicate when: 9/22/17          Bowel Medications: yes            Incision: healing well          Incentive Spirometry Q 1-2 hour when awake:  yes Volume: 1500 cc          Epicardial Pacing Wires:  Yes. Capped this afternoon.            Patient Activity:           Up to chair for meals: yes          Ambulation with RN x2 (Not including CR): no            Is patient in home clothes:no             Chest Tubes   Pleural: no Draining: not applicable               Suction: not applicable              Mediastinal: no Draining: not applicable               Suction: not applicable   Dressing Change Daily:yes If No, why?                     Urinary Catheter: no           Preventative WOC consult (need MD order): no       Assessment (Nursing primary shift focus): Moved out of ICU this afternoon. Continues on heparin gtt at 10 units/kg/hr. Moving well with minimal cues using heart pillow with good technique Eating and drinking well. Using IS q 1 hr while awake. Good pain control with oral pain medications.     Plan (Patient Care Plan/focus): Continue to encourage IS q 1 hr while awake, and increase activity with ambulation outside of room in addition to cardiac rehab. Continue post op surgery teaching re: meds and self care.       Asha Franco RN  9/23/2017   8:08 PM

## 2021-06-13 NOTE — PROGRESS NOTES
"Type: pacemaker remote transmission done per Dr. Henao to assess for AF.  Presenting rhythm: AVNRT vs 's.  Battery/lead status: stable.  Arrhythmias: since 10/5/17; 357 AT/AF episodes, persistent AF from 10/5 to 10/14, ventricular rate well controlled, AT/AF burden 38%. 16 ventricular high rate episodes and 14 SVT episodes, available EGMs show possible PAT vs AVNRT.  Comments: Normal magnet and pacemaker function. Routed to device RN for review. WEST    Addendum: transmission reviewed. Agree with above report. Presenting rhythm at the time of this transmission showing some type of Atrial tachycardia, rates 130s. Per [place] graph there was persistent AT/AF from 10/5 to 10/14, there were no EGMs from these dates available to review on this remote, but old remote alert on 10/5/17 showed a presenting of Atrial Flutter with V-rates at fallback pacing rate of 60 bpm.    Since the termination of persistent AF/AFL on 10/14/17 the longest duration of AT has but ~4 hours on 10/17/17 and then 1.3 hours on 10/24/17 (see reports). More recently patient has had bursts of AT/SVT/possible AVNRT? At times with rates in the 160-170s. Atrial histograms show a significant jump in Atrial tachycardias at various rates when compared to previous histograms.    Device report and EGMs attached to \"Remote Device Check\" under Cardiology tab.     Will route to Dr. Henao for review.  CC'd Dr. Nelsy Dickerson, RN      "

## 2021-06-13 NOTE — PROGRESS NOTES
"Cardiac Rehab Discharge Summary    Problem List  Patient Active Problem List    Diagnosis Date Noted     Cardiac asystole      Tachycardia-bradycardia syndrome      Coronary artery disease involving native coronary artery of native heart without angina pectoris      S/P mitral valve replacement with tissue valve 09/19/2017     S/P MVR (mitral valve repair)      SSS (sick sinus syndrome) 08/31/2017     Abnormal stress echo 07/21/2017     Non-rheumatic mitral regurgitation 07/07/2017     Asymmetrical sensorineural hearing loss 02/13/2015       Pertinent Medical History  Pertinent Medical History: KYLE, Hyperlipidemia, Other (Comment) (laminectomy lumbar 1970, 2003)    Risk Factors  Risk Factors: High cholesterol    Living Situation  Living Accomodations: House, Significant Other, Other (Comment) (13 stairs)    Vitals  Height: 6' 1.75\" (187.3 cm)     Weight: 210 lb 6.4 oz (95.4 kg)      Labs    Hemoglobin A1c   Date/Time Value Ref Range Status   09/18/2017 08:35 AM 5.5 4.2 - 6.1 % Final     BNP   Date/Time Value Ref Range Status   07/07/2017 02:58 PM 22 0 - 70 pg/mL Final     Lab Results   Component Value Date    CHOL 177 08/03/2017    CHOL 220 (H) 08/24/2016    CHOL 226 (H) 01/14/2015     Lab Results   Component Value Date    HDL 43 08/03/2017    HDL 52 08/24/2016    HDL 53 01/14/2015     Lab Results   Component Value Date    LDLCALC 122 08/03/2017    LDLCALC 158 (H) 08/24/2016    LDLCALC 163 (H) 01/14/2015     Lab Results   Component Value Date    TRIG 58 08/03/2017    TRIG 52 08/24/2016    TRIG 51 01/14/2015         Treatment Progression: Maximal Activity and Exercise Level  Transfers  Bed Mobility: Independent  Bed to Chair: Independent  Chair to Stand: Independent  Transfer Comments: NIGEL burden    Walking/Marching  Distance: 800ft  Time: 5 min  Met Level: 3  Peak HR: 75  Peak BP: 118/76  Ambulation Status: Independent  Device: None  Comment:     Steps  Number of Steps: 20  Met Level: 4  Peak HR: 80  Peak BP: " 137/65    Treadmill  Time: 3  Workload: 1.0mph  Met Level: 3  Peak HR: 78  Peak BP: 116/74                   Tolerance Level/Symptoms/Complications       Symptomatic: lightheaded with decrease in HR    EKG/Arrhythmias  ECG: Paced rhythm    Discharge Disposition  Discharge   Recommended Discharge Disposition: Home with family  Outpatient Location: Other (Comment) ([facility])  Order Received On: 09/19/17  Comment: Release signed, info faxed to [place]

## 2021-06-13 NOTE — PROGRESS NOTES
Patient independent on home cpap with 2 liter bleed.         09/25/17 0438   NPPV Other   SpO2 97 %   Heart Rate 71   CPAP   Pt. Owned Device Yes   CPAP Pt. Interface Nasal pillows   CPAP Pressure (cmH2O) 9 cmH2O   CPAP O2 (L/min or FiO2) 2   Olivia Corrigan

## 2021-06-13 NOTE — PROGRESS NOTES
O: The patient will be hemodynamically stable.   D: The patient has been in a junctional rhythm with a heart rate in the 40 - 60's over the course of the evening shift. Metoprolol was held on evening R/T his bradycardia.  The patient converted to A-Fib with a HR in the 90 - 100's.  He had no C/O CP, SOB, nausea or dizziness.  The on call MD was notified of the change in heart rate and ordered the patient to get 5 mg IV metoprolol x 3 and 12.50 mg PO metoprolol. The writer pushed the IV metoprolol and the patient heart rate remained the same. About fifteen minutes after medication was given the writer was going to give the patient pain medication and the 12.50 mg of PO Metoprolol.  At this time the patient went asystole on the monitor in the room.  The patient was agonal breathing. The writer started compressions and called a code.  The patient returned to spontaneous circulation after 15 seconds of compressions. His heart rate was 90 - 100's A-Fib with vital signs WNL.  The on call MD was notified of the event and the patient was transferred back to ICU.  A: The patient was transferred back to ICU to be A/V paced.  R: The patient was transferred safely to ICU.  Jensen Perdomo RN

## 2021-06-13 NOTE — PROGRESS NOTES
[facility] Follow Up Note    Obed Haley   72 y.o. male    Date of Visit: 11/6/2017    Chief Complaint   Patient presents with     Follow-up     Subjective  This is a 72-year-old man who recently underwent valve surgery.  He continues to have some dyspnea and paroxysmal atrial fibrillation.  He does have a pacemaker.  He had an echocardiogram done late last week.  I have not as yet seen the report.  I did review the most recent cardiology consultation and there is concern about pleural fluid and pericardial fluid.  They have talked about fluid removal but as yet have not scheduled anything.  He is on a diuretic and has lost some fluid and weight.  His heart rhythm remains intermittently in atrial fibrillation.  He denies any chest pain.  No dizziness.  No other significant changes.  His primary symptom is the dyspnea which he and his wife both report is a little worse than it has been.    ROS A comprehensive review of systems was performed and was otherwise negative    Medications, allergies, and problem list were reviewed and updated    Exam  General Appearance:   On examination his blood pressure was 124/70.  Weight is 209 pounds and height is 74 inches.  BMI is 27.02.    Lungs I hear no rales or rhonchi today.    Heart is in a somewhat irregular rhythm.  Rate is around 108.    No new peripheral edema.    The patient is alert and oriented ×3.      Assessment/Plan  1. Paroxysmal atrial fibrillation     2. S/P mitral valve replacement with tissue valve     3. Dyspnea       Recent mitral valve replacement.  Continue follow-up with cardiology next week.    Paroxysmal atrial fibrillation.  Again, continue with cardiology next week.    Ongoing dyspnea.  He will continue his diuretic.  He may need more aggressive therapy and this will be at the discretion of cardiology.    We will check his INR today.  I will follow-up with him next month.  Total time of this office visit was 25 minutes with  greater than 50% of the time spent in care coordination and patient counseling.  The following high BMI interventions were performed this visit: weight monitoring    Franko Franz MD      Current Outpatient Prescriptions on File Prior to Visit   Medication Sig     acetaminophen (TYLENOL) 325 MG tablet Take 2 tablets (650 mg total) by mouth every 6 (six) hours as needed.     amoxicillin (AMOXIL) 500 MG capsule Take 2,000 mg by mouth as needed (1 hour prior to dentist.).      aspirin 81 MG EC tablet Take 1 tablet (81 mg total) by mouth daily.     b complex vitamins tablet Take 1 tablet by mouth daily.     cholecalciferol, vitamin D3, 1,000 unit tablet Take 1,000 Units by mouth daily.     EPINEPHrine (EPIPEN) 0.3 mg/0.3 mL atIn Inject 0.3 mg into the shoulder, thigh, or buttocks once as needed (allergic reaction).     furosemide (LASIX) 40 MG tablet Take 1 tablet (40 mg total) by mouth daily.     metoprolol succinate (TOPROL-XL) 50 MG 24 hr tablet Take 1 tablet (50 mg total) by mouth daily.     MULTIVIT &MINERALS/FERROUS FUM (MULTI VITAMIN ORAL) Take 1 tablet by mouth every other day. Pt taking multi vitamin without Iron     sotalol (BETAPACE) 80 MG tablet Take 1 tablet (80 mg total) by mouth 2 (two) times a day.     warfarin (COUMADIN) 5 MG tablet Take One 5 mg tablet by mouth daily. Adjust dose based on INR results as directed.     ibuprofen (ADVIL,MOTRIN) 200 MG tablet Take 200 mg by mouth every 6 (six) hours as needed for pain.     [DISCONTINUED] OMEGA-3/DHA/EPA/FISH OIL (FISH OIL-OMEGA-3 FATTY ACIDS) 300-1,000 mg capsule Take 2 g by mouth daily.     [DISCONTINUED] oxyCODONE (ROXICODONE) 5 MG immediate release tablet Take 1-2 tablets (5-10 mg total) by mouth every 6 (six) hours as needed (5 mg for mild to moderate paint (pain score 3-4) or 10 mg for moderate pain (pain score 5-6)).     No current facility-administered medications on file prior to visit.      Allergies   Allergen Reactions     Bee Venom Protein  (Honey Bee) Anaphylaxis     Social History   Substance Use Topics     Smoking status: Former Smoker     Quit date: 1/14/2007     Smokeless tobacco: Never Used     Alcohol use No

## 2021-06-13 NOTE — PROGRESS NOTES
Cardiology Progress Note    Assessment:  Mitral valve prolapse status post mitral valve replacement with [place] 33 mm bioprosthesis in September 2017 normal auscultation of mitral valve prosthesis today;  Tachycardia-bradycardia syndrome status post permanent pacemaker  Atrial fibrillation probably persistent, questionable rate control  Left pleural effusion, postop  Fatigue and shortness of breath, suspect fluid overload; rule out tachycardia induced cardiomyopathy    Plan:  Chest x-ray; will consider thoracentesis if large amount of effusion noted  Echo to assess mitral valve prosthesis and LV function  Basic metabolic panel CBC and BNP today.  Will start diuretic based on the results of the blood work and chest x-ray.  Start metoprolol succinate 50 mg a day; will obtain remote pacemaker interrogation to determine character of atrial fibrillation.  We will plan cardioversion if he has persistent atrial fibrillation.  If he has paroxysmal atrial fibrillation will start antiarrhythmic first.    Subjective:   This is 72 y.o. male who comes in today for follow-up visit.  He had severe mitral regurgitation due to bileaflet prolapse.  He was complaining of exertional dyspnea prior to surgery.  He underwent mitral valve replacement with bioprosthesis.  He developed atrial fibrillation after the surgery.  He has long pauses during transition from A. fib to sinus rhythm.  He received permanent pacemaker prior to discharge.  He has been attending cardiac rehab program.  He continues to complain of dyspnea.  He denies PND and orthopnea.  He has had mild lower extremity edema. His weight is about 3 pounds above baseline.  He denies incisional chest pains.  He is unaware of irregular beating of the heart.  Review of Systems:   General: WNL  Eyes: WNL  Ears/Nose/Throat: WNL  Lungs: Shortness of Breath  Heart: Chest Pain, Shortness of Breath with activity, Irregular Heartbeat, Leg Swelling  Stomach: WNL  Bladder:  "WNL  Muscle/Joints: WNL  Skin: WNL  Nervous System: WNL  Mental Health: WNL     Blood: WNL    Objective:   /73 (Patient Site: Right Arm, Patient Position: Sitting, Cuff Size: Adult Regular)  Pulse (!) 112 Comment: Irregular  Resp 16  Ht 6' 1.75\" (1.873 m)  Wt 215 lb 3.2 oz (97.6 kg)  BMI 27.82 kg/m2  Physical Exam:  GENERAL: no distress  NECK: No JVD  LUNGS: Decreased breath sounds lower third of the left lung field  CARDIAC: irregular rhythm, S1 & S2 normal.  No heaves, thrills, gallops or murmurs.  ABDOMEN: flat, negative hepatosplenomegaly, soft and non-tender.  EXTREMITIES: No evidence of cyanosis, clubbing 1+ edema.    Current Outpatient Prescriptions   Medication Sig Dispense Refill     amoxicillin (AMOXIL) 500 MG capsule Take 2,000 mg by mouth as needed (1 hour prior to dentist.).        aspirin 81 MG EC tablet Take 1 tablet (81 mg total) by mouth daily.  0     b complex vitamins tablet Take 1 tablet by mouth daily.       cholecalciferol, vitamin D3, 1,000 unit tablet Take 1,000 Units by mouth daily.       EPINEPHrine (EPIPEN) 0.3 mg/0.3 mL atIn Inject 0.3 mg into the shoulder, thigh, or buttocks once as needed (allergic reaction).       ibuprofen (ADVIL,MOTRIN) 200 MG tablet Take 200 mg by mouth every 6 (six) hours as needed for pain.       MULTIVIT &MINERALS/FERROUS FUM (MULTI VITAMIN ORAL) Take 1 tablet by mouth every other day. Pt taking multi vitamin without Iron       warfarin (COUMADIN) 5 MG tablet Take One 5 mg tablet by mouth daily. Adjust dose based on INR results as directed. 35 tablet 3     acetaminophen (TYLENOL) 325 MG tablet Take 2 tablets (650 mg total) by mouth every 6 (six) hours as needed.  0     furosemide (LASIX) 40 MG tablet Take 1 tablet (40 mg total) by mouth daily for 5 days. 5 tablet 0     metoprolol succinate (TOPROL-XL) 50 MG 24 hr tablet Take 1 tablet (50 mg total) by mouth daily. 30 tablet 12     OMEGA-3/DHA/EPA/FISH OIL (FISH OIL-OMEGA-3 FATTY ACIDS) 300-1,000 mg " capsule Take 2 g by mouth daily.       oxyCODONE (ROXICODONE) 5 MG immediate release tablet Take 1-2 tablets (5-10 mg total) by mouth every 6 (six) hours as needed (5 mg for mild to moderate paint (pain score 3-4) or 10 mg for moderate pain (pain score 5-6)). 30 tablet 0     No current facility-administered medications for this visit.        Cardiographics:    Pacemaker interrogation: 10/3/2017 persistent atrial fibrillation    Echocardiogram: August 2017  1.  Bileaflet mitral valve prolapse with A2 and P2 involvement. There is partial rupture of prolapsed P2 leading moderate flail.  There is an eccentric jet directed to anterior wall of LA. Put all together, the findings are consistent with severe mitral valve regurgitation.  2.  Normal left ventricular size, wall motion and function.  The estimated left ventricular ejection fraction is 60%.  3.  Normal right ventricular size and function.  4.  Moderately enlarged left atrium.    Coronary angio: August 2017  Pt with hx of MVP/MR with progressive fatigue and dyspnea and abn ECG on stress echo     RHC:   RA mean 5  PA mean 18mmHg  PCWP 13mmHg with V wave  LVEDP 15mmHg     EF 65% no WMA wthi mild to mod MR     Anatomy:  LM min dz  LAD mid 30%   Circ mild dz with branch supplies PL territory  RCA mid 50% with PDA extends to apex     Note distal LAD and PDA vessels are very small around apex     Imp/plan:  1. Dyspnea with hx of MR - noted mild to mod today but given v wave possible could be severe - will arrange for ZANE with follow up with Dr. Henao   2. CAD - start asp 81mg and atorvastatin 40mg to prevent/slow progression  Lab Results:       Lab Results   Component Value Date    CHOL 177 08/03/2017    CHOL 220 (H) 08/24/2016    CHOL 226 (H) 01/14/2015     Lab Results   Component Value Date    HDL 43 08/03/2017    HDL 52 08/24/2016    HDL 53 01/14/2015     Lab Results   Component Value Date    LDLCALC 122 08/03/2017    LDLCALC 158 (H) 08/24/2016    LDLCALC 163 (H)  01/14/2015     Lab Results   Component Value Date    TRIG 58 08/03/2017    TRIG 52 08/24/2016    TRIG 51 01/14/2015     No components found for: CHOLHDL  BNP   Date Value Ref Range Status   07/07/2017 22 0 - 70 pg/mL Final       Paulo (Sha)  MD Juliano

## 2021-06-13 NOTE — ANESTHESIA PROCEDURE NOTES
Central line    Start time: 9/19/2017 7:15 AM  End time: 9/19/2017 7:30 AM  Patient location: OR Post-induction  Indications: central pressure monitoring and vascular access  Performing Anesthesiologist: PERCY SHI  Pre-procedure Checklist  Completed: patient identified, site marked, risks, benefits, and alternatives discussed, timeout performed, consent obtained, hand hygiene performed, all elements of maximal sterile barriers used including cap, mask, gown, sterile gloves, and large sheet and skin prep agent completely dried prior to procedure    Procedure Details:  Lidocaine 1% local anesthesia used for skin prep  Preparation: 2% chlorhexidine  Location details: right internal jugular  Site selection rationale: access  Catheter type: Introducer with [place]Daniela  Introducer type: MAC  Lumens:single lumen  Withdrawn and locked at: 55Number of attempts: 1  Ultrasound evaluation of access site: yes  Vessel patent by US exam  Concurrent real time visualization of needle entry  ultrasound permanent image saved    manometry confirmation of venous access    Post-procedure:   line sutured and Antimicrobial disks with CHG applied  Assessment: blood return through all ports and free fluid flow  Complications: none

## 2021-06-13 NOTE — PROGRESS NOTES
"Progress Note    Assessment/Plan  S/P MVR POD # 6  Awake and alert, laying in bed at time of evaluation  Denies or shortness of breath  Ventricular pace at 70 with underlying accelerated junctional rhythm in the 60s  Episode of asystole over the weekend requiring chest compression  Plan for dual-chamber permanent pacemaker implantation today  Acute blood loss anemia stable last hemoglobin 8.8, will monitor closely  Hypomagnesemia, Mag 1.7, will replace per protocol  Incisions are clean dry and intact and paste as noted above  Lung sounds are diminished in the lower bases otherwise clear  Abdomen is soft and nondistended and bowel sounds are present in all 4 quadrants  Urine output adequate no bowel movement yet  Weight 96.6 kg, preop weight 95.3 kg still up of 1.3 kg  We will continue gentle diuresis  Lasix 40 mg p.o. twice daily X 24 hours  We will start warfarin therapy after implantation of a dual-chamber pacemaker today  We will remove pacer wires after permanent pacemaker implantation today  Continue pulmonary toilet  We will ambulated in the hallway after permanent pacemaker implantation  Transfer to telemetry later on today      Subjective  Denies chest pain or shortness of breath  Objective  Awake and alert laying in bed at time of evaluation with no apparent distress  Vital signs in last 24 hours  Temp:  [98.4  F (36.9  C)-98.9  F (37.2  C)] 98.9  F (37.2  C)  Heart Rate:  [] 70  Resp:  [18-23] 18  BP: ()/(54-81) 122/65  Weight:   198 lb 12.8 oz (90.2 kg)  Preop weight 95.3 kg  Intake/Output last 3 shifts  I/O last 3 completed shifts:  In: 1092 [P.O.:820; I.V.:222; IV Piggyback:50]  Out: 1450 [Urine:1450]  Intake/Output this shift:       Review of Systems   A 12 point comprehensive review of systems was negative except as noted.    Physical Exam  /58  Pulse 70  Temp 98.9  F (37.2  C) (Oral)   Resp 18  Ht 6' 1.75\" (1.873 m)  Wt 213 lb (96.6 kg)  SpO2 96%  BMI 27.53 kg/m2  V paced at " 70, incisions are clean dry and intact  Abdomen soft and nondistended and nontender  No lower extremity edema appreciated    Pertinent Labs   Lab Results: personally reviewed.   Lab Results   Component Value Date     (L) 09/24/2017    K 4.2 09/25/2017    CL 99 09/24/2017    CO2 29 09/24/2017    BUN 19 09/24/2017    CREATININE 0.78 09/24/2017    CALCIUM 8.8 09/24/2017     Lab Results   Component Value Date    WBC 8.4 09/23/2017    HGB 8.8 (L) 09/24/2017    HCT 24.2 (L) 09/23/2017    MCV 90 09/23/2017     09/25/2017       Pertinent Radiology   Radiology Results: Not yet available for review  EKG Results: personally reviewed.  and Accelerated junctional rhythm rate 50s to the 60s    Reed Mims

## 2021-06-13 NOTE — PROGRESS NOTES
Patient Name: Obed Haley   MRN: [id number]   Date of Admission: 9/19/2017    Procedure: MITRAL VALVE REPLACEMENT, ANESTHESIA TRANSESOPHAGEAL ECHOCARDIOGRAM, CLOSURE OF THE PFO    Post Op day #:1    Subjective (Patient focus/Primary Problem for shift): Pain control and increased activity          Pain Goal0 Pain Rating3           Pain Medication/ Regime effective to reduce patient pain Tylenol and Oxy    Objective (Physical assessment):           Rhythm: atrial fibrillation            Bowel Activity: no if Yes indicate when: N/A          Bowel Medications: yes            Incision: healing well          Incentive Spirometry Q 1-2 hour when awake:  yes Volume: 1500          Epicardial Pacing Wires:  yes            Patient Activity:           Up to chair for meals: yes          Ambulation with RN x2 (Not including CR): no (pt with temporary pacer; unable to ambulate)            Is patient in home clothes:no             Chest Tubes   Pleural: yes Draining: yes               Suction: yes              Mediastinal: yes Draining: yes               Suction: yes   Dressing Change Daily:not applicable If No, why? N/A                     Urinary Catheter: yes           Preventative WOC consult (need MD order): not applicable       Assessment (Nursing primary shift focus): Stable HR, BP, & rhythm, pain control, and increased activity as tolerated     Plan (Patient Care Plan/focus): Stabilize VS & Rhythm, pulmonary toilet, and increase activity      Gus Araujo   9/21/2017   12:21 AM

## 2021-06-13 NOTE — TELEPHONE ENCOUNTER
Wellness Screening Tool  Symptom Screening:  Do you have one of the following NEW symptoms:    Fever (subjective or >100.0)?  No    A new cough?  No    Shortness of breath?  No     Chills? No     New loss of taste or smell? No     Generalized body aches? No     New persistent headache? No     New sore throat? No     Nausea, vomiting, or diarrhea?  No    Within the past 2 weeks, have you been exposed to someone with a known positive illness below:    COVID-19 (known or suspected)?  No    Chicken pox?  No    Mealses?  No    Pertussis?  No    Patient notified of visitor policy- No visitors are allowed to accompany the patient at this time. Yes  Pt informed to wear a mask: Yes  Pt notified if they develop any symptoms listed above, prior to their appointment, they are to call the clinic directly at [phone] for further instructions.  Yes  Patient's appointment status: Patient will be seen in clinic as scheduled on 12/9/

## 2021-06-13 NOTE — TELEPHONE ENCOUNTER
Patient called and notified information below per covering MD. He is wondering if covering MD would still be able to order this test before he leaves to [place] this Saturday. He does see that last result was within normal range but would still appreciate if this test could get order. Please advise, thanks much.

## 2021-06-13 NOTE — TELEPHONE ENCOUNTER
Who is calling:  Patient  Reason for Call:  The patient is returning a call to [facility] regarding today's INR 12/10.    Okay to leave a detailed message: Yes

## 2021-06-13 NOTE — PROGRESS NOTES
Spiritual Care Note    Spiritual Assessment:  Patient seen today by Fr. Hicks for spiritual support.     Care Provided:   offered anointing of the sick for patient along with communion and confession.     Plan of Care: Spiritual care will continue to follow as part of patient's care team.    BRANDYN Murrieta, [facility]

## 2021-06-13 NOTE — ANESTHESIA PROCEDURE NOTES
ZANE    Patient location during procedure: OR  Start time: 9/19/2017 7:34 AM  Staffing:  Performing  Anesthesiologist: PERCY SHI  ZANE:  Type/Reason: Monitoring ZANE  Technique: blind insertion  Difficulty: easy  Anesthesia Monitoring: see additional note

## 2021-06-13 NOTE — PATIENT INSTRUCTIONS - HE
Obed Haley,    It was a pleasure to see you today at the [facility].     My recommendations after this visit include:    Please call device clinic and do remote if symptoms of atrial fibrillation .      To followup with remote device check and see Dr. Henao in 6 months.      My contact information:  Shan Salcido, MUNIRA  After Hours or Scheduling  [phone]  My Nurse---Bertha Crenshaw [phone]

## 2021-06-13 NOTE — PROGRESS NOTES
ITP ASSESSMENT   Assessment Day: 30 Day  Session Number: 7  Precautions: sternal/post surgical/ST  Diagnosis: Valve  Risk Stratification: Medium  Referring Provider: Franko Franz MD   ITP-Dr. Henao  EXERCISE  Exercise Assessment: Reassessment  Obed is attending cardiac rehab 2x/week and is in/out of afib.                         Exercise Plan  Goals Next 30 days  ADL'S: Return to his social clubs, breakfast, etc.  Leisure: Start cleaning out the garage, as tolerated.  No Data Recorded    Education Goals: All goals in this section met  Education Goals Met: Patient can state cardiac s/s and appropriate emergency response.;Has system for taking medication.;Medication review.                        Goals Met  Initial ADL's goals met: Met. Pt has resumed driving.  Initial Leisure goals met: Met. Pt has resumed light yardwork/housework.   No Data Recorded  Initial Progression: Currently exercising at 2.53-3 mets, increasing as pt tolerates.    Exercise Prescription  Exercise Mode: Treadmill;Bike;Nustep  Frequency: 2x/week  Duration: 40 minutes  Intensity / THR: 20-30 beats above resting heart rate  RPE 11-14  Progression / Met level: 3.8-3  Resistive Training?: No    Current Exercise (mins/week): 300    Interventions  Home Exercise:  Mode: walking   Frequency: 5x/week  Duration: 40-45 minutes daily    Education Material : Educational videos;Provide written material;Individual education and counseling;Offer educational classes    Education Completed  Exercise Education Completed: Cardiac Anatomy;Signs and Symptoms;RPE;Emergency Plan;Home Exercise;FITT Principles;Warm up/cool down;BP/HR Reponse to exercise;Benefits of Exercise;End point of exercise            Exercise Follow-up/Discharge  Follow up/Discharge: Pt exercising at home every day. Either walks outside or walks around inside his home.          NUTRITION  Nutrition Assessment: Reassessment    Nutrition Risk Factors:  Nutrition Risk Factors:  "Dyslipidemia;Overweight  Cholesterol: 177  LDL: 122  HDL: 43  Triglycerides: 58    Nutrition Plan  Interventions  Nutrition Interventions: Diet consult;Diet class;Therapist/Patient discussion;Educational videos;Provide with written material  No Data Recorded    Education Completed  Nutrition Education Completed: Low Saturated fat diet;Risk factor overview;Low sodium diet    Goals  Nutrition Goals (Next 30 days): Review Dietitian schedule    Goals Met  Nutrition Goals Met: Patient can identify their risk factors for CAD;Patient will maintain current weight or gradual weight gain;Patient follows a low sodium diet;Patient states following a low saturated fat diet    Height, Weight, and  BMI  Weight: 207 lb (93.9 kg)  Height: 6' 1\" (1.854 m)  BMI: 27.32    Nutrition Follow-up  Follow-up/Discharge: Pt has not yet met with the dietician. Encouraged him to return his diet survey asap, so he has a chance to meet with her.     Other Risk Factors  Other Risk Factor Assessment: Reassessment    HTN Risk Factor: NA    Pre Exercise BP: 106/68  Post Exercise BP: 88/60 (asymptomatic)    Risk Factor Follow-up   Follow-up/Discharge: reviewed his risk factor. He is knowledgable about them and what he needs to do about them.       PSYCHOSOCIAL  Psychosocial Assessment: Reassessment     [place] CO[employer] Q of L Summary Score: 19    AJITH-D Score: 2    Psychosocial Risk Factor: NA    Psychosocial Plan  Interventions  Interventions: Offer educational videos and classes;Provide written material;Individual education and counseling    Education Completed  Education Completed: Relaxation/Coping Techniques;S/S of depression;Effects of stress on body    Goals  Goals (Next 30 days): Identify stressors;Improvement in [place] [employer] score;Practicing stress management skills    Goals Met  Goals Met: Identified Support system;Oriented to stress management classes    Psychosocial Follow-up  Follow-up/Discharge: Pt denies any issues with stress     "     Patient involved in Goal setting?: Yes    Signature: _____________________________________________________________    Date: __________________    Time: __________________See Doc Flowsheet

## 2021-06-13 NOTE — PROGRESS NOTES
1945  Home:[phone] (home) Cell:[phone] (mobile)  Emergency Contact: Ani Savage [phone]    +++Important patient information for CSC/Cath Lab staff : PT IS ON COUMADIN, PT HAS A PACEMAKER+++    [facility] EP Cath Lab Procedure Order     Cardioversion:  Cardioversion     PT IS ON COUMADIN AND INR'S IN EPIC UNDER LAB TAB    11/19=2.5  11/12=2.4  10/30=2.6  10/7=2.7  9/23=2  9/9=3.3    Diagnosis:  AF  Anticipated Case Duration:  Standard  Scheduling Needs/Timeframe:  PT IS SET FOR MONDAY 11/23/2020 AT 12 WIT ODALYS PHELPS CNP    Current Device: Dual Pacemaker  Device Company/Device Rep Needed for Procedure: [employer]    Pre-Procedural Testing needed: COVID 19 nasal/lab test within 48hrs of procedure  Anesthesia:  General-CV Only  Research Protocol:  No    [facility] EP Cath Lab Prep   Ordering Provider: DR TAYLOR  Ordering Date: 11/6/2020    Orders Status: Intial order placed and Order set placed    H&P:  Compled by DR TAYLOR on 11/6/2020 if scheduled within 30 days, pt to schedule with PMD if procedure outside of this timeframe  PCP: Franko Nguyen MD, [phone]    Pre-op Labs: N/A for procedure    Medical Records Pertinent for Procedure:  N/A    Patient Education:    PT HAS A  FOR PROCEDURE TO DROP OFF AND  DUE TO COVID  PT INSTRUCTED TO HOLD ANY VITAMINS,MINERALS,CALCIUM, IRON OR SUPPLEMENTS THE MORNING OF CV  PT INSTRUCTED NO GUM CHEWING MINTS OR CANDY THE MORNING OF CV  PT INSTRUCTED TO LEAVE JEWELRY AT HOME  PT INSTRUCTED TO BATHE OR SHOWER BEFORE COMING IN  PT IS ON COUMADIN  PT HAS A FOLLOW UP APPT WITH ODALYS PHELPS CNP        Teach with Patient: Completed via phone on 11/17/2020    Risks Reviewed:     Cardioversion    >90% acute success rate, <10% failure to convert or   reverts shortly after cardioversion.    <1% embolic event of (CVA, pulmonary embolism, or   1. other site).    75% risk for superficial burn.  Risks associated with general anesthesia will be addressed  by the Anesthesiology Department    Pre-Procedure Instructions that were Reviewed with Patient:  NPO after midnight, Remove all jewelry prior to coming in for procedure, Shower prior to arrival, Transportation arrangements needed s/p procedure, Post-procedure follow up process and Sedation plan/orders    Pre-Procedure Medication Instructions:  Instructions given to pt regarding anticoagulants: Coumadin- instructed to continue anticoagulation uninterrupted through their procedure  Instructions given to pt regarding antiarrhythmic medication: Beta Blocker; Pt instructed to continue medication prior to procedure  Instructions for medication, other than anticoagulants/antiarrhythmics listed above, given to pt: to take all morning medications with small sips of water, with the exception of OTC supplements and MVI    Allergies   Allergen Reactions     Bee Venom Protein (Honey Bee) Anaphylaxis       Current Outpatient Medications:      acetaminophen (TYLENOL) 325 MG tablet, Take 2 tablets (650 mg total) by mouth every 6 (six) hours as needed., Disp: , Rfl: 0     amoxicillin (AMOXIL) 500 MG capsule, Take 2,000 mg by mouth as needed (1 hour prior to dentist.). , Disp: , Rfl:      aspirin 81 MG EC tablet, Take 1 tablet (81 mg total) by mouth daily., Disp: , Rfl: 0     atorvastatin (LIPITOR) 40 MG tablet, Take 1 tablet (40 mg total) by mouth daily. (Patient taking differently: Take 20 mg by mouth daily.    ), Disp: 90 tablet, Rfl: 3     b complex vitamins tablet, Take 1 tablet by mouth every other day. , Disp: , Rfl:      cholecalciferol, vitamin D3, 1,000 unit tablet, Take 1,000 Units by mouth every other day. , Disp: , Rfl:      EPINEPHrine (EPIPEN) 0.3 mg/0.3 mL atIn, Inject 0.3 mg into the shoulder, thigh, or buttocks once as needed (allergic reaction)., Disp: , Rfl:      metoprolol succinate (TOPROL-XL) 50 MG 24 hr tablet, Take 1 tablet (50 mg total) by mouth daily., Disp: 90 tablet, Rfl: 0     MULTIVIT &MINERALS/FERROUS  FUM (MULTI VITAMIN ORAL), Take 1 tablet by mouth every other day. Pt taking multi vitamin without Iron, Disp: , Rfl:      warfarin ANTICOAGULANT (COUMADIN/JANTOVEN) 5 MG tablet, TAKE 1 TABLET (5MG) TO 1 & 1/2 TABLETS (7.5MG)DAILY, AS DIRECTED. ADJUST DOSE BASED ON INR RESULTS., Disp: 120 tablet, Rfl: 1    Documentation Date:11/19/2020 12:27 PM  Helen rCenshaw LPN    Instructions for Patients Scheduled for Cardiac Procedures During the   COVID-19 Pandemic      - Your Provider has determined that your condition warrants going ahead with your procedure at this time.  - You will need to be tested for COVID-19 48-72 hours (2-3 days) prior to your procedure.  - We highly encourage you to get tested for COVID-19 at one of our designated [facility] testing sites. We process the tests within our lab, which allows us to get the results back quickly.   - If you choose to get tested at a non-[facility] location, you will need to contact your primary care provider to make those arrangements to ensure the results are available to your cardiologist before you arrive for your procedure. If we DO NOT receive the results in time, your procedure may be postponed or canceled. The results will need to get faxed to [phone].  - After testing, you will need to remain in self-quarantine until your procedure.  - If you are notified of a positive COVID-19 result; you will need to call your provider at [phone] for further recommendations.    - If the test is positive; PLEASE DO NOT PRESENT FOR YOUR PROCEDURE until you have been given further instructions.   - You will not be called with negative results, so arrive as instructed unless otherwise notified.    Don't:    Don't invite visitors or friends into your home.    Don't leave your home unless absolutely necessary.    Don't share utensils and other household items with others in the home.  Do:    Wash your hands regularly with soap and water and use hand   with at least 60% alcohol if you don't have easy access to soap and water.     Disinfect surface areas daily, including doorknobs, electronics - especially phones, laptops and other devices.     Wash utensils and other items thoroughly.     Separate yourself from others in the household as best you can, including pets.    Keep your hands away from your face.     Practice all other prevention tips the CDC recommends, including covering your coughs and sneezes with a tissue or your sleeve and immediately throwing the tissue into the trash and washing your hands.    Call the clinic if you develop any of the following symptoms before your procedure:    Fever     Cough    Shortness of breath    Sore throat    Runny or stuffy nose    Muscle or body aches    Headaches    Fatigue    Vomiting and diarrhea    These steps will help keep you and your family, other patients and hospital staff safe from COVID-19

## 2021-06-13 NOTE — PROGRESS NOTES
Progress Note    CV Surg   DOS 9/19/2017  POD # 5  EF 60%  A1c 5.5    Assessment/Plan    9/24-NPO after midnight in anticipation of Pacer in am.   Review of evening events: Atrial fib with CVR than  RVR treated with metoprolol, no immediate response but then went asystole with agonal breathing and code was called. ROSC within 15 sec of compressions and back in atrial fib.  Continue heparin, start coumadin after PPM.    9/22-Doing well overall.  Underlying rhythm rate is 45-55 SR- /- cap wires and watch. Transfer to tele 40/41 9/22 Atrial fib rate variable :- trial low dose metoprolol. Dr. Hall changed pacer to atrial paced rate of 68 from Vent paced at 80.     9/21 Has had three 3-5 second pauses, despite Amio being off since this morning, discussed with Dr. Swenson. Plan is to V pace at a rate of 80.  Atrial fib last night and treated with Amio bolus and gtt. Currently Atrial fib with rate of 50-60 with back up pacer needed when rate drops to 35. Amio stopped.  9/20 Was A-V paced  With underlying slow junctional rate    1. S/P MVR-tissue- asa, heparin, BB   Pre op HR SBrady -50-60  At discharge: Coumadin for 3 month per Dr. Swenson routine  2. Acute resp failure- resolved- RA sat's 94%  3. Acute blood loss anemia-8.0 - follow  4. Volume overload- weight is below admit- hold diuresis today  5. Atrial fib-converted to SR- Atrial fib RVR   - heparin gtt.   6. PPM planned 9/25     PLAN  Cardiology consulted  Pacer in am  Continue heparin  Keep pacer connected     Subjective/Objective  Up in chair, NAD  Neuro's grossly intact    CXR:9/23  No significant change in small left pleural effusion with atelectasis versus consolidation left lung base. Blunting of the right costophrenic angle suggests very small right pleural effusion. The cardiac silhouette appear normal. Median sternotomy wires are present. Mediastinal drains have been removed.    Vital signs in last 24 hours  /67 (Patient Position:  "Lying)  Pulse 97  Temp 98.4  F (36.9  C) (Oral)   Resp 16  Ht 6' 1.75\" (1.873 m)  Wt 198 lb 12.8 oz (90.2 kg)  SpO2 95%  BMI 25.7 kg/m2  O2 Sat's on RA  95%    Weight:   198 lb 12.8 oz (90.2 kg)  Yesterday 96.6 kg  Pre op 95.3 kg  Admit 95.3 kg      Ambulation: 50 ft, up in chair and in room  Physical Exam  Neuro: A & O DEJESUS = tristian  Lungs: decreased bs at bases - IS   Cor: Reg,irreg- pacer back up   INC: intact  ABD: soft, +BM  EXT: trace edema       Pertinent Labs     Mag 1.9 replaced  Lab Results: personally reviewed.   Lab Results   Component Value Date     (L) 09/24/2017    K 4.1 09/24/2017    CL 99 09/24/2017    CO2 29 09/24/2017    BUN 19 09/24/2017    CREATININE 0.78 09/24/2017    CALCIUM 8.8 09/24/2017     Lab Results   Component Value Date    WBC 8.4 09/23/2017    HGB 8.8 (L) 09/24/2017    HCT 24.2 (L) 09/23/2017    MCV 90 09/23/2017     09/24/2017                 "

## 2021-06-13 NOTE — CONSULTS
"  Clinical Nutrition Therapy Assessment Note      Reason for Assessment:   Obed Haley is a 72 y.o. male assessed by the RD for consult and protocol/pathway order.    Subjective:  Patient s/p MVR.  Patient currently busy with therapy.  Chart reviewed.    Nutrition Prescription:   Diet: CL, diabetic, SCIP  IV dextrose or Fluids:  dexmedetomidine 200 mcg/50 mL in NS (PRECEDEX) (4mcg/mL) Last Rate: Stopped (09/19/17 1332)   DOPamine    epinephrine Last Rate: Stopped (09/19/17 1259)   insulin regular infusion 0.5 unit/mL Last Rate: Stopped (09/20/17 0518)   niCARdipine Last Rate: 2 mg/hr (09/19/17 1341)   nitroglycerin    norepinephrine IV infusion in D5W    nacl 0.9% Last Rate: 50 mL/hr (09/19/17 1300)   nacl 0.9% Last Rate: 25 mL/hr (09/19/17 1300)   vasopressin 100 units/100 ml (PITRESSIN)in D5W (1 unit/ml)        Current Nutrition Intake:  Minimal intake today.      Anthropometrics:   Ht 73\" per hx  Admission weight:  Weight: 216 lb (98 kg)     Ideal body weight 184#+-10%  Weight History:  216# 8/9/17    Physical Findings:  Physical findings which could indicate malnutrition: deferred     GI Status/Output:   Last BM recorded: n/a    Skin/Wound:   Kiran Scale Score: 20    Medications:  Medications reviewed.    Labs:  Labs reviewed    Estimated Nutrition Needs:  Using current weight of 98 kg.    Energy Needs: 9171-7571 kcals daily per 23-25 kcal/kg   Protein Needs: 98 g daily, 1 g/kg.  Fluid Needs: 2450 mls daily, 25 mls/kg    Malnutrition: Not noted at this time    Nutrition Risk Level: low risk    Nutrition DX: none at this time    Goals:  Participate in diet education    Education needs:  Plan low sodium diet ed before discharge.    Plan:  As above    Monitor:   Diet advancement, diet education readiness.    See Care Plan for Problems, Goals, and Interventions.        "

## 2021-06-13 NOTE — PROGRESS NOTES
PULMONARY / CRITICAL CARE PROGRESS NOTE      Consultation -   Obed Haley,  1945, MRN [id number]  Date / Time of Admission:  2017  5:51 AM    Admitting Dx: Mitral regurgitation [I34.0]    PCP: Franko Franz MD, [phone]  Consulting physician:  Monica Swenson MD   Code status:  Full Code       Extended Emergency Contact Information  Primary Emergency Contact: Ani Savage   [facility]  Home Phone: [phone]  Mobile Phone: [phone]  Relation: Friend       Assessment   1. Severe MR  2. Nonobstructive CAD  3. MVR    Plan   Systems to Assess:   Pulmonary:  Extubated last evening.  On 2 ln/c.  Home CPAP at night   -Wean supplemental O2 as tolerated; goal O2 sat > 92%.    -HOB > 30 degrees to limit aspiration risk.    -Aggressive pulmonary hygiene  Cardiovascular: pacing via epicardial wires; stay in ICU while relying on pacer   -Cardiac monitoring.   -Vasoactive medications to be titrated per [facility] protocol  Neurological:    -Neuro checks per ICU protocol.    -Allow to wake fully from anesthesia/sedation.     -Pain control: aggressive while avoiding oversedation.   GI/:    -NPO for now until fully awake.    -Initiate bowel protocol early in setting of narcotic pain medications.    -GI prophylaxis: PPI  Renal:   -Monitor I/O's. Electrolyte repletion PRN. Avoid/limit nephrotoxic agents.   Heme/Coag:   -Monitor H/H. Monitor chest tube output hourly. Transfuse as needed.    -DVT prophylaxis: scds/subcutaneous heparin   Infectious disease:    -General precautions. Remove lines and drains as soon as no longer needed.   Endocrine: FSBG Q4H. Initiate critical care insulin if blood sugars > 180 x 2.   Musculoskeletal:  Increase activity.     Lines: Chest tubes x 2, urethral catheter all placed on 2017    Activity: increase as tolerated    The patient and/or the family were educated about the above plan of care.  They indicated understanding.  This patient is considered  critically ill and requires ICU level of care due to post-op cardiovascular surgery    TOTAL TIME 31 minutes.    Aleida Gillespie CNP  Pulmonary & Critical Care Medicine  9/20/2017  10:41 AM    ICU DAILY CHECKLIST   Can patient transfer out of MICU? no    FAST HUG:    Feeding:  Advance per [facility]  Nelson: yes  Analgesia/Sedation: n/a  Thromboembolic prophylaxis:  Heparin subcutaneous, scds  HOB>30:  yes  Stress Ulcer Protocol: ppi  Glycemic Control: ssi/insulin drip        Chief Complaint MVR       HPI    Obed Haley is a 72 y.o. old male with a history of nonobstructive CAD and severe MR who has been experiencing increased exertional dyspnea presents for a MVR on 9/19/2017.    History is provided by review of the records.       Review of Systems   Pertinent items are noted in HPI.     Active Problem List   Patient Active Problem List    Diagnosis Date Noted     S/P MVR (mitral valve replacement) 09/19/2017     S/P MVR (mitral valve repair)      Abnormal stress echo 07/21/2017     Non-rheumatic mitral regurgitation 07/07/2017     Asymmetrical sensorineural hearing loss 02/13/2015        Medical/Surgical History   Past Medical History:   Diagnosis Date     High cholesterol      MVP (mitral valve prolapse)      Sleep apnea, obstructive     uses CPAP     Past Surgical History:   Procedure Laterality Date     KNEE ARTHROSCOPY  2007    left     LAMINECTOMY  1970, 2003    lumbar X2     MITRAL VALVE REPLACEMENT N/A 9/19/2017    Procedure: MITRAL VALVE REPLACEMENT, ANESTHESIA TRANSESOPHAGEAL ECHOCARDIOGRAM, CLOSURE OF THE PFO;  Surgeon: Monica Swenson MD;  Location: [facility] Main OR;  Service:      NE CATH PLACEMENT & NJX CORONARY ART ANGIO IMG S&I N/A 8/3/2017    Procedure: Coronary Angiogram;  Surgeon: Abhinav Redmond MD;  Location: [facility] Cath Lab;  Service: Cardiology     NE L HRT CATH W/NJX L VENTRICULOGRAPHY IMG S&I N/A 8/3/2017    Procedure: Left Heart Catheterization with Left Ventriculogram;  Surgeon:  Abhinav Redmond MD;  Location: [facility] Cath Lab;  Service: Cardiology     ME RIGHT HEART CATH O2 SATURATION & CARDIAC OUTPUT N/A 8/3/2017    Procedure: Right Heart Catheterization;  Surgeon: Abhinav Redmond MD;  Location: [facility] Cath Lab;  Service: Cardiology        Allergies   Allergies   Allergen Reactions     Bee Venom Protein (Honey Bee) Anaphylaxis        Medications:  OUTpatient medications   Prior to Admission medications    Medication Sig Start Date End Date Taking? Authorizing Provider   amoxicillin (AMOXIL) 500 MG capsule Take 2,000 mg by mouth as needed (1 hour prior to dentist.).    Yes PROVIDER, HISTORICAL   aspirin 81 MG EC tablet Take 1 tablet (81 mg total) by mouth daily. 8/3/17  Yes Abhinav Redmond MD   b complex vitamins tablet Take 1 tablet by mouth daily.   Yes PROVIDER, HISTORICAL   cholecalciferol, vitamin D3, 1,000 unit tablet Take 1,000 Units by mouth daily.   Yes PROVIDER, HISTORICAL   EPINEPHrine (EPIPEN) 0.3 mg/0.3 mL atIn Inject 0.3 mg into the shoulder, thigh, or buttocks once as needed (allergic reaction).   Yes PROVIDER, HISTORICAL   MULTIVIT &MINERALS/FERROUS FUM (MULTI VITAMIN ORAL) Take 1 tablet by mouth every other day.   Yes Franko Franz MD   OMEGA-3/DHA/EPA/FISH OIL (FISH OIL-OMEGA-3 FATTY ACIDS) 300-1,000 mg capsule Take 2 g by mouth daily.   Yes PROVIDER, HISTORICAL          Medications:  INpatient medications     acetaminophen  650 mg Oral Q6H    Or     acetaminophen  650 mg Rectal Q6H     aspirin  81 mg Oral DAILY     [START ON 9/22/2017] bisacodyl  10 mg Rectal Once     docusate sodium  100 mg Oral BID     furosemide  20 mg Intravenous BID - diuretic     heparin (PF)  5,000 Units Subcutaneous Q8H FIXED TIMES     insulin aspart (NovoLOG) injection   Subcutaneous TID with meals     insulin aspart (NovoLOG) injection   Subcutaneous QHS     [START ON 9/21/2017] magnesium hydroxide  30 mL Oral DAILY     melatonin  3 mg Oral QHS     omeprazole  20  mg Oral QAM AC     polyethylene glycol  17 g Oral DAILY          Family History Social History     Family History   Problem Relation Age of Onset     Breast cancer Mother      Hemangiomas Sister        Social History     Social History     Marital status: Single     Spouse name: N/A     Number of children: N/A     Years of education: N/A     Occupational History     Not on file.     Social History Main Topics     Smoking status: Former Smoker     Quit date: 1/14/2007     Smokeless tobacco: Never Used     Alcohol use No     Drug use: No     Sexual activity: Not on file     Other Topics Concern     Not on file     Social History Narrative      Psychosocial Needs  Social History     Social History Narrative     Additional psychosocial needs reviewed per nursing assessment.     PHYSICAL EXAM   VITALS:  /59  Pulse 79  Temp 98.6  F (37  C) (Oral)   Resp 18  Wt 216 lb (98 kg)  SpO2 96%  BMI 27.92 kg/m2  Temp:  [97.9  F (36.6  C)-100.8  F (38.2  C)] 98.6  F (37  C)  Heart Rate:  [79-80] 79  Resp:  [12-20] 18  BP: ()/(55-73) 107/59  SpO2:  [91 %-100 %] 96 %  Arterial Line BP: ()/(40-88) 86/71  FiO2 (%):  [30 %-100 %] 30 %  PHYSICAL EXAM:   General:  Sitting in chair, no acute distress  Skin:  Midline sternotomy intact  Head:  Normocephalic, atraumatic  Eyes:  Sclera anicteric,   Mouth/Throat:  Oropharynx clear  Neck:  Supple, trachea midine  Heart: S1S2, rrr,   Lungs: CTA through out  Abdomen:  Soft, round, bs active all four quadrants  Vascular:  Pt and DP palpable  Neurologic:  Grossly non-focal     I&O:      Intake/Output Summary (Last 24 hours) at 09/20/17 1233  Last data filed at 09/20/17 0910   Gross per 24 hour   Intake           3806.6 ml   Output             2193 ml   Net           1613.6 ml       Pertinent Labs   Lab Results: personally reviewed.     Serum Glucose range:  Recent Labs      09/19/17   0743  09/19/17   0814  09/19/17   0846  09/19/17   0921  09/19/17   1043   POCGLU  98  114   111  110  123     ABG:  ABGs:   Lab Results   Component Value Date    PH 7.44 09/20/2017     CBC:    Results from last 7 days  Lab Units 09/20/17  0448 09/19/17  1507 09/19/17  1227 09/19/17  1050   LN-WHITE BLOOD CELL COUNT thou/uL 7.9  --  7.8 7.0   LN-HEMOGLOBIN g/dL 8.0* 8.4* 9.6* 9.0*   LN-HEMATOCRIT % 22.8*  --  27.5* 25.2*   LN-PLATELET COUNT thou/uL 108*  --  131* 75*   LN-NEUTROPHILS RELATIVE PERCENT % 79*  --  86* 76*   LN-MONOCYTES RELATIVE PERCENT % 9  --  3 1*     Chemistry:    Results from last 7 days  Lab Units 09/20/17  0448 09/19/17  1227 09/18/17  0833   LN-SODIUM mmol/L 140 142 142   LN-POTASSIUM mmol/L 4.1 4.9 4.6   LN-CHLORIDE mmol/L 111* 114* 106   LN-CO2 mmol/L 24 22 29   LN-BLOOD UREA NITROGEN mg/dL 17 17 22   LN-CREATININE mg/dL 0.79 0.82 1.02   LN-CALCIUM mg/dL 8.2* 8.3* 9.5   LN-MAGNESIUM mg/dL 2.1 2.3 1.8     Coags:  Lab Results   Component Value Date    INR 1.34 (H) 09/20/2017    INR 1.36 (H) 09/19/2017    INR 1.47 (H) 09/19/2017           Microbiology  MRSA screeen on 9/18 negative     Pertinent Radiology   Radiology Results: Personally reviewed image/s  EKG Results: personally reviewed.        Outside reports reviewed:  Historical medical records

## 2021-06-13 NOTE — PROGRESS NOTES
Spiritual Care Note    Spiritual Assessment:   referred to patient this morning in the [facility]; patient here for heart testing day. Patient spoke openly about his upcoming surgery, states he did expect that it would happen at some point, and admits he has had some anxiety and negative anticipation of his upcoming surgery. Patient's wife shares feeling she is more worried than patient but both do seem hopeful for a good outcome. 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 Patient reflected on his own severiano journey and this seems to be giving him comfort at this time.  notes no concerns.     Care Provided:  shared prayer with patient, offered encouragement and support and provided information on  availability.     Plan of Care: Spiritual care will continue to follow as part of patient's care team.    BRANDYN Murrieta, BCC

## 2021-06-13 NOTE — ANESTHESIA POSTPROCEDURE EVALUATION
Patient: Obed Haley  * No procedures listed *  Anesthesia type: general    Patient location: PACU  Last vitals:   Vitals Value Taken Time   BP  11/23/20 1421   Temp  11/23/20 1421   Pulse 64 11/23/20 1420   Resp 21 11/23/20 1420   SpO2  11/23/20 1421   Vitals shown include unvalidated device data.  Post vital signs: stable  Level of consciousness: awake and responds to simple questions  Post-anesthesia pain: pain controlled  Post-anesthesia nausea and vomiting: no  Pulmonary: unassisted, return to baseline  Cardiovascular: stable and blood pressure at baseline  Hydration: adequate  Anesthetic events: no    QCDR Measures:  ASA# 11 - Valerie-op Cardiac Arrest: ASA11B - Patient did NOT experience unanticipated cardiac arrest  ASA# 12 - Valerie-op Mortality Rate: ASA12B - Patient did NOT die  ASA# 13 - PACU Re-Intubation Rate: ASA13B - Patient did NOT require a new airway mgmt  ASA# 10 - Composite Anes Safety: ASA10A - No serious adverse event    Additional Notes:

## 2021-06-13 NOTE — PROGRESS NOTES
ITP ASSESSMENT   Assessment Day: Initial  Session Number: 1  Precautions: Sternal/ Post Pacer  Diagnosis: Valve  Risk Stratification: High  Referring Provider: Dr Henao  EXERCISE  Exercise Assessment: Initial   Pt tolerated 10 of exercise on the Nustep for his first session, with met levels of 2.5. Denies any cardiac sx's.                       Exercise Plan  Goals Next 30 days  ADL'S: Resume driving and meal prep  Leisure: Resume light yardwork    Education Goals: Patient can state cardiac s/s and appropriate emergency response.;Has system for taking medication.;Medication review  Education Goals Met: Has system for taking medication.    Exercise Prescription  Exercise Mode: Treadmill;Bike;Nustep  Frequency: 2 x a week  Duration: 30-40 min  Intensity / THR: 20-30 beats above resting heart rate  RPE 11-14  Progression / Met level: 3-4  Resistive Training?: No    Current Exercise (mins/week): 30    Interventions  Home Exercise:  Mode: Walking  Frequency: 4-5 x a week  Duration: 20-30 min    Education Material : Educational videos;Provide written material;Individual education and counseling;Offer educational classes    Education Completed  Exercise Education Completed: Cardiac Anatomy;Signs and Symptoms;RPE;Emergency Plan;Home Exercise;FITT Principles;Warm up/cool down;BP/HR Reponse to exercise;Benefits of Exercise;End point of exercise            Exercise Follow-up/Discharge  Follow up/Discharge: Instructed on home walking program NUTRITION  Nutrition Assessment: Initial    Nutrition Risk Factors:  Nutrition Risk Factors: Dyslipidemia;Overweight  Cholesterol: 177  LDL: 122  HDL: 43  Triglycerides: 58    Nutrition Plan  Interventions  Nutrition Interventions: Diet consult;Diet class;Therapist/Patient discussion;Educational videos;Provide with written material    Education Completed  Nutrition Education Completed: Risk factor overview    Goals  Nutrition Goals (Next 30 days): Patient will follow a low sodium  Patient free from fall/injury, pain controlled at this time, BG being monitored, no acute distress or changes noted in patient's condition, will continue to monitor, administer meds as ordered, provide comfort/safety measures and notify MD of any changes in condition.   "diet;Patient will follow a low saturated fat diet;Patient knows appropriate portion size    Goals Met  Nutrition Goals Met: Completed Nutritional Risk Screen;Provided Rate your Plate Survey;Reviewed Dietitian schedule    Height, Weight, and  BMI  Weight: 210 lb (95.3 kg)  Height: 6' 1\" (1.854 m)  BMI: 27.71    Nutrition Follow-up  Follow-up/Discharge: Enc to return diet habit survey and consult with dietitian       Other Risk Factors  Other Risk Factor Assessment: Initial    HTN Risk Factor: NA    Pre Exercise BP: 111/71  Post Exercise BP: 108/58    Tobacco Risk Factor: NA      Risk Factor Follow-up   Follow-up/Discharge: Education and support for risk factors management.   PSYCHOSOCIAL  Psychosocial Assessment: Initial     [facility] L Summary Score: 19    AJITH-D Score: 2    Psychosocial Risk Factor: NA    Psychosocial Plan  Interventions  Interventions: Offer educational videos and classes;Provide written material;Individual education and counseling    Education Completed  Education Completed: Relaxation/Coping Techniques;S/S of depression;Effects of stress on body    Goals  Goals (Next 30 days): Identify stressors;Improvement in [employer] COOP score;Practicing stress management skills    Goals Met  Goals Met: Identified Support system;Oriented to stress management classes    Psychosocial Follow-up  Follow-up/Discharge: Pt denies any stress at this time, reviewed emotional aspects after heart surgery           Patient involved in Goal setting?: Yes    Signature: _____________________________________________________________    Date: __________________    Time: __________________See Doc Flowsheet  "

## 2021-06-13 NOTE — ANESTHESIA PREPROCEDURE EVALUATION
Anesthesia Evaluation      Patient summary reviewed   No history of anesthetic complications     Airway   Mallampati: II  Neck ROM: full   Pulmonary - normal exam   (+) sleep apnea on CPAP, ,                          Cardiovascular   Exercise tolerance: > or = 4 METS  (+) valvular problems/murmurs MVP and MR, , hypercholesterolemia,     ECG reviewed  Rhythm: regular  Rate: normal,    murmur      Neuro/Psych - negative ROS     Endo/Other - negative ROS      GI/Hepatic/Renal - negative ROS      Other findings: 8/2017 EKG: Sinus bradycardia with 1st degree A-V block     8/2017 ECHO: Left Ventricle  Normal size.The estimated left ventricular ejection fraction is 60%. This represents a normal ejection fraction. The left ventricular wall thickness is normal.     Wall Scoring    Resting Score Index: 1.000 Percent Normal: 100.0%           The left ventricular wall motion is normal.      Right Ventricle  Normal size and systolic function.    Left Atrium  Left atrial volume is moderately increased. Patent foramen ovale is not present. No thrombus present. No mass present. There is no atrial septal defect. Normal appearing left atrial appendage. Abnormal pulmonary vein connection. Systolic flow reversal in the pulmonary veins.    Right Atrium  Normal size.    Aortic Valve  The valve is tricuspid. There is focal thickening of the aortic valve. No stenosis. Trace regurgitation.    Mitral Valve  The following structural abnormalities were observed: non-specific thickening. no mitral stenosis present. Severe regurgitation. The jet is anterior directed and is eccentric. There is moderate prolapse of the middle segment of the anterior mitral leaflet. There is moderate prolapse of the middle segment of the posterior mitral leaflet. Medium sized flail portion of the posterior leaflet involving the middle segment.    Tricuspid Valve  Normal valve structure. There is no evidence of tricuspid stenosis. Trace tricuspid valve  regurgitation.    Pulmonic Valve  There is no significant stenosis. Trace regurgitation.    Thoracic Aorta  Normal caliber of the visualized aorta. Normal sized aortic root. No aneurysm present.    Pericardium  No pericardial effusion. Pericardium is normal.    7/24/17 cath:     The left ventricular size is normal. The left ventricular systolic function is normal. LV systolic pressure is normal. LV end diastolic pressure is elevated.    The left ventricular ejection fraction is grossly normal.    Estimated blood loss was <20 ml.    The LM vessel was large.    The LAD vessel was moderate .    The left circumflex was large.    The RCA was moderate.    Mid RCA lesion 50% stenosed.    Prox LAD to Mid LAD lesion 35% stenosed.          Dental - normal exam                        Anesthesia Plan  Planned anesthetic: general endotracheal  - glidescope  - 20mg methadone  - 50mg ketamine  ASA 3   Induction: intravenous   Anesthetic plan and risks discussed with: patient  Anesthesia plan special considerations: CVP line, arterial catheterization, pulmonary artery catheterization, dexmedetomidine  Post-op plan: extended intubation/vent support

## 2021-06-13 NOTE — PROGRESS NOTES
Pulmonary Critical Care    Patient doing well on pressure support trial 5/5.  Will extubate now, repeat ABG in one hour.    Aleida Gillespie CNP  [facility] Pulmonary Critical Care

## 2021-06-13 NOTE — PROGRESS NOTES
Pharmacy Consult: Warfarin Management    Pharmacy consulted to dose warfarin for Obed Haley, a 72 y.o. male    Ordering provider: Dr. Whittington    Reason for warfarin therapy: Atrial Fibrillation and Aortic Valve Replacement  Goal INR Range: 2-3    Subjective  Medication lists (home and hospital) were reviewed.      medications that may increase bleeding risk/INR:  Omeprazole, melatonin, asa      Home Warfarin Dosing: none, new start    Other Anticoagulants: none  Patient being bridged: No    Patient Active Problem List   Diagnosis     Asymmetrical sensorineural hearing loss     Non-rheumatic mitral regurgitation     Abnormal stress echo     S/P MVR (mitral valve repair)     S/P mitral valve replacement with tissue valve     Cardiac asystole     Tachycardia-bradycardia syndrome     Coronary artery disease involving native coronary artery of native heart without angina pectoris     SSS (sick sinus syndrome)    Past Medical History:   Diagnosis Date     High cholesterol      MVP (mitral valve prolapse)      Sleep apnea, obstructive     uses CPAP        Social History   Substance Use Topics     Smoking status: Former Smoker     Quit date: 1/14/2007     Smokeless tobacco: Never Used     Alcohol use No       Objective   Labs:  Last 3 days:    Recent Labs      09/22/17   1625  09/23/17   0414  09/24/17   0215  09/24/17   0503  09/25/17   0421   CREATININE   --   0.74  0.81  0.78   --    HGB  9.0*  8.5*  8.8*   --    --    HCT  25.9*  24.2*   --    --    --    PLT  125*  136*  169   --   173     Last 7 days:   Recent labs: (last 7 days)      09/19/17   1050  09/19/17   1227  09/20/17   0448  09/22/17   1625   INR  1.47*  1.36*  1.34*  1.15*       Warfarin Dosing History:    Date INR Warfarin Dose Comment   9/25 --- 7.5mg Dose per MD                       Assessment  The patient is initiating warfarin for the indication of Atrial Fibrillation and Aortic Valve Replacement with a goal INR of 2-3. INR today is  subtherapeutic.    Plan  1. Administer warfarin 7.5 mg PO today per MD.  2. Check INR daily or as appropriate.  3. Continue to follow the patient's INR, PLT, and HGB as available.  4. Monitor for potential drug/disease interactions.    Thank you for the consult,  Gabrielle Duran [facility] 9/25/2017 2:10 PM

## 2021-06-13 NOTE — H&P
H&P Pre-Procedure Update    Obed Haley    9/25/2017    Provider: JAIRO Jones    Patient presents for pacemaker implant. He is s/p MVR; POD #6 and has developed post operative SSS in need of permanent pacing. He has normal LV fxn.     The patient's preprocedure history and physical from this hospitalization is reviewed. There are no significant changes in the patient's clinical status. The patient's physical exam is essentially unchanged.  Questions regarding the patient's upcoming procedure were answered.  Risks benefits and expected outcomes were reviewed and the patient agrees to proceed.

## 2021-06-13 NOTE — ANESTHESIA PROCEDURE NOTES
Arterial Line  Reason for Procedure: hemodynamic monitoring  Patient location during procedure: OR pre-induction  Start time: 9/19/2017 6:56 AM  End time: 9/19/2017 7:06 AM  Staffing:  Performing  Anesthesiologist: PERCY SHI  Performing CRNA: BHARATHI TONY  Sterile Precautions:  sterile barriers used during insertion: cap, mask, sterile gloves, large sheet, and hand hygiene used.  Arterial Line:   Immediately prior to procedure a time out was called to verify the correct patient, procedure, equipment, support staff and site/side marked as required  Laterality: left  Location: brachial  Prepped with: ChloroPrep    Needle gauge: 20 G  Number of Attempts: 2  Secured with: tape, transparent dressing and pressure dressing  Flushed with: saline  1% lidocaine local anesthesia used for skin prep.   See MAR for additional medications given.  Ultrasound evaluation of access site: yes  Vessel patent by US exam    Concurrent real time visualization of needle entry

## 2021-06-13 NOTE — PROGRESS NOTES
"Progress Note    CV Surg   DOS 9/19/2017  POD # 2  EF 60%  A1c 5.5    Assessment/Plan  Has had three 3-5 second pauses, despite Amio being off since this morning, discussed with Dr. Swenson. Plan is to V pace at a rate of 80. Hold on Cardiology consult for now.     9/21- Was pacer dependent for slow junctional rate. Atrial fib last night and treated with Amio bolus and gtt. Currently Atrial fib with rate of 50-60 with back up pacer needed when rate drops to 35. Amio stopped.    1. S/P MVR-tissue- asa, heparin, NO BB   Pre op HR SBrady   At discharge: Coumadin for 3 month per Dr. Swenson routine  2. Acute resp failure- Supplemental O2  3l/nc  3. Acute blood loss anemia-8.0 - follow  4. Volume overload- weight up - lasix  5. Atrial fib- anticoagulation if remains in atrial fib     PLAN  Keep pacer for back up  Dc Amiiodarone for low HR  Decrease pacer rate to 40 and observe  Watch rate, if able cap pacer   Keep in ICU while needing pacer  Diurese  Keep CT while pacer wires in use    BMP in am    Subjective/Objective  Up in chair, NAD  Neuro's grossly intact  \"food would help\"      Vital signs in last 24 hours  /59  Pulse 67  Temp 98.4  F (36.9  C) (Oral)   Resp 15  Wt 217 lb (98.4 kg)  SpO2 94%  BMI 28.05 kg/m2  O2 Sat's on     Weight:   217 lb (98.4 kg)  Yesterday 98 kg  Pre op 95.3 kg  Admit 95.3 kg    Intake/Output this shift:  Urine output  450 cc/noc and 1,644 cc/24 hours  CT output: 80 cc/noc and 210 cc/24 hours    Ambulation:  Physical Exam  Neuro: A & O DEJESUS = tristian  Lungs: decreased bs at bases  Cor: paced,   CT: no air leak, output sero-sang- minimal output  INC: intact  ABD: soft, hypoactive,   EXT: trace edema LE, puffy hands/fingers      Pertinent Labs   Lab Results: personally reviewed.   Lab Results   Component Value Date     09/21/2017    K 4.3 09/21/2017     09/21/2017    CO2 25 09/21/2017    BUN 21 09/21/2017    CREATININE 0.86 09/21/2017    CALCIUM 8.4 (L) 09/21/2017     Lab " Results   Component Value Date    WBC 8.9 09/21/2017    HGB 8.7 (L) 09/21/2017    HCT 25.8 (L) 09/21/2017    MCV 94 09/21/2017    PLT 98 (L) 09/21/2017

## 2021-06-13 NOTE — PROGRESS NOTES
"DEVICE CLINIC RN POST-OP NOTE    Reason for visit: Post-operative wound and device check; s/p implant of a dual chamber, MRI conditionally safe pacemaker system.     Device: [employer] L331 ACCOLADE MRI EL, RA Lead: [employer] 4470 Fineline II Sterox EZ, RV Lead: [employer] 4471 Fineline II Sterox EZ  Procedure date: 09/25/2017  Implant Diagnosis: Sick Sinus Syndrome  Implanting Physician: Dr. Reji Whittington      Assessment  Subjective: \"This is feeling good.  I've not had any pain with the pacemaker.\"  Vitals:   Vitals:    10/03/17 0759   BP: 110/60   Pulse: 84   Resp: 18   Temp: 99.3  F (37.4  C)     Heart Sounds: Irregular rate and rhythm, no murmur appreciated  Lung Sounds: clear to auscultation bilaterally  Incision/device pocket: Clean, dry and intact with resolving trace ecchymosis and edema.  There are no signs of infection present.  The Steri-strips were removed at today's visit and the area cleaned of residual adhesive.      Device Findings  Interrogation of device reveals normal sensing and capture thresholds  See the Paceart Report for a full summary of the device information.    Other: Mr. Haley is noted to be in atrial fibrillation today, which started on 09/28/2017 at 1840 hours per his device log.  His underlying rate range is 60s-120s bpm.  Mr. Haley is unaware he has been in atrial fibrillation, he denies any symptoms.  Patient started on warfarin last week and had his INR drawn at the Outpatient Lab this morning.      Patient Education  Obed Haley was accompanied today by his partner, Ani.     [facility]'s Partnership Agreement for Device Patients and Post-operative Checklist were presented and reviewed with Mr. Haley and Ani at today's appointment.    Signs and symptoms of infection, poor wound healing, and boby device function were reviewed. Range of motion and weight restrictions for the left arm are for four weeks. He was issued a " [employer] Latitude NXT remote monitor and instructed on its set-up and use for remote monitoring by the [employer] representative prior to hospital discharge. These instructions were reviewed with the patient at today s visit. Mr. Haley asks many appropriate and probing questions about his pacemaker's function and purpose along with multiple questions related to atrial fibrillation and heart rhythms in general.  We were able to discuss his questions for approximately 30-35 minutes this morning.  Today's device results will be forwarded to Dr. Henao for review.       Plan  - One month remote device check on 10/23/2017  - Three month in-clinic post-operative wound and device check on 12/29/2017 at our [facility] location    Su Patrick RN, MA  Device Nurse  [facility]

## 2021-06-13 NOTE — PROGRESS NOTES
Pharmacy Note - Admission Medication History  Pertinent Provider Information:    ______________________________________________________________________  Prior To Admission (PTA) med list completed and updated in EMR.     PTA Med List   Medication Sig Last Dose     amoxicillin (AMOXIL) 500 MG capsule Take 2,000 mg by mouth as needed (1 hour prior to dentist.).  Unknown at Unknown time     aspirin 81 MG EC tablet Take 1 tablet (81 mg total) by mouth daily. 9/19/2017 at 0400     b complex vitamins tablet Take 1 tablet by mouth daily. Past Week at Unknown time     cholecalciferol, vitamin D3, 1,000 unit tablet Take 1,000 Units by mouth daily. 9/17/2017 at am     EPINEPHrine (EPIPEN) 0.3 mg/0.3 mL atIn Inject 0.3 mg into the shoulder, thigh, or buttocks once as needed (allergic reaction). Unknown at Unknown time     MULTIVIT &MINERALS/FERROUS FUM (MULTI VITAMIN ORAL) Take 1 tablet by mouth every other day. Past Week at Unknown time     OMEGA-3/DHA/EPA/FISH OIL (FISH OIL-OMEGA-3 FATTY ACIDS) 300-1,000 mg capsule Take 2 g by mouth daily. 9/8/2017       Information source(s): Patient  Patient was asked about OTC/herbal products specifically.  PTA med list reflects this.  Based on the pharmacist s assessment, the PTA med list information appears reliable  Allergies were reviewed, assessed, and updated with the patient.    Patient does not use any multi-dose medications prior to admission.   Thank you for the opportunity to participate in the care of this patient.    Ines Lopez, PharmD     9/19/2017     6:19 AM

## 2021-06-13 NOTE — TELEPHONE ENCOUNTER
ANTICOAGULATION  MANAGEMENT    Assessment     Today's INR result of 2.50 is Therapeutic (goal INR of 2.0-3.0)     - NOTE:  Prepping for Cardioversion INR target goal 2.5 - 3.5      Warfarin taken as previously instructed    - reported taking the one time booster warfarin dose on 11/12.    No new diet changes affecting INR    No new medication/supplements affecting INR    Continues to tolerate warfarin with no reported s/s of bleeding or thromboembolism     Previous INR was Therapeutic at 2.40 on 11/12/20.    Scheduled for Cardioversion on 11/23/20.    Plan:     Spoke on phone with Obed regarding INR result and instructed:    - ensure patient goes for his Covid-19 appt 11/19/20   - also, advised not to eat to much VItamin-K foods.    Warfarin Dosing Instructions:    - advised another one time booster with 7.5mg warfarin dose, then continue current warfarin dose 7.5 mg daily on Mon/Wed/Fri; and 5 mg daily rest of week.    Instructed patient to follow up no later than: one wk after Cardioversion.   - INR scheduled on 12/2/20 @ [place].    Education provided: impact of vitamin K foods on INR and target INR goal and significance of current INR result    Obed verbalizes understanding and agrees to warfarin dosing plan.    Instructed to call the [facility] for any changes, questions or concerns. (#[phone])   ?   Yolanda Krishna RN    Subjective/Objective:      Obed Haley, a 75 y.o. male is on warfarin.     Obed reports:     Home warfarin dose: as updated on anticoagulation calendar per template     Missed doses: No     Medication changes:  No     S/S of bleeding or thromboembolism:  No     New Injury or illness:  Yes:  Atrial fibrillation.     Changes in diet or alcohol consumption:  No     Upcoming surgery, procedure or cardioversion:  Yes: Cardioversion on 11/23/20.    Anticoagulation Episode Summary     Current INR goal:  2.0-3.0   TTR:  79.8 % (1 y)   Next INR check:  11/30/2020   INR from last check:  2.50  (11/19/2020)   Weekly max warfarin dose:     Target end date:  9/29/2017   INR check location:     Preferred lab:     Send INR reminders to:  [facility]       Comments:           Anticoagulation Care Providers     Provider Role Specialty Phone number    Franko Franz MD Referring Internal Medicine [phone]

## 2021-06-13 NOTE — PROGRESS NOTES
"  Clinical Nutrition Therapy Education and Follow Up Note    Current Nutrition Prescription:   Diet: cardiac    Current Nutrition Intake:  Pt has been eating 100% of smaller meals.    Anthropometrics:  Height: 6' 1.75\" (187.3 cm)  Admission weight:216#  Weight: 212 lb (96.2 kg)    Physical Findings:  The patient has the following physical signs which could indicate malnutrition: none    GI Status/Output:   The patient's GI symptoms include: BM today    Labs:  Labs reviewed    Malnutrition: Not noted    Nutrition Risk Level: low risk    Nutrition dx:  None    Intervention:  Gave pt 2 gram sodium diet education from [facility] Nutrition Care Manual.  Pt plans to go to [place] for outpatient cardiac rehab.  Encouraged pt to follow up with RD at cardiac rehab.      Monitoring:  Prn      "

## 2021-06-13 NOTE — PROGRESS NOTES
"Spiritual Care Note    Spiritual Assessment:   made a post surgery follow up visit this morning. Patient up in his chair when  arrives; wife is present. Patient seems in great spirits as he warmly welcomes the  into his rooms. Patient seems minimally disappointed as he talks about having a, \"Small complication,\" but is hoping it will work it's way out. Patient continues to have the support he needs at this time;  notes no concerns.     Care Provided:  shared prayer with patient, offered encouragement and support and provided information on  availability.     Plan of Care:  Spiritual care will continue to follow as part of patient's care team.    BRANDYN Murrieta, BCC    "

## 2021-06-13 NOTE — BRIEF OP NOTE
Brief Operative Note    Name:  Obed Haley  Location: [facility] Main OR  Procedure Date:  9/19/2017  PCP:  Franko Franz MD    Procedure:  Mitral valve replacement using a 33 mm [place] [employer] Epic biologic mitral valve and closure of a patent foramen ovale.    Pre-Procedure Diagnosis:    Mitral regurgitation [I34.0]     Post-Procedure Diagnosis:    Same  PFO    Surgeon(s):  MD Deneen Bishop [facility]    Findings:    Torn chordae of A2 and P2 and a myxomatous valve  Very large annulus    Estimated Blood Loss:   650 mL from 9/19/2017  6:51 AM to 9/19/2017 11:56 AM    Specimens:      ID Type Source Tests Collected by Time Destination   A : mitral valve Tissue Heart Valve, Mitral (Bicuspid) SURGICAL PATHOLOGY EXAM Monica Swenson MD 9/19/2017 0950           Drains:   Chest Tube 1 Anterior 36 Fr. (Active)   Function -20 cm H2O 9/19/2017 12:00 PM   Chest Tube Air Leak No 9/19/2017 12:00 PM   Patency Intervention Tip/tilt 9/19/2017 12:00 PM   Drainage Description Sanguineous 9/19/2017 12:00 PM   Dressing Status  Clean;Dry;Intact 9/19/2017 12:00 PM   Output (mL) 13 mL 9/19/2017 12:15 PM       Chest Tube 2 Anterior 36 Fr. (Active)   Function -20 cm H2O 9/19/2017 12:00 PM   Chest Tube Air Leak No 9/19/2017 12:00 PM   Patency Intervention Tip/tilt 9/19/2017 12:00 PM   Drainage Description Sanguineous 9/19/2017 12:00 PM   Dressing Status  Clean;Dry;Intact 9/19/2017 12:00 PM       NG/OG Tube Orogastric (Active)   Placement Verification Auscultation 9/19/2017 12:00 PM   Site Assessment Clean;Dry;Intact 9/19/2017 12:00 PM   Status Low continuous 9/19/2017 12:00 PM   Stabilization Other (Comment) 9/19/2017 12:00 PM       Urethral Catheter Non-latex;Temperature probe 16 Fr. (Active)   Site Skin Assessment Clean;Intact;Air leak 9/19/2017 12:00 PM   Care/Interventions Patent and draining 9/19/2017 12:00 PM   Securement Method Stabilization device 9/19/2017 12:00 PM   Protocol 1: Patient excluded from protocol? Yes,  Specific provider order to keep catheter in place 9/19/2017 12:00 PM   Protocol 2: Indication to continue catheter Output monitoring in critical patient 9/19/2017 12:00 PM   Drainage Color Doris 9/19/2017 12:00 PM       Implants:    Implant Name Type Inv. Item Serial No.  Lot No. LRB No. Used Action   PLEDGET PTFE SOFT 9X5           [id number] - [id number] IMPLANT MESH/GEN SURG PLEDGET PTFE SOFT 9X5           [id number]  AESCULAP [id number] N/A 6 Implanted   WIRE STERNAL SUTURE SINGLE #6    046-032 - [id number] 98-OTHER IMPLANTS WIRE STERNAL SUTURE SINGLE #6    046-032  A&E MED [id number] N/A 1 Implanted   WIRE STERNAL SUTURE DOUBLE #6    046-267 - [id number] 98-OTHER IMPLANTS WIRE STERNAL SUTURE DOUBLE #6    046-267  A&E MED [id number] N/A 1 Implanted   CLIP LIGATION HORIZON MED MULTI     2204 - [id number] - [id number] [employer] CLIP LIGATION HORIZON MED MULTI     2204 - 985120  TELEFLEX [id number] N/A 1 Implanted   CLIP LIGATION HORIZON SMALL MULTI PK - [id number] - [id number] STAPLING CLIP LIGATION HORIZON SMALL MULTI PK - [id number]  WECK [id number] N/A 1 Implanted   LEAD PACING MYOCARDIAL TEMPORARY   6495F - [id number] Lead LEAD PACING MYOCARDIAL TEMPORARY   6495F  [employer] [id number] N/A 2 Implanted   RELOAD COR KNOT MULTI             376950 - [id number] STAPLING RELOAD COR KNOT MULTI             968503  LSI [id number] N/A 4 Implanted   VALVE TISSUE EPIC MITRAL SZ 33 E871-72H - [id number] - [id number] HEART VALVES VALVE TISSUE EPIC MITRAL SZ 33 C587-95A - [id number] ST SAVITA   N/A 1 Implanted       Complications:    None    Monica Swenson     Date: 9/19/2017  Time: 12:33 PM

## 2021-06-13 NOTE — PROGRESS NOTES
PULMONARY / CRITICAL CARE PROGRESS NOTE    Date / Time of Hospital Admission:  9/19/2017  5:51 AM    ID:  Obed Haley is a 72 y.o. male with coronary artery disease, mitral valve prolapse with regurgitation, patent foramen ovale, KYLE on CPAP, and remote tobacco use (quit 2007) who was admitted to [facility] on 9/19/17 after tissue mitral valve replacement and closure of patent foramen ovale with Dr. Swenson, now transferred back to the ICU for recurrent bradyarrythmia and transient asystole.     Assessment:   1. Transient asystole with intermittent bradyarryhthmias.  Patient's rhythm strip shows 12 second pause and lost consciousness during the event, received CPR for less than 10 seconds with return of spontaneous circulation.  Patient had persistent bradycardia post-operatively x 2 days, necessitating epicardial lead pacing.  Notably, this was also complicated by atrial fibrillation with use of amiodarone for rate/rhythm control on 9/20.  On 9/22, EKG demonstrated slow atrial fibrillation with competing junctional pacemaker and per RN on 4100, patient had junctional rhythm earlier in the day.  Ultimately, high risk of recurrence and may require pacemaker.    2. Symptomatic mitral valve prolapse and regurgitation s/p tissue mitral valve replacement, 9/19/17.  OR with Dr. Swenson for 33 mm [facility] [place] Epic biologic mitral valve and closure of a patent foramen ovale.  Found to have very large annulus and torn chordae of A2 and P2 as well as a myxomatous valve intraoperatively.   mL.   Extubated POD # 0.  3. Patent foramen ovale s/p PFO closure, 9/19/17.  4. Nonobstructive coronary artery disease.  Angiogram 8/3/17 showing 30% mid-LAD stenosis, 50% mid-RCA stenosis, minimal disease on the left main and the circumflex.  Stress echo 7/18 with LVEF 55%.   5. Post-operative atrial fibrillation.    6. Obstructive sleep apnea on CPAP.  7. Normocytic anemia.      Advance Directives:  Received      Plan:    Systems to Assess:     Pulmonary: Wean supplemental O2 as tolerated; goal O2 sat > 92%.  HOB > 30 degrees to limit aspiration risk.      Cardiovascular: Cardiac monitoring.     SBP > 90 mmHg, MAP > 65 mmHg.      External pacemaker attached to epicardial leads - backup rate 70    Metoprolol discontinued    Anticipate need for permanent pacemaker - this was discussed with the patient, and explained that CVS team will speak with him in more detail regarding this, if indicated.      Continue ASA.  Continue heparin gtt with Afib.      EKG PRN.  Follow-up electrolytes/K/Mg.     Neurological: Neuro checks per ICU protocol.     Pain control: PRNs.     GI/: cardiac diet.     GI prophylaxis:  Not indicated.    Renal: Monitor I/O's.  Electrolyte repletion PRN.  Avoid/limit nephrotoxic agents.     IVFs: Saline lock.     Follow-up electrolytes (diuresed 9/23).     Heme/Coag: Monitor H/H.     DVT prophylaxis:  Heparin infusion    Infectious disease: General precautions.     Endocrine: FSBG QAC/QHS, Aspart insulin SS.     Musculoskeletal: Bedrest    Lines: PIVs.      Activity: Bed Rest    Code Status:  Full code.     The patient and/or the family was educated about the above plan of care and indicated understanding.      This patient is considered critically ill and requires ICU level of care due to transient asystole and recurrent bradyarrhythmias, high risk of hemodynamic instability or death.    Total Critical Care time, not including separate billable procedure time:  35 minutes.    Mary Lou Soto MD, MPH  Pulmonary & Critical Care Medicine  Pager: [phone]  9/24/2017  3:52 AM       ICU Checklist:   ICU DAILY CHECKLIST                         Can patient transfer out of MICU? no    FAST HUG:    Feeding:  Feeding: Yes.  Patient is receiving ORAL    Nelson:No  Analgesia/Sedation:Not Indicated   Thromboembolic prophylaxis: yes; Mode:  Heparin  HOB>30:  Yes  Stress Ulcer Protocol Active: not applicable; Mode: Not  Indicated  Glycemic Control: Any glucose > 180 no; Mode of Insulin Therapy: Sliding Scale Insulin    INTUBATED:  Can patient have daily waking:  not applicable  Can patient have spontaneous breathing trial:  not applicable    Restraints? no    PHYSICAL THERAPY AND MOBILITY:  Can patient have PT and mobility trial: no  Activity: Bed Rest     Subjective:   HPI:  Obed Haley is a 72 y.o. male with coronary artery disease, mitral valve prolapse with regurgitation, patent foramen ovale, KYLE on CPAP, and remote tobacco use (quit 2007) who was admitted to [facility] on 9/19/17 after tissue mitral valve replacement and closure of patent foramen ovale with Dr. Swenson, now transferred back to the ICU for recurrent bradyarrythmia and transient asystole.      9/19:  Extubated post-operatively.      9/20:  AV paced at 80 with underlying slow junctional rate.  Developed atrial fibrillation with HRs in the 80s-100s.  Started on amiodarone bolus/infusion per CVS team.    9/21: Taken off amiodarone due to underlying bradycardia with pacing turned off.  Later, had 3-5 second pauses.  Ultimately, patient had Afib HR 50s-60s, backup rate of 35 placed.    9/22:  HRs 60s-110s.  Started low-dose metoprolol.    9/23:  Evening metoprolol dose hold 2/2 bradycardia.      On morning of 9/24, patient had recurrent episodes of atrial fibrillation with HRs in the 90s.  Case discussed with CV surgery with metoprolol IV PRN ordered.  Patient received metoprolol 5 mg IV x 3 between 0028 and 0038 hr.  About 10 minutes later, nurse at the bedside talking to the patient when he became unconscious and was agonally breathing.  CPR initiated for a few seconds, and then patient had regained consciousness.  CODE BLUE initially called and then cancelled. In review of rhythm strips, had 12 second burst of asystole.      Patient is unaware of the events, denies any lightheadedness, dizziness, chest pain/pressure, dyspnea, wheezing, abdominal  "pain, numbness/tingling in the extremities.  No chest pain related to CPR.      Problem List: Principal Problem:    S/P MVR (mitral valve replacement)  Active Problems:    S/P MVR (mitral valve repair)      ALLERGIES: Bee venom protein (honey bee)    MEDS:  Reviewed.  Active include:      aspirin  81 mg Oral DAILY     docusate sodium  100 mg Oral BID     influenza vacc high dose (age 65 or >) (PF) 2017-18  0.5 mL Intramuscular Prior to Discharge     insulin aspart (NovoLOG) injection   Subcutaneous TID with meals     insulin aspart (NovoLOG) injection   Subcutaneous QHS     magnesium hydroxide  30 mL Oral DAILY     melatonin  3 mg Oral QHS     metoprolol succinate  12.5 mg Oral DAILY     metoprolol tartrate  12.5 mg Oral BID     metoprolol tartrate  6.25 mg Oral Once     omeprazole  20 mg Oral QAM AC     polyethylene glycol  17 g Oral DAILY     Continuous Infusions:    heparin infusion (100 units/ml) 10 Units/kg/hr (09/23/17 1551)        Objective:   VITALS:  /66 (Patient Position: Lying)  Pulse 90  Temp 100.4  F (38  C) (Oral)   Resp 21  Ht 6' 1.75\" (1.873 m)  Wt 198 lb 12.8 oz (90.2 kg)  SpO2 91%  BMI 25.7 kg/m2  Temp:  [98.4  F (36.9  C)-100.9  F (38.3  C)] 100.4  F (38  C)  Heart Rate:  [] 90  Resp:  [16-21] 21  BP: (101-124)/(55-67) 119/66  SpO2:  [91 %-100 %] 91 %    PHYSICAL EXAM:    GEN: Pleasant male, slightly anxious otherwise in no acute distress.   HEENT: Normocephalic, atraumatic.  Extraoccular eye movements intact, anicteric sclera. No sinus tenderness to palpation.  Moist mucous membranes.   NECK: Supple.    CHEST: Midsternal incision without any tenderness, drainage/bleeding.    PULM: Non-labored breathing.  No use of accessory muscles.  Clear to ausculation bilaterally.   CVS: Irregularly irregular rhythm. Normal S1, S2.  No rubs, murmurs, or gallops.    ABDOMEN: Normoactive bowel sounds.  Non-tender to palpation.  Non-distended.    EXTREMITES:  No clubbing, cyanosis.    NEURO:  " Awake.  Oriented to person, place, time and situation.  Cranial nerves 2-12 grossly intact.  Moving all extremities.      I&O:    Intake/Output Summary (Last 24 hours) at 09/24/17 0352  Last data filed at 09/24/17 0200   Gross per 24 hour   Intake          1063.99 ml   Output              425 ml   Net           638.99 ml       PERTINENT STUDIES:  Serum Glucose range:No results for input(s): POCGLU in the last 72 hours.  ABG:  No results for input(s): PHART, HKT6YUM, PO2ART, OXYHB, BEARTCALC, CARBOXYHGB, METHGB, POCPEEP, TEMP, 20458, POCRATE, POCFLOW, PSV in the last 72 hours.    Invalid input(s): [id number], 02SAT, VENTTIVOL  CBC:    Results from last 7 days  Lab Units 09/24/17 0215 09/23/17 0414 09/22/17  1625 09/21/17  0453  09/20/17  0448  09/19/17  1227 09/19/17  1050   LN-WHITE BLOOD CELL COUNT thou/uL  --  8.4 8.9 8.9  --  7.9  --  7.8 7.0   LN-HEMOGLOBIN g/dL 8.8* 8.5* 9.0* 8.7*  < > 8.0*  < > 9.6* 9.0*   LN-HEMATOCRIT %  --  24.2* 25.9* 25.8*  --  22.8*  --  27.5* 25.2*   LN-PLATELET COUNT thou/uL 169 136* 125* 98*  --  108*  --  131* 75*   LN-NEUTROPHILS RELATIVE PERCENT %  --   --   --   --   --  79*  --  86* 76*   LN-MONOCYTES RELATIVE PERCENT %  --   --   --   --   --  9  --  3 1*   < > = values in this interval not displayed.  Chemistry:    Results from last 7 days  Lab Units 09/24/17 0215 09/23/17 0414 09/22/17  0547 09/21/17  0453   LN-SODIUM mmol/L 132* 134* 135* 136   LN-POTASSIUM mmol/L 4.0  4.0 3.7 4.1 4.3   LN-CHLORIDE mmol/L 97* 98 102 103   LN-CO2 mmol/L 28 28 23 25   LN-BLOOD UREA NITROGEN mg/dL 21 19 19 21   LN-CREATININE mg/dL 0.81 0.74 0.72 0.86   LN-CALCIUM mg/dL 8.7 8.5 8.1* 8.4*   LN-MAGNESIUM mg/dL  --  1.9 2.0 2.6     Coags:  Lab Results   Component Value Date    INR 1.15 (H) 09/22/2017    INR 1.34 (H) 09/20/2017    INR 1.36 (H) 09/19/2017     Cardiac Markers:         Microbiology:    None.    ECG: Personally reviewed.     RADIOLOGY/IMAGING:    Chest X-Ray, 9/23/17: No  significant change in small left pleural effusion with atelectasis versus consolidation left lung base. Blunting of the right costophrenic angle suggests very small right pleural effusion. The cardiac silhouette appear normal. Median sternotomy wires are present. Mediastinal drains have been removed.     Outside reports reviewed: None.    Additional Comments:  None.

## 2021-06-13 NOTE — ANESTHESIA CARE TRANSFER NOTE
Last vitals:   Vitals:    09/19/17 1213   BP: 117/63   Pulse: 80   Resp: 14   Temp: 36.8  C (98.2  F)   SpO2: 100%     Patient's level of consciousness is unresponsive  Spontaneous respirations: no: sedated on vent  Maintains airway independently: no: sedated on vent  Dentition unchanged: yes  Oropharynx: endotracheal tube in place and oral gastric tube in place    QCDR Measures:  ASA# 20 - Surgical Safety Checklist: WHO surgical safety checklist completed prior to induction  PQRS# 430 - Adult PONV Prevention: 4558F - Pt received => 2 anti-emetic agents (different classes) preop & intraop  ASA# 8 - Peds PONV Prevention: NA - Not pediatric patient, not GA or 2 or more risk factors NOT present  PQRS# 424 - Valerie-op Temp Management: 4559F - At least one body temp DOCUMENTED => 35.5C or 95.9F within required timeframe  PQRS# 426 - PACU Transfer Protocol:NA - Patient did not go to PACU  ASA# 14 - Acute Post-op Pain: ASA14B - Patient did NOT experience pain >= 7 out of 10

## 2021-06-13 NOTE — PROGRESS NOTES
Patient independent with home cpap.  Required 3 liter blled o21. sats 98%     09/22/17 0510   Non-Invasive    Pt Owned Device Yes   NPPV Other   SpO2 97 %   Heart Rate 80   CPAP   Pt. Owned Device Yes   CPAP Pt. Interface Nasal pillows   CPAP Pressure (cmH2O) 9 cmH2O   CPAP O2 (L/min or FiO2) 3

## 2021-06-13 NOTE — ANESTHESIA POSTPROCEDURE EVALUATION
Patient: Obed Haley  MITRAL VALVE REPLACEMENT, ANESTHESIA TRANSESOPHAGEAL ECHOCARDIOGRAM, CLOSURE OF THE PFO  Anesthesia type: general    Patient location: ICU  Last vitals:   Vitals:    09/20/17 0830   BP: 109/56   Pulse: 80   Resp:    Temp:    SpO2: 96%     Post vital signs: stable  Level of consciousness: awake and responds to simple questions  Post-anesthesia pain: pain controlled  Post-anesthesia nausea and vomiting: no  Pulmonary: unassisted, return to baseline  Cardiovascular: stable and blood pressure at baseline  Hydration: adequate  Anesthetic events: no    QCDR Measures:  ASA# 11 - Valerie-op Cardiac Arrest: ASA11B - Patient did NOT experience unanticipated cardiac arrest  ASA# 12 - Valerie-op Mortality Rate: ASA12B - Patient did NOT die  ASA# 13 - PACU Re-Intubation Rate: ASA13B - Patient did NOT require a new airway mgmt  ASA# 10 - Composite Anes Safety: ASA10A - No serious adverse event    Additional Notes:

## 2021-06-13 NOTE — TELEPHONE ENCOUNTER
He just had a TSH done on 11/19 that was normal so I don't think another one needs to be done at this time. SUMAN

## 2021-06-13 NOTE — PROGRESS NOTES
Type: In-clinic post-op wound and device check.  Presenting Rhythm: Atrial fibrillation with both ventricular sensed and paced response, rate range 70-120s bpm.  Lead/Battery status: Stable lead and battery measurements.  Arrhythmias: 1 AT/AF episode remains in-progress, started 09/28/17 at 1840 hours (~4.5 days duration). [place] 76%, ventricular rates >/=120 bpm are 10-15%. No ventricular high rate detections.  Comments: Normal device function; reprogrammed RV output from 3.5 to 2.5 Trend. ATR Fallback rate reprogrammed from 70 to 60 bpm not only mirroring the lower programmed rate, but to decrease RV pacing, which is currently at 11%. Patient started on Warfarin last week, INR drawn this morning. All information routed to Dr. Henao for review. Please see post-op note in Epic.

## 2021-06-13 NOTE — ANESTHESIA CARE TRANSFER NOTE
Last vitals:   Vitals:    11/23/20 1353   BP: 135/85   Pulse: 80   Resp: 16   Temp:    SpO2: 100%     Patient's level of consciousness is drowsy  Spontaneous respirations: yes  Maintains airway independently: yes  Dentition unchanged: yes  Oropharynx: oropharynx clear of all foreign objects    QCDR Measures:  ASA# 20 - Surgical Safety Checklist: WHO surgical safety checklist completed prior to induction    PQRS# 430 - Adult PONV Prevention: 4558F - Pt received => 2 anti-emetic agents (different classes) preop & intraop  ASA# 8 - Peds PONV Prevention: NA - Not pediatric patient, not GA or 2 or more risk factors NOT present  PQRS# 424 - Valerie-op Temp Management: 4559F - At least one body temp DOCUMENTED => 35.5C or 95.9F within required timeframe  PQRS# 426 - PACU Transfer Protocol: - Transfer of care checklist used  ASA# 14 - Acute Post-op Pain: ASA14B - Patient did NOT experience pain >= 7 out of 10

## 2021-06-13 NOTE — PROGRESS NOTES
Progress Note    CV Surg   DOS 9/19/2017  POD # 1  EF 60%  A1c 5.5    Assessment/Plan    1. S/P MVR-tissue- asa, heparin,   Underlying rhythm is slow junction rate 40- A- paced 80   Pre op HR SBrady   Noted short run of VT vs SVT  At discharge: Coumadin for 3 month per Dr. Swenson routine  2. Acute resp failure  Supplemental O2  3l/nc    3. Acute blood loss anemia-8.0 - follow    PLAN  Keep paced   Replace mag.   Keep CT another day for excessive output  Keep paul  Keep in ICU while needing pacer  Diurese  Transition to SSI    BMP/Hgb in am    Subjective/Objective  Up in chair, NAD  Neuro's grossly intact  Wanting coffee    GTTs: insulin     Vital signs in last 24 hours  /59  Pulse 80  Temp 99.7  F (37.6  C) (Core)   Resp 18  Wt 216 lb (98 kg)  SpO2 98%  BMI 27.92 kg/m2  O2 Sat's on     Weight:   216 lb (98 kg)  Yesterday 95.3 kg  Pre op 95.3 kg  Admit 95.3 kg    Intake/Output this shift:  Urine output  321 cc/noc and 2,100 cc/24 hours  CT output: 110 cc/noc and 765 cc/24 hours    Ambulation:  Physical Exam  Neuro: A & O DEJESUS = tristian  Lungs: decreased bs at bases  Cor: paced,   CT: no air leak, output sero-sang- on high side  INC: intact  ABD: soft, hypoactive, taking po fluids  EXT: no edema      Pertinent Labs   Lab Results: personally reviewed.   Lab Results   Component Value Date     09/20/2017    K 4.1 09/20/2017     (H) 09/20/2017    CO2 24 09/20/2017    BUN 17 09/20/2017    CREATININE 0.79 09/20/2017    CALCIUM 8.2 (L) 09/20/2017     Lab Results   Component Value Date    WBC 7.9 09/20/2017    HGB 8.0 (L) 09/20/2017    HCT 22.8 (L) 09/20/2017    MCV 93 09/20/2017     (L) 09/20/2017

## 2021-06-13 NOTE — PROGRESS NOTES
"Progress Note    CV Surg   DOS 9/19/2017  POD # 4  EF 60%  A1c 5.5    Assessment/Plan  9/22-Doing well overall.  Underlying rhythm rate is 45-55 SR- /- cap wires and watch. Transfer to tele 40/41 9/22 Atrial fib rate variable :- trial low dose metoprolol. Dr. Hall changed pacer to atrial paced rate of 68 from Vent paced at 80.     9/21 Has had three 3-5 second pauses, despite Amio being off since this morning, discussed with Dr. Swenson. Plan is to V pace at a rate of 80. Hold on Cardiology consult for now.  Atrial fib last night and treated with Amio bolus and gtt. Currently Atrial fib with rate of 50-60 with back up pacer needed when rate drops to 35. Amio stopped.  9/20 Was A-V paced  With underlying slow junctional rate    1. S/P MVR-tissue- asa, heparin, BB   Pre op HR SBrady -50-60  At discharge: Coumadin for 3 month per Dr. Swenson routine  2. Acute resp failure- resolved- RA sat's 94%  3. Acute blood loss anemia-8.0 - follow  4. Volume overload- weight up 1.3 kg - lasix  5. Atrial fib-converted to SR  - heparin gtt.     PLAN  Transfer off 5100  Low dose of metoprolol if meets parameters  Will go to long acting in AM with parameters  EKG rhythm assessment  Cap wires   Continue diuresis one more day  Continue heparin    Follow rhythm  BMP in am    Subjective/Objective  Up in chair, NAD  Neuro's grossly intact    CXR:9/23  No significant change in small left pleural effusion with atelectasis versus consolidation left lung base. Blunting of the right costophrenic angle suggests very small right pleural effusion. The cardiac silhouette appear normal. Median   sternotomy wires are present. Mediastinal drains have been removed.    Vital signs in last 24 hours  /64  Pulse 68  Temp 99.1  F (37.3  C) (Oral)   Resp 16  Ht 6' 1.75\" (1.873 m)  Wt 213 lb (96.6 kg)  SpO2 94%  BMI 27.53 kg/m2  O2 Sat's on RA  95%    Weight:   213 lb (96.6 kg)  Yesterday 98.4 kg  Pre op 95.3 kg  Admit 95.3 " kg      Ambulation: 50 ft, up in chair and in room  Physical Exam  Neuro: A & O DEJESUS = tristina  Lungs: decreased bs at bases  Cor: Reg,irreg  INC: intact  ABD: soft, +BM  EXT: trace edema       Pertinent Labs   Lab Results: personally reviewed.   Lab Results   Component Value Date     (L) 09/23/2017    K 3.7 09/23/2017    CL 98 09/23/2017    CO2 28 09/23/2017    BUN 19 09/23/2017    CREATININE 0.74 09/23/2017    CALCIUM 8.5 09/23/2017     Lab Results   Component Value Date    WBC 8.4 09/23/2017    HGB 8.5 (L) 09/23/2017    HCT 24.2 (L) 09/23/2017    MCV 90 09/23/2017     (L) 09/23/2017

## 2021-06-13 NOTE — PROGRESS NOTES
Pt evaluated S/P MVR. Pt instructed on deep breathing and coughing techniques. BS clear and diminished. Flutter valve taught. Achieving 1500 ml on the IS. Plan is to continue Flutter valve QID and reevaluate in 24 hours.RT following.       09/19/17 2128   Reason for Assessment (RCAT)   RCAT Assesment Initial   Assessment Reason  Thoracic surgery   $ RCAT Eval Time 15 min.  Yes   Blood Gas Measurements   pH 7.39   PaCO2 35   PaO2 137   HCO3 22.4   SaO2 100   Vitals (RCAT)   Heart Rate 79   SpO2 98 %   Chart Assessment (RCAT)   Pulmonary Status  1   Surgical Status  3   Chest Xray  2   Patient Assessment (RCAT)    Respiratory Pattern  0   Mental Status  0   Breath Sounds  2   Cough Effectiveness  0   Level of Activity  1   O2 Required SpO2 >= 92%  1   Chart + Pt. Assessment Total Points  10   RCAT Acuity Score 10 (Acuity 2)   Clinical Indications (RCAT)   Aerosol Hygiene Physician order   RCAT Order Placed  Yes       Talya Spaulding, HUSSEINT

## 2021-06-13 NOTE — CONSULTS
PULMONARY / CRITICAL CARE CONSULTATION NOTE      Consultation -   Obed Haley,  1945, MRN [id number]  Date / Time of Admission:  2017  5:51 AM    Admitting Dx: Mitral regurgitation [I34.0]    PCP: Franko Franz MD, [phone]  Consulting physician:  Monica Swenson MD   Code status:  Prior       Extended Emergency Contact Information  Primary Emergency Contact: Ani Savage   [facility]  Home Phone: [phone]  Relation: Friend       Assessment   1. Severe MR  2. Nonobstructive CAD  3. MVR    Plan   Systems to Assess:   Pulmonary:   -Check ABG and adjust ventilator as needed   -Wean supplemental O2 as tolerated; goal O2 sat > 92%.    -HOB > 30 degrees to limit aspiration risk.    -PST when awake and hemodynamically stable  Cardiovascular: Arrives pacing via epicardial wires at 80 (heart rate in the 40's pre-op) and low dose epinephrine   -Cardiac monitoring.   -Vasoactive medications to be titrated per [facility] protocol  Neurological:    -Neuro checks per ICU protocol.    -Allow to wake fully from anesthesia/sedation.     -Pain control: aggressive while avoiding oversedation.   GI/:    -NPO for now until fully awake.    -Initiate bowel protocol early in setting of narcotic pain medications.    -GI prophylaxis: PPI  Renal:   -Monitor I/O's. Electrolyte repletion PRN. Avoid/limit nephrotoxic agents.   Heme/Coag: received 2 5 packs of platelets in case.   -Monitor H/H. Monitor chest tube output hourly. Transfuse as needed.    -DVT prophylaxis: scds/subcutaneous heparin   Infectious disease:    -General precautions. Remove lines and drains as soon as no longer needed.   Endocrine: FSBG Q4H. Initiate critical care insulin if blood sugars > 180 x 2.   Musculoskeletal: bedrest for now. Increase activity as he wakes up.     Lines: Arterial line left brachial, ETT #8, CVC with sg RIJ, urethral catheter all placed on 2017    Activity: bedrest until awake and hemodynamically  stable    The patient and/or the family were educated about the above plan of care.  They indicated understanding.  This patient is considered critically ill and requires ICU level of care due to immediate post-op cardiovascular surgery. High risk for hemodynamic collapse and death    TOTAL CRITICAL CARE TIME:  38 minutes.    Aleida Gillespie CNP  Pulmonary & Critical Care Medicine  9/19/2017  10:41 AM    ICU DAILY CHECKLIST   Can patient transfer out of MICU? no    FAST HUG:    Feeding:  npo  Nelson: yes  Analgesia/Sedation: precedex  Thromboembolic prophylaxis:  Heparin subcutaneous, scds  HOB>30:  yes  Stress Ulcer Protocol: ppi  Glycemic Control: ssi/insulin drip    INTUBATED:  Can patient have daily waking:  yes  Can patient have spontaneous breathing trial:  yes    Restraints? yes  Critical Care Medicine           9/19/20173:34 PM    Restraints:  Impulsive behavior, grabbing at support tubes/lines.   Bilateral soft wrist restraints in place.   Ongoing reassessment for restraint need.   Will d/c restraints when able to safely maintain support tubes in place with verbal redirection.    Aleida Gillespie CNP            Chief Complaint MVR       HPI    Obed Haley is a 72 y.o. old male with a history of nonobstructive CAD and severe MR who has been experiencing increased exertional dyspnea presents for a MVR today.    History is provided by review of the records.       Review of Systems   Pertinent items are noted in HPI.     Active Problem List   Patient Active Problem List    Diagnosis Date Noted     Abnormal stress echo 07/21/2017     Non-rheumatic mitral regurgitation 07/07/2017     Asymmetrical sensorineural hearing loss 02/13/2015        Medical/Surgical History   Past Medical History:   Diagnosis Date     High cholesterol      MVP (mitral valve prolapse)      Sleep apnea, obstructive     uses CPAP     Past Surgical History:   Procedure Laterality Date     KNEE ARTHROSCOPY  2007    left     LAMINECTOMY  1970, 2003     lumbar X2     TX CATH PLACEMENT & NJX CORONARY ART ANGIO IMG S&I N/A 8/3/2017    Procedure: Coronary Angiogram;  Surgeon: Abhinav Redmond MD;  Location: [facility];  Service: Cardiology     TX L HRT CATH W/NJX L VENTRICULOGRAPHY IMG S&I N/A 8/3/2017    Procedure: Left Heart Catheterization with Left Ventriculogram;  Surgeon: Abhinav Redmond MD;  Location: [facility];  Service: Cardiology     TX RIGHT HEART CATH O2 SATURATION & CARDIAC OUTPUT N/A 8/3/2017    Procedure: Right Heart Catheterization;  Surgeon: Abhinav Remdond MD;  Location: [facility];  Service: Cardiology        Allergies   Allergies   Allergen Reactions     Bee Venom Protein (Honey Bee) Anaphylaxis        Medications:  OUTpatient medications   Prior to Admission medications    Medication Sig Start Date End Date Taking? Authorizing Provider   amoxicillin (AMOXIL) 500 MG capsule Take 2,000 mg by mouth as needed (1 hour prior to dentist.).    Yes PROVIDER, HISTORICAL   aspirin 81 MG EC tablet Take 1 tablet (81 mg total) by mouth daily. 8/3/17  Yes Abhinav Redmond MD   b complex vitamins tablet Take 1 tablet by mouth daily.   Yes PROVIDER, HISTORICAL   cholecalciferol, vitamin D3, 1,000 unit tablet Take 1,000 Units by mouth daily.   Yes PROVIDER, HISTORICAL   EPINEPHrine (EPIPEN) 0.3 mg/0.3 mL atIn Inject 0.3 mg into the shoulder, thigh, or buttocks once as needed (allergic reaction).   Yes PROVIDER, HISTORICAL   MULTIVIT &MINERALS/FERROUS FUM (MULTI VITAMIN ORAL) Take 1 tablet by mouth every other day.   Yes Franko Franz MD   OMEGA-3/DHA/EPA/FISH OIL (FISH OIL-OMEGA-3 FATTY ACIDS) 300-1,000 mg capsule Take 2 g by mouth daily.   Yes PROVIDER, HISTORICAL          Medications:  INpatient medications     methadone  20 mg Intravenous Once     sodium chloride  3 mL Intravenous Line Care          Family History Social History     Family History   Problem Relation Age of Onset     Breast cancer  Mother      Hemangiomas Sister        Social History     Social History     Marital status: Single     Spouse name: N/A     Number of children: N/A     Years of education: N/A     Occupational History     Not on file.     Social History Main Topics     Smoking status: Former Smoker     Quit date: 1/14/2007     Smokeless tobacco: Never Used     Alcohol use No     Drug use: No     Sexual activity: Not on file     Other Topics Concern     Not on file     Social History Narrative      Psychosocial Needs  Social History     Social History Narrative     Additional psychosocial needs reviewed per nursing assessment.     PHYSICAL EXAM   VITALS:  /74 (Patient Position: Semi-butcher)  Pulse (!) 57  Temp 97.8  F (36.6  C) (Oral)   Resp 16  Wt 210 lb (95.3 kg)  SpO2 98%  BMI 27.15 kg/m2  Temp:  [97.8  F (36.6  C)] 97.8  F (36.6  C)  Heart Rate:  [57] 57  Resp:  [16] 16  BP: (147)/(72-74) 147/74  SpO2:  [98 %] 98 %  PHYSICAL EXAM:   General:  Lying in bed on the ventilator  Skin:  Midline sternotomy intact  Head:  Normocephalic, atraumatic  Eyes:  Sclera anicteric,   Mouth/Throat:  Orally intubated, trachea midline  Neck:  Supple, trachea midine  Heart: S1S2, rrr,   Lungs: CTA through out  Abdomen:  Soft, round, bs active all four quadrants  Vascular:  Pt and DP palpable  Neurologic:  Sedated     I&O:    Intake/Output Summary (Last 24 hours) at 09/19/17 1041  Last data filed at 09/19/17 0938   Gross per 24 hour   Intake                0 ml   Output              520 ml   Net             -520 ml       Pertinent Labs   Lab Results: personally reviewed.     Serum Glucose range:No results for input(s): POCGLU in the last 72 hours.  ABG:  ABGs: No results found for: PH  CBC:    Results from last 7 days  Lab Units 09/18/17  0833   LN-WHITE BLOOD CELL COUNT thou/uL 4.2   LN-HEMOGLOBIN g/dL 12.3*   LN-HEMATOCRIT % 36.4*   LN-PLATELET COUNT thou/uL 142     Chemistry:    Results from last 7 days  Lab Units 09/18/17  0833    LN-SODIUM mmol/L 142   LN-POTASSIUM mmol/L 4.6   LN-CHLORIDE mmol/L 106   LN-CO2 mmol/L 29   LN-BLOOD UREA NITROGEN mg/dL 22   LN-CREATININE mg/dL 1.02   LN-CALCIUM mg/dL 9.5   LN-MAGNESIUM mg/dL 1.8     Coags:  Lab Results   Component Value Date    INR 1.05 09/18/2017           Microbiology  MRSA screeen on 9/18 negative     Pertinent Radiology   Radiology Results: Personally reviewed image/s    Chest X-Ray:    EKG Results: personally reviewed.        Outside reports reviewed:  Historical medical records

## 2021-06-13 NOTE — TELEPHONE ENCOUNTER
ANTICOAGULATION  MANAGEMENT    Assessment     Today's INR result of 2.80 is Therapeutic (goal INR of 2.0-3.0)        Warfarin taken as previously instructed    No new diet changes affecting INR    No new medication/supplements affecting INR    Continues to tolerate warfarin with no reported s/s of bleeding or thromboembolism     Previous INR was Therapeutic at 2.50 on 11/19/20.    S/p Cardioversion on 11/23/20 for persistent Atrial Fib / flutter.    Heading to [place] on 12/13/20 for 3-4 wks.    Obed would like to get INR's tested in [place].  ([facility] in [place], [place].  I can send an INR standing order).    Plan:     Spoke on phone with Obed regarding INR result and instructed:     Warfarin Dosing Instructions:     Continue current warfarin dose 7.5 mg daily on Mon/Wed/Fri; and 5 mg daily rest of week.    Instructed patient to follow up no later than:  Scheduled on 12/10/20 during f/u @ [facility].    Education provided: importance of consistent vitamin K intake and target INR goal and significance of current INR result    Obed verbalizes understanding and agrees to warfarin dosing plan.    Instructed to call the [facility] for any changes, questions or concerns. (#[phone])   ?   Yolanda Krishna RN    Subjective/Objective:      Obed Haley, a 75 y.o. male is on warfarin.     Obed reports:     Home warfarin dose: as updated on anticoagulation calendar per template     Missed doses: No     Medication changes:  Yes:  Amiodarone discontinued on 11/20/20.     S/S of bleeding or thromboembolism:  No     New Injury or illness:  No     Changes in diet or alcohol consumption:  No     Upcoming surgery, procedure or cardioversion:  No    Anticoagulation Episode Summary     Current INR goal:  2.0-3.0   TTR:  78.8 % (1 y)   Next INR check:  12/9/2020   INR from last check:  2.80 (12/2/2020)   Weekly max warfarin dose:     Target end date:  9/29/2017   INR check location:     Preferred lab:     Send INR  reminders to:  [facility]       Comments:           Anticoagulation Care Providers     Provider Role Specialty Phone number    Franko Franz MD Referring Internal Medicine [phone]

## 2021-06-13 NOTE — PROGRESS NOTES
[facility] Follow Up Note    Obed Haley   72 y.o. male    Date of Visit: 9/29/2017    Chief Complaint   Patient presents with     Hospital Visit Follow Up     pacemaker inserted, open heart surgery at  09/19/17-09/27/17.needs INR and anticoagulation monitoring referral     Subjective  This is a 72-year-old man who recently underwent mitral valve surgery.  He had a somewhat complicated hospital course that included cardiac rhythm disturbances and an episode of asystole.  He subsequently had a pacemaker placed and was discharged earlier this week.  He comes in for follow-up.  He is on Coumadin and does need an INR checked today.  He had a little bit of fluid retention is in the midst of a 5 day course of furosemide.  He did also have some blood loss anemia and needs follow-up on his hemoglobin.  He is actually feeling reasonably good with no shortness of breath and no chest pain.  His incisions have all been clean and without difficulty.  He has started to ambulate a little more in the last 2 days and is feeling good while doing that.  No other new issues.    ROS A comprehensive review of systems was performed and was otherwise negative    Medications, allergies, and problem list were reviewed and updated    Exam  General Appearance:   On examination today his blood pressure is 118/68.  Weight is 212 pounds and height is 74 inches.  O2 saturation is 97%.    Neck is supple with no neck vein distention.    Lungs are clear.    Heart is in sinus rhythm with a rate of 80 and no ectopy.    Chest incision and pacemaker site appear clean.    No peripheral edema.    Patient is alert and oriented ×3.      Assessment/Plan  1. Tachycardia-bradycardia syndrome     2. Non-rheumatic mitral regurgitation     3. S/P MVR (mitral valve repair)  Hemoglobin    Ambulatory referral to Anticoagulation Monitoring   4. Medication management  Potassium    INR     Post mitral valve surgery.  He seems to be doing  well.    Pacemaker insertion.  The site looks clean and he will follow-up for checks with cardiology.    INR will be done today and he will be started in the anticoagulation program for management of his warfarin over the next 3 months.    We will recheck his potassium and hemoglobin today as well.    I would like to see him back in 1 week for follow-up.  Discharge medications were reconciled on this visit.    Total time of this office visit was 25 minutes with greater than 50% of the time spent in care coordination and patient counseling.  Body Mass Index was not assessed due to The patient was in for acute hospitalization follow-up..    Franko Franz MD      Current Outpatient Prescriptions on File Prior to Visit   Medication Sig     acetaminophen (TYLENOL) 325 MG tablet Take 2 tablets (650 mg total) by mouth every 6 (six) hours as needed.     amoxicillin (AMOXIL) 500 MG capsule Take 2,000 mg by mouth as needed (1 hour prior to dentist.).      aspirin 81 MG EC tablet Take 1 tablet (81 mg total) by mouth daily.     b complex vitamins tablet Take 1 tablet by mouth daily.     cholecalciferol, vitamin D3, 1,000 unit tablet Take 1,000 Units by mouth daily.     EPINEPHrine (EPIPEN) 0.3 mg/0.3 mL atIn Inject 0.3 mg into the shoulder, thigh, or buttocks once as needed (allergic reaction).     furosemide (LASIX) 40 MG tablet Take 1 tablet (40 mg total) by mouth daily for 5 days.     magnesium oxide (MAG-OX) 400 mg tablet Take 1 tablet (400 mg total) by mouth 3 (three) times a day for 2 days.     MULTIVIT &MINERALS/FERROUS FUM (MULTI VITAMIN ORAL) Take 1 tablet by mouth every other day.     OMEGA-3/DHA/EPA/FISH OIL (FISH OIL-OMEGA-3 FATTY ACIDS) 300-1,000 mg capsule Take 2 g by mouth daily.     oxyCODONE (ROXICODONE) 5 MG immediate release tablet Take 1-2 tablets (5-10 mg total) by mouth every 6 (six) hours as needed (5 mg for mild to moderate paint (pain score 3-4) or 10 mg for moderate pain (pain score 5-6)).      polyethylene glycol (MIRALAX) 17 gram packet Take 1 packet (17 g total) by mouth daily as needed.     warfarin (COUMADIN) 5 MG tablet Take One 5 mg tablet by mouth daily. Adjust dose based on INR results as directed.     No current facility-administered medications on file prior to visit.      Allergies   Allergen Reactions     Bee Venom Protein (Honey Bee) Anaphylaxis     Social History   Substance Use Topics     Smoking status: Former Smoker     Quit date: 1/14/2007     Smokeless tobacco: Never Used     Alcohol use No

## 2021-06-13 NOTE — PROGRESS NOTES
Vent Mode: CPAP/PSV  FiO2 (%):  [30 %-100 %] 30 %  S RR:  [14] 14  S VT:  [500 mL] 500 mL  PEEP/CPAP (cm H2O):  [5 cm H2O] 5 cm H2O  Minute Ventilation (L/min):  [6.8 L/min-14.5 L/min] 14.5 L/min  PIP:  [9 cm H2O-19 cm H2O] 15 cm H2O  NE SUP:  [8 cm H20-10 cm H20] 10 cm H20  MAP (cm H2O):  [6-8] 8      Pt currently on weaning trial with above settings sats 100%. Pt started on PS 8/5 but changed to PS 10 due to RR of 7. BS: clear/diminished. ABG x 1 results on flow sheet. X:ray: Right jugular vein sheath with Geneva-Daniela catheter with tip in the proximal pulmonary artery. Mediastinal drains, and nasogastric tube are new. There is a new tracheostomy tube is at el. There is mild blunting of the left costophrenic angle suggesting a small left pleural effusion. Pulmonary vasculature appears normal. Appears to be mild cardiomegaly. There is mild opacity in medial left lung base which may represent atelectasis or elevation. There are new median sternotomy wires. Per MUNIRA Shin okay not to adjust ETT, pt has good VT and comfortable. Continue to wean as able. RT following.    Corie Malcolm LRT

## 2021-06-13 NOTE — PROGRESS NOTES
Brief EP progress note:    Obed Haley is a 73 y/o WM, s/p MVR POD #7 and post operative SSS who underwent successful dual chamber PPM implant on 9/25/2017 with Dr. hWittington.     Left pre-pectoral device site is WNL. Steri-strips are C/D/I. No surrounding ecchymosis or hematoma.CXR demonstrates adequate lead placement and no evidence of pneumothorax. The PPM was interrogated and is functioning appropriately. Atrial and ventricular lead sensing, impedance, and pacing thresholds are stable and within normal limits.    PPM teaching was provided to the patient which included activity restrictions and reportable signs and symptoms. Patient was receptive to teaching and denies needs prior to discharge. He was provided with a temporary device card and owner's manual.     PLAN:  OK for discharge from an EP standpoint  We look forward to seeing Mr. Haley  in device clinic on 10/3/2017 @ 0750  Thank you for the opportunity to participate in the care of this patient  Please call if further EP issues arise    Sandra Irvin, CNP

## 2021-06-13 NOTE — DISCHARGE SUMMARY
Physician Discharge Summary    Primary Care Physician:  Franko Franz MD    Discharge Provider: Chloé Gino Debora     Admission Date: 9/19/2017. Admission Diagnoses: Mitral regurgitation [I34.0]  SSS (sick sinus syndrome) [I49.5]   Discharge Date: September 27, 2017   Date of service: 9/19/2017   Disposition: Home or Self Care  Condition at Discharge: Good      Discharge Diagnoses:    1. S/P MVR-tissue- asa, heparin, BB                        Pre op HR SBrady -50-60                        PPM - atrial paced-                        INR goal 2-3                         INR sub therapeutic- 1.25   Coumadin for 3 month per Dr. Swenson routine     2. Acute resp failure- resolved- RA sat's 94%  3. Acute blood loss anemia-8.0 - follow  4. Volume overload- at baseline  5. Atrial fib-converted to SR- Atrial fib RVR - atrial paced   6. Tachy contrears syndrome-  PPM placed 9/25   7. Fever 9/25 - WBC nml, procalcitonin nml, UA neg,        Consult/s: cardiology, pulmonary/intensive care and Pharmacy    Surgery: 9/19 Mitral valve replacement using a 33 mm [employer] Robin Epic biologic valve, primary closure of a patent foramen ovale, per Dr. Swenson.     Discharge Medications:      Medication List      START taking these medications          acetaminophen 325 MG tablet   Dose:  650 mg   Commonly known as:  TYLENOL   650 mg, Oral, Q6H PRN       furosemide 40 MG tablet   Quantity:  5 tablet   Dose:  40 mg   Start taking on:  9/28/2017   Commonly known as:  LASIX   40 mg, Oral, DAILY       influenza vacc high dose (age 65 or >) (PF) 2017-18 180 mcg/0.5 mL Syrg injection   Quantity:  0.5 mL   Dose:  0.5 mL   Commonly known as:  FLUZONE HIGH DOSE   0.5 mL, Intramuscular, Prior to Discharge       magnesium oxide 400 mg tablet   Quantity:  6 tablet   Dose:  400 mg   Commonly known as:  MAG-OX   400 mg, Oral, TID       oxyCODONE 5 MG immediate release tablet   Quantity:  30 tablet   Dose:  5-10 mg   Commonly known as:  ROXICODONE   5-10 mg,  Oral, Q6H PRN       polyethylene glycol 17 gram packet   Dose:  17 g   Commonly known as:  MIRALAX   17 g, Oral, Daily PRN       warfarin 5 MG tablet   Quantity:  35 tablet   Commonly known as:  COUMADIN   Take One 5 mg tablet by mouth daily. Adjust dose based on INR results as directed.         CONTINUE taking these medications          amoxicillin 500 MG capsule   Dose:  2000 mg   Commonly known as:  AMOXIL   2,000 mg, Oral, PRN       aspirin 81 MG EC tablet   Dose:  81 mg   81 mg, Oral, DAILY       b complex vitamins tablet   Dose:  1 tablet   1 tablet, Oral, DAILY       cholecalciferol (vitamin D3) 1,000 unit tablet   Dose:  1000 Units   1,000 Units, Oral, DAILY       EPINEPHrine 0.3 mg/0.3 mL Atin   Dose:  0.3 mg   Commonly known as:  EPIPEN   0.3 mg, Intramuscular, Once PRN       fish oil-omega-3 fatty acids 300-1,000 mg capsule   Dose:  2 g   2 g, Oral, DAILY       MULTI VITAMIN ORAL   Take 1 tablet by mouth every other day.           INR   Date/Time Value Ref Range Status   09/27/2017 06:16 AM 1.25 (H) 0.90 - 1.10 Final      Coumadin dose has been 7.5 mg x 3 days - 9/25. 9/26 and 9/27  Will have him take only 5 mg 9/28 and recheck INR on 9/29.    CXR: 9/26 ...small left pleural effusion with associated atelectasis vs consolidation left lower lobe. Blunting of the right costophrenic angle consistent with right pleural effusion.     Hospital Summary:   Mr. Haley is a 72 year old gentleman who had progressive dyspena on exertion. He has had some element of mitral regurgitation, that presented now as severe. He did not demonstrate obstructive coronary artery disease. On Sept 19 he underwent the above noted Mitral valve replacement per Dr. Swenson. He tolerated the procedure and was brought to the ICU in stable but critical condition. He was extubated on day of surgery and required no vaso active support. He did however require pacing via temporary pacer wires. His underlying rhythm was junctional of note he was  Sinus contreras pre op. His electrolytes where replaced and he remained in the ICU.    POD #2 Pt went into a rapid Atrial fib and he received a bolus and gtt of Amiodarone. Rhythm was then noted to slow to 45-55 with pacer back up required for rates dropping to 35. Because of this slow rate the Amiodarone was discontinued and pacer was changed to V pacing at the rate of 80. Kept in ICU because of pacer need and rhythm. changes, otherwise he was progressing on path. Diuresis was started.   POD #3 Continued in atrial fib with variable rate of 60- 116. Pacer was changed to atrial pacing rate of 68. Trial of low dose metoprolol was initiated. Chest tubes and paul catheter were removed. Heparin was started for underlying and intermittent atrial fib. Continued diuresis for volume overload.    POD#4 At times underlying rhythm looked as if it could be SR-SB but with predominant Atrial fib. Heparin was continued, Pacer wires were capped and it was felt he could transfer out of ICU. Later in the evening rhythm was junctional rates 40-60. He then went into an Atrial fib with rates . He received low dose IV metoprolol without change in rhythm, however ~15 minutes later pt went Asystole with agonal breathing. Compressions were started and a code was called. ROSC noted quickly and HR was  atrial fib. He was transferred back to ICU.   POD #5 Cardiology consulted and pacer planned for am    POD #6 Tolerated procedure well. But had a fever of 102.1 resolved with tylenol. A Blood culture was sent- and has no growth as of 9/27. UA was neg, Pro calcitonin normal, CXR reviewed with Dr. Hall. Small tristian effusions, some atelectasis on Left base.      POD #7 No further temp spikes. Reviewed discharge instructions/restrictions. Pt. Is to have INR checked every 3 days until at goal and stable. He will be instructed to take 7.5 mg tonight and 5 mg Thursday with a recheck INR on Friday to be dosed by Franko Franz MD.     Continue to use Flutter valve and IS until seen in CV surgery follow   Will continue gentle diuresis for an additional 5 days, then re evaluated in follow up by Dr. Franz and/or CV surgery for further diuresis.  He will continue coumadin for 3 months from CV surgery standpoint. Further coumadin therapy per Cardiology based on their recommendations.   Mr. Haley has done well, tolerating cardiac rehab. He does his IS to 1500 and is ready for discharge home today.       Discharge Instructions:    He should see Franko Franz MD in 3-5 days     INR check on Friday 9/30 and every 3 days until INR at goal and stable  Oct 3 7:50  Post op clinic device check   CV surg: Oct 17 at 10:40 wit [facility]  Cardiology: Oct 30 at 12:50 with Dr. Juliano Welch  Nov 1 Home device check    Diet: cardiac diet  Discharge instruction: No lifting > 10 lbs for 6-8 weeks  Sternal precautions  Wash incisions with antibacterial soap  No driving for 4 weeks      Vital Signs in last 24 hours:    Temp:  [98  F (36.7  C)-99.3  F (37.4  C)] 98.6  F (37  C)  Heart Rate:  [60-68] 68  Resp:  [18-20] 18  BP: (116-146)/(56-71) 117/61   RA sat's 94%     Physical Exam:    Neuro: Alert oriented, DEJESUS = tristian  Cor: Atrial paced  Lungs: decreased tristian bases, o/w clear  Incision: clean and dry sternal incision,   Abd: soft, large BM yesterday  Ext: no edema

## 2021-06-13 NOTE — PROGRESS NOTES
Obed BETTENCOURT Hernandopapoalexa has participated in 7 sessions of Phase II Cardiac Rehab.    Progress Report:   Cardiac Rehab Treatment Progress Report 10/20/2017 10/25/2017 10/27/2017 11/1/2017 11/3/2017   Weight -211 210 212 211 207 lbs   Pre Exercise  HR 92 92 94 73 106   Pre Exercise /64 90/50 120/68 118/50 106/68   Pre Blood Sugar (mg/dl) - - - - -   Treadmill Peak HR 95 105 93 100 78   Nustep Peak Heart Rate 111 95 90 106    Nustep Peak Blood Pressure - 108/60 130/68 120/58 116/60   Heart Rate 74 86 84 63 70   Post Exercise /60 90/52 110/60 108/58 88/60   ECG AFib/Paced rare PVC At- fib /Paced/ SR with 1 AVB SR, 1st Degree AV Block/Paced Afib/paced SR/afib/paced   Total Exercise Minutes 40 40 45 40 40         Current Status:  Obed will be in to see you on Monday, Nov. 6th. He has been participating in outpatient cardiac rehab 2x/week and is in/out of afib, his cardiologist is following. EF has lowered to 45%.        If Physician recommends change in treatment plan, please place orders.        __________________________________________________      _____________  Signature                                                                                                  DateSee Doc Flowsheet

## 2021-06-13 NOTE — TELEPHONE ENCOUNTER
Who is calling:  Patient  Reason for Call:  Patient returning phone call from ACN.   Date of last appointment with primary care: na  Okay to leave a detailed message: Yes

## 2021-06-13 NOTE — PROGRESS NOTES
Pt remain on RA, flutter valve and IS was instructed, pt tolerated well.  BBS clear/diminihes, no respiratory distress is noted.  Pt able to do flutter and IS self, no concern at this time.    Nisa Bello, LRT

## 2021-06-13 NOTE — PROGRESS NOTES
Patient on home cpap unit all night with 3 liter bleed.  Patient is independent.       09/23/17 0455   NPPV Other   Heart Rate 68   CPAP   Pt. Owned Device Yes   CPAP Pt. Interface Nasal pillows   CPAP Pressure (cmH2O) 9 cmH2O   CPAP O2 (L/min or FiO2) 3   Olivia Corrigan

## 2021-06-13 NOTE — PROGRESS NOTES
CV order from Dr Henao pt has a device and is on coumadin another INR 11/19  Pt is pre booked for 11/23 COVID in  Plan teach 11/17 pt is aware and has my direct number for contact

## 2021-06-13 NOTE — PROGRESS NOTES
Progress Note    CV Surg   DOS 9/19/2017  POD # 7  EF 60%  A1c 5.5    Assessment/Plan  9/26 Temp spike last evening, WBC- normal, CXR reviewed with [facility].  procalcitonin normal. Pacer to be interrogated. Plan dc in am if no further temp spikes and stable.    9/24-NPO after midnight in anticipation of Pacer in am.   Review of evening events: Atrial fib with CVR than  RVR treated with metoprolol, no immediate response but then went asystole with agonal breathing and code was called. ROSC within 15 sec of compressions and back in atrial fib.  Continue heparin, start coumadin after PPM.    9/22-Doing well overall.  Underlying rhythm rate is 45-55 SR- /- cap wires and watch. Transfer to tele 40/41 9/22 Atrial fib rate variable :- trial low dose metoprolol. Dr. Hall changed pacer to atrial paced rate of 68 from Vent paced at 80.     9/21 Has had three 3-5 second pauses, despite Amio being off since this morning, discussed with Dr. Swenson. Plan is to V pace at a rate of 80.  Atrial fib last night and treated with Amio bolus and gtt. Currently Atrial fib with rate of 50-60 with back up pacer needed when rate drops to 35. Amio stopped.  9/20 Was A-V paced  With underlying slow junctional rate    1. S/P MVR-tissue- asa, heparin, BB   Pre op HR SBrady -50-60  At discharge: Coumadin for 3 month per Dr. Swenson routine  2. Acute resp failure- resolved- RA sat's 94%  3. Acute blood loss anemia-8.0 - follow  4. Volume overload- 1 kg above admit- oral lasix  5. Atrial fib-converted to SR- Atrial fib RVR     6. PPM placed 9/25     PLAN  Cardiology consulted  Pacer in am  Continue heparin  Keep pacer connected     Subjective/Objective  Up in chair, NAD  Neuro's grossly intact    CXR:9/26  Left subclavian pacemaker with leads superimposed over the right atrium and right ventricle. Sternal wires. Small left pleural effusion with associated atelectasis versus consolidation left lower lobe. Blunting of the right  "costophrenic angle consistent with small right pleural effusion. Degenerative changes of the thoracic spine  Vital signs in last 24 hours  /69 (Patient Position: Lying)  Pulse 60  Temp 98.2  F (36.8  C) (Oral)   Resp 18  Ht 6' 1.75\" (1.873 m)  Wt 212 lb (96.2 kg)  SpO2 99%  BMI 27.4 kg/m2  O2 Sat's on RA  95%    Weight:   212 lb (96.2 kg)  Yesterday 96.6 kg  Pre op 95.3 kg  Admit 95.3 kg      Ambulation: 50 ft, up in chair and in room  Physical Exam  Neuro: A & O DEJESUS = tristian  Lungs: decreased bs at bases - IS   Cor: Reg,irreg- pacer back up   INC: intact  ABD: soft, +BM  EXT: trace edema       Pertinent Labs     Mag 2.0  Lab Results: personally reviewed.   Lab Results   Component Value Date     (L) 09/24/2017    K 4.5 09/26/2017    CL 99 09/24/2017    CO2 29 09/24/2017    BUN 19 09/24/2017    CREATININE 0.78 09/24/2017    CALCIUM 8.8 09/24/2017     Lab Results   Component Value Date    WBC 6.5 09/26/2017    HGB 8.7 (L) 09/26/2017    HCT 25.9 (L) 09/26/2017    MCV 94 09/26/2017     09/26/2017                 "

## 2021-06-13 NOTE — TELEPHONE ENCOUNTER
Who is calling:  Patient   Reason for Call:  Additional lab work -- see below  Date of last appointment with primary care:   Okay to leave a detailed message: Yes    TSH lab    Patient has INR scheduled through [facility]'s lab on 10/12 @ 1:30pm. He needs a TSH lab attached to this appointment.     Please call and assist with scheduling.

## 2021-06-13 NOTE — H&P
Patient was seen and examined by me. There has been no interval change in history or physical examination.  Ready for mitral valve repair/replacement.

## 2021-06-14 NOTE — TELEPHONE ENCOUNTER
FYI - Status Update  Who is Calling: Patient  Update: Would like to speak to Sue in regards to his INR. Please call back.  Okay to leave a detailed message?:  Yes

## 2021-06-14 NOTE — PROGRESS NOTES
Cardiac surgery    Mr. Haley is a 72 year-old man status post mitral valve replacement with a bioprosthetic valve for rheumatic mitral disease. He developed a large left pleural effusion postoperatively and this has recurred now. He actually feels fine from a respiratory standpoint. 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 He is in agreement with this plan and will be admitted next Tuesday. He will stop his Coumadin after today.    Prabhakar Hall. MD PhD  6:24 PM  12/08/17

## 2021-06-14 NOTE — PROGRESS NOTES
ITP ASSESSMENT   Assessment Day: 60 Day  Session Number: 13  Precautions: Sternal  Diagnosis: Valve  Risk Stratification: High  Referring Provider: Dr. Henao  EXERCISE  Exercise Assessment: Reassessment     Currently tolerates 40 min at 2.9-3 METS with no sx's.  States feeling much better after thoracentesis this month.  Higher HR's noted with med changes.                         Exercise Plan  Goals Next 30 days  ADL'S: Resume cleaning garage  Leisure: Put up Xmas tree and decorations    Education Goals: All goals in this section met  Education Goals Met: Medication review.;Has system for taking medication.;Patient can state cardiac s/s and appropriate emergency response.                        Goals Met  30 day ADL'S goals met: Start cleaning garage- not yet met, plans to resume this month  30 day Leisure goals met: Return to social outings with friends - goal met  30 Day Progression: Slower progress due to having a thoracentesis     Initial ADL's goals met: Met. Pt has resumed driving.  Initial Leisure goals met: Met. Pt has resumed light yardwork/housework.   Initial Progression: Currently exercising at 2.53-3 mets, increasing as pt tolerates.    Exercise Prescription  Exercise Mode: Treadmill;Bike;Nustep  Frequency: 2x/week  Duration: 40 min  Intensity / THR: 20-30 beats above resting heart rate  RPE 11-14  Progression / Met level: 3-3.5  Resistive Training?: No    Current Exercise (mins/week): 300    Interventions  Home Exercise:  Mode: walking  Frequency: 5x/week  Duration: 40-45 min    Education Material : Educational videos;Provide written material;Individual education and counseling;Offer educational classes    Education Completed  Exercise Education Completed: Cardiac Anatomy;Signs and Symptoms;RPE;Emergency Plan;Home Exercise;FITT Principles;Warm up/cool down;BP/HR Reponse to exercise;Benefits of Exercise;End point of exercise            Exercise Follow-up/Discharge  Follow up/Discharge: lower  "exercise tolerance noted due to needing a thoracentesis this past month NUTRITION  Nutrition Assessment: Reassessment    Nutrition Risk Factors:  Nutrition Risk Factors: Dyslipidemia;Overweight  Cholesterol: 177  LDL: 122  HDL: 43  Triglycerides: 58    Nutrition Plan  Interventions  Nutrition Interventions: Diet consult;Diet class;Therapist/Patient discussion;Educational videos;Provide with written material    Education Completed  Nutrition Education Completed: Low Saturated fat diet;Risk factor overview;Low sodium diet    Goals  Nutrition Goals (Next 30 days): Patient knows appropriate portion size;Patient will follow a low saturated fat diet;Patient will follow a low sodium diet    Goals Met  Nutrition Goals Met: Patient can identify their risk factors for CAD;Patient will maintain current weight or gradual weight gain;Patient follows a low sodium diet;Patient states following a low saturated fat diet    Height, Weight, and  BMI  Weight: 199 lb (90.3 kg)  Height: 6' 1\" (1.854 m)  BMI: 26.26    Nutrition Follow-up  Follow-up/Discharge: Pt has not yet met with the dietician. Encouraged him to return his diet survey asap, so he has a chance to meet with her.       Other Risk Factors  Other Risk Factor Assessment: Reassessment    HTN Risk Factor: NA    Pre Exercise BP: 98/62  Post Exercise BP: 96/60    Tobacco Risk Factor: NA    Risk Factor Follow-up   Follow-up/Discharge: reviewed his risk factor. He is knowledgable about them and what he needs to do about them.   PSYCHOSOCIAL  Psychosocial Assessment: Reassessment     Sade REID Q of L Summary Score: 19    AJITH-D Score: 2    Psychosocial Risk Factor: NA    Psychosocial Plan  Interventions  Interventions: Offer educational videos and classes;Provide written material;Individual education and counseling    Education Completed  Education Completed: Relaxation/Coping Techniques    Goals  Goals (Next 30 days): Patient demonstrates understanding of stress, no goals " identified for the next 30 days    Goals Met  Goals Met: Practicing stress management skills    Psychosocial Follow-up  Follow-up/Discharge: States handling any stress well           Patient involved in Goal setting?: Yes    Signature: _____________________________________________________________    Date: __________________    Time: __________________See Doc Flowsheet

## 2021-06-14 NOTE — PROGRESS NOTES
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    Will review with EP treatment of atrial tachycardia.  I do not believe that development of the LV systolic dysfunction is related to pacemaker as percentage of ventricular pacing is low.    I will stop metoprolol because of fatigue and hypotension.    We will decide about follow-up based on the results of chest x-ray      Subjective:   This is 72 y.o. male who comes in today for follow-up visit.  He was complaining of increasing shortness of breath after mitral valve replacement surgery.  Echocardiogram confirmed normal function of the prosthesis.  He had moderate pericardial effusion, decreased LV systolic function and moderate left pleural effusion.  He was started on furosemide.  He lost over 8 pounds.  He feels somewhat better but still short of breath with fairly modest physical activities.  He has not had PND orthopnea.  He does not feel irregular beating of the heart.      Review of Systems:   General: Weight Loss  Eyes: WNL  Ears/Nose/Throat: WNL  Lungs: Shortness of Breath  Heart: Shortness of Breath with activity, Irregular Heartbeat  Stomach: Heartburn  Bladder: WNL  Muscle/Joints: WNL  Skin: WNL  Nervous System: WNL  Mental Health: WNL     Blood: WNL    Objective:   /70 (Patient Site: Left Arm, Patient Position: Sitting, Cuff Size: Adult Regular)  Pulse 66  Resp 16  Ht 6' 1.75\" (1.873 " m)  Wt 207 lb 14.4 oz (94.3 kg)  BMI 26.87 kg/m2  Physical Exam:  GENERAL: no distress  NECK: No JVD  LUNGS: Absent breath sounds left lower half of the lung field  CARDIAC: regular rhythm, S1 & S2 normal.  No heaves, thrills, gallops or murmurs.  ABDOMEN: flat, negative hepatosplenomegaly, soft and non-tender.  EXTREMITIES: No evidence of cyanosis, clubbing or edema.    Current Outpatient Prescriptions   Medication Sig Dispense Refill     acetaminophen (TYLENOL) 325 MG tablet Take 2 tablets (650 mg total) by mouth every 6 (six) hours as needed.  0     amoxicillin (AMOXIL) 500 MG capsule Take 2,000 mg by mouth as needed (1 hour prior to dentist.).        aspirin 81 MG EC tablet Take 1 tablet (81 mg total) by mouth daily.  0     b complex vitamins tablet Take 1 tablet by mouth daily.       cholecalciferol, vitamin D3, 1,000 unit tablet Take 1,000 Units by mouth daily.       enalapril (VASOTEC) 2.5 MG tablet Take 1 tablet (2.5 mg total) by mouth 2 (two) times a day. 60 tablet 11     EPINEPHrine (EPIPEN) 0.3 mg/0.3 mL atIn Inject 0.3 mg into the shoulder, thigh, or buttocks once as needed (allergic reaction).       furosemide (LASIX) 40 MG tablet Take 1 tablet (40 mg total) by mouth daily. 30 tablet 3     ibuprofen (ADVIL,MOTRIN) 200 MG tablet Take 200 mg by mouth every 6 (six) hours as needed for pain.       MULTIVIT &MINERALS/FERROUS FUM (MULTI VITAMIN ORAL) Take 1 tablet by mouth every other day. Pt taking multi vitamin without Iron       sotalol (BETAPACE) 80 MG tablet Take 1 tablet (80 mg total) by mouth 2 (two) times a day. 60 tablet 11     warfarin (COUMADIN) 5 MG tablet Take One 5 mg tablet by mouth daily. Adjust dose based on INR results as directed. 35 tablet 3     No current facility-administered medications for this visit.        Cardiographics:    ECG: Normal sinus rhythm QTc 459    Pacemaker interrogation: Today frequent episodes of atrial tachycardia with atrial tracking.  No A. fib.  10% V  paced    Echocardiogram: November 2017    Left ventricle ejection fraction is moderately decreased. The estimated left ventricular ejection fraction is 35%.    There is a 33mm [employer] Robin Epic bioprosthetic mitral valve. Normal prosthetic valve function.    Right Ventricle: Normal size. The systolic function is mildly reduced.    Trace circumferential pericardial effusion. Pleural effusion is present.    Thoracic Aorta: There is effacement of the sinotubular ridge.    When compared to the previous study dated 11/1/2017, left ventricular systolic function appears decreased    Lab Results:       Lab Results   Component Value Date    CHOL 177 08/03/2017    CHOL 220 (H) 08/24/2016    CHOL 226 (H) 01/14/2015     Lab Results   Component Value Date    HDL 43 08/03/2017    HDL 52 08/24/2016    HDL 53 01/14/2015     Lab Results   Component Value Date    LDLCALC 122 08/03/2017    LDLCALC 158 (H) 08/24/2016    LDLCALC 163 (H) 01/14/2015     Lab Results   Component Value Date    TRIG 58 08/03/2017    TRIG 52 08/24/2016    TRIG 51 01/14/2015     No components found for: CHOLHDL  BNP   Date Value Ref Range Status   10/30/2017 245 (H) 0 - 70 pg/mL Final       Paulo (Sha)  MD Juliano

## 2021-06-14 NOTE — PROGRESS NOTES
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  We will need to reassess LV systolic function later on and decide about afterload reducing agents  Stop sotalol as it has not been very effective in controlling atrial tachycardia.  I did discuss antiarrhythmic therapy with Dr. Soto from EP services.  He recommended limited course of amiodarone.  We will start him on 200 mg a day.  I asked the patient to alert his anticoagulation nurse to the fact that he started taking amiodarone.    Follow-up in 4 weeks      Subjective:   This is 72 y.o. male who comes in today for follow-up visit.  He underwent thoracentesis of large left pleural effusion.  2 L of clear fluid was removed from left pleural space.  He has had immediate improvement of shortness of breath.  He has had mild pleuritic left-sided chest pain but otherwise feels much better than he did before thoracentesis.  He denies heart palpitations.  He has not had syncope.  He has not had lightheadedness when he stands up.      Review of Systems:   General: Weight Gain, Weight Loss  Eyes: WNL  Ears/Nose/Throat: WNL  Lungs: Cough  Heart: Irregular Heartbeat  Stomach: WNL  Bladder: WNL  Muscle/Joints: WNL  Skin: WNL  Nervous System: WNL  Mental Health: WNL    "  Blood: WNL    Objective:   BP 90/60 (Patient Site: Left Arm, Patient Position: Sitting, Cuff Size: Adult Regular)  Pulse 64  Resp 16  Ht 6' 1.75\" (1.873 m)  Wt 200 lb 3.2 oz (90.8 kg)  BMI 25.88 kg/m2  Physical Exam:  GENERAL: no distress  NECK: No JVD  LUNGS: Decreased breath sounds and crackles at the left lower half field  CARDIAC: regular rhythm, S1 & S2 normal.  No heaves, thrills, gallops or murmurs.  ABDOMEN: flat, negative hepatosplenomegaly, soft and non-tender.  EXTREMITIES: No evidence of cyanosis, clubbing or edema.    Current Outpatient Prescriptions   Medication Sig Dispense Refill     acetaminophen (TYLENOL) 325 MG tablet Take 2 tablets (650 mg total) by mouth every 6 (six) hours as needed.  0     amoxicillin (AMOXIL) 500 MG capsule Take 2,000 mg by mouth as needed (1 hour prior to dentist.).        aspirin 81 MG EC tablet Take 1 tablet (81 mg total) by mouth daily.  0     b complex vitamins tablet Take 1 tablet by mouth daily.       cholecalciferol, vitamin D3, 1,000 unit tablet Take 1,000 Units by mouth daily.       EPINEPHrine (EPIPEN) 0.3 mg/0.3 mL atIn Inject 0.3 mg into the shoulder, thigh, or buttocks once as needed (allergic reaction).       furosemide (LASIX) 40 MG tablet Take 1 tablet (40 mg total) by mouth daily. 30 tablet 3     ibuprofen (ADVIL,MOTRIN) 200 MG tablet Take 200 mg by mouth every 6 (six) hours as needed for pain.       MULTIVIT &MINERALS/FERROUS FUM (MULTI VITAMIN ORAL) Take 1 tablet by mouth every other day. Pt taking multi vitamin without Iron       warfarin (COUMADIN) 5 MG tablet Take One 5 mg tablet by mouth daily. Adjust dose based on INR results as directed. 35 tablet 3     amiodarone (PACERONE) 200 MG tablet Take 1 tablet (200 mg total) by mouth daily. 30 tablet 12     No current facility-administered medications for this visit.        Cardiographics:    Pacemaker interrogation:    frequent episodes of atrial tachycardia with atrial tracking.  No A. fib.  10% V " paced     Echocardiogram: November 2017    Left ventricle ejection fraction is moderately decreased. The estimated left ventricular ejection fraction is 35%.    There is a 33mm [employer] Robin Epic bioprosthetic mitral valve. Normal prosthetic valve function.    Right Ventricle: Normal size. The systolic function is mildly reduced.    Trace circumferential pericardial effusion. Pleural effusion is present.    Thoracic Aorta: There is effacement of the sinotubular ridge.    When compared to the previous study dated 11/1/2017, left ventricular systolic function appears decreased    Lab Results:       Lab Results   Component Value Date    CHOL 177 08/03/2017    CHOL 220 (H) 08/24/2016    CHOL 226 (H) 01/14/2015     Lab Results   Component Value Date    HDL 43 08/03/2017    HDL 52 08/24/2016    HDL 53 01/14/2015     Lab Results   Component Value Date    LDLCALC 122 08/03/2017    LDLCALC 158 (H) 08/24/2016    LDLCALC 163 (H) 01/14/2015     Lab Results   Component Value Date    TRIG 58 08/03/2017    TRIG 52 08/24/2016    TRIG 51 01/14/2015     No components found for: CHOLHDL  BNP   Date Value Ref Range Status   11/15/2017 332 (H) 0 - 70 pg/mL Final       Paulo (Sha)  MD Juliano

## 2021-06-14 NOTE — TELEPHONE ENCOUNTER
Called and spoke with Obed.     - he will be due to check INR anytime now or next week.     - he will call [facility] (Tel# [phone]) and schedule appt for next Tuesday.     - he will have them fax INR to us.

## 2021-06-14 NOTE — PROGRESS NOTES
Obed Haley has participated in 10 sessions of Phase II Cardiac Rehab.    Progress Report:   Cardiac Rehab Treatment Progress Report 11/1/2017 11/3/2017 11/8/2017 11/10/2017 11/15/2017   Weight 211 lb 207 lbs 203 lb 204lb 204 lb   Pre Exercise  HR 73 106 95 - 103   Pre Exercise /50 106/68 100/60 102/68 94/62   Treadmill Peak  78 109 113 117   Nustep Peak Heart Rate 106  111 when paced 100 100   Nustep Peak Blood Pressure 120/58 116/60 188/66 122/70 116/68   Heart Rate 63 70 66 70 100   Post Exercise /58 88/60 104/66 106/74 102/60   ECG Afib/paced SR/afib/paced SR/Paced/afib SR/Paced with underlying afib SR/Paced/Afib   Total Exercise Minutes 40 40 35 35 35         Current Status:  Currently exercising without complaints or symptoms. Limited by shortness of breath    If Physician recommends change in treatment plan, please place orders.        __________________________________________________      _____________  Signature                                                                                                  Date

## 2021-06-14 NOTE — TELEPHONE ENCOUNTER
Who is calling:  patient  Reason for Call:  Would like to speak to ACN regarding getting his INR done in [place]  Date of last appointment with primary care: n/a  Okay to leave a detailed message: Yes

## 2021-06-14 NOTE — TELEPHONE ENCOUNTER
Called and spoke with Obed.     - he said to refax INR standing order, caused they did not receive -  [facility].   - had INR checked on 12/28/20 and it was 2.30

## 2021-06-14 NOTE — TELEPHONE ENCOUNTER
Who is calling:  Patient  Reason for Call:  Patient asked for callback from ACN, patient did not state what message is regarding, states there are several things to go over.  Date of last appointment with primary care: na  Okay to leave a detailed message: Yes

## 2021-06-14 NOTE — TELEPHONE ENCOUNTER
Who is calling:  Patient  Reason for Call:  Information -- see below  Date of last appointment with primary care:   Okay to leave a detailed message: Yes    INR orders can be faxed to [phone].    Please reference patients name and  on orders.     Orders are going to [facility] in [facility].    Phone number to [facility] is [phone].

## 2021-06-14 NOTE — TELEPHONE ENCOUNTER
ANTICOAGULATION  MANAGEMENT    Assessment     Today's INR result of 2.30 is Therapeutic (goal INR of 2.0-3.0)        Warfarin taken as previously instructed    No new diet changes affecting INR    No new medication/supplements affecting INR    - taking ES Tylenol PRN for aches / pains.    Continues to tolerate warfarin with no reported s/s of bleeding or thromboembolism     Previous INR was Therapeutic at 2.10 on 12/10/20.    Plan:     Spoke on phone with Obed regarding INR result and instructed:   - advised to check INR sooner, if taking >2000mg of ES-Tylenol.    Warfarin Dosing Instructions:    - Continue current warfarin dose 5 mg daily on Mon/Wed/Fri; and 7.5 mg daily rest of week.    Instructed patient to follow up no later than: 4 wks.    Education provided: importance of consistent vitamin K intake, target INR goal and significance of current INR result, importance of notifying clinic for changes in medications and monitoring for bleeding signs and symptoms    Obed verbalizes understanding and agrees to warfarin dosing plan.    Instructed to call the [facility] for any changes, questions or concerns. (#[phone])   ?   Yolanda Krishna RN    Subjective/Objective:      Obed Haley, a 75 y.o. male is on warfarin.     Obed reports:     Home warfarin dose: verbally confirmed home dose with Obed and updated on anticoagulation calendar     Missed doses: No     Medication changes:  Yes:  Will take ES-Tylenol PRN for pain.     S/S of bleeding or thromboembolism:  No     New Injury or illness:  No     Changes in diet or alcohol consumption:  No     Upcoming surgery, procedure or cardioversion:  No    Anticoagulation Episode Summary     Current INR goal:  2.0-3.0   TTR:  83.6 % (11.5 mo)   Next INR check:  1/27/2021   INR from last check:  No new INR was available at last check   Weekly max warfarin dose:     Target end date:  9/29/2017   INR check location:     Preferred lab:     [facility]       Comments:           Anticoagulation Care Providers     Provider Role Specialty Phone number    Franko Franz MD Referring Internal Medicine [phone]

## 2021-06-14 NOTE — PROGRESS NOTES
Obed Haley has participated in 16 sessions of Phase II Cardiac Rehab.    Progress Report:   Cardiac Rehab Treatment Progress Report 11/24/2017 11/29/2017 12/1/2017 12/6/2017 12/8/2017   Weight - 199 lbs - - -   Pre Exercise  HR 79 113 106 72 98   Pre Exercise BP 96/62 98/62 118/60 118/78 114/74   Pre Blood Sugar (mg/dl) - - - - -   Treadmill Peak  128 112 106 114   Nustep Peak Heart Rate  120 110 - -   Nustep Peak Blood Pressure 114/62 124/68 124/64 - -   Heart Rate 81 88 106 80 95   Post Exercise BP 90/60 96/60 96/62 118/60 108/68   ECG paced pacer/ ST Paced/ST SR/ST/ Pacer Paced   Total Exercise Minutes 40 40 40 40 40         Current Status:  Continues to participate and tolerate well in outpatient cardiac rehab.    If Physician recommends change in treatment plan, please place orders.        __________________________________________________      _____________  Signature                                                                                                  DateSee Doc Flowsheet

## 2021-06-14 NOTE — TELEPHONE ENCOUNTER
RN cannot approve Refill Request    RN can NOT refill this medication med is not covered by policy/route to provider. Last office visit: 10/25/2018 Franko Franz MD Last Physical: 9/9/2020 Last MTM visit: Visit date not found Last visit same specialty: Visit date not found.  Next visit within 3 mo: Visit date not found  Next physical within 3 mo: Visit date not found      Marta Diana, [facility] Triage/Med Refill 2/2/2021    Requested Prescriptions   Pending Prescriptions Disp Refills     warfarin ANTICOAGULANT (COUMADIN/JANTOVEN) 5 MG tablet [Pharmacy Med Name: WARFARIN SODIUM 5 MG TABLET] 120 tablet 1     Sig: TAKE 1 TABLET (5MG) TO 1 & 1/2 TABLETS (7.5MG)DAILY, AS DIRECTED. ADJUST DOSE BASED ON INR RESULTS.9       There is no refill protocol information for this order            -now back at baseline SBPs in the 100s-110s. On chronic midodrine  -s/p 4L IVF. Hold off on additional fluids, encourage PO intake  -monitor VS q4h

## 2021-06-14 NOTE — PROGRESS NOTES
Add on pacemaker check for Dr. Henao.    Anupama Fernández, RN BSN  [facility]

## 2021-06-14 NOTE — PROGRESS NOTES
Obed Haley has participated in 12 sessions of Phase II Cardiac Rehab.    Progress Report: Dr. Henao   Cardiac Rehab Treatment Progress Report 11/8/2017 11/10/2017 11/15/2017 11/17/2017 11/24/2017   Weight - - - - -   Pre Exercise  HR 95 - 103 80 79   Pre Exercise /60 102/68 94/62 104/68 96/62   Pre Blood Sugar (mg/dl) - - - - -   Treadmill Peak  113 117 120 120   Nustep Peak Heart Rate 111 when paced 100 100 88    Nustep Peak Blood Pressure 188/66 122/70 116/68 128/64 114/62   Heart Rate 66 70 100 77 81   Post Exercise /66 106/74 102/60 112/64 90/60   ECG SR/Paced/afib SR/Paced with underlying afib SR/Paced/Afib SR/Paced/Afib paced   Total Exercise Minutes 35 35 35 40 40         Current Status:tolerating 40 min of exercise at 2.8 met level. Feeling much better after thorencentesis. Low BP without sx.    If Physician recommends change in treatment plan, please place orders.        __________________________________________________      _____________  Signature                                                                                                  DateSee Doc Flowsheet

## 2021-06-14 NOTE — TELEPHONE ENCOUNTER
Called and spoke with Obed,     - he would prefer to have INR standing order faxed to:    - [facility], [place], [place]      - Email - [email or web address]

## 2021-06-15 ENCOUNTER — COMMUNICATION - HEALTHEAST (OUTPATIENT)
Dept: ANTICOAGULATION | Facility: CLINIC | Age: 76
End: 2021-06-15

## 2021-06-15 ENCOUNTER — AMBULATORY - HEALTHEAST (OUTPATIENT)
Dept: LAB | Facility: CLINIC | Age: 76
End: 2021-06-15

## 2021-06-15 ENCOUNTER — OFFICE VISIT - HEALTHEAST (OUTPATIENT)
Dept: CARDIOLOGY | Facility: CLINIC | Age: 76
End: 2021-06-15

## 2021-06-15 DIAGNOSIS — I48.4 ATYPICAL ATRIAL FLUTTER (H): ICD-10-CM

## 2021-06-15 DIAGNOSIS — I48.0 PAROXYSMAL ATRIAL FIBRILLATION (H): ICD-10-CM

## 2021-06-15 DIAGNOSIS — Z79.01 LONG TERM (CURRENT) USE OF ANTICOAGULANTS: ICD-10-CM

## 2021-06-15 DIAGNOSIS — Z95.3 S/P MITRAL VALVE REPLACEMENT WITH TISSUE VALVE: ICD-10-CM

## 2021-06-15 DIAGNOSIS — I48.19 PERSISTENT ATRIAL FIBRILLATION (H): ICD-10-CM

## 2021-06-15 LAB — INR PPP: 2.1 (ref 0.9–1.1)

## 2021-06-15 RX ORDER — METOPROLOL SUCCINATE 50 MG/1
50 TABLET, EXTENDED RELEASE ORAL DAILY
Qty: 30 TABLET | Refills: 12 | Status: SHIPPED | OUTPATIENT
Start: 2021-06-15 | End: 2021-08-05

## 2021-06-15 ASSESSMENT — MIFFLIN-ST. JEOR: SCORE: 1799.94

## 2021-06-15 NOTE — ANESTHESIA CARE TRANSFER NOTE
Last vitals:   Vitals:    01/18/18 1324   BP: 147/76   Pulse: 69   Resp: 18   Temp:    SpO2: 100%     Patient's level of consciousness is drowsy  Spontaneous respirations: yes  Maintains airway independently: yes  Dentition unchanged: yes  Oropharynx: oropharynx clear of all foreign objects    QCDR Measures:  ASA# 20 - Surgical Safety Checklist: WHO surgical safety checklist completed prior to induction  PQRS# 430 - Adult PONV Prevention: 4558F - Pt received => 2 anti-emetic agents (different classes) preop & intraop  ASA# 8 - Peds PONV Prevention: NA - Not pediatric patient, not GA or 2 or more risk factors NOT present  PQRS# 424 - Valerie-op Temp Management: NA - MAC anesthesia or case < 60 minutes  PQRS# 426 - PACU Transfer Protocol: - Transfer of care checklist used  ASA# 14 - Acute Post-op Pain: ASA14B - Patient did NOT experience pain >= 7 out of 10

## 2021-06-15 NOTE — PROGRESS NOTES
"  Assessment:       1. S/P chest tube placement on 12/11/2017  XR Chest 2 Views        Plan:   S/P chest tube placement on 12/11/2017  Discharged to home on 12/14/2017  Presents today for a postop CV surgery follow up  Denies chest and or shortness of breath  Appetite is good, bowel movement is regular and voids without difficulties  No lower extremity edema  Repeat two view CXR today  Follow up with Dr Henao on Friday as scheduled  No need for any further CV surgery follow up, unless pleural effusions reoccurs    /76 (Patient Site: Left Arm, Patient Position: Sitting, Cuff Size: Adult Regular)  Pulse 72  Temp 97.8  F (36.6  C)  Resp 16  Ht 6' 1.75\" (1.873 m)  Wt 206 lb 3.2 oz (93.5 kg)  BMI 26.65 kg/m2    Current Outpatient Prescriptions   Medication Sig Note     amiodarone (PACERONE) 200 MG tablet Take 1 tablet (200 mg total) by mouth daily.      amoxicillin (AMOXIL) 500 MG capsule Take 2,000 mg by mouth as needed (1 hour prior to dentist.).       aspirin 81 MG EC tablet Take 1 tablet (81 mg total) by mouth daily.      b complex vitamins tablet Take 1 tablet by mouth daily. 11/6/2017: QOD     cholecalciferol, vitamin D3, 1,000 unit tablet Take 1,000 Units by mouth daily. 11/6/2017: QOD     EPINEPHrine (EPIPEN) 0.3 mg/0.3 mL atIn Inject 0.3 mg into the shoulder, thigh, or buttocks once as needed (allergic reaction).      furosemide (LASIX) 40 MG tablet Take 0.5 tablets (20 mg total) by mouth daily.      ibuprofen (ADVIL,MOTRIN) 200 MG tablet Take 200 mg by mouth every 6 (six) hours as needed for pain.      MULTIVIT &MINERALS/FERROUS FUM (MULTI VITAMIN ORAL) Take 1 tablet by mouth every other day. Pt taking multi vitamin without Iron 11/6/2017: QOD     warfarin (COUMADIN) 5 MG tablet Take One 5 mg tablet by mouth daily. Adjust dose based on INR results as directed. (Patient taking differently: 7.5 mg on Tuesday and Friday, 5 mg all other days of the week)      acetaminophen (TYLENOL) 325 MG tablet " Take 2 tablets (650 mg total) by mouth every 6 (six) hours as needed.          Subjective:        Patient ID: Obed Haley is a 72 y.o. male.    Chief Complaint:  HPI  The following portions of the patient's history were reviewed and updated as appropriate: allergies, current medications, past family history, past medical history, past social history, past surgical history and problem list.  Mr Haley is a 72-year-old male who was brought to the hospital on December 11, 2017 and had a chest tube placement  into the left pleura space  in interventional radiology, for recurrent pleural effusion.  He was Discharged to home on 12/14/2017 andPresents today for a postop CV surgery follow up.  He denies chest and or shortness of breath.  It should be noted that patient underwent a mitral valve replacement back in September 19, 2017 with a 33 mm [place] Robin epic biologic valve.  Post hospital discharge, patient developed left pleural effusion that had to be drained with thoracentesis twice prior to coming to the hospital for chest tube placement.  He states that he is appetite is good, bowel movement is regular and he voids without difficulties. No lower extremity edema is appreciated.  Will repeat two view CXR today.  Patient is scheduled to follow up with Dr Henao on Friday January 5, 2018.  No need for any further CV surgery follow up, unless pleural effusions reoccurs and need further intervention.      Review of Systems   Constitution: Negative.   HENT: Negative.    Eyes: Negative.    Cardiovascular: Negative.    Respiratory: Negative.    Hematologic/Lymphatic: Negative.    Skin: Negative.    Musculoskeletal: Negative.    Gastrointestinal: Negative.    Genitourinary: Negative.    Neurological: Negative.    Psychiatric/Behavioral: Negative.           Objective:     Physical Exam   Constitutional: He appears healthy. No distress.   HENT:   Nose: Nose normal.   Mouth/Throat: Dentition is normal. Oropharynx is clear.    Eyes: Conjunctivae are normal. Pupils are equal, round, and reactive to light.   Neck: Normal range of motion and thyroid normal. Neck supple.   Pulmonary/Chest: Effort normal and breath sounds normal. He has no wheezes. He has no rales. He exhibits no tenderness.   Abdominal: Soft. Bowel sounds are normal.   Musculoskeletal: Normal range of motion.   Neurological: He is alert and oriented to person, place, and time. He has normal motor skills, normal reflexes and intact cranial nerves. Gait normal.   Skin: Skin is warm and dry.

## 2021-06-15 NOTE — PROGRESS NOTES
ITP ASSESSMENT   Assessment Day: 90 Day  Session Number: 21  Precautions: sternal/pacemaker  Diagnosis: Valve  Risk Stratification: High  Referring Provider: Franko Franz MD   ITP: Dr. Henao  EXERCISE  Exercise Assessment: Reassessment  Pt is doing well in outpatient cardiac rehab. He would like to continue attending 2x/week until his Afib is better controlled. He definitely is bothered by this rhythm. As long as he has functional goals that he needs to meet, he will continue to attend.                            Exercise Plan  Goals Next 30 days  ADL'S: light shoveling of the driveway  Leisure: take down [place] decorations.   Work: continue cleaning garage    Education Goals: All goals in this section met  Education Goals Met: Medication review.;Has system for taking medication.;Patient can state cardiac s/s and appropriate emergency response.                        Goals Met  60 day ADL'S goals met: not met, it has been to cold to clean out the garage  60 day Leisure goals met: met. pt did put up [place] decorations.    60 Day Progression: Doing very well him exercise and tolerating increased workloads.    30 day ADL'S goals met: Start cleaning garage- not yet met, plans to resume this month  30 day Leisure goals met: Return to social outings with friends - goal met  30 Day Progression: Doing well with exercise. Tolerating some increased workloads and has a good home exercise routing at home.    Initial ADL's goals met: Met. Pt has resumed driving.  Initial Leisure goals met: Met. Pt has resumed light yardwork/housework.   Initial Progression: Currently exercising at 2.53-3 mets, increasing as pt tolerates.    Exercise Prescription  Exercise Mode: Treadmill;Bike;Nustep  Frequency: 2x/week  Duration: 40  Intensity / THR: 20-30 beats above resting heart rate  RPE 11-14  Progression / Met level: 3.2-3.5  Resistive Training?: Yes    Current Exercise (mins/week): 300    Interventions  Home Exercise:  Mode:  "walking  Frequency: daily  Duration: 40-60 minutes.     Education Material : Educational videos;Provide written material;Individual education and counseling;Offer educational classes    Education Completed  Exercise Education Completed: Cardiac Anatomy;Signs and Symptoms;RPE;Emergency Plan;Home Exercise;FITT Principles;Warm up/cool down;BP/HR Reponse to exercise;Benefits of Exercise;End point of exercise            Exercise Follow-up/Discharge  Follow up/Discharge: He has a very good system for keeping track of his daily walks.  NUTRITION  Nutrition Assessment: Reassessment    Nutrition Risk Factors:  Nutrition Risk Factors: Dyslipidemia;Overweight  Cholesterol: 177  LDL: 122  HDL: 43  Triglycerides: 58    Nutrition Plan  Interventions  Nutrition Interventions: Diet consult;Diet class;Therapist/Patient discussion;Educational videos;Provide with written material    Education Completed  Nutrition Education Completed: Low Saturated fat diet;Risk factor overview;Low sodium diet    Goals  Nutrition Goals (Next 30 days): Patient knows appropriate portion size;Patient will follow a low saturated fat diet;Patient will follow a low sodium diet    Goals Met  Nutrition Goals Met: Patient can identify their risk factors for CAD;Patient will maintain current weight or gradual weight gain;Patient follows a low sodium diet;Patient states following a low saturated fat diet    Height, Weight, and  BMI  Weight: 203 lb (92.1 kg)  Height: 6' 1\" (1.854 m)  BMI: 26.79    Nutrition Follow-up  Follow-up/Discharge: Pt has not yet met with the dietician. Encouraged him to return his diet survey asap, so he has a chance to meet with her.       Other Risk Factors  Other Risk Factor Assessment: Reassessment    HTN Risk Factor: NA    Pre Exercise BP: 114/68  Post Exercise BP: 102/64    Tobacco Risk Factor: NA      Risk Factor Follow-up   Follow-up/Discharge: Pt has not completed diet survey nor does he want to meet with dietician. States that he " follows a very heart healthy diet..   PSYCHOSOCIAL  Psychosocial Assessment: Reassessment     [facility] JEANETTE Q of L Summary Score: 19    AJITH-D Score: 2    Psychosocial Risk Factor: NA    Psychosocial Plan  Interventions  Interventions: Offer educational videos and classes;Provide written material;Individual education and counseling    Education Completed  Education Completed: Relaxation/Coping Techniques    Goals  Goals (Next 30 days): Patient demonstrates understanding of stress, no goals identified for the next 30 days    Goals Met  Goals Met: Practicing stress management skills    Psychosocial Follow-up  Follow-up/Discharge: not much stress in his life, per pt           Patient involved in Goal setting?: Yes    Signature: _____________________________________________________________    Date: __________________    Time: __________________See Doc Flowsheet

## 2021-06-15 NOTE — TELEPHONE ENCOUNTER
INR results received via fax from [facility].    2/10    INR 2.2    Per the fax it appears his dose is reported as 5mg MWF and 7.5 ROW and patient declined any bleeding or bruising. Fax states they will repeat INR in 1 month, Per Cierra Long

## 2021-06-15 NOTE — PROGRESS NOTES
[facility] HEART [facility]   Arrhythmia Clinic    Assessment/Plan:  Diagnoses and all orders for this visit:    Paroxysmal atrial fibrillation and Atrial flutter, unspecified type.  I  discussed today that due to mitral valve disease I am reluctant to think that atrial fib and flutter are related to cardiac surgery only.  Discussed that it is common to have atrial arrhythmias with mitral valve disease even when the valve is fixed.  With persistent episode of atrial flutter, I recommend to wait at least several months before doing a trial off of amiodarone.  [id number] score of 1+ and recommended to stay on warfarin for now.  No side effects on amiodarone.  To continue amiodarone 200 mg p.o. daily.  If Obed is going to stay on amiodarone long-term would recommend complete pulmonary function tests be ordered at next follow-up with Dr. Henao.  Since it may be short-term I did not order this today.  Obed had a lot of questions about potential side effects to amiodarone which were answered to his satisfaction.  Quoted most serious side effect is 1% risk of pulmonary fibrosis with long-term use of amiodarone and related to dose and time on medication.  Unclear that he is symptomatic with persistent atrial flutter as no significant difference since conversion.  I recommended limited echo 1-2 weeks prior to follow-up with Dr. Henao in 2 months as I believe he wanted him in regular rhythm to see if cardiomyopathy improved.  -     amiodarone (PACERONE) 200 MG tablet; Take 1 tablet (200 mg total) by mouth daily.  Dispense: 90 tablet; Refill: 3      Cardiac pacemaker in situ, dual chamber and device functioning appropriately.  No longer going back and forth between paced and atrial flutter so decreased lower rate limit back to 60.  (Increased lower rate limit had increased ventricular pacing which could lead to pacer induced cardiomyopathy. ) He had stimulation with left arm activity so accelerometer was turned off.  To  call device clinic if he notes he short of breath with more aggressive physical activity and then will see device nurse and have this reprogrammed on at lower sensitivity.    Cardiomyopathy and see above.  To stay on low-dose diuretic for now.  -     Echo Limited; Future; Expected date: 4/6/18    No symptoms of reoccurrence of pleural effusion and none noted on physical exam.     40 minutes were spent in face-to-face counseling regarding above diagnoses and options for treatment.    ______________________________________________________________________    Subjective:    I had the opportunity to see Obed Haley at the [facility]. Obed Haley is a 72 y.o. male and here for follow-up after set up for cardioversion on 1/18/2018 and converted with induction with Brevital.  Obed was then going back and forth between atrial flutter and paced rhythm so I increased lower rate limit to 70 and then maintaining paced rhythm thereafter.  He had a device check prior to this visit.  Obed Haley has a known history of mitral valve prolapse status post mitral valve replacement with Saint Robin 33 mm bioprosthesis in September 2017 and normal function of valve on last ECHO.  He has some  LV systolic dysfunction likely related to long-standing mitral regurgitation and postop tachycardia.  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  Paroxysmal atrial fibrillation postop as well as recently persistent atrial tachycardia/atrial flutter so started on amiodarone and on warfarin since postop.  He denies any change in energy level or tolerance of activity since conversion to paced rhythm.  He denies feeling palpitations or lightheaded with atrial flutter.  His wife agrees that there is been no significant change.  He comes to visit today and has a list of medications that he is hoping  to get off of including amiodarone and warfarin.  I discussed with him at time of cardioversion that his TSH is slightly elevated but thyroid levels themselves are normal so not concerned about this.  Multiple questions were answered with regard to potential side effects of amiodarone and included recommending yearly eye exams and will do pulmonary function test at next visit if he is going to stay on amiodarone.  I discussed sun sensitivity with medication as well and lab monitoring program.  ______________________________________________________________________    Problem List:  Patient Active Problem List   Diagnosis     Asymmetrical sensorineural hearing loss     Non-rheumatic mitral regurgitation     S/P mitral valve replacement with tissue valve     Cardiac pacemaker in situ, dual chamber     Paroxysmal atrial fibrillation     Dilated cardiomyopathy     Recurrent left pleural effusion     Unspecified atrial flutter     Medical History:  Past Medical History:   Diagnosis Date     High cholesterol      MVP (mitral valve prolapse)      Nonocclusive coronary atherosclerosis of native coronary artery 8/3/2017     Sleep apnea, obstructive     uses CPAP     Surgical History:  Past Surgical History:   Procedure Laterality Date     EP PACEMAKER INSERT Left 9/25/2017    Procedure: EP Pacemaker Insertion;  Surgeon: Reji Whittington MD;  Location: [facility] Cath Lab;  Service:      KNEE ARTHROSCOPY  2007    left     LAMINECTOMY  1970, 2003    lumbar X2     MITRAL VALVE REPLACEMENT N/A 9/19/2017    Procedure: MITRAL VALVE REPLACEMENT, ANESTHESIA TRANSESOPHAGEAL ECHOCARDIOGRAM, CLOSURE OF THE PFO;  Surgeon: Monica Swenson MD;  Location: [facility] Main OR;  Service:      KS CATH PLACEMENT & NJX CORONARY ART ANGIO IMG S&I N/A 8/3/2017    Procedure: Coronary Angiogram;  Surgeon: Abhinav Redmond MD;  Location: [facility] Cath Lab;  Service: Cardiology     KS L HRT CATH W/NJX L VENTRICULOGRAPHY IMG S&I N/A 8/3/2017     Procedure: Left Heart Catheterization with Left Ventriculogram;  Surgeon: Abhinav Redmond MD;  Location: [facility] Cath Lab;  Service: Cardiology     RI RIGHT HEART CATH O2 SATURATION & CARDIAC OUTPUT N/A 8/3/2017    Procedure: Right Heart Catheterization;  Surgeon: Abhinav Redmond MD;  Location: [facility] Cath Lab;  Service: Cardiology     Social History:  Social History   Substance Use Topics     Smoking status: Former Smoker     Quit date: 1/14/2007     Smokeless tobacco: Never Used     Alcohol use Yes      Comment: occasionally        Review of Systems: Review of Systems:   General: WNL  Eyes: WNL  Ears/Nose/Throat: WNL  Lungs: WNL  Heart: WNL  Stomach: WNL  Bladder: WNL  Muscle/Joints: WNL  Skin: WNL  Nervous System: WNL  Mental Health: WNL     Blood: WNL      Family History:  Family History   Problem Relation Age of Onset     Breast cancer Mother      Hemangiomas Sister          Allergies:  Allergies   Allergen Reactions     Bee Venom Protein (Honey Bee) Anaphylaxis     Medications:  Current Outpatient Prescriptions   Medication Sig Dispense Refill     acetaminophen (TYLENOL) 325 MG tablet Take 2 tablets (650 mg total) by mouth every 6 (six) hours as needed.  0     amiodarone (PACERONE) 200 MG tablet Take 1 tablet (200 mg total) by mouth daily. 90 tablet 3     amoxicillin (AMOXIL) 500 MG capsule Take 2,000 mg by mouth as needed (1 hour prior to dentist.).        aspirin 81 MG EC tablet Take 1 tablet (81 mg total) by mouth daily.  0     b complex vitamins tablet Take 1 tablet by mouth every other day.        cholecalciferol, vitamin D3, 1,000 unit tablet Take 1,000 Units by mouth every other day.        EPINEPHrine (EPIPEN) 0.3 mg/0.3 mL atIn Inject 0.3 mg into the shoulder, thigh, or buttocks once as needed (allergic reaction).       furosemide (LASIX) 40 MG tablet Take 0.5 tablets (20 mg total) by mouth daily. 30 tablet 3     ibuprofen (ADVIL,MOTRIN) 200 MG tablet Take 200 mg by mouth  "every 6 (six) hours as needed for pain.       lisinopril (PRINIVIL,ZESTRIL) 5 MG tablet Take 1 tablet (5 mg total) by mouth daily. 30 tablet 3     MULTIVIT &MINERALS/FERROUS FUM (MULTI VITAMIN ORAL) Take 1 tablet by mouth every other day. Pt taking multi vitamin without Iron       warfarin (COUMADIN) 5 MG tablet Take One 5 mg tablet by mouth daily. Adjust dose based on INR results as directed. (Patient taking differently: 7.5 mg on Tuesday and Friday, 5 mg all other days of the week) 35 tablet 3     No current facility-administered medications for this visit.        Objective:   Vital signs:  /74 (Patient Site: Left Arm, Patient Position: Sitting, Cuff Size: Adult Large)  Pulse 68  Resp 16  Ht 6' 1\" (1.854 m)  Wt 210 lb (95.3 kg)  BMI 27.71 kg/m2      Physical Exam:    GENERAL APPEARANCE: Alert, cooperative and in no acute distress.  HEENT: No scleral icterus. No Xanthelasma. Oral mucuos membranes pink and moist.  NECK: JVP Nl.   CHEST: clear to auscultation  CARDIOVASCULAR: S1, S2 without murmur ,clicks or rubs. Regular, regular.  Radial and posterior tibial pulses are intact and symetric.   EXTREMITIES: No cyanosis, clubbing .  1+ bilateral ankle edema.    Results personally reviewed:  Results for orders placed during the hospital encounter of 11/10/17   Echo Limited [ECH17] 11/10/2017    Narrative   Left ventricle ejection fraction is moderately decreased. The estimated   left ventricular ejection fraction is 35%.    There is a 33mm St Robin Epic bioprosthetic mitral valve. Normal   prosthetic valve function.    Right Ventricle: Normal size. The systolic function is mildly reduced.    Trace circumferential pericardial effusion. Pleural effusion is present.    Thoracic Aorta: There is effacement of the sinotubular ridge.    When compared to the previous study dated 11/1/2017, left ventricular   systolic function appears decreased        Results for orders placed during the hospital encounter of 08/03/17 "   Cardiac Catheterization [CATH01] 08/03/2017    Narrative   The left ventricular size is normal. The left ventricular systolic   function is normal. LV systolic pressure is normal. LV end diastolic   pressure is elevated.    The left ventricular ejection fraction is grossly normal.    Estimated blood loss was <20 ml.    The LM vessel was large.    The LAD vessel was moderate .    The left circumflex was large.    The RCA was moderate.    Mid RCA lesion 50% stenosed.    Prox LAD to Mid LAD lesion 35% stenosed.          Device check shows leads stable and battery ok.  Device functioning appropriately.   Atrial pacing 79 % and Ventricular pacing 34 %.  AT/AFib burden none since spontaneously converted with induction for cardioversion. No ventr arrhythmias.      Results for orders placed or performed during the hospital encounter of 01/18/18   ECG 12 lead   Result Value Ref Range    SYSTOLIC BLOOD PRESSURE  mmHg    DIASTOLIC BLOOD PRESSURE  mmHg    VENTRICULAR RATE 76 BPM    ATRIAL RATE 76 BPM    P-R INTERVAL 302 ms    QRS DURATION 92 ms    Q-T INTERVAL 404 ms    QTC CALCULATION (BEZET) 454 ms    P Axis 21 degrees    R AXIS 6 degrees    T AXIS 61 degrees    MUSE DIAGNOSIS       Sinus rhythm with 1st degree A-V block  Possible Left atrial enlargement  Low voltage QRS  Cannot rule out Inferior infarct , age undetermined  Abnormal ECG  When compared with ECG of 18-JAN-2018 11:24,  Sinus rhythm has replaced Atrial fibrillation  Electronic ventricular pacemaker is no longer Present  Minimal criteria for Inferior infarct are now Present  Confirmed by JOSE ALFREDO GUZMAN, LES LOC:JN ([id number]) on 1/18/2018 3:01:29 PM         TSH:   Lab Results   Component Value Date    TSH 7.69 (H) 01/18/2018     BNP:   Lab Results   Component Value Date     (H) 11/27/2017     BMP:  Lab Results   Component Value Date    CREATININE 0.92 12/13/2017    BUN 17 12/13/2017     12/13/2017    K 4.5 01/18/2018     12/13/2017    CO2 23  12/13/2017       This note has been dictated using voice recognition software. Any grammatical or context distortions are unintentional and inherent to the software.    DEREK ARANDA RN, [facility] HEART [facility]  [phone]

## 2021-06-15 NOTE — PROGRESS NOTES
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      Subjective:   This is 72 y.o. male who comes in today follow-up visit.  He feels good today.  He denies chest pain or shortness of breath he has no sensation of heart palpitations.  He has not had syncope.  He has had recurrent left pleural effusions.  He had ultrasound guided thoracentesis first.  He redeveloped effusion.  He underwent chest tube drainage.  He denies PND, orthopnea, pedal edema    Review of Systems:   General: WNL  Eyes: WNL  Ears/Nose/Throat: WNL  Lungs: Cough  Heart: Irregular Heartbeat  Stomach: WNL  Bladder: WNL  Muscle/Joints: WNL  Skin: WNL  Nervous System: WNL  Mental Health: WNL     Blood: WNL    Objective:   /68 (Patient Site: Left Arm, Patient Position: Sitting, Cuff Size: Adult Regular)  Pulse 68  Resp 18  Ht 6' 1.75\" (1.873 m)  Wt 204 lb (92.5 kg) Comment: with shoes  BMI 26.37 kg/m2  Physical Exam:  GENERAL: no distress  NECK: No JVD  LUNGS: Absent breath sounds left base  CARDIAC: regular rhythm, S1 & S2 normal.  No heaves, thrills, gallops or murmurs.  ABDOMEN: flat, negative hepatosplenomegaly, soft and non-tender.  EXTREMITIES: No evidence of " cyanosis, clubbing or edema.    Current Outpatient Prescriptions   Medication Sig Dispense Refill     amiodarone (PACERONE) 200 MG tablet Take 1 tablet (200 mg total) by mouth daily. 30 tablet 12     aspirin 81 MG EC tablet Take 1 tablet (81 mg total) by mouth daily.  0     b complex vitamins tablet Take 1 tablet by mouth daily.       cholecalciferol, vitamin D3, 1,000 unit tablet Take 1,000 Units by mouth daily.       furosemide (LASIX) 40 MG tablet Take 0.5 tablets (20 mg total) by mouth daily. 30 tablet 3     MULTIVIT &MINERALS/FERROUS FUM (MULTI VITAMIN ORAL) Take 1 tablet by mouth every other day. Pt taking multi vitamin without Iron       warfarin (COUMADIN) 5 MG tablet Take One 5 mg tablet by mouth daily. Adjust dose based on INR results as directed. (Patient taking differently: 7.5 mg on Tuesday and Friday, 5 mg all other days of the week) 35 tablet 3     acetaminophen (TYLENOL) 325 MG tablet Take 2 tablets (650 mg total) by mouth every 6 (six) hours as needed.  0     amoxicillin (AMOXIL) 500 MG capsule Take 2,000 mg by mouth as needed (1 hour prior to dentist.).        EPINEPHrine (EPIPEN) 0.3 mg/0.3 mL atIn Inject 0.3 mg into the shoulder, thigh, or buttocks once as needed (allergic reaction).       ibuprofen (ADVIL,MOTRIN) 200 MG tablet Take 200 mg by mouth every 6 (six) hours as needed for pain.       No current facility-administered medications for this visit.        Cardiographics:    Pacemaker interrogation:   Atrial flutter with 4-1 AV conduction.  No tachycardia      Echocardiogram: November 2017    Left ventricle ejection fraction is moderately decreased. The estimated left ventricular ejection fraction is 35%.    There is a 33mm St Robin Epic bioprosthetic mitral valve. Normal prosthetic valve function.    Right Ventricle: Normal size. The systolic function is mildly reduced.    Trace circumferential pericardial effusion. Pleural effusion is present.    Thoracic Aorta: There is effacement of the  sinotubular ridge.    When compared to the previous study dated 11/1/2017, left ventricular systolic function appears decreased    Lab Results:       Lab Results   Component Value Date    CHOL 177 08/03/2017    CHOL 220 (H) 08/24/2016    CHOL 226 (H) 01/14/2015     Lab Results   Component Value Date    HDL 43 08/03/2017    HDL 52 08/24/2016    HDL 53 01/14/2015     Lab Results   Component Value Date    LDLCALC 122 08/03/2017    LDLCALC 158 (H) 08/24/2016    LDLCALC 163 (H) 01/14/2015     Lab Results   Component Value Date    TRIG 58 08/03/2017    TRIG 52 08/24/2016    TRIG 51 01/14/2015     No components found for: CHOLHDL  BNP   Date Value Ref Range Status   11/27/2017 177 (H) 0 - 70 pg/mL Final       Paulo (Sha)  MD Juliano

## 2021-06-15 NOTE — PROGRESS NOTES
In clinic device check.  Please see link for full device report.  Patient was informed of results and next follow up during today's visit.      Type: In office 3-month post-op pacemaker check.  Presenting Rhythm: Atrial flutter, ventricular sensed 60-70's.  Lead/Battery status: Stable.  Arrhythmias: 182 mode switches, 70% burden, per trends AFL appears persistent since the beginning of December, v-rates >/=120bpm <5%, asymptomatic to arrhythmia. No ventricular arrhythmias.   Anticoagulant: Warfarin   Comments: Patient started amiodarone the end of November. RV output to 1.5V @ 0.4ms with trending on. Routing to Dr. Henao he is seeing him 1/5/18. JOSE LUIS

## 2021-06-15 NOTE — PROGRESS NOTES
In clinic device check with Device Nurse followed by office visit with Shan Salcido NP.  Please see link for full device report.  Patient was informed of results and next follow up during today's visit.

## 2021-06-15 NOTE — PROGRESS NOTES
Spoke with patient regarding order for cardioversion. Instructed patient that a nurse will be contacting him next week to set up a time for further education and instruction. Advised him to call [facility] where he gets his INR's to set up INR's 1/10 and 1/17. Handout on cardioversion provided.

## 2021-06-15 NOTE — TELEPHONE ENCOUNTER
ANTICOAGULATION  MANAGEMENT PROGRAM    Obed Haley is overdue for INR check.     Left message to call and schedule INR appointment as soon as possible.      Yolanda Krishna RN

## 2021-06-15 NOTE — PROGRESS NOTES
1945  [phone] (home)  [phone] (mobile)    +++Important patient information for CSC/Cath Lab staff : PT IS ON AMIOODARONE, PT IS KYLE/CPAP, PT HAS BOSTON PACEMAKER+++    [facility] EP Cath Lab Procedure Order     Cardioversion:    Cardioversion   INR'S ARE IN EPIC UNDER LAB TAB  1/7=3.0  1/10=3.10  1/3=2.4  12/29=3.0  12/18=2.5    Diagnosis:  AF  Anticipated Case Duration:  Standard  Scheduling Needs/Timeframe:  PT SET FOR THURS 1/18/2018 AT 11 30 WITH DEREK ARANDA CNP    Current Device: Dual Pacemaker  Device Company/Device Rep Needed for Procedure: [employer]    Anesthesia:  General-CV Only  Research Protocol:  No    [facility] EP Cath Lab Prep   Ordering Provider: DR TAYLOR  Ordering Date: 1/17/2018  Orders Status: Intial order placed and Order set placed  EP NC Contact: Bertha Crenshaw LPN    H&P:  Compled by DR TAYLOR on 1/8/2018  PCP: Franko Franz MD, [phone]    Pre-op Labs: N/A for procedure    Medical Records Pertinent for Procedure:  N/A    Patient Education:    PT HAS A  FOR PROCEDURE  PT INSTRUCTED TO HOLD ANY VITAMINS, MINERALS, CALCIUM, IRON OR SUPPLEMENTS THE MORNING OF CV  PT IS KYLE/CPAP  PT 'S INR'S ARE GOOD IN EPIC UNDER LAB TAB  PT IS ON AMIODARONE  PT HAS BOSTON DUAL PACEMAKER  PT WILL HAVE A FOLLOW UP IN 4 WEEKS WITH AN ECHO PRIOR WITH DR TAYLOR, IF HE IS UNAVAILABLE WILL SET UP WITH DEREK AND  HAS BEEN NOTIFIED     Teach with Patient: Completed via phone on 1/18/2018  Risks Reviewed:     Cardioversion    >90% acute success rate, <10% failure to convert or   reverts shortly after cardioversion.    <1% embolic event of (CVA, pulmonary embolism, or   other site).    75% risk for superficial burn.  Risks associated with general anesthesia will be addressed by the Anesthesiology Department    Consent: Will be obtained in [facility] day of procedure    Pre-Procedure Instructions that were Reviewed with Patient:  NPO after midnight, Notified patient of time and date of procedure  by CV , Transportation arrangements needed s/p procedure, Post-procedure follow up process and Sedation plan/orders    Pre-Procedure Medication Instructions:  Instructions given to pt regarding anticoagulants: Coumadin- instructed to continue anticoagulation uninterrupted through their procedure  Instructions given to pt regarding antiarrhythmic medication: Amiodarone; Pt instructed to continue medication prior to procedure  Instructions for medication, other than anticoagulants/antiarrhythmics listed above, given to pt: to take all morning medications with small sips of water, with the exception of OTC supplements and MVI    Allergies   Allergen Reactions     Bee Venom Protein (Honey Bee) Anaphylaxis       Current Outpatient Prescriptions:      acetaminophen (TYLENOL) 325 MG tablet, Take 2 tablets (650 mg total) by mouth every 6 (six) hours as needed., Disp: , Rfl: 0     amiodarone (PACERONE) 200 MG tablet, Take 1 tablet (200 mg total) by mouth daily., Disp: 30 tablet, Rfl: 12     amoxicillin (AMOXIL) 500 MG capsule, Take 2,000 mg by mouth as needed (1 hour prior to dentist.). , Disp: , Rfl:      aspirin 81 MG EC tablet, Take 1 tablet (81 mg total) by mouth daily., Disp: , Rfl: 0     b complex vitamins tablet, Take 1 tablet by mouth daily., Disp: , Rfl:      cholecalciferol, vitamin D3, 1,000 unit tablet, Take 1,000 Units by mouth daily., Disp: , Rfl:      EPINEPHrine (EPIPEN) 0.3 mg/0.3 mL atIn, Inject 0.3 mg into the shoulder, thigh, or buttocks once as needed (allergic reaction)., Disp: , Rfl:      furosemide (LASIX) 40 MG tablet, Take 0.5 tablets (20 mg total) by mouth daily., Disp: 30 tablet, Rfl: 3     ibuprofen (ADVIL,MOTRIN) 200 MG tablet, Take 200 mg by mouth every 6 (six) hours as needed for pain., Disp: , Rfl:      MULTIVIT &MINERALS/FERROUS FUM (MULTI VITAMIN ORAL), Take 1 tablet by mouth every other day. Pt taking multi vitamin without Iron, Disp: , Rfl:      warfarin (COUMADIN) 5 MG tablet,  Take One 5 mg tablet by mouth daily. Adjust dose based on INR results as directed. (Patient taking differently: 7.5 mg on Tuesday and Friday, 5 mg all other days of the week), Disp: 35 tablet, Rfl: 3

## 2021-06-15 NOTE — TELEPHONE ENCOUNTER
ANTICOAGULATION  MANAGEMENT    Assessment     Today's INR result of 2.10 is Therapeutic (goal INR of 2.0-3.0)        Warfarin taken as previously instructed    No new diet changes affecting INR    No new medication/supplements affecting INR    Continues to tolerate warfarin with no reported s/s of bleeding or thromboembolism     Previous INR was Therapeutic at 2.30 from 12/30/21.   (last 7 INR's therapeutic)    1/28/21 was 2.70 checked @[facility]     Plan:     Spoke on phone with Obed regarding INR result and instructed:   - Obed will check INR sooner if any changes with his usual regiments.      Warfarin Dosing Instructions:    - Continue current warfarin dose 5 mg daily on Mon/Wed/Fri; and 7.5 mg daily rest of week.    Instructed patient to follow up no later than: 6-8 wks.    Education provided: importance of consistent vitamin K intake, target INR goal and significance of current INR result, importance of notifying clinic for changes in medications and monitoring for bleeding signs and symptoms    Obed verbalizes understanding and agrees to warfarin dosing plan.    Instructed to call the [facility] for any changes, questions or concerns. (#[phone])   ?   Yolanda Krishna RN    Subjective/Objective:      Obed Haley, a 75 y.o. male is on warfarin. Obed Clay reports:     Home warfarin dose: verbally confirmed home dose with Obed and updated on anticoagulation calendar     Missed doses: No     Medication changes:  No     S/S of bleeding or thromboembolism:  No     New Injury or illness:  No     Changes in diet or alcohol consumption:  No     Upcoming surgery, procedure or cardioversion:  No    Anticoagulation Episode Summary     Current INR goal:  2.0-3.0   TTR:  95.0 % (1 y)   Next INR check:  4/7/2021   INR from last check:  2.10 (2/10/2021)   Weekly max warfarin dose:     Target end date:  9/29/2017   INR check location:     Preferred lab:     Send INR reminders to:  [facility]        Comments:           Anticoagulation Care Providers     Provider Role Specialty Phone number    Frakno Franz MD Referring Internal Medicine [phone]

## 2021-06-15 NOTE — ANESTHESIA PREPROCEDURE EVALUATION
Anesthesia Evaluation      Patient summary reviewed   No history of anesthetic complications     Airway   Mallampati: II  Neck ROM: full   Pulmonary - normal exam    breath sounds clear to auscultation  (+) sleep apnea,                          Cardiovascular   Exercise tolerance: > or = 4 METS  (+) valvular problems/murmurs (s/p MVR) MVP, , hypercholesterolemia,     ECG reviewed  Rhythm: irregular  Rate: normal,         Neuro/Psych - negative ROS     Endo/Other - negative ROS      GI/Hepatic/Renal - negative ROS      Other findings: Echo 11/10/17:    When compared to the previous study dated 6/7/2017, there is now a bioprosthetic mitral valve, LVEF has declined, and the pericardial effusion is new.    Left ventricular ejection fraction is mildly decreased. The estimated left ventricular ejection fraction is 45%.    Medium sized circumferential pericardial effusion. No cardiac tamponade is present.    There is a bioprosthetic mitral valve. Normal prosthetic valve function.        Dental - normal exam                        Anesthesia Plan  Planned anesthetic: general mask  Brevital  ASA 3   Induction: intravenous   Anesthetic plan and risks discussed with: patient and spouse    Post-op plan: routine recovery

## 2021-06-15 NOTE — PROGRESS NOTES
Remote device check.  Please see link for full device report.     Normal rate, regular rhythm, normal S1, S2 heart sounds heard.

## 2021-06-15 NOTE — PROGRESS NOTES
Pt seen by Dr Henao, CV ordered, has device is on amio and will need 2 more INR's  Plan for 1/18 pt will be taught when labs ready  Pt understands and agrees to plan

## 2021-06-15 NOTE — PROGRESS NOTES
Obed Haley has participated in 21 sessions of Phase II Cardiac Rehab.    Progress Report:   Cardiac Rehab Treatment Progress Report 12/22/2017 12/27/2017 12/29/2017 1/2/2018 1/3/2018   Weight 202 lbs 202 lbs 205 lbs 206 lbs 3 oz 203 lbs   Pre Exercise  HR 75 70 - - 65   Pre Exercise /60 110/66 - - 114/68   Pre Blood Sugar (mg/dl) - - - - -   Treadmill Peak  104 - - 105   Nustep Peak Heart Rate 105 - - - -   Nustep Peak Blood Pressure 138/66 - - - -   Heart Rate 80 80 - - 80   Post Exercise /64 102/64 - - 108/60   ECG paced Pacer - - paced/afib   Total Exercise Minutes 50 40 - - 45         Current Status: doing well, he would like to continue with outpatient cardiac rehab.        If Physician recommends change in treatment plan, please place orders.        __________________________________________________      _____________  Signature                                                                                                  DateSee Doc Flowsheet

## 2021-06-16 PROBLEM — Z95.0 CARDIAC PACEMAKER IN SITU: Status: ACTIVE | Noted: 2017-10-03

## 2021-06-16 PROBLEM — I25.119 CORONARY ARTERY DISEASE INVOLVING NATIVE CORONARY ARTERY OF NATIVE HEART WITH ANGINA PECTORIS (H): Status: ACTIVE | Noted: 2019-09-30

## 2021-06-16 PROBLEM — I34.0 NON-RHEUMATIC MITRAL REGURGITATION: Status: ACTIVE | Noted: 2017-07-07

## 2021-06-16 PROBLEM — I48.19 PERSISTENT ATRIAL FIBRILLATION (H): Status: ACTIVE | Noted: 2017-10-30

## 2021-06-16 PROBLEM — Z95.3 S/P MITRAL VALVE REPLACEMENT WITH TISSUE VALVE: Status: ACTIVE | Noted: 2017-09-19

## 2021-06-16 NOTE — TELEPHONE ENCOUNTER
Telephone Encounter by Makenna Soni RN at 5/30/2019  2:09 PM     Author: Makenna Soni RN Service: -- Author Type: Registered Nurse    Filed: 5/30/2019  2:30 PM Encounter Date: 5/30/2019 Status: Signed    : Makenna Soni RN (Registered Nurse)       Stephanie Mcgraw Kellie C, RN   Caller: Patient (Today,  1:37 PM)             Caller: Obed     Primary cardiologist: Dr. Henao     Detailed reason for call: Obed states he's been traveling and can take a return call now. Please call back.     Best phone number:  [phone]   Best time to contact: Today   Ok to leave a detailed message? No   Device? Yes

## 2021-06-16 NOTE — TELEPHONE ENCOUNTER
ANTICOAGULATION  MANAGEMENT PROGRAM    Obed Haley is overdue for INR check.     Spoke with Obed and scheduled INR appointment on 5/5/21.    - reported last INR was on 3/23/21 at 2.10 in [place]      Yolanda Krishna RN

## 2021-06-16 NOTE — PROGRESS NOTES
ITP ASSESSMENT   Assessment Day: 150 Day - Discharge  Session Number: 36  Precautions: Standard  Diagnosis: Valve  Risk Stratification: High  Referring Provider: Franko Franz MD   ITP: Dr. Henao  EXERCISE  Exercise Assessment: Discharge     Pt currently exercising for 40 minutes at 3.5-4.2 mets without any complaints. Pt walks for 60+ minutes a day at home.                         Exercise Plan  Goals Next 30 days  ADL'S: Resume remodeling project;  Paint a bedroom, without fatigue  Leisure: Walk daily;  Travel  Work: Work PT as a     Education Goals: All goals in this section met  Education Goals Met: Medication review.;Has system for taking medication.;Patient can state cardiac s/s and appropriate emergency response.                        Goals Met  120 Day Progress: Pt states he is back to doing most activites. Progressing well towards his goals.    90 day ADL'S goals met: Does report snow plowing  90 day Leisure goals met: Did take down X mas decorations  90 day Work goals met: Did not clean out garage yet  90 Day Progress: Making progress;  Still reports occasional fatigue    60 day ADL'S goals met: not met, it has been to cold to clean out the garage  60 day Leisure goals met: met. pt did put up Elsie decorations.  60 Day Progression: Doing very well him exercise and tolerating increased workloads.    30 day ADL'S goals met: Start cleaning garage- not yet met, plans to resume this month  30 day Leisure goals met: Return to social outings with friends - goal met  30 Day Progression: Doing well with exercise. Tolerating some increased workloads and has a good home exercise routing at home.    Exercise Prescription  Exercise Mode: Treadmill;Bike;Nustep;Arm Erg.  Frequency: 2 x week  Duration: 40'  Intensity / THR: 20-30 beats above resting heart rate  RPE 11-14  Progression / Met level: 3.1-3.8  Resistive Training?: Yes    Current Exercise (mins/week): 380    Interventions  Home  "Exercise:  Mode: Walk  Frequency: Daily  Duration: 60+ minutes    Education Material : Educational videos;Provide written material;Individual education and counseling;Offer educational classes    Education Completed  Exercise Education Completed: Cardiac Anatomy;Signs and Symptoms;RPE;Emergency Plan;Home Exercise;FITT Principles;Warm up/cool down;BP/HR Reponse to exercise;Benefits of Exercise;End point of exercise;Strength training            Exercise Follow-up/Discharge  Follow up/Discharge: Reviewed home exercise program. Gave information sheet on Phase III to think about. NUTRITION  Nutrition Assessment: Discharge    Nutrition Risk Factors:  Nutrition Risk Factors: Dyslipidemia;Overweight  Cholesterol: 177  LDL: 122  HDL: 43  Triglycerides: 58    Nutrition Plan  Interventions  Diet Consult: Completed  Other Nutrition Intervention: Therapist/Pt Discussion;Educational Videos;Provide with Written Material    Education Completed  Nutrition Education Completed: Low Saturated fat diet;Risk factor overview;Low sodium diet    Goals  Nutrition Goals (Next 30 days): Patient knows appropriate portion size;Patient will follow a low saturated fat diet;Patient will follow a low sodium diet    Goals Met  Nutrition Goals Met: Patient can identify their risk factors for CAD;Patient will maintain current weight or gradual weight gain;Patient follows a low sodium diet;Patient states following a low saturated fat diet    Height, Weight, and  BMI  Weight: 208 lb (94.3 kg)  Height: 6' 1\" (1.854 m)  BMI: 27.45  Pre BMI: 27.45     Nutrition Follow-up  Follow-up/Discharge: Delined to meet with the dietician;  Reports his wife is a dietician       Other Risk Factors  Other Risk Factor Assessment: Discharge    HTN Risk Factor: NA    Pre Exercise BP: 120/64  Post Exercise BP: 124/60    Tobacco Risk Factor: NA    Risk Factor Follow-up   Follow-up/Discharge: Pt states understanding of risk factors.   PSYCHOSOCIAL  Psychosocial Assessment: " Discharge     [facility] Q of L Summary Score: 11  Pre [facility] Q of L Summary Score: 11     AJITH-D Score: 5  Pre AJITH-D Score: 5     Psychosocial Risk Factor: NA    Psychosocial Plan  Interventions  If AJITH-D > 15 send letter to MD  Interventions: Offer educational videos and classes;Provide written material;Individual education and counseling    Education Completed  Education Completed: Relaxation/Coping Techniques    Goals  Goals (Next 30 days): Patient demonstrates understanding of stress, no goals identified for the next 30 days    Goals Met  Goals Met: Practicing stress management skills    Psychosocial Follow-up  Follow-up/Discharge: Pt reports he manages stress well. States he has a good support system.     Patient involved in Goal setting?: Yes    Signature: _____________________________________________________________    Date: __________________    Time: __________________

## 2021-06-16 NOTE — TELEPHONE ENCOUNTER
Telephone Encounter by Yolanda Krishna RN at 3/18/2020  8:18 AM     Author: Yolanda Krishna RN Service: -- Author Type: Registered Nurse    Filed: 3/18/2020 12:09 PM Encounter Date: 3/18/2020 Status: Attested    : Yolanda Krishna RN (Registered Nurse) Cosigner: Franko Franz MD at 3/18/2020 12:32 PM    Attestation signed by Franko Franz MD at 3/18/2020 12:32 PM    Continue anticoagulation                ANTICOAGULATION  MANAGEMENT    Assessment     Today's INR result of 2.20 is Therapeutic (goal INR of 2.0-3.0)        Warfarin taken as previously instructed    No new diet changes affecting INR    No new medication/supplements affecting INR    Continues to tolerate warfarin with no reported s/s of bleeding or thromboembolism     Previous INR was Therapeutic at 2.30 on 2/28/20.    Will be driving to [place] in April 12, 2020.    Plan:     Spoke with Obed regarding INR result and instructed:    1.  Recommended to check INR prior to driving to [place].    Warfarin Dosing Instructions:   (has 5mg tabs)   - Continue current warfarin dose 7.5 mg daily on Mon/Wed/Fri; and 5 mg daily rest of week.    Instructed patient to follow up no later than:  4 wks.   - INR scheduled on 4/8/20 @ [facility].    Education provided: importance of consistent vitamin K intake    Obed verbalizes understanding and agrees to warfarin dosing plan.    Instructed to call the [facility] for any changes, questions or concerns. (#[phone])   ?   Yolanda Krishna RN    Subjective/Objective:      Obed Haley, a 74 y.o. male is on warfarin.     Obed reports:     Home warfarin dose: as updated on anticoagulation calendar per template     Missed doses: No     Medication changes:  No     S/S of bleeding or thromboembolism:  No     New Injury or illness:  No     Changes in diet or alcohol consumption:  No     Upcoming surgery, procedure or cardioversion:  No    Anticoagulation Episode Summary     Current INR goal:   2.0-3.0   TTR:    66.0 % (1 y)   Next INR check:   4/15/2020   INR from last check:   2.20 (3/18/2020)   Weekly max warfarin dose:      Target end date:   9/29/2017   INR check location:      Preferred lab:      Send INR reminders to:   [facility]       Comments:            Anticoagulation Care Providers     Provider Role Specialty Phone number    Franko Franz MD Referring Internal Medicine [phone]

## 2021-06-17 NOTE — PATIENT INSTRUCTIONS - HE
Patient Instructions by Franko Franz MD at 8/14/2019  9:00 AM     Author: Franko Franz MD Service: -- Author Type: Physician    Filed: 8/14/2019  9:44 AM Encounter Date: 8/14/2019 Status: Signed    : Franko Franz MD (Physician)         Patient Education   Signs of Hearing Loss  Hearing loss is a problem shared by many people. In fact, it is one of the most common health conditions, particularly as people age. Most people over age 65 have some hearing loss, and by age 80, almost everyone does. Because hearing loss usually occurs slowly over the years, you may not realize your hearing ability has gotten worse.       Have your hearing checked  Contact your [facility] care provider if you:    Have to strain to hear normal conversation.    Have to watch other peoples faces very carefully to follow what theyre saying.    Need to ask people to repeat what theyve said.    Often misunderstand what people are saying.    Turn the volume of the television or radio up so high that others complain.    Feel that people are mumbling when theyre talking to you.    Find that the effort to hear leaves you feeling tired and irritated.    Notice, when using the phone, that you hear better with 1 ear than the other.    5453-5589 [employer]. [address] [place] [address], [place] [id number]. All rights reserved. This information is not intended as a substitute for professional medical care. Always follow your healthcare professional's instructions.         Patient Education   Preventing Falls in the Home  As you get older, falls are more likely. Thats because your reaction time slows. Your muscles and joints may also get stiffer, making them less flexible. Illness, medications, and vision changes can also affect your balance. A fall could leave you unable to live on your own. To make your home safer, follow these tips:    Floors    Put nonskid pads under area rugs.    Remove throw rugs.    Replace worn floor  coverings.    Tack carpets firmly to each step on carpeted stairs. Put nonskid strips on the edges of uncarpeted stairs.    Keep floors and stairs free of clutter and cords.    Arrange furniture so there are clear pathways.    Clean up any spills right away.    Bathrooms    Install grab bars in the tub or shower.    Apply nonskid strips or put a nonskid rubber mat in the tub or shower.    Sit on a bath chair to bathe.    Use bathmats with nonskid backing.    Lighting    Keep a flashlight in each room.    Put a nightlight along the pathway between the bedroom and the bathroom.    [id number] [employer] [address]. [address] [place] [address], [place] [address]. All rights reserved. This information is not intended as a substitute for professional medical care. Always follow your healthcare professional's instructions.           Advance Directive  Patients advance directive was discussed and I am comfortable with the patients wishes.  Patient Education   Personalized Prevention Plan  You are due for the preventive services outlined below.  Your care team is available to assist you in scheduling these services.  If you have already completed any of these items, please share that information with your care team to update in your medical record.  Health Maintenance   Topic Date Due   ? ZOSTER VACCINES (2 of 3) 03/11/2015   ? INFLUENZA VACCINE RULE BASED (1) 08/01/2019   ? MEDICARE ANNUAL WELLNESS VISIT  08/13/2019   ? FALL RISK ASSESSMENT  08/14/2020   ? ADVANCE CARE PLANNING  08/13/2023   ? TD 18+ HE  01/14/2025   ? COLONOSCOPY  02/16/2025   ? HEPATITIS C SCREENING  Completed   ? PNEUMOCOCCAL POLYSACCHARIDE VACCINE AGE 65 AND OVER  Completed   ? PNEUMOCOCCAL CONJUGATE VACCINE FOR ADULTS (PCV13 OR PREVNAR)  Completed

## 2021-06-17 NOTE — PATIENT INSTRUCTIONS - HE
Obed, we talked about vertigo today and I suspect you have something called benign paroxysmal positional vertigo.  This is generally a problem with the levels in the inner ear.  I sent your emails with modified Epley maneuvers to try when you become symptomatic.  If these are not successful I would recommend seeing a vestibular physical therapist.    We also discussed looking in the direction of the dizziness or vertigo.  You would then hold this gaze for 30 to 60 seconds.    The antihistamine that has been helpful for you in the past for your nasal congestion was chlorphenirmine.  I believe this is available over-the-counter but I have also sent it into your pharmacy.  This is somewhat similar to Benadryl but might be a little bit more potent and certainly can make you sleepy.  You can use this medication if you are experiencing vertigo as well.    Less sedating antihistamine option would be loratadine or cetirizine which are available over-the-counter.

## 2021-06-17 NOTE — PROGRESS NOTES
"  Office Visit - Follow Up   Obed Haley   76 y.o. male    Date of Visit: 5/5/2021    Chief Complaint   Patient presents with     Thyroid Problem     Dizziness        Assessment and Plan   1. Vertigo  We discussed that this is likely a peripheral vertigo, benign paroxysmal positional vertigo.  I have recommended Epley maneuvers.  If not improved with this then I would recommend vestibular PT or OT    2. Subclinical hypothyroidism  Currently TSH has been okay, will continue to follow periodically  - Thyroid Cascade    3. Allergic rhinitis, unspecified seasonality, unspecified trigger  - chlorpheniramine (CHLOR-TRIMETON) 4 mg tablet; Take 1 tablet (4 mg total) by mouth every 6 (six) hours as needed for allergies.  Dispense: 30 tablet; Refill: 11    4. Persistent atrial fibrillation (H)  Continue with rate control strategy and warfarin for stroke prevention    5. Coronary artery disease involving native coronary artery of native heart with angina pectoris (H)  Continue with secondary prevention    Return in about 3 months (around 8/5/2021) for recheck.     History of Present Illness   This 76 y.o. old man comes in for follow-up.  He has been having episodes of vertigo.  This is generally positional.  Recently resolved.  No headache, neurological deficits.  He does have some nasal congestion and globus sensation in his throat which is chronic.  Previously benefited from antihistamine.  TSH has been mildly elevated with markedly positive TPO antibody.  Underlying atrial fibrillation and coronary artery disease.    Review of Systems: A comprehensive review of systems was negative except as noted.     Medications, Allergies and Problem List   Reviewed, reconciled and updated  Post Discharge Medication Reconciliation Status:      Physical Exam   General Appearance:   No acute distress    /86 (Patient Site: Left Arm, Patient Position: Sitting, Cuff Size: Adult Regular)   Pulse 60   Ht 6' 1\" (1.854 m)   Wt (!) 227 " lb (103 kg)   SpO2 99%   BMI 29.95 kg/m      HEENT exam is unremarkable  Neck supple no thyromegaly or nodule palpable  Lymphatic no cervical lymphadenopathy  Cardiovascular regular rate and rhythm no murmur gallop or rub  Pulmonary lungs are clear to auscultation bilaterally  Gastrointestinal abdomen soft nontender nondistended no organomegaly  Neurologic exam is non focal  Psychiatric pleasant, no confusion or agitation        Additional Information   Current Outpatient Medications   Medication Sig Dispense Refill     amoxicillin (AMOXIL) 500 MG capsule Take 2,000 mg by mouth as needed (1 hour prior to dentist.).        aspirin 81 MG EC tablet Take 1 tablet (81 mg total) by mouth daily.  0     atorvastatin (LIPITOR) 40 MG tablet Take 1 tablet (40 mg total) by mouth daily. 90 tablet 1     b complex vitamins tablet Take 1 tablet by mouth every other day.        cholecalciferol, vitamin D3, 1,000 unit tablet Take 1,000 Units by mouth every other day.        EPINEPHrine (EPIPEN) 0.3 mg/0.3 mL atIn Inject 0.3 mg into the shoulder, thigh, or buttocks once as needed (allergic reaction).       metoprolol succinate (TOPROL-XL) 50 MG 24 hr tablet TAKE 1 TABLET BY MOUTH EVERY DAY (Patient taking differently: 25 mg. ) 90 tablet 2     MULTIVIT &MINERALS/FERROUS FUM (MULTI VITAMIN ORAL) Take 1 tablet by mouth every other day. Pt taking multi vitamin without Iron       warfarin ANTICOAGULANT (COUMADIN/JANTOVEN) 5 MG tablet TAKE 1 TABLET (5MG) TO 1 & 1/2 TABLETS (7.5MG)  BY MOUTH DAILY, AS DIRECTED. ADJUST DOSE BASED ON INR RESULTS9 120 tablet 1     acetaminophen (TYLENOL) 325 MG tablet Take 2 tablets (650 mg total) by mouth every 6 (six) hours as needed.  0     chlorpheniramine (CHLOR-TRIMETON) 4 mg tablet Take 1 tablet (4 mg total) by mouth every 6 (six) hours as needed for allergies. 30 tablet 11     No current facility-administered medications for this visit.      Allergies   Allergen Reactions     Bee Venom Protein (Honey  Bee) Anaphylaxis     Social History     Tobacco Use     Smoking status: Former Smoker     Quit date: 2007     Years since quittin.3     Smokeless tobacco: Never Used   Substance Use Topics     Alcohol use: Yes     Alcohol/week: 3.0 standard drinks     Types: 3 Standard drinks or equivalent per week     Comment: occasionally     Drug use: No       Review and/or order of clinical lab tests:  Review and/or order of radiology tests:  Review and/or order of medicine tests:  Discussion of test results with performing physician:  Decision to obtain old records and/or obtain history from someone other than the patient:  Review and summarization of old records and/or obtaining history from someone other than the patient and.or discussion of case with another health care provider:  Independent visualization of image, tracing or specimen itself:    Time:      Franko Nguyen MD

## 2021-06-17 NOTE — PROGRESS NOTES
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  I believe we should continue amiodarone for another 3 months and then stop it.  If he were to develop recurrent atrial dysrhythmias, ablation may be an option.    Follow-up in 3 months    Subjective:   This is 73 y.o. male who comes in today for follow-up visit.  He has done well.  He feels much stronger.  He has not had any shortness of breath or chest pain.  He was able to do some snow shoveling last weekend.  He denies recurrent heart palpitations or syncope.  He does complain of dry cough and occasional lightheadedness.    Review of Systems:   General: WNL  Eyes: WNL  Ears/Nose/Throat: WNL  Lungs: Cough  Heart: WNL  Stomach: WNL  Bladder: WNL  Muscle/Joints: WNL  Skin: Poor Wound Healing  Nervous System: Dizziness, Loss of Balance  Mental Health: WNL     Blood: Easy Bruising    Objective:   /72 (Patient Site: Right Arm, Patient Position: Sitting, Cuff Size: Adult Large)  Pulse 62  Resp 16  Ht 6' 2\" (1.88 m)  Wt 207 lb (93.9 kg)  BMI 26.58 kg/m2  Physical Exam:  GENERAL: no distress  NECK: No JVD  LUNGS: Clear to auscultation.  CARDIAC: regular rhythm, S1 & S2 normal.  No heaves, thrills, gallops or murmurs.  ABDOMEN: flat, negative hepatosplenomegaly, soft and " non-tender.  EXTREMITIES: No evidence of cyanosis, clubbing or edema.    Current Outpatient Prescriptions   Medication Sig Dispense Refill     acetaminophen (TYLENOL) 325 MG tablet Take 2 tablets (650 mg total) by mouth every 6 (six) hours as needed.  0     amiodarone (PACERONE) 200 MG tablet Take 1 tablet (200 mg total) by mouth daily. 90 tablet 3     amoxicillin (AMOXIL) 500 MG capsule Take 2,000 mg by mouth as needed (1 hour prior to dentist.).        aspirin 81 MG EC tablet Take 1 tablet (81 mg total) by mouth daily.  0     b complex vitamins tablet Take 1 tablet by mouth every other day.        cholecalciferol, vitamin D3, 1,000 unit tablet Take 1,000 Units by mouth every other day.        EPINEPHrine (EPIPEN) 0.3 mg/0.3 mL atIn Inject 0.3 mg into the shoulder, thigh, or buttocks once as needed (allergic reaction).       ibuprofen (ADVIL,MOTRIN) 200 MG tablet Take 200 mg by mouth every 6 (six) hours as needed for pain.       MULTIVIT &MINERALS/FERROUS FUM (MULTI VITAMIN ORAL) Take 1 tablet by mouth every other day. Pt taking multi vitamin without Iron       warfarin (COUMADIN) 5 MG tablet TAKE ONE TABLET BY MOUTH ONCE DAILY. ADJUST DOSE BASED ON INR RESULTS AS DIRECTED. 35 tablet 3     losartan (COZAAR) 25 MG tablet Take 1 tablet (25 mg total) by mouth daily. 30 tablet 12     No current facility-administered medications for this visit.        Cardiographics:    Pacemaker interrogation: February 2018  Normal function no atrial dysrhythmias    Echocardiogram: April 2018    Left ventricle ejection fraction is mildly decreased. The estimated left ventricular ejection fraction is 50%.    Mitral Valve: There is a bioprosthetic mitral valve. Normal prosthetic valve function.    Left Atrium: Left atrial volume is moderately increased.    IVC: Normal central venous pressure.    Pericardium: No pericardial effusion.    When compared to the previous study dated 11/10/2017, LVEF is higher.  Lab Results:       Lab Results    Component Value Date    CHOL 177 08/03/2017    CHOL 220 (H) 08/24/2016    CHOL 226 (H) 01/14/2015     Lab Results   Component Value Date    HDL 43 08/03/2017    HDL 52 08/24/2016    HDL 53 01/14/2015     Lab Results   Component Value Date    LDLCALC 122 08/03/2017    LDLCALC 158 (H) 08/24/2016    LDLCALC 163 (H) 01/14/2015     Lab Results   Component Value Date    TRIG 58 08/03/2017    TRIG 52 08/24/2016    TRIG 51 01/14/2015     No components found for: CHOLHDL  BNP   Date Value Ref Range Status   11/27/2017 177 (H) 0 - 70 pg/mL Final       Paulo (Sha)  MD Juliano

## 2021-06-17 NOTE — TELEPHONE ENCOUNTER
ANTICOAGULATION  MANAGEMENT    Assessment     Today's INR result of 2.50 is Therapeutic (goal INR of 2.0-3.0)        Warfarin taken as previously instructed    No new diet changes affecting INR    No new medication/supplements affecting INR    - 4/21/21 received initial Pfizer Covid-19 vaccination.    Continues to tolerate warfarin with no reported s/s of bleeding or thromboembolism     Previous INR was Therapeutic at 2.10 on 3/23/21 tested in [place].    Reported a few days of vertigo symptoms.    Plan:     Spoke on phone with Obed regarding INR result and instructed:      Warfarin Dosing Instructions:    - Continue current warfarin dose 5 mg daily on Mon/Wed/Fri; and 7.5 mg daily rest of week.    Instructed patient to follow up no later than:  4-6 wks.   - INR scheduled on 6/15/21 @ [place].    Education provided: importance of consistent vitamin K intake and target INR goal and significance of current INR result    Obed verbalizes understanding and agrees to warfarin dosing plan.    Instructed to call the [facility] for any changes, questions or concerns. (#[phone])   ?   Yolanda Krishna RN    Subjective/Objective:      Obed Haley, a 76 y.o. male is on warfarin. Obed Clay reports:     Home warfarin dose: as updated on anticoagulation calendar per template     Missed doses: No     Medication changes:  No     S/S of bleeding or thromboembolism:  No     New Injury or illness:  No     Changes in diet or alcohol consumption:  No     Upcoming surgery, procedure or cardioversion:  No    Anticoagulation Episode Summary     Current INR goal:  2.0-3.0   TTR:  95.0 % (1 y)   Next INR check:  6/15/2021   INR from last check:  2.50 (5/4/2021)   Weekly max warfarin dose:     Target end date:  9/29/2017   INR check location:     Preferred lab:     Send INR reminders to:  [facility]       Comments:           Anticoagulation Care Providers     Provider Role Specialty Phone number    Franko Franz MD  [facility] Internal Medicine [phone]

## 2021-06-18 NOTE — LETTER
Letter by Franko Franz MD at      Author: Franko Franz MD Service: -- Author Type: --    Filed:  Encounter Date: 1/17/2019 Status: (Other)       Obed Haley  [address] [facility] [address]             January 17, 2019         Dear Mr. Haley,    Below are the results from your recent visit:    Resulted Orders   Lipid Cascade   Result Value Ref Range    Cholesterol 206 (H) <=199 mg/dL    Triglycerides 78 <=149 mg/dL    HDL Cholesterol 54 >=40 mg/dL    LDL Calculated 136 (H) <=129 mg/dL    Patient Fasting > 8hrs? Yes        Your cholesterol numbers are improving.    Please call with questions or contact us using [employer].    Sincerely,        Electronically signed by Franko Franz MD

## 2021-06-19 NOTE — PROGRESS NOTES
Assessment and Plan:   Annual wellness visit.  All information related to the annual wellness visit was reviewed.  Nothing unusual was discovered and no other changes were recommended.    Mitral valve disease with valve replacement surgery 1 year ago.  Doing well.  Continue to follow-up with cardiology.    Paroxysmal atrial fibrillation.  The patient was off of his amiodarone but is now back on it doing well.  He has no cardiac symptoms.        The patient's current medical problems were reviewed.    I have had an Advance Directives discussion with the patient.  The following health maintenance schedule was reviewed with the patient and provided in printed form in the after visit summary:   Health Maintenance   Topic Date Due     PNEUMOCOCCAL CONJUGATE VACCINE FOR ADULTS (PCV13 OR PREVNAR)  04/01/2010     INFLUENZA VACCINE RULE BASED (1) 08/01/2018     FALL RISK ASSESSMENT  08/13/2019     ADVANCE DIRECTIVES DISCUSSED WITH PATIENT  08/24/2021     TD 18+ HE  01/14/2025     COLONOSCOPY  02/16/2025     PNEUMOCOCCAL POLYSACCHARIDE VACCINE AGE 65 AND OVER  Completed     ZOSTER VACCINE  Completed        Subjective:   Chief Complaint: Obed Haley is an 73 y.o. male here for an Annual Wellness visit.   HPI: This is a 73-year-old man who comes in for his annual wellness visit.  It has been nearly 1 year since his mitral valve surgery and he continues to do well.  He follows up with cardiology on a regular basis.  His heart rhythm has been under good control with his current medication.  He remains active and busy.  He denies any chest pain or shortness of breath.  Appetite is good and he is sleeping well.  He is currently preparing to go on a month-long vacation with his wife in his restored 1936 vehicle.  [place] and [place].  Again, he feels good and has no specific complaints today.    Review of Systems:    Please see above.  The rest of the review of systems are negative for all  systems.    Patient Care Team:  Franko Franz MD as PCP - General     Patient Active Problem List   Diagnosis     Asymmetrical sensorineural hearing loss     Non-rheumatic mitral regurgitation     S/P mitral valve replacement with tissue valve     Cardiac pacemaker in situ, dual chamber     Paroxysmal atrial fibrillation (H)     Dilated cardiomyopathy (H)     Recurrent left pleural effusion     Unspecified atrial flutter (H)     Past Medical History:   Diagnosis Date     High cholesterol      MVP (mitral valve prolapse)      Nonocclusive coronary atherosclerosis of native coronary artery 8/3/2017     Sleep apnea, obstructive     uses CPAP      Past Surgical History:   Procedure Laterality Date     EP PACEMAKER INSERT Left 9/25/2017    Procedure: EP Pacemaker Insertion;  Surgeon: Reji Whittington MD;  Location: [facility] Cath Lab;  Service:      KNEE ARTHROSCOPY  2007    left     LAMINECTOMY  1970, 2003    lumbar X2     MITRAL VALVE REPLACEMENT N/A 9/19/2017    Procedure: MITRAL VALVE REPLACEMENT, ANESTHESIA TRANSESOPHAGEAL ECHOCARDIOGRAM, CLOSURE OF THE PFO;  Surgeon: Monica Swenson MD;  Location: [facility] Main OR;  Service:      NH CATH PLACEMENT & NJX CORONARY ART ANGIO IMG S&I N/A 8/3/2017    Procedure: Coronary Angiogram;  Surgeon: Abhinav Redmond MD;  Location: [facility] Cath Lab;  Service: Cardiology     NH L HRT CATH W/NJX L VENTRICULOGRAPHY IMG S&I N/A 8/3/2017    Procedure: Left Heart Catheterization with Left Ventriculogram;  Surgeon: Abhinav Redmond MD;  Location: [facility] Cath Lab;  Service: Cardiology     NH RIGHT HEART CATH O2 SATURATION & CARDIAC OUTPUT N/A 8/3/2017    Procedure: Right Heart Catheterization;  Surgeon: Abhinav Redmond MD;  Location: [facility] Cath Lab;  Service: Cardiology      Family History   Problem Relation Age of Onset     Breast cancer Mother      Hemangiomas Sister       Social History     Social History     Marital status: Single     Spouse  "name: N/A     Number of children: N/A     Years of education: N/A     Occupational History     Not on file.     Social History Main Topics     Smoking status: Former Smoker     Quit date: 1/14/2007     Smokeless tobacco: Never Used     Alcohol use Yes      Comment: occasionally     Drug use: No     Sexual activity: Not on file     Other Topics Concern     Not on file     Social History Narrative      Current Outpatient Prescriptions   Medication Sig Dispense Refill     amiodarone (PACERONE) 200 MG tablet Take 1 tablet (200 mg total) by mouth daily.  0     aspirin 81 MG EC tablet Take 1 tablet (81 mg total) by mouth daily.  0     b complex vitamins tablet Take 1 tablet by mouth every other day.        cholecalciferol, vitamin D3, 1,000 unit tablet Take 1,000 Units by mouth every other day.        losartan (COZAAR) 25 MG tablet Take 1 tablet (25 mg total) by mouth daily. 30 tablet 12     MULTIVIT &MINERALS/FERROUS FUM (MULTI VITAMIN ORAL) Take 1 tablet by mouth every other day. Pt taking multi vitamin without Iron       warfarin (COUMADIN) 5 MG tablet Take 1 tab (5mg) to 1 &1/2 tabs (7.5mg) by mouth daily, as directed.  Adjust dose based on INR results. 110 tablet 1     acetaminophen (TYLENOL) 325 MG tablet Take 2 tablets (650 mg total) by mouth every 6 (six) hours as needed.  0     amoxicillin (AMOXIL) 500 MG capsule Take 2,000 mg by mouth as needed (1 hour prior to dentist.).        EPINEPHrine (EPIPEN) 0.3 mg/0.3 mL atIn Inject 0.3 mg into the shoulder, thigh, or buttocks once as needed (allergic reaction).       ibuprofen (ADVIL,MOTRIN) 200 MG tablet Take 200 mg by mouth every 6 (six) hours as needed for pain.       No current facility-administered medications for this visit.       Objective:   Vital Signs:   Visit Vitals     /80     Pulse 89     Ht 5' 11.5\" (1.816 m)     Wt 211 lb (95.7 kg)     SpO2 98%     BMI 29.02 kg/m2        VisionScreening:  No exam data present     PHYSICAL EXAM  On examination his " blood pressure and other vital signs are as noted.    Eyes: Pupils are equal and conjunctiva are normal.    Ears nose and throat: He does wear hearing aids.  Examination of the external ears, canals and tympanic membranes were normal.  Nose and throat are normal.    Neck: Supple with no masses and no neck vein distention.  No thyroid enlargement.    Lungs: Clear.    Cardiovascular: Heart is in a stable rhythm with rate of 90 and no ectopy.  No gallops or murmurs.  Carotid pulses are full with no bruits.  No peripheral edema.    GI: Abdomen is soft and nontender with no distention.  No masses or organomegaly.    Musculoskeletal: Head and neck are normal to inspection with good range of motion.  Good strength and range of motion in all 4 extremities.    Neurologic: Cranial nerves are intact.  Gait is normal.    Psychiatric: The patient is alert and oriented ×3.  Mood and affect are appropriate.    Assessment Results 8/13/2018   Activities of Daily Living No help needed   Instrumental Activities of Daily Living No help needed   Get Up and Go Score Less than 12 seconds   Mini Cog Total Score 5   Some recent data might be hidden     A Mini-Cog score of 0-2 suggests the possibility of dementia, score of 3-5 suggests no dementia    Identified Health Risks:     The patient was provided with written information regarding signs of hearing loss.  He is at risk for falling and has been provided with information to reduce the risk of falling at home.  Patient's advanced directive was discussed and I am comfortable with the patient's wishes.

## 2021-06-19 NOTE — LETTER
Letter by Franko Franz MD at      Author: Franko Franz MD Service: -- Author Type: --    Filed:  Encounter Date: 8/16/2019 Status: (Other)         Obed Haley  [address] [facility] [address]             August 16, 2019         Dear Mr. Haley,    Below are the results from your recent visit:    Resulted Orders   Comprehensive Metabolic Panel   Result Value Ref Range    Sodium 141 136 - 145 mmol/L    Potassium 5.0 3.5 - 5.0 mmol/L    Chloride 105 98 - 107 mmol/L    CO2 28 22 - 31 mmol/L    Anion Gap, Calculation 8 5 - 18 mmol/L    Glucose 94 70 - 125 mg/dL    BUN 26 8 - 28 mg/dL    Creatinine 1.26 0.70 - 1.30 mg/dL    GFR MDRD Af Amer >60 >60 mL/min/1.73m2    GFR MDRD Non Af Amer 56 (L) >60 mL/min/1.73m2    Bilirubin, Total 0.7 0.0 - 1.0 mg/dL    Calcium 9.9 8.5 - 10.5 mg/dL    Protein, Total 7.6 6.0 - 8.0 g/dL    Albumin 4.1 3.5 - 5.0 g/dL    Alkaline Phosphatase 58 45 - 120 U/L    AST 27 0 - 40 U/L    ALT 20 0 - 45 U/L    Narrative    Fasting Glucose reference range is 70-99 mg/dL per  American Diabetes Association (ADA) guidelines.   Lipid Cascade   Result Value Ref Range    Cholesterol 234 (H) <=199 mg/dL    Triglycerides 108 <=149 mg/dL    HDL Cholesterol 47 >=40 mg/dL    LDL Calculated 165 (H) <=129 mg/dL    Patient Fasting > 8hrs? Yes    PSA (Prostatic-Specific Antigen), Annual Screen   Result Value Ref Range    PSA 0.4 0.0 - 6.5 ng/mL    Narrative    Method is Abbott Prostate-Specific Antigen (PSA)  Standard-WHO 1st International (90:10)       Except for your cholesterol numbers your blood tests look good.  I would like to start you on some a atorvastatin 40 mg daily for your cholesterol.  We should then recheck that number in about 6 to 8 weeks.  I will send in a prescription.  Let me know if you have questions.    Please call with questions or contact us using [employer].    Sincerely,        Electronically signed by Franko Franz MD

## 2021-06-19 NOTE — LETTER
Letter by Beverly Rolon RDCS at      Author: Beverly Rolon RDCS Service: -- Author Type: --    Filed:  Encounter Date: 7/8/2019 Status: (Other)         Obed Haley  [address] [facility] [address]      July 8, 2019      Dear Mr. Haley,    RE: Remote Results    We are writing to you regarding your recent Remote Pacemaker check from home. Your transmission was received successfully. Battery status is satisfactory at this time.     Your results are being reviewed.  You will be contacted if further information is necessary.     Your next device appointment will be a remote check on October 8, 2019; this will occur automatically.    To schedule or reschedule, please call [phone] and press 1.    NOTE: If you would like to do an extra transmission, please call [phone] and press 3 to speak to a nurse BEFORE transmitting. This ensures that the Device Clinic staff is aware of the reason you are sending a transmission, and can follow-up with you after it has been reviewed.    We will be checking your implanted device from home (remotely) every three months unless otherwise instructed. We will need to see you in the clinic at least once a year. You may need to be seen in the clinic sooner depending on the results of your check.    Please be aware:    The follow-up schedule is like a Physician prescription.    Your remote monitor is paired to your specific implanted device.      Sincerely,    [facility]

## 2021-06-19 NOTE — PROGRESS NOTES
Pt is back on amiodarone per Dr. Henao  Pt is going to be traveling for 1 month  Pt will get amio labs on return 10/1  Reviewed sunscreen usage  Reviewed  To call if visual problems or tremors or rash  Pt understands and agrees to plan and has my direct number if needed.

## 2021-06-19 NOTE — LETTER
Letter by Paulo Henao MD (Ted) at      Author: Paulo Henao MD (Ted) Service: -- Author Type: --    Filed:  Encounter Date: 7/24/2019 Status: (Other)         Obed Haley  [address] [facility] [address]      July 24, 2019      Dear Obed,    This letter is to remind you that you will be due for your follow up appointment with Dr. Sha Henao . To help ensure you are in the best health possible, a regular follow-up with your cardiologist is essential.     Please call our Patient Scheduling Line at [phone] to schedule your appointment at your earliest convenience.  If you have recently scheduled an appointment, please disregard this letter.    We look forward to seeing you again. As always, we are available at the number  above for any questions or concerns you may have.      Sincerely,     The Physicians and Staff of [facility]

## 2021-06-19 NOTE — PROGRESS NOTES
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  I instructed him to inform INR nurse about medication changes.    We will continue warfarin and losartan for the time being.  I would like to reassess LV function about 6 months from now with echo.  We may be able to stop losartan if LV function is completely normal again.    Pacemaker interrogations will help us decide if he needs to be on warfarin long-term.  Atrial fibrillation and atrial flutter were only documented after heart surgery.    Follow-up in 6 months    Subjective:   This is 73 y.o. male who comes in today for follow-up visit.  He has done well.  He reports no chest pain or shortness of breath.  He stays physically active.  He has not had heart palpitations or syncope.  He reports the cough has gotten much better after changing lisinopril to losartan.  He denies lightheadedness.    Review of Systems:   General: WNL  Eyes: Visual Distubance  Ears/Nose/Throat: WNL  Lungs: Cough  Heart: WNL  Stomach: WNL  Bladder: WNL  Muscle/Joints: WNL  Skin: Poor Wound Healing  Nervous System: Dizziness, Loss of Balance  Mental Health: WNL     Blood: Easy Bleeding, Easy Bruising    Objective:   /66 (Patient Site: Right Arm, Patient Position: Sitting, Cuff Size: Adult Large)  Pulse 80  Resp 16  Ht 6' 2\" (1.88 m)  Wt 210 lb (95.3 kg)  BMI 26.96 kg/m2  Physical " Exam:  GENERAL: no distress  NECK: No JVD  LUNGS: Clear to auscultation.  CARDIAC: regular rhythm, S1 & S2 normal.  No heaves, thrills, gallops or murmurs.  ABDOMEN: flat, negative hepatosplenomegaly, soft and non-tender.  EXTREMITIES: No evidence of cyanosis, clubbing or edema.    Current Outpatient Prescriptions   Medication Sig Dispense Refill     acetaminophen (TYLENOL) 325 MG tablet Take 2 tablets (650 mg total) by mouth every 6 (six) hours as needed.  0     amoxicillin (AMOXIL) 500 MG capsule Take 2,000 mg by mouth as needed (1 hour prior to dentist.).        aspirin 81 MG EC tablet Take 1 tablet (81 mg total) by mouth daily.  0     b complex vitamins tablet Take 1 tablet by mouth every other day.        cholecalciferol, vitamin D3, 1,000 unit tablet Take 1,000 Units by mouth every other day.        EPINEPHrine (EPIPEN) 0.3 mg/0.3 mL atIn Inject 0.3 mg into the shoulder, thigh, or buttocks once as needed (allergic reaction).       ibuprofen (ADVIL,MOTRIN) 200 MG tablet Take 200 mg by mouth every 6 (six) hours as needed for pain.       losartan (COZAAR) 25 MG tablet Take 1 tablet (25 mg total) by mouth daily. 30 tablet 12     MULTIVIT &MINERALS/FERROUS FUM (MULTI VITAMIN ORAL) Take 1 tablet by mouth every other day. Pt taking multi vitamin without Iron       warfarin (COUMADIN) 5 MG tablet Take 1 tab (5mg) to 1 &1/2 tabs (7.5mg) by mouth daily, as directed.  Adjust dose based on INR results. 110 tablet 1     No current facility-administered medications for this visit.        Cardiographics:    Pacemaker interrogation: May 2018  Normal function; no A. fib or atrial flutter     Echocardiogram: April 2018    Left ventricle ejection fraction is mildly decreased. The estimated left ventricular ejection fraction is 50%.    Mitral Valve: There is a bioprosthetic mitral valve. Normal prosthetic valve function.    Left Atrium: Left atrial volume is moderately increased.    IVC: Normal central venous  pressure.    Pericardium: No pericardial effusion.    When compared to the previous study dated 11/10/2017, LVEF is higher.    Lab Results:       Lab Results   Component Value Date    CHOL 177 08/03/2017    CHOL 220 (H) 08/24/2016    CHOL 226 (H) 01/14/2015     Lab Results   Component Value Date    HDL 43 08/03/2017    HDL 52 08/24/2016    HDL 53 01/14/2015     Lab Results   Component Value Date    LDLCALC 122 08/03/2017    LDLCALC 158 (H) 08/24/2016    LDLCALC 163 (H) 01/14/2015     Lab Results   Component Value Date    TRIG 58 08/03/2017    TRIG 52 08/24/2016    TRIG 51 01/14/2015     BNP   Date Value Ref Range Status   11/27/2017 177 (H) 0 - 70 pg/mL Final       Paulo (Sha)  MD Juliano

## 2021-06-19 NOTE — LETTER
Letter by Makenna Snoi RN at      Author: Makenna Soni RN Service: -- Author Type: --    Filed:  Encounter Date: 5/3/2019 Status: (Other)         Obed Haley  [address] [facility] [address]     May 3, 2019     Dear Mr. Haley,    Below are the results from your recent visit:    Resulted Orders   Basic metabolic panel   Result Value Ref Range    Sodium 141 136 - 145 mmol/L    Potassium 4.6 3.5 - 5.0 mmol/L    Chloride 105 98 - 107 mmol/L    CO2 24 22 - 31 mmol/L    Anion Gap, Calculation 12 5 - 18 mmol/L    Glucose 94 70 - 125 mg/dL    Calcium 9.5 8.5 - 10.5 mg/dL    BUN 26 8 - 28 mg/dL    Creatinine 1.06 0.70 - 1.30 mg/dL    GFR MDRD Af Amer >60 >60 mL/min/1.73m2    GFR MDRD Non Af Amer >60 >60 mL/min/1.73m2    Narrative    Fasting Glucose reference range is 70-99 mg/dL per  American Diabetes Association (ADA) guidelines.   Lipid panel   Result Value Ref Range    Triglycerides 100 <=149 mg/dL    Cholesterol 214 (H) <=199 mg/dL    LDL Calculated 149 (H) <=129 mg/dL    HDL Cholesterol 45 >=40 mg/dL    Patient Fasting > 8hrs? Yes      We have attempted to call you multiple times. Your LDL is elevated. Because of mild to moderate nonobstructive coronary artery disease we should try to lower that to below 100. Dr. Henao would like you to start atorvastatin 20 mg a day and repeat lipid profile in 2 months. Please call [phone] to discuss further.     Sincerely,        Electronically signed by Makenna Soni RN

## 2021-06-19 NOTE — LETTER
Letter by Beverly Rolon RDCS at      Author: Beverly Rolon RDCS Service: -- Author Type: --    Filed:  Encounter Date: 4/4/2019 Status: (Other)         Obed Haley  [address] [address]      April 4, 2019      Dear Mr. Haley,    RE: Remote Results    We are writing to you regarding your recent Remote Pacemaker check from home. Your transmission was received successfully. Battery status is satisfactory at this time.     Your results are being reviewed.    Your next device appointment will be a remote check on July 8, 2019; this will occur automatically.    To schedule or reschedule, please call [phone] and press 1.    NOTE: If you would like to do an extra transmission, please call [phone] and press 3 to speak to a nurse BEFORE transmitting. This ensures that the Device Clinic staff is aware of the reason you are sending a transmission, and can follow-up with you after it has been reviewed.    We will be checking your implanted device from home (remotely) every three months unless otherwise instructed. We will need to see you in the clinic at least once a year. You may need to be seen in the clinic sooner depending on the results of your check.    Please be aware:    The follow-up schedule is like a Physician prescription.    Your remote monitor is paired to your specific implanted device.      Sincerely,    [facility]

## 2021-06-20 ENCOUNTER — HEALTH MAINTENANCE LETTER (OUTPATIENT)
Age: 76
End: 2021-06-20

## 2021-06-20 NOTE — LETTER
Letter by Franko Franz MD at      Author: Franko Franz MD Service: -- Author Type: --    Filed:  Encounter Date: 9/9/2020 Status: (Other)         Obed Haley  [address]  [place] [address]             September 9, 2020         Dear Mr. Haley,    Below are the results from your recent visit:    Resulted Orders   Thyroid Stimulating Hormone (TSH)   Result Value Ref Range    TSH 4.65 0.30 - 5.00 uIU/mL   PSA (Prostatic-Specific Antigen), Annual Screen   Result Value Ref Range    PSA 0.3 0.0 - 6.5 ng/mL    Narrative    Method is Abbott Prostate-Specific Antigen (PSA)  Standard-WHO 1st International (90:10)       Your thyroid test is now in the normal range.  PSA is normal.    Please call with questions or contact us using [facility].    Sincerely,        Electronically signed by Franok Franz MD

## 2021-06-20 NOTE — LETTER
Letter by Kate Waldron at      Author: Kate Waldron Service: -- Author Type: --    Filed:  Encounter Date: 12/30/2019 Status: Signed         Obed Haley  [address]  [place] [address]      December 30, 2019      Dear  Hernandopapoalexa,    RE: Remote Results    We are writing to you regarding your recent Remote Pacemaker check from home. Your transmission was received successfully. Battery status is satisfactory at this time.     Your results are showing no significant changes.    Your next device appointment will be a remote check on March 30, 2020; this will occur automatically.    To schedule or reschedule, please call [phone] and press 1.    NOTE: If you would like to do an extra transmission, please call [phone] and press 3 to speak to a nurse BEFORE transmitting. This ensures that the Device Clinic staff is aware of the reason you are sending a transmission, and can follow-up with you after it has been reviewed.    We will be checking your implanted device from home (remotely) every three months unless otherwise instructed. We will need to see you in the clinic at least once a year. You may need to be seen in the clinic sooner depending on the results of your check.    Please be aware:    The follow-up schedule is like a Physician prescription.    Your remote monitor is paired to your specific implanted device.      Sincerely,    [facility]

## 2021-06-20 NOTE — LETTER
Letter by Beverly Rolon RDCS at      Author: Beverly Rolon RDCS Service: -- Author Type: --    Filed:  Encounter Date: 6/30/2020 Status: (Other)         Obed Haley  [address]  [place] [address]      June 30, 2020      Dear Mr. Haley,    RE: Remote Results    We are writing to you regarding your recent Remote Pacemaker check from home. Your transmission was received successfully. Battery status is satisfactory at this time.     Your results are showing no significant changes.    Your next device appointment will be a clinic visit.  Please call in late July to schedule.      To schedule or reschedule, please call [phone] and press 1.    NOTE: If you would like to do an extra transmission, please call [phone] and press 3 to speak to a nurse BEFORE transmitting. This ensures that the Device Clinic staff is aware of the reason you are sending a transmission, and can follow-up with you after it has been reviewed.    We will be checking your implanted device from home (remotely) every three months unless otherwise instructed. We will need to see you in the clinic at least once a year. You may need to be seen in the clinic sooner depending on the results of your check.    Please be aware:    The follow-up schedule is like a Physician prescription.    Your remote monitor is paired to your specific implanted device.      Sincerely,    [facility]

## 2021-06-20 NOTE — LETTER
Letter by Beverly Rolon RDCS at      Author: Beverly Rolon RDCS Service: -- Author Type: --    Filed:  Encounter Date: 3/30/2020 Status: (Other)         Obed Haley  [address]  [place] [address]      March 30, 2020      Dear Mr. Haley,    RE: Remote Results    We are writing to you regarding your recent Remote Pacemaker check from home. Your transmission was received successfully. Battery status is satisfactory at this time.     Your results are showing no significant changes.    Your next device appointment will be a remote check on June 30, 2020; this will occur automatically.    To schedule or reschedule, please call [phone] and press 1.    NOTE: If you would like to do an extra transmission, please call [phone] and press 3 to speak to a nurse BEFORE transmitting. This ensures that the Device Clinic staff is aware of the reason you are sending a transmission, and can follow-up with you after it has been reviewed.    We will be checking your implanted device from home (remotely) every three months unless otherwise instructed. We will need to see you in the clinic at least once a year. You may need to be seen in the clinic sooner depending on the results of your check.    Please be aware:    The follow-up schedule is like a Physician prescription.    Your remote monitor is paired to your specific implanted device.      Sincerely,    [facility]

## 2021-06-21 NOTE — LETTER
Letter by Kate Waldron at      Author: Kate Waldron Service: -- Author Type: --    Filed:  Encounter Date: 3/10/2021 Status: (Other)         Obed SG Haley  [address]  [place] [address]      March 10, 2021      Dear Mr. Edmondsalexa,    RE: Remote Results    We are writing to you regarding your recent Remote Pacemaker check from home. Your transmission was received successfully. Battery status is satisfactory at this time.     Your results are showing no significant changes.    Your next device appointment will be a remote check on Angie 10, 2021; this will occur automatically.    To schedule or reschedule, please call [phone] and press 1.    NOTE: If you would like to do an extra transmission, please call [phone] and press 3 to speak to a nurse BEFORE transmitting. This ensures that the Device Clinic staff is aware of the reason you are sending a transmission, and can follow-up with you after it has been reviewed.    We will be checking your implanted device from home (remotely) every three months unless otherwise instructed. We will need to see you in the clinic at least once a year. You may need to be seen in the clinic sooner depending on the results of your check.    Please be aware:    The follow-up schedule is like a Physician prescription.    Your remote monitor is paired to your specific implanted device.      Sincerely,    [facility]

## 2021-06-21 NOTE — LETTER
Letter by Beverly Rolon RDCS at      Author: Beverly Rolon RDCS Service: -- Author Type: --    Filed:  Encounter Date: 11/10/2020 Status: (Other)         Obed Haley  [address]  [place] [address]      November 10, 2020      Dear Mr. Haley,    RE: Remote Results    We are writing to you regarding your recent Remote Pacemaker check from home. Your transmission was received successfully. Battery status is satisfactory at this time.     Your results are being reviewed.  You will be contacted if further information is necessary.     Your next device appointment will be a remote check on February 9, 2021; this will occur automatically.    To schedule or reschedule, please call [phone] and press 1.    NOTE: If you would like to do an extra transmission, please call [phone] and press 3 to speak to a nurse BEFORE transmitting. This ensures that the Device Clinic staff is aware of the reason you are sending a transmission, and can follow-up with you after it has been reviewed.    We will be checking your implanted device from home (remotely) every three months unless otherwise instructed. We will need to see you in the clinic at least once a year. You may need to be seen in the clinic sooner depending on the results of your check.    Please be aware:    The follow-up schedule is like a Physician prescription.    Your remote monitor is paired to your specific implanted device.      Sincerely,    [facility]

## 2021-06-21 NOTE — PROGRESS NOTES
[facility] Follow Up Note    Obed Haley   73 y.o. male    Date of Visit: 10/25/2018    Chief Complaint   Patient presents with     Follow-up     wants to discuss most recent Cholesterol results. Needs referral for sleep - to discuss needing the CPAP      Flu Vaccine     wants flu vaccine and prevnar 13     Subjective  This is a 73-year-old man that has multiple medical issues that include paroxysmal atrial fibrillation, dilated cardiomyopathy, dyslipidemia and obstructive sleep apnea.  For the most part he is doing well.  His cardiac issues are stable.  There have been no recent changes in medication.  He just returned from a [place] and his [place].  He showed me pictures and it looks as though he was doing fine and having fun.  He comes in for several reasons today.  One is to get flu and pneumonia vaccines which we will do.  Second reason is to discuss his lipids.  On his most recent blood tests his LDL was up somewhat in the 155 range.  His triglycerides and HDL were very good.  A year ago his LDL was down around 122.  He admits that at that time, which is when he was having cardiac issues, he was much more attentive to his diet.  He admits that he is not is stringent with his diet at this point in time.  He wished to discuss the cholesterol issue.  The other primary issue is his obstructive sleep apnea.  He has carried this diagnosis for many years and has not been reevaluated in some time.  For the most part he seems to be doing well although he tells me that occasionally he wakes up in the middle of the night with breathing difficulties.  Again, he has not been reassessed in some time and has not had his equipment assessed either.  Otherwise he is feeling fine and offers no other complaints.    ROS A comprehensive review of systems was performed and was otherwise negative    Medications, allergies, and problem list were reviewed and updated    Exam  General  Appearance:   On examination his blood pressure is 138/80.  Weight is 213 pounds and height is 71.5 inches.    Lungs are clear.    Heart rhythm is stable with rate in the 70s and no ectopy.    No peripheral edema.    The patient is alert and oriented x3.      Assessment/Plan  1. Paroxysmal atrial fibrillation (H)     2. Dilated cardiomyopathy (H)     3. Dyslipidemia     4. KYLE (obstructive sleep apnea)  Ambulatory referral to Sleep Medicine   5. Need for vaccination for Strep pneumoniae  Pneumococcal conjugate vaccine 13-valent 6wks-17yrs; >50yrs   6. Need for immunization against influenza  Influenza High Dose, Seasonal 65+ yrs     Paroxysmal atrial fibrillation.  Stable.  Continue Coumadin.    Dilated cardiomyopathy.  Also stable.  Follow-up as needed.    Dyslipidemia.  There is some elevation of his LDL.  We spent a great deal of time in discussing the situation.  I suggested to him that he work more diligently again on his diet and that we recheck the lipid numbers in February or March.  If we are not seeing improvement at that time we may consider a medication.    Obstructive sleep apnea.  I thought it would be best if you return to the sleep clinic for reassessment and evaluation of his current equipment.  He is agreeable to that and so we will set up the referral.    I will follow-up as needed.  Total time of this office visit was 25 minutes with greater than 50% of the time spent in care coordination of patient counseling.  The following high BMI interventions were performed this visit: weight monitoring    Franko Franz MD      Current Outpatient Prescriptions on File Prior to Visit   Medication Sig     acetaminophen (TYLENOL) 325 MG tablet Take 2 tablets (650 mg total) by mouth every 6 (six) hours as needed.     amiodarone (PACERONE) 200 MG tablet Take 1 tablet (200 mg total) by mouth daily.     amoxicillin (AMOXIL) 500 MG capsule Take 2,000 mg by mouth as needed (1 hour prior to dentist.).      aspirin  81 MG EC tablet Take 1 tablet (81 mg total) by mouth daily.     b complex vitamins tablet Take 1 tablet by mouth every other day.      cholecalciferol, vitamin D3, 1,000 unit tablet Take 1,000 Units by mouth every other day.      EPINEPHrine (EPIPEN) 0.3 mg/0.3 mL atIn Inject 0.3 mg into the shoulder, thigh, or buttocks once as needed (allergic reaction).     ibuprofen (ADVIL,MOTRIN) 200 MG tablet Take 200 mg by mouth every 6 (six) hours as needed for pain.     losartan (COZAAR) 25 MG tablet Take 1 tablet (25 mg total) by mouth daily.     MULTIVIT &MINERALS/FERROUS FUM (MULTI VITAMIN ORAL) Take 1 tablet by mouth every other day. Pt taking multi vitamin without Iron     warfarin (COUMADIN) 5 MG tablet Take 1 tab (5mg) to 1 &1/2 tabs (7.5mg) by mouth daily, as directed.  Adjust dose based on INR results.     No current facility-administered medications on file prior to visit.      Allergies   Allergen Reactions     Bee Venom Protein (Honey Bee) Anaphylaxis     Social History   Substance Use Topics     Smoking status: Former Smoker     Quit date: 1/14/2007     Smokeless tobacco: Never Used     Alcohol use Yes      Comment: occasionally

## 2021-06-22 ENCOUNTER — COMMUNICATION - HEALTHEAST (OUTPATIENT)
Dept: CARDIOLOGY | Facility: CLINIC | Age: 76
End: 2021-06-22

## 2021-06-22 ENCOUNTER — AMBULATORY - HEALTHEAST (OUTPATIENT)
Dept: CARDIOLOGY | Facility: CLINIC | Age: 76
End: 2021-06-22

## 2021-06-22 DIAGNOSIS — Z95.0 CARDIAC PACEMAKER IN SITU: ICD-10-CM

## 2021-06-22 DIAGNOSIS — I49.5 SICK SINUS SYNDROME (H): ICD-10-CM

## 2021-06-23 NOTE — TELEPHONE ENCOUNTER
ANTICOAGULATION  MANAGEMENT    Assessment     Today's INR result of 2.80 is Therapeutic (goal INR of 2.0-3.0)        Warfarin taken as previously instructed    No new diet changes affecting INR    No new medication/supplements affecting INR    Continues to tolerate warfarin with no reported s/s of bleeding or thromboembolism     Previous INR was Supratherapeutic at 3.10 on 12/28/18.    Plan:     Spoke with Obed regarding INR result and instructed:     Warfarin Dosing Instructions:    - Continue current warfarin dose 2.5 mg daily on Wednesdays; and 5 mg daily rest of week.    Instructed patient to follow up no later than:  2 wks.   - scheduled on 1/30/19 @ [facility].    Education provided: importance of consistent vitamin K intake, target INR goal and significance of current INR result and importance of notifying clinic for changes in medications    Obed verbalizes understanding and agrees to warfarin dosing plan.    Instructed to call the [facility] for any changes, questions or concerns. (#[phone])   ?   Yolanda Krishna RN    Subjective/Objective:      Obed Haley, a 73 y.o. male is on warfarin.     Obed reports:     Home warfarin dose: as updated on anticoagulation calendar per template     Missed doses: No     Medication changes:  No.  Currently will continue Amiodarone 200mg daily.  (will assess medication if it should be continued / discontinued, and scheduled for echocardiogram).     S/S of bleeding or thromboembolism:  No     New Injury or illness:  No     Changes in diet or alcohol consumption:  No     Upcoming surgery, procedure or cardioversion:  No    Anticoagulation Episode Summary     Current INR goal:   2.0-3.0   TTR:   56.2 % (1.3 y)   Next INR check:   1/30/2019   INR from last check:   2.80 (1/16/2019)   Weekly max warfarin dose:      Target end date:   9/29/2017   INR check location:      Preferred lab:      Send INR reminders to:   ANTICOAGULATION POOL C (DTN,VAD,CGR,GAV)       Comments:             Anticoagulation Care Providers     Provider Role Specialty Phone number    Franko Franz MD Referring Internal Medicine [phone]

## 2021-06-23 NOTE — TELEPHONE ENCOUNTER
Called and talked with Obed.     - reported Amiodarone was stopped on 1/17/19, and switched to Metoprolol Succinate 100mg daily on 1/18.     - INR on 1/16/19 was therapeutic at 2.80   (target goal of 2.0 - 3.0)     - rescheduled to check INR on Mon. 1/28/19 @ [facility].

## 2021-06-23 NOTE — TELEPHONE ENCOUNTER
ANTICOAGULATION  MANAGEMENT    Assessment     Today's INR result of 2.70 is Therapeutic (goal INR of 2.0-3.0)        Warfarin taken as previously instructed    No new diet changes affecting INR    Potential interaction between Amiodarone discontinued on 1/17 and switched to Metoprolol Succinate 1/18. and warfarin which may affect subsequent INRs    Continues to tolerate warfarin with no reported s/s of bleeding or thromboembolism     Previous INR was Therapeutic at 2.80 on 1/16/18.    Plan:     Spoke with Obed regarding INR result and instructed:     Warfarin Dosing Instructions:    - Continue current warfarin dose 2.5 mg daily on Wednesdays; and 5 mg daily rest of week.    Instructed patient to follow up no later than:  1-2 wks.    Education provided: target INR goal and significance of current INR result, potential interaction between warfarin and Amiodarone / Metoprolol Succinate and importance of notifying clinic for changes in medications    Obed verbalizes understanding and agrees to warfarin dosing plan.    Instructed to call the [facility] for any changes, questions or concerns. (#[phone])   ?   Yolanda Krishna RN    Subjective/Objective:      Obed Haley, a 73 y.o. male is on warfarin.     Obed reports:     Home warfarin dose: as updated on anticoagulation calendar per template     Missed doses: No     Medication changes:  Yes:  Reported on 1/17/19 discontinued on Amiodarone and on 1/18/19 switched and started on Metoprolol Succinate 100mg daily.     S/S of bleeding or thromboembolism:  No     New Injury or illness:  No.  Reported doing well with no new complaints.     Changes in diet or alcohol consumption:  No     Upcoming surgery, procedure or cardioversion:  No    Anticoagulation Episode Summary     Current INR goal:   2.0-3.0   TTR:   57.3 % (1.3 y)   Next INR check:   2/11/2019   INR from last check:   2.70 (1/28/2019)   Weekly max warfarin dose:      Target end date:   9/29/2017   INR  check location:      Preferred lab:      Send INR reminders to:   ANTICOAGULATION POOL C (DTN,VAD,CGR,GAV)       Comments:            Anticoagulation Care Providers     Provider Role Specialty Phone number    Franko Franz MD Referring Internal Medicine [phone]

## 2021-06-23 NOTE — TELEPHONE ENCOUNTER
Who is calling:  Patient  Reason for Call:  Reporting medication change  Date of last appointment with primary care:   Has the patient been recently seen:    Okay to leave a detailed message: Yes      Patient is calling to report that he has started taking Metoprolol 100mg 1x daily in place of Amiodorone.     Please call to discuss any changes.

## 2021-06-23 NOTE — TELEPHONE ENCOUNTER
Who is calling:  patient  Reason for Call:  Patient says it was discussed that he will need to follow up with Dr Franz in 3 month for his cholesterol. Patient says he is going to be at [facility] tomorrow for an appointment and would like to know if he can have labs drawn for Dr Franz at that time. Please advise, thank you.  Date of last appointment with primary care: 10-25-18  Has the patient been recently seen:  No  Okay to leave a detailed message: Yes

## 2021-06-23 NOTE — PATIENT INSTRUCTIONS - HE
Obed Haley,    It was a pleasure to see you today at the [facility].     My recommendations after this visit include:    Echo  Will change amiodarone when echo results available    DANNY Henao MD, FACC, MILI

## 2021-06-23 NOTE — PROGRESS NOTES
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  Depending on LV systolic function we may select different antiarrhythmics or go with rate control strategy.  I am not very optimistic that ablation would be successful option for him.  I will run it by EP specialist.      Should consider statin for nonobstructive coronary artery disease.  He is quite reluctant to start taking any new medications at this point.    Follow-up in 6 months    Subjective:   This is 73 y.o. male who comes in today for follow-up visit.  He reports that his energy has improved significantly.  He was able to go on one month trip out to [place] [facility] [place]".  He did quite a bit of hiking.  He has not had chest pains or shortness of breath.  He is not aware of irregular heart rhythm.  He does feel lightheaded when he stands up abruptly.  He denies syncope or near syncope.    Review of Systems:   General: WNL  Eyes: WNL  Ears/Nose/Throat: WNL  Lungs: WNL  Heart: Irregular Heartbeat  Stomach: WNL  Bladder: WNL  Muscle/Joints: Joint Pain  Skin: Poor Wound Healing  Nervous System: Loss of Balance  Mental Health: WNL     Blood: WNL    Objective:   BP (!) 152/92 (Patient Site: Right Arm, Patient Position: Sitting, Cuff Size: Adult Large)   Pulse 84   Resp 16   " Ht 5' 11.5\" (1.816 m)   Wt 217 lb (98.4 kg)   BMI 29.84 kg/m    Physical Exam:  GENERAL: no distress  NECK: No JVD  LUNGS: Clear to auscultation.  CARDIAC: irregular rhythm, S1 & S2 normal.  No heaves, thrills, gallops or murmurs.  ABDOMEN: flat, negative hepatosplenomegaly, soft and non-tender.  EXTREMITIES: No evidence of cyanosis, clubbing or edema.    Current Outpatient Medications   Medication Sig Dispense Refill     acetaminophen (TYLENOL) 325 MG tablet Take 2 tablets (650 mg total) by mouth every 6 (six) hours as needed.  0     amiodarone (PACERONE) 200 MG tablet Take 1 tablet (200 mg total) by mouth daily.  0     aspirin 81 MG EC tablet Take 1 tablet (81 mg total) by mouth daily.  0     b complex vitamins tablet Take 1 tablet by mouth every other day.        cholecalciferol, vitamin D3, 1,000 unit tablet Take 1,000 Units by mouth every other day.        EPINEPHrine (EPIPEN) 0.3 mg/0.3 mL atIn Inject 0.3 mg into the shoulder, thigh, or buttocks once as needed (allergic reaction).       ibuprofen (ADVIL,MOTRIN) 200 MG tablet Take 200 mg by mouth every 6 (six) hours as needed for pain.       losartan (COZAAR) 25 MG tablet Take 1 tablet (25 mg total) by mouth daily. 30 tablet 12     MULTIVIT &MINERALS/FERROUS FUM (MULTI VITAMIN ORAL) Take 1 tablet by mouth every other day. Pt taking multi vitamin without Iron       warfarin (COUMADIN/JANTOVEN) 5 MG tablet Take ONE-HALF tab (2.5mg) to ONE tab (5mg) by mouth daily, as directed.  Adjust dose based on INR results. 110 tablet 1     amoxicillin (AMOXIL) 500 MG capsule Take 2,000 mg by mouth as needed (1 hour prior to dentist.).        No current facility-administered medications for this visit.        Cardiographics:    Pacemaker interrogation: January 11, 2019 9% atrial paced 21% ventricular paced; atrial flutter or coarse A. fib burden 51%, rate controlled     Echocardiogram: April 2018    Left ventricle ejection fraction is mildly decreased. The estimated left " ventricular ejection fraction is 50%.    Mitral Valve: There is a bioprosthetic mitral valve. Normal prosthetic valve function.    Left Atrium: Left atrial volume is moderately increased.    IVC: Normal central venous pressure.    Pericardium: No pericardial effusion.    When compared to the previous study dated 11/10/2017, LVEF is higher.    Coronary angiogram: August 2017  LM min dz  LAD mid 30%   Circ mild dz with branch supplies PL territory  RCA mid 50% with PDA extends to apex     Note distal LAD and PDA vessels are very small around apex  Lab Results:       Lab Results   Component Value Date    CHOL 221 (H) 08/13/2018    CHOL 177 08/03/2017    CHOL 220 (H) 08/24/2016     Lab Results   Component Value Date    HDL 55 08/13/2018    HDL 43 08/03/2017    HDL 52 08/24/2016     Lab Results   Component Value Date    LDLCALC 153 (H) 08/13/2018    LDLCALC 122 08/03/2017    LDLCALC 158 (H) 08/24/2016     Lab Results   Component Value Date    TRIG 67 08/13/2018    TRIG 58 08/03/2017    TRIG 52 08/24/2016     BNP   Date Value Ref Range Status   11/27/2017 177 (H) 0 - 70 pg/mL Final       Paulo (Sha)  MD Juliano

## 2021-06-23 NOTE — PROGRESS NOTES
In clinic device check with Device RN and follow-up with Dr. Henao.  Please see link for full device report.  Patient was informed of results and next follow up during today's visit.

## 2021-06-24 NOTE — TELEPHONE ENCOUNTER
ANTICOAGULATION  MANAGEMENT    Assessment     Today's INR result of 2.80 is Therapeutic (goal INR of 2.0-3.0)        Warfarin taken as previously instructed    No new diet changes affecting INR    No new medication/supplements affecting INR    Continues to tolerate warfarin with no reported s/s of bleeding or thromboembolism     Previous INR was Therapeutic at 2.70 on 1/28/19.    Plan:     Spoke with Obed regarding INR result and instructed:     Warfarin Dosing Instructions:  (has 5mg tabs)   - Continue current warfarin dose 2.5 mg daily on Wednesdays; and 5 mg daily rest of week.    Instructed patient to follow up no later than:  2 wks.    - scheduled on 2/27/19 @ [facility].    Education provided: target INR goal and significance of current INR result and potential interaction between warfarin and Amiodarone discontinued 2 wks ago    Obed verbalizes understanding and agrees to warfarin dosing plan.    Instructed to call the [facility] for any changes, questions or concerns. (#[phone])   ?   Yolanda Krishna RN    Subjective/Objective:      Obed Haley, a 73 y.o. male is on warfarin.     Obed reports:     Home warfarin dose: as updated on anticoagulation calendar per template     Missed doses: No     Medication changes:  Yes:  Amiodarone discontinued 2 wks ago and switched to Metoprolol Succinate.     S/S of bleeding or thromboembolism:  No     New Injury or illness:  No     Changes in diet or alcohol consumption:  No     Upcoming surgery, procedure or cardioversion:  No    Anticoagulation Episode Summary     Current INR goal:   2.0-3.0   TTR:   58.7 % (1.3 y)   Next INR check:   2/27/2019   INR from last check:   2.80 (2/13/2019)   Weekly max warfarin dose:      Target end date:   9/29/2017   INR check location:      Preferred lab:      Send INR reminders to:   ANTICOAGULATION POOL C (DTN,VAD,CGR,GAV)       Comments:            Anticoagulation Care Providers     Provider Role Specialty Phone number     Franko Franz MD [facility] Internal Medicine [phone]

## 2021-06-24 NOTE — TELEPHONE ENCOUNTER
ANTICOAGULATION  MANAGEMENT    Assessment     Today's INR result of 2.7 is Therapeutic (goal INR of 2.0-3.0)        Warfarin taken as previously instructed    No new diet changes affecting INR    Potential interaction between Amiodarone (ended 1/17) and warfarin which may affect subsequent INRs    Continues to tolerate warfarin with no reported s/s of bleeding or thromboembolism     Previous INR was Therapeutic    Plan:     Left a detailed message for Obed regarding INR result and instructed:     Warfarin Dosing Instructions:  Continue current warfarin dose 2.5 mg daily on Wed; and 5 mg daily rest of week  (0 % change)    Instructed patient to follow up no later than: two weeks    Education provided: potential interaction between warfarin and Amiodarone    Instructed to call the [facility] for any changes, questions or concerns. (#[phone])   ?   Cherie Mora RN    Subjective/Objective:      Obed Haley, a 73 y.o. male is on warfarin.     Obed reports:     Home warfarin dose: as updated on anticoagulation calendar per template     Missed doses: No     Medication changes:  Yes: Amiodarone DC'd 1/17     S/S of bleeding or thromboembolism:  No     New Injury or illness:  No     Changes in diet or alcohol consumption:  No     Upcoming surgery, procedure or cardioversion:  No    Anticoagulation Episode Summary     Current INR goal:   2.0-3.0   TTR:   59.9 % (1.4 y)   Next INR check:   3/13/2019   INR from last check:   2.70 (2/27/2019)   Weekly max warfarin dose:      Target end date:   9/29/2017   INR check location:      Preferred lab:      Send INR reminders to:   ANTICOAGULATION POOL C (DTN,VAD,CGR,GAV)       Comments:            Anticoagulation Care Providers     Provider Role Specialty Phone number    Franko Franz MD Referring Internal Medicine [phone]

## 2021-06-24 NOTE — TELEPHONE ENCOUNTER
ANTICOAGULATION  MANAGEMENT    Assessment     Today's INR result of 2.80 is Therapeutic (goal INR of 2.0-3.0)        Warfarin taken as previously instructed    No new diet changes affecting INR    No new medication/supplements affecting INR    Continues to tolerate warfarin with no reported s/s of bleeding or thromboembolism     Previous INR was Therapeutic at 2.70 on 2/27/19.    Leaving on vacation this week.    Plan:     Spoke with Obed regarding INR result and instructed:     Warfarin Dosing Instructions:    - Continue current warfarin dose 2.5 mg daily on Wednesdays; and 5 mg daily rest of week.    Instructed patient to follow up no later than:  4-6 wks.   - will call back to schedule on their return from vacation.    Education provided: importance of consistent vitamin K intake, target INR goal and significance of current INR result, importance of notifying clinic for changes in medications and travel related clotting risk and prevention    Obed verbalizes understanding and agrees to warfarin dosing plan.    Instructed to call the [facility] for any changes, questions or concerns. (#[phone])   ?   Yolanda Krishna RN    Subjective/Objective:      Obed Haley, a 73 y.o. male is on warfarin.     Obed reports:     Home warfarin dose: verbally confirmed home dose with Obed and updated on anticoagulation calendar     Missed doses: No     Medication changes:  No     S/S of bleeding or thromboembolism:  No     New Injury or illness:  No     Changes in diet or alcohol consumption:  No     Upcoming surgery, procedure or cardioversion:  No    Anticoagulation Episode Summary     Current INR goal:   2.0-3.0   TTR:   60.8 % (1.4 y)   Next INR check:   4/22/2019   INR from last check:   2.80 (3/11/2019)   Weekly max warfarin dose:      Target end date:   9/29/2017   INR check location:      Preferred lab:      Send INR reminders to:   ANTICOAGULATION POOL C (DTN,VAD,CGR,GAV)       Comments:             Anticoagulation Care Providers     Provider Role Specialty Phone number    Franko Franz MD Referring Internal Medicine [phone]

## 2021-06-25 NOTE — PROGRESS NOTES
Progress Notes by Paulo Henao MD (Ted) at 7/7/2017  1:50 PM     Author: Paulo Henao MD (Ted) Service: -- Author Type: Physician    Filed: 7/7/2017  2:48 PM Encounter Date: 7/7/2017 Status: Signed    : Paulo Henao MD (Ted) (Physician)           Click to link to [facility] HEART [facility] NOTE    Thank you, Dr. Franz, for asking the [facility] include [facility] beds at the [facility] to evaluate Mr. Obed Haley.      Assessment/Recommendations   Assessment:    Exertional dyspnea, uncertain etiology  Mitral valve prolapse with mitral regurgitation  Sinus bradycardia with first-degree AV block probably asymptomatic  Hyperlipidemia    Plan:  Recent echocardiogram showed mitral valve prolapse/ posterior leaflet with moderate mitral regurgitation.  I suspect that we may be underestimating severity of mitral regurgitation.  The size of the left atrium would indicate significant regurgitant volume.  Exertional dyspnea could be also related to progression of mitral valve regurgitation.  We should not forget about other causes of shortness of breath including coronary artery disease.  I would like to obtain BNP today.  Stress echo to rule out coronary artery disease and assess pulmonary pressures with exercise.  We may need to do a transesophageal echocardiogram to get a better sense about severity of mitral regurgitation.       History of Present Illness    Mr. Obed Haley is a 72 y.o. male who comes in today for evaluation of shortness of breath.  He has been experiencing increasing dyspnea during physical activities in last few months.  He denies any chest discomfort or shortness of breath at rest.  He has not had any prolonged heart racing or syncope.  His weight has has not changed.  He denies PND, orthopnea, pedal edema.  He is not known to have coronary artery disease or cardiomyopathy.  He was told  back in 1991 that he has mitral valve prolapse.  He has not seen  cardiologist.    ECG: Personally reviewed.  Sinus bradycardia rate of 57 beats per minutes first-degree AV block 230 ms otherwise normal    Echocardiogram:   1. Normal left ventricular size and systolic performance with a visually estimated ejection fraction of 60-65%.   2. There is mild to moderate prolapse of the posterior mitral valve leaflet. There is moderate mitral insufficiency.  3. Normal right ventricular size and systolic performance.   4. There is mild to moderate left atrial enlargement.        Physical Examination Review of Systems   Vitals:    07/07/17 1358   BP: 128/58   Pulse: 60   Resp: 18     Body mass index is 28.76 kg/(m^2).  Wt Readings from Last 3 Encounters:   07/07/17 215 lb (97.5 kg)   06/07/17 222 lb (100.7 kg)   05/31/17 (!) 222 lb 4 oz (100.8 kg)     General Appearance:   Alert, cooperative, no distress, appears stated age   Head/ENT: Normocephalic, without obvious abnormality. Membranes moist      EYES:  no scleral icterus, normal conjunctivae   Neck: Supple, symmetrical, trachea midline, no adenopathy, thyroid: not enlarged, symmetric, no carotid bruit or JVD   Chest/Lungs:   Lungs are clear to auscultation, respirations unlabored. No tenderness or deformity    Cardiovascular:   Regular rhythm, S1, S2 normal, midsystolic click with 3/6 late systolic murmur at the apex    Abdomen:  Soft, non-tender, bowel sounds active all four quadrants,  no masses, no organomegaly   Extremities: no cyanosis or clubbing. No edema   Skin: Skin color, texture, turgor normal, no rashes or lesions.    Psychiatric: Normal affect, calm   Neurologic: Alert and oriented x 3, moving all four extremities.     General: WNL  Eyes: WNL  Ears/Nose/Throat: WNL  Lungs: WNL  Heart: Shortness of Breath with activity, Irregular Heartbeat, Leg Swelling  Stomach: WNL  Bladder: WNL  Muscle/Joints: Joint Pain  Skin: WNL  Nervous System: WNL  Mental Health: WNL     Blood: WNL     Medical History  Surgical History Family  History Social History   Hyperlipidemia   back surgery ×2  No family history of premature coronary artery disease  Social History     Social History   ? Marital status: Single     Spouse name: N/A   ? Number of children: N/A   ? Years of education: N/A     Occupational History   ?  retired       Social History Main Topics   ? Smoking status: Former Smoker     Quit date: 2007   ? Smokeless tobacco: Not on file   ? Alcohol use Not on file   ? Drug use: Not on file   ? Sexual activity: Not on file     Other Topics Concern   ? Not on file     Social History Narrative          Medications  Allergies   Current Outpatient Prescriptions   Medication Sig Dispense Refill   ? EPINEPHRINE (EPIPEN INJ) Inject as directed.     ? MULTIVIT &MINERALS/FERROUS FUM (MULTI VITAMIN ORAL) Take by mouth.       No current facility-administered medications for this visit.       Allergies   Allergen Reactions   ? Bee Venom Protein (Honey Bee) Anaphylaxis         Lab Results    Chemistry/lipid CBC Cardiac Enzymes/BNP/TSH/INR   Lab Results   Component Value Date    CHOL 220 (H) 2016    HDL 52 2016    LDLCALC 158 (H) 2016    TRIG 52 2016    CREATININE 0.88 2016    BUN 15 2016    K 4.3 2016     2016     2016    CO2 30 2016    Lab Results   Component Value Date    HGB 14.2 2012    No results found for: CKTOTAL, CKMB, CKMBINDEX, TROPONINI, BNP, TSH, INR

## 2021-06-25 NOTE — PROGRESS NOTES
Progress Notes by Natasha Dickerson RN at 10/31/2017 10:21 AM     Author: Natasha Dickerson RN Service: -- Author Type: Registered Nurse    Filed: 11/8/2017  7:49 AM Encounter Date: 10/31/2017 Status: Signed    : Natasha Dickerson RN (Registered Nurse)       MD Natasha Richard RN                     Primary arrhythmia probably primary atrial tachycardia with one-to-one AV conduction at times simulating AV [place] reentry.  I spoke with Dr. Henao who will start him on sotalol.   Please do a follow-up TTM this Friday.  dd       Above notes and request for remote on Friday sent to schedulers. Device RNs updated on follow up plan.    Natasha Dickerson RN

## 2021-06-25 NOTE — TELEPHONE ENCOUNTER
----- Message from Kate Waldron sent at 6/10/2021  8:21 AM CDT -----  Regarding: device RN review  Routine pacemaker remote   One ventricular high rate episode on 4/4/21, appears to be NSVT 18bts 170-180 bpm, duration 5sec.

## 2021-06-25 NOTE — TELEPHONE ENCOUNTER
Routine remote device findings showed an 18bt run NSVT on 4/4/21. Call made to patient to assess symptoms, LM to call back.     Type: routine pacemaker remote transmission.   Presenting rhythm: A-V paced 65 bpm.  Battery/lead status: stable.  Arrhythmias: since 3/10/21; three mode switch episodes all <1min, appears to be atrial tachy arrhythmia. One ventricular high rate episode on 4/4/21, appears to be NSVT 18bts 170-180 bpm, duration 5sec.   Comments: Normal magnet and pacemaker function. Routed to device RN for review. WEST ADD: Reviewed remote, agree with tech findings. Call made to patient to assess symptoms. See epic note. AJM

## 2021-06-25 NOTE — TELEPHONE ENCOUNTER
ANTICOAGULATION  MANAGEMENT    Assessment     Today's INR result of 2.10 is Therapeutic (goal INR of 2.0-3.0)        Warfarin taken as previously instructed    No new diet changes affecting INR    - not drinking much alcoholic beverages.    No new medication/supplements affecting INR    Continues to tolerate warfarin with no reported s/s of bleeding or thromboembolism     Previous INR was Therapeutic at 2.50 on 5/4/21.    Follow-up today with Dr. Henao @ [facility] re: Device f/u.    Plan:     Spoke on phone with Obed regarding INR result and instructed:      Warfarin Dosing Instructions:  (5mg tabs)   - Continue current warfarin dose 5 mg daily on Mon/Wed/Fri; and 7.5 mg daily rest of week.    Instructed patient to follow up no later than:  6 wks.   - INR scheduled on 7/28/21 @ [place].    Education provided: importance of consistent vitamin K intake, target INR goal and significance of current INR result and importance of notifying clinic for changes in medications    Obed verbalizes understanding and agrees to warfarin dosing plan.    Instructed to call the [facility] for any changes, questions or concerns. (#[phone])   ?   Yolanda Krishna RN    Subjective/Objective:      Obed Haley, a 76 y.o. male is on warfarin. Obed Clay reports:     Home warfarin dose: as updated on anticoagulation calendar per template     Missed doses: No     Medication changes:  No     S/S of bleeding or thromboembolism:  No     New Injury or illness:  No     Changes in diet or alcohol consumption:  No     Upcoming surgery, procedure or cardioversion:  No    Anticoagulation Episode Summary     Current INR goal:  2.0-3.0   TTR:  95.0 % (1 y)   Next INR check:  7/27/2021   INR from last check:  2.10 (6/15/2021)   Weekly max warfarin dose:     Target end date:  9/29/2017   INR check location:     Preferred lab:     Send INR reminders to:  [facility]       Comments:           Anticoagulation Care Providers      Provider Role Specialty Phone number    Franko Franz MD Referring Internal Medicine [phone]

## 2021-06-26 NOTE — TELEPHONE ENCOUNTER
Alert received for AT/AF >24 hours. Review of remote shows patient has been in AF since 6/18/21 @ 0146. Call made to patient to assess symptoms. Patient reports being asymptomatic. Last week his metoprolol was decreased by Dr. Henao to 25mg daily to help with lightheadedness. Patient reports this has helped and he no longer has felt lightheaded.     Shan,     Patient has been in AF since 6/18/21, he is asymptomatic. V-rates controlled. [place]: 2%. Patient confirms he is taking warfarin and 25mg metoprolol daily. Please review remote and give any further recommendations. Please give AF routing criteria.     Thank you,    Sun Kingsley RN  Device Nurse      Type: alert remote pacemaker transmission for atrial arrhythmia burden >24 of 24 hours.  Presenting rhythm: AF with ventricular pacing and sensing, rate 80 bpm.  Battery/lead status; stble  Arrhythmias: since 6/10/21, one mode switch, EGM confirms AF, in progress since 6/18/21 at 1:46AM, burden 2% (since 11/23/20), V-rates >/=120 bpm 1%. One nonsustained V-high rate EGM suggest 9-bt NSVT.  Anticoagulant: warfarin  Comments: normal magnet and pacemaker function. Routed to device RN. prd

## 2021-06-26 NOTE — TELEPHONE ENCOUNTER
----- Message from Beverly Rolon RDCS sent at 6/22/2021  6:35 AM CDT -----  Regarding: device RN review  [place]   AF alert >24 hrs, V rates controlled, on warfarin

## 2021-06-26 NOTE — TELEPHONE ENCOUNTER
----- Message -----  From: Marsha Salcido CNP  Sent: 6/22/2021  11:07 AM CDT  To: Sun Kingsley RN    Please schedule remote device check in 1 month to see if he is persistent.  He has previously been asymptomatic.  No change in meds.  Please review with patient that this is the plan.  Shan

## 2021-06-26 NOTE — TELEPHONE ENCOUNTER
Patient informed of Shan's recommendations, patient verbalized understanding and agrees with plan. No further recommendations at this time. -AJM

## 2021-06-27 NOTE — PROGRESS NOTES
"Progress Notes by Natasha Dickerson RN at 4/4/2019 11:25 AM     Author: Natasha Dickerson RN Service: -- Author Type: Registered Nurse    Filed: 4/12/2019 10:13 AM Encounter Date: 4/4/2019 Status: Signed    : Natasha Dickerson RN (Registered Nurse)       Type: routine remote pacemaker transmission.  Presenting rhythm: appears atrial flutter with ventricular pacing, rate 61 bpm.  Battery/lead status: stable  Arrhythmias: since 1/11/19, AF is persistent, burden now 100% (up from 51%), V-rates controlled.  No ventricular high rate episodes detected.  Anticoagulant: warfarin  Comments: normal magnet and pacemaker function.  72%, up from 21%. Routed to device RN.     Transmission reviewed, known persistent AF. % increase noted, most likely result of starting Toprol  mg daily mid-January.  Appears that rate control strategy was pursued as Amiodarone was stopped, although is still listed on Medication list.     Call placed to patient to review findings. He states he has actually been doing quite well. No unusual symptoms with persistent A.fib or s/s of HF reported. States he has some YOUNG but that is not new, \"it is because of my valve issue.\" He confirms that Amiodarone was discontinued and that he is tolerating Toprol  mg well. Denies any worsening activity intolerance with change. Histograms shown below (paced beats highlighted). Confirms Warfarin compliance.    Patient due to see Dr. Henao next week. Will forward for review.     Natasha Dickerson RN                    "

## 2021-06-29 NOTE — PROGRESS NOTES
Progress Notes by Paulo Henao MD (Ted) at 7/15/2020 12:50 PM     Author: Paulo Henao MD (Ted) Service: -- Author Type: Physician    Filed: 7/15/2020  1:41 PM Encounter Date: 7/15/2020 Status: Signed    : Paulo Henao MD (Ted) (Physician)           Cardiology Progress Note    Assessment:  Fatigue, unclear etiology, no obvious fluid overload, arrhythmias on exam today  Mitral valve prolapse status post mitral valve replacement with Saint Robin 33 mm bioprosthesis in September 2017, normal function  LV systolic dysfunction likely related to long-standing mitral regurgitation and postop tachycardia; improved near normal LVEF  Lower extremity edema venous insufficiency, improved with diuretics  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 male who comes in today for follow-up visit.  He has had persistent fatigue.  After our last visit I stopped losartan hoping that medication related hypotension could be responsible for his symptoms.  He states that he feels somewhat better but still not as good as he would like to.  He denies weight gain.  As a matter of fact  he lost 5 pounds.  He has chronic mild swelling of lower extremities.  He denies PND and orthopnea.  He has not had chest pains or heart palpitations.    Review of Systems:   General: WNL  Eyes: WNL  Ears/Nose/Throat: Hearing Loss  Lungs: Shortness of Breath  Heart: Shortness of Breath with activity, Irregular Heartbeat  Stomach: WNL  Bladder:  WNL  Muscle/Joints: WNL  Skin: WNL  Nervous System: WNL  Mental Health: WNL     Blood: WNL    Objective:   /60 (Patient Site: Left Arm, Patient Position: Sitting, Cuff Size: Adult Regular)   Pulse 60   Resp 16   Wt (!) 226 lb (102.5 kg)   SpO2 97%   BMI 29.82 kg/m    Physical Exam:  GENERAL: no distress  NECK: No JVD  LUNGS: Clear to auscultation.  CARDIAC: regular rhythm, S1 & S2 normal.  No heaves, thrills, gallops or murmurs.  ABDOMEN: flat, negative hepatosplenomegaly, soft and non-tender.  EXTREMITIES: No evidence of cyanosis, clubbing 1+ edema.    Current Outpatient Medications   Medication Sig Dispense Refill   ? acetaminophen (TYLENOL) 325 MG tablet Take 2 tablets (650 mg total) by mouth every 6 (six) hours as needed.  0   ? amoxicillin (AMOXIL) 500 MG capsule Take 2,000 mg by mouth as needed (1 hour prior to dentist.).      ? aspirin 81 MG EC tablet Take 1 tablet (81 mg total) by mouth daily.  0   ? atorvastatin (LIPITOR) 40 MG tablet Take 1 tablet (40 mg total) by mouth daily. (Patient taking differently: Take 20 mg by mouth daily.       ) 90 tablet 3   ? b complex vitamins tablet Take 1 tablet by mouth every other day.      ? cholecalciferol, vitamin D3, 1,000 unit tablet Take 1,000 Units by mouth every other day.      ? EPINEPHrine (EPIPEN) 0.3 mg/0.3 mL atIn Inject 0.3 mg into the shoulder, thigh, or buttocks once as needed (allergic reaction).     ? ibuprofen (ADVIL,MOTRIN) 200 MG tablet Take 200 mg by mouth every 6 (six) hours as needed for pain.     ? metoprolol succinate (TOPROL-XL) 50 MG 24 hr tablet Take 1 tablet (50 mg total) by mouth daily. 90 tablet 3   ? mometasone (NASONEX) 50 mcg/actuation nasal spray 2 sprays into each nostril daily. 17 g 1   ? MULTIVIT &MINERALS/FERROUS FUM (MULTI VITAMIN ORAL) Take 1 tablet by mouth every other day. Pt taking multi vitamin without Iron     ? warfarin ANTICOAGULANT (COUMADIN/JANTOVEN) 5 MG tablet TAKE 1 TABLET (5MG) TO 1 & 1/2 TABLETS  (7.5MG)DAILY, AS DIRECTED. ADJUST DOSE BASED ON INR RESULTS. 110 tablet 1     No current facility-administered medications for this visit.        Cardiographics:    Pacemaker interrogation: June 2020  68% atrial paced 53% ventricular paced, 1 episode of atrial fibrillation with controlled ventricular response lasting 13 hours, short episode of SVT     Echocardiogram: January 2019    The estimated left ventricular ejection fraction is 50%. This represents a mildly decreased ejection fraction. Cavity is mildly increased. The left ventricular wall thickness is normal. Abnormal septal motion consistent with right ventricular pacing.    There is a bioprosthetic mitral valve. Normal prosthetic valve function.    Normal central venous pressure.           Coronary angiogram: August 2017  LM min dz  LAD mid 30%   Circ mild dz with branch supplies PL territory  RCA mid 50% with PDA extends to apex     Note distal LAD and PDA vessels are very small around apex    Lab Results:       Lab Results   Component Value Date    CHOL 139 12/10/2019    CHOL 139 09/25/2019    CHOL 234 (H) 08/14/2019     Lab Results   Component Value Date    HDL 44 12/10/2019    HDL 43 09/25/2019    HDL 47 08/14/2019     Lab Results   Component Value Date    LDLCALC 84 12/10/2019    LDLCALC 80 09/25/2019    LDLCALC 165 (H) 08/14/2019     Lab Results   Component Value Date    TRIG 56 12/10/2019    TRIG 82 09/25/2019    TRIG 108 08/14/2019     BNP   Date Value Ref Range Status   10/14/2019 73 0 - 74 pg/mL Final       Paulo (Sha)  MD Juliano

## 2021-06-29 NOTE — PROGRESS NOTES
"Progress Notes by Paulo Henao MD (Ted) at 11/6/2020  4:30 PM     Author: Paulo Henao MD (Ted) Service: -- Author Type: Physician    Filed: 11/6/2020  5:18 PM Encounter Date: 11/6/2020 Status: Signed    : Paulo Henao MD (Ted) (Physician)           Cardiology Progress Note    Assessment:  Mitral valve prolapse status post mitral valve replacement with Saint Robin 33 mm bioprosthesis in September 2017, normal function  LV systolic dysfunction likely related to long-standing mitral regurgitation and postop tachycardia; normalized LVEF  Lower extremity edema venous insufficiency, improved with diuretics  Tachycardia-bradycardia syndrome, status post permanent pacemaker, normal device function  Persistent atrial flutter, brief asymptomatic episodes detected by device, on chronic warfarin  Postop left pleural effusion, resolved  Coronary artery disease, mild to moderate, nonobstructive      Plan:  He appears to be well compensated from cardiac standpoint.  I encouraged him to stay physically active and keep taking current cardiac medications.    Follow-up in 6 months    Subjective:   This is 75 y.o. male who comes in today for follow-up visit.  He reports less shortness of breath.  He has not had PND orthopnea.  He has not had heart palpitations or syncope.  He denies chest pains.  He is compliant with all cardiac medications    Review of Systems:   General: WNL  Eyes: WNL     Lungs: Snoring  Heart: Shortness of Breath with activity  Stomach: WNL  Bladder: WNL  Muscle/Joints: WNL  Skin: WNL  Nervous System: WNL  Mental Health: WNL     Blood: WNL    Objective:   /70 (Patient Site: Left Arm, Patient Position: Sitting, Cuff Size: Adult Regular)   Pulse 67   Resp 14   Ht 6' 1\" (1.854 m)   Wt (!) 224 lb (101.6 kg)   BMI 29.55 kg/m    Physical Exam:  GENERAL: no distress  NECK: No JVD  LUNGS: Clear to auscultation.  CARDIAC: regular rhythm, S1 & S2 normal.  No " heaves, thrills, gallops or murmurs.  ABDOMEN: flat, negative hepatosplenomegaly, soft and non-tender.  EXTREMITIES: No evidence of cyanosis, clubbing or edema.    Current Outpatient Medications   Medication Sig Dispense Refill   ? acetaminophen (TYLENOL) 325 MG tablet Take 2 tablets (650 mg total) by mouth every 6 (six) hours as needed.  0   ? amoxicillin (AMOXIL) 500 MG capsule Take 2,000 mg by mouth as needed (1 hour prior to dentist.).      ? aspirin 81 MG EC tablet Take 1 tablet (81 mg total) by mouth daily.  0   ? atorvastatin (LIPITOR) 40 MG tablet Take 1 tablet (40 mg total) by mouth daily. (Patient taking differently: Take 20 mg by mouth daily.       ) 90 tablet 3   ? b complex vitamins tablet Take 1 tablet by mouth every other day.      ? cholecalciferol, vitamin D3, 1,000 unit tablet Take 1,000 Units by mouth every other day.      ? EPINEPHrine (EPIPEN) 0.3 mg/0.3 mL atIn Inject 0.3 mg into the shoulder, thigh, or buttocks once as needed (allergic reaction).     ? metoprolol succinate (TOPROL-XL) 50 MG 24 hr tablet Take 1 tablet (50 mg total) by mouth daily. 90 tablet 0   ? MULTIVIT &MINERALS/FERROUS FUM (MULTI VITAMIN ORAL) Take 1 tablet by mouth every other day. Pt taking multi vitamin without Iron     ? warfarin ANTICOAGULANT (COUMADIN/JANTOVEN) 5 MG tablet TAKE 1 TABLET (5MG) TO 1 & 1/2 TABLETS (7.5MG)DAILY, AS DIRECTED. ADJUST DOSE BASED ON INR RESULTS. 120 tablet 1     No current facility-administered medications for this visit.        Cardiographics:    Pacemaker interrogation, November 2020 report pending    Echo: August 2020    When compared to the previous study dated 1/17/2019, left ventricular systolic function is improved.    Normal left ventricular size, wall thickness. Septal motion is consistent with artifact of right ventricular pacing lead. Left ventricle ejection fraction is normal. The calculated left ventricular ejection fraction is 62%.    Normal right ventricular size with mildly  reduced right ventricular systolic function.    Moderately enlarged both atria.    Normal functioning of bioprosthetic mitral valve.     Coronary angiogram: August 2017  LM min dz  LAD mid 30%   Circ mild dz with branch supplies PL territory  RCA mid 50% with PDA extends to apex     Note distal LAD and PDA vessels are very small around apex    Lab Results:       Lab Results   Component Value Date    CHOL 138 09/09/2020    CHOL 128 07/16/2020    CHOL 139 12/10/2019     Lab Results   Component Value Date    HDL 45 09/09/2020    HDL 41 07/16/2020    HDL 44 12/10/2019     Lab Results   Component Value Date    LDLCALC 81 09/09/2020    LDLCALC 70 07/16/2020    LDLCALC 84 12/10/2019     Lab Results   Component Value Date    TRIG 60 09/09/2020    TRIG 85 07/16/2020    TRIG 56 12/10/2019     BNP   Date Value Ref Range Status   07/16/2020 89 (H) 0 - 76 pg/mL Final       Paulo (Sha)  MD Juliano

## 2021-06-29 NOTE — PROGRESS NOTES
"Progress Notes by Paulo Henao MD (Ted) at 5/20/2020  3:30 PM     Author: Paulo Henao MD (Ted) Service: -- Author Type: Physician    Filed: 5/20/2020  3:56 PM Encounter Date: 5/20/2020 Status: Signed    : Paulo Henao MD (Ted) (Physician)           The patient has been notified of following:     \"This video visit will be conducted via a call between you and your physician/provider. We have found that certain health care needs can be provided without the need for an in-person physical exam.  This service lets us provide the care you need with a video conversation.  If a prescription is necessary we can send it directly to your pharmacy.  If lab work is needed we can place an order for that and you can then stop by our lab to have the test done at a later time.      Patient has given verbal consent to a Video visit? Yes    HEART CARE VIDEO ENCOUNTER        The patient has chosen to have the visit conducted as a video visit, to reduce risk of exposure given the current status of Coronavirus in our community. This video visit is being conducted via a call between the patient and physician/provider. Health care needs are being provided without a physical exam.     Assessment/Recommendations   Assessment/Plan:  Mitral valve prolapse status post mitral valve replacement with Saint Robin 33 mm bioprosthesis in September 2017, normal function  LV systolic dysfunction likely related to long-standing mitral regurgitation and postop tachycardia; improved near normal LVEF  Lower extremity edema venous insufficiency, improved with diuretics  Tachycardia-bradycardia syndrome status post permanent pacemaker, normal device function  Persistent atrial flutter, resolved  Postop left pleural effusion, resolved  Coronary artery disease, mild to moderate, nonobstructive  Exercise intolerance/fatigue, unclear etiology probably deconditioning    I asked him to hold losartan for 1 week to " correlate it with symptoms of exercise intolerance and fatigue.  He was instructed to call me in 1 week to let me know if he feels any different    Follow Up Plan: 6 months  I have reviewed the note as documented.  This accurately captures the substance of my conversation with the patient.    Total time of video between patient and provider was 15 minutes   Start Time: 3:35 PM  Stop Time: 3:50 PM    Originating Location (pt. Location): Home    Distant Location (provider location):  [facility] HEART [facility]      Mode of Communication:  Video Conference via [email or web address]       History of Present Illness/Subjective    Obed Haley is a 75 y.o. male who is being evaluated via a billable video visit and has consented to a video.  He reports no new cardiac symptoms.  Specifically he has not had chest pain or exertional dyspnea.   He continues to feels very tired after he does physical activities.  He has not had weight gain, he denies PND and orthopnea.  He has been noticing relatively low blood pressure after he comes home from exercising.  He has not had any syncope or heart palpitations    I have reviewed and updated the patient's Past Medical History, Social History, Family History and Medication List.     Physical Examination performed via live video encounter Review of Systems   General Appearance:   no distress, normal body habitus, upright.   ENT/Mouth: membranes moist, no nasal discharge or bleeding gums.  Normal head shape, no evidence of injury or laceration.     EYES:  no scleral icterus, normal conjunctivae   Neck: no evidence of thyromegaly.  Supple   Chest/Lungs:   No audible wheezing equal chest wall expansion. Non labored breathing.  No cough.   Cardiovascular:   No evidence of elevated jugular venous pressure.  No evidence of pitting edema bilaterally    Abdomen:  no evidence of abdominal distention. No observe juandice.     Extremities: no cyanosis or clubbing noted.    Skin: no xanthelasma, normal skin color.  No evidence of facial lacerations.      Neurologic: Normal arm motion bilateral, no tremors.  No evidence of focal defect.       Psychiatric: alert and oriented x3, calm                                               Medical History  Surgical History Family History Social History   Past Medical History:   Diagnosis Date   ? High cholesterol    ? MVP (mitral valve prolapse)    ? Nonocclusive coronary atherosclerosis of native coronary artery 8/3/2017   ? Sleep apnea, obstructive     uses CPAP    Past Surgical History:   Procedure Laterality Date   ? EP PACEMAKER INSERT Left 9/25/2017    Procedure: EP Pacemaker Insertion;  Surgeon: Reji Whittington MD;  Location: [facility] Cath Lab;  Service:    ? KNEE ARTHROSCOPY  2007    left   ? LAMINECTOMY  1970, 2003    lumbar X2   ? MITRAL VALVE REPLACEMENT N/A 9/19/2017    Procedure: MITRAL VALVE REPLACEMENT, ANESTHESIA TRANSESOPHAGEAL ECHOCARDIOGRAM, CLOSURE OF THE PFO;  Surgeon: Monica Swenson MD;  Location: [facility] Main OR;  Service:    ? TN CATH PLACEMENT & NJX CORONARY ART ANGIO IMG S&I N/A 8/3/2017    Procedure: Coronary Angiogram;  Surgeon: Abhinav Redmond MD;  Location: [facility] Cath Lab;  Service: Cardiology   ? TN L HRT CATH W/NJX L VENTRICULOGRAPHY IMG S&I N/A 8/3/2017    Procedure: Left Heart Catheterization with Left Ventriculogram;  Surgeon: Abhinav Redmond MD;  Location: [facility] Cath Lab;  Service: Cardiology   ? TN RIGHT HEART CATH O2 SATURATION & CARDIAC OUTPUT N/A 8/3/2017    Procedure: Right Heart Catheterization;  Surgeon: Abhinav Redmond MD;  Location: [facility] Cath Lab;  Service: Cardiology    Family History   Problem Relation Age of Onset   ? Breast cancer Mother    ? Hemangiomas Sister       Social History     Socioeconomic History   ? Marital status: Single     Spouse name: Not on file   ? Number of children: Not on file   ? Years of education: Not on file   ? Highest education level: Not on file   Occupational  History   ? Not on file   Social Needs   ? Financial resource strain: Not on file   ? Food insecurity     Worry: Not on file     Inability: Not on file   ? Transportation needs     Medical: Not on file     Non-medical: Not on file   Tobacco Use   ? Smoking status: Former Smoker     Last attempt to quit: 2007     Years since quittin.3   ? Smokeless tobacco: Never Used   Substance and Sexual Activity   ? Alcohol use: Yes     Comment: occasionally   ? Drug use: No   ? Sexual activity: Not on file   Lifestyle   ? Physical activity     Days per week: Not on file     Minutes per session: Not on file   ? Stress: Not on file   Relationships   ? Social connections     Talks on phone: Not on file     Gets together: Not on file     Attends Restorationism service: Not on file     Active member of club or organization: Not on file     Attends meetings of clubs or organizations: Not on file     Relationship status: Not on file   ? Intimate partner violence     Fear of current or ex partner: Not on file     Emotionally abused: Not on file     Physically abused: Not on file     Forced sexual activity: Not on file   Other Topics Concern   ? Not on file   Social History Narrative   ? Not on file          Medications  Allergies   Current Outpatient Medications   Medication Sig Dispense Refill   ? acetaminophen (TYLENOL) 325 MG tablet Take 2 tablets (650 mg total) by mouth every 6 (six) hours as needed.  0   ? amoxicillin (AMOXIL) 500 MG capsule Take 2,000 mg by mouth as needed (1 hour prior to dentist.).      ? aspirin 81 MG EC tablet Take 1 tablet (81 mg total) by mouth daily.  0   ? atorvastatin (LIPITOR) 40 MG tablet Take 1 tablet (40 mg total) by mouth daily. (Patient taking differently: Take 20 mg by mouth daily.       ) 90 tablet 3   ? b complex vitamins tablet Take 1 tablet by mouth every other day.      ? cholecalciferol, vitamin D3, 1,000 unit tablet Take 1,000 Units by mouth every other day.      ? EPINEPHrine (EPIPEN)  0.3 mg/0.3 mL atIn Inject 0.3 mg into the shoulder, thigh, or buttocks once as needed (allergic reaction).     ? ibuprofen (ADVIL,MOTRIN) 200 MG tablet Take 200 mg by mouth every 6 (six) hours as needed for pain.     ? metoprolol succinate (TOPROL-XL) 50 MG 24 hr tablet Take 1 tablet (50 mg total) by mouth daily. 90 tablet 3   ? MULTIVIT &MINERALS/FERROUS FUM (MULTI VITAMIN ORAL) Take 1 tablet by mouth every other day. Pt taking multi vitamin without Iron     ? warfarin ANTICOAGULANT (COUMADIN/JANTOVEN) 5 MG tablet TAKE 1 TABLET (5MG) TO 1 & 1/2 TABLETS (7.5MG)DAILY, AS DIRECTED. ADJUST DOSE BASED ON INR RESULTS. 110 tablet 1   ? mometasone (NASONEX) 50 mcg/actuation nasal spray 2 sprays into each nostril daily. 17 g 1     No current facility-administered medications for this visit.     Allergies   Allergen Reactions   ? Bee Venom Protein (Honey Bee) Anaphylaxis         Lab Results    Chemistry/lipid CBC Cardiac Enzymes/BNP/TSH/INR   Lab Results   Component Value Date    CHOL 139 12/10/2019    HDL 44 12/10/2019    LDLCALC 84 12/10/2019    TRIG 56 12/10/2019    CREATININE 1.58 (H) 10/14/2019    BUN 23 10/14/2019    K 3.9 10/14/2019     10/14/2019     10/14/2019    CO2 27 10/14/2019    Lab Results   Component Value Date    WBC 6.3 10/14/2019    HGB 12.7 (L) 10/14/2019    HCT 37.6 (L) 10/14/2019    MCV 98 10/14/2019     10/14/2019    Lab Results   Component Value Date    BNP 73 10/14/2019    TSH 4.66 08/14/2019    INR 2.30 (H) 05/06/2020        Paulo Henao (Ted)

## 2021-06-29 NOTE — PROGRESS NOTES
Progress Notes by Hailey Torres RN at 7/16/2020 11:38 AM     Author: Hailey Torres RN Service: -- Author Type: Registered Nurse    Filed: 7/16/2020 11:39 AM Encounter Date: 7/16/2020 Status: Signed    : Hailey Torres RN (Registered Nurse)       Paulo Henao (Sha)MD Melissa Mallory J, RN               Essentially normal labs, borderline TSH, should repeat TSH in 3 months, awaiting echo results            Noted. TSH ordered for in 3 months. Will update patient when he calls back. -[facility]

## 2021-06-30 NOTE — PROGRESS NOTES
Progress Notes by Marsha Salcido CNP at 12/10/2020 12:50 PM     Author: Marsha Salcido CNP Service: -- Author Type: Nurse Practitioner    Filed: 12/10/2020  1:50 PM Encounter Date: 12/10/2020 Status: Signed    : Marsha Salcido CNP (Nurse Practitioner)            Thank you, Dr. Nguyen, for asking the [facility] team to see Mr. Obed Haley to evaluate persistent atrial fibrillation.    Assessment/Recommendations     Assessment/Plan:    Diagnoses and all orders for this visit:    Persistent atrial fibrillation (H) and reports that he is not felt any different since cardioversion.  He was having symptoms of fatigue and dyspnea this summer but it is gradually improved.  I cannot relate this to any medical change in condition.  Turned A. fib alerts on on his pacemaker for 24 hours of A. fib in 24 hours.  On metoprolol and warfarin.  No change in medication.    Cardiac pacemaker in situ, dual chamber and device functioning appropriately.    [id number] score of 3 and on chronic warfarin.  Follow up in 6 months with Dr. Henao with remote device check prior to visit as has CAD and valvular heart disease.     History of Present Illness/Subjective     Obed Haley is a very pleasant 75 y.o. male who comes in today for EP follow-up after cardioversion on 11/23/2020.  Obed Haley has a known history of mitral valve prolapse and had mitral valve replacement with [place] Robin tissue valve in September 2017.  He has some LV systolic dysfunction in the past related to longstanding mitral regurgitation.  He has sick sinus syndrome syndrome and received a permanent pacemaker in September 2017.  He had a coronary angiogram in 2017 which showed mild to moderate nonobstructive coronary artery atherosclerosis.  He developed persistent atrial fib and had cardioversion without any change in how he is feeling.  He tells me he has dyspnea on exertion if he is working very hard.  Otherwise  he denies any other cardiac symptoms.  He denies any peripheral edema or PND.  He is planning on wintering in [place] for 4 months.  He is leaving in 2 days.  His device shows nonsustained VT of  very short episodes.  This was seen on previous device checks on my review.  He had a negative stress test and the fall of 2019 and no anginal type symptoms on close questioning.        Cardiographics (reviewed):  Results for orders placed during the hospital encounter of 08/05/20   Echo Complete [ECH10] 08/05/2020    Narrative   When compared to the previous study dated 1/17/2019, left ventricular   systolic function is improved.    Normal left ventricular size, wall thickness. Septal motion is   consistent with artifact of right ventricular pacing lead. Left ventricle   ejection fraction is normal. The calculated left ventricular ejection   fraction is 62%.    Normal right ventricular size with mildly reduced right ventricular   systolic function.    Moderately enlarged both atria.    Normal functioning of bioprosthetic mitral valve.        Results for orders placed during the hospital encounter of 08/03/17   Cardiac Catheterization [CATH01] 08/03/2017    Narrative   The left ventricular size is normal. The left ventricular systolic   function is normal. LV systolic pressure is normal. LV end diastolic   pressure is elevated.    The left ventricular ejection fraction is grossly normal.    Estimated blood loss was <20 ml.    The LM vessel was large.    The LAD vessel was moderate .    The left circumflex was large.    The RCA was moderate.    Mid RCA lesion 50% stenosed.    Prox LAD to Mid LAD lesion 35% stenosed.     Pt with hx of MVP/MR with progressive fatigue and dyspnea and abn ECG on   stress echo    RHC:   RA mean 5  PA mean 18mmHg  PCWP 13mmHg with V wave  LVEDP 15mmHg    EF 65% no WMA wthi mild to mod MR    Anatomy:  LM min dz  LAD mid 30%   Circ mild dz with branch supplies PL territory  RCA mid 50% with PDA  extends to apex    Note distal LAD and PDA vessels are very small around apex    Imp/plan:  1. Dyspnea with hx of MR - noted mild to mod today but given v wave   possible could be severe - will arrange for ZANE with follow up with Dr. Henao   2. CAD - start asp 81mg and atorvastatin 40mg to prevent/slow progression       Results for orders placed during the hospital encounter of 10/11/19   NM Pharmacologic Stress Test    Narrative   The pharmacologic nuclear stress test is negative for inducible   myocardial ischemia or infarction.    The left ventricular ejection fraction is 56%.    There is no prior study available.             Cardiac testing personally reviewed:  Yesterday device check shows leads stable and battery ok.  Device functioning appropriately.   AT/AFib burden atrial tachycardia of less than 1 minute x 3 and 10 beats of nonsustained VT.   Results for orders placed or performed during the hospital encounter of 11/23/20   ECG 12 lead   Result Value Ref Range    SYSTOLIC BLOOD PRESSURE      DIASTOLIC BLOOD PRESSURE      VENTRICULAR RATE 61 BPM    ATRIAL RATE 57 BPM    P-R INTERVAL      QRS DURATION 168 ms    Q-T INTERVAL 494 ms    QTC CALCULATION (BEZET) 497 ms    P Axis      R AXIS 129 degrees    T AXIS -15 degrees    MUSE DIAGNOSIS       sinus and atrial paced rhythm  Electronic ventricular pacemaker  Abnormal ECG  When compared with ECG of 23-NOV-2020 12:40,  atrial and ventricular pacing are now evident  Confirmed by JORDAN HERNADEZ MD LOC:[facility] ([id number]) on 11/24/2020 12:14:14 PM            Problem List:  Patient Active Problem List   Diagnosis   ? Asymmetrical sensorineural hearing loss   ? Non-rheumatic mitral regurgitation   ? S/P mitral valve replacement with tissue valve   ? Cardiac pacemaker in situ, dual chamber   ? Persistent atrial fibrillation (H)   ? Coronary artery disease involving native coronary artery of native heart with angina pectoris (H)     Revi  e  Physical Examination Review  of Systems    max  Vitals:    12/10/20 1308   BP: 128/80   Pulse: 60   Resp: 16   SpO2: 97%     Body mass index is 29.29 kg/m .  Wt Readings from Last 3 Encounters:   12/10/20 222 lb (100.7 kg)   11/06/20 (!) 224 lb (101.6 kg)   10/30/20 (!) 229 lb 12 oz (104.2 kg)     General Appearance:   Alert, well-appearing and in no acute distress.   HEENT: Atraumatic, normocephalic.  No scleral icterus, normal conjunctivae; mucous membranes pink and moist.     Chest: Chest symmetric, spine straight.   Lungs:   Respirations unlabored: Lungs are clear to auscultation.   Cardiovascular:   Normal first and second heart sounds with no murmurs, rubs, or gallops.  Regular, regular.  Radial and posterior tibial pulses are intact.  Normal JVD, slight bilateral ankle edema.       Extremities: No cyanosis or clubbing   Musculoskeletal: Moves all extremities   Skin: Warm, dry, intact.    Neurologic: Mood and affect are appropriate, alert and oriented to person, place, time, and situation    General: WNL  Eyes: WNL  Ears/Nose/Throat: WNL  Lungs: WNL  Heart: Shortness of Breath with activity  Stomach: WNL  Bladder: WNL  Muscle/Joints: WNL  Skin: WNL  Nervous System: WNL  Mental Health: WNL     Blood: WNL       Medical History  Surgical History Family History Social History     Past Medical History:   Diagnosis Date   ? High cholesterol    ? MVP (mitral valve prolapse)    ? Nonocclusive coronary atherosclerosis of native coronary artery 8/3/2017   ? Sleep apnea, obstructive     uses CPAP    Past Surgical History:   Procedure Laterality Date   ? CARDIOVERSION  11/23/2020   ? EP PACEMAKER INSERT Left 9/25/2017    Procedure: EP Pacemaker Insertion;  Surgeon: Reji Whittington MD;  Location: [facility] Cath Lab;  Service:    ? KNEE ARTHROSCOPY  2007    left   ? LAMINECTOMY  1970, 2003    lumbar X2   ? MITRAL VALVE REPLACEMENT N/A 9/19/2017    Procedure: MITRAL VALVE REPLACEMENT, ANESTHESIA TRANSESOPHAGEAL ECHOCARDIOGRAM, CLOSURE OF THE PFO;   Surgeon: Monica Swenson MD;  Location: [facility] Main OR;  Service:    ? MS CATH PLACEMENT & NJX CORONARY ART ANGIO IMG S&I N/A 8/3/2017    Procedure: Coronary Angiogram;  Surgeon: Abhinav Redmond MD;  Location: [facility] Cath Lab;  Service: Cardiology   ? MS L HRT CATH W/NJX L VENTRICULOGRAPHY IMG S&I N/A 8/3/2017    Procedure: Left Heart Catheterization with Left Ventriculogram;  Surgeon: Abhinav Redmond MD;  Location: [facility] Cath Lab;  Service: Cardiology   ? MS RIGHT HEART CATH O2 SATURATION & CARDIAC OUTPUT N/A 8/3/2017    Procedure: Right Heart Catheterization;  Surgeon: Abhinav Redmond MD;  Location: [facility] Cath Lab;  Service: Cardiology    Family History   Problem Relation Age of Onset   ? Breast cancer Mother    ? Cerebral aneurysm Sister    ? Other Father         train acciendt   ? No Medical Problems Sister    ? No Medical Problems Son    ? Lymphoma Son     Social History     Tobacco Use   Smoking Status Former Smoker   ? Quit date: 2007   ? Years since quittin.9   Smokeless Tobacco Never Used     Social History     Substance and Sexual Activity   Alcohol Use Yes   ? Alcohol/week: 3.0 standard drinks   ? Types: 3 Standard drinks or equivalent per week    Comment: occasionally        Medications  Allergies     Current Outpatient Medications   Medication Sig Dispense Refill   ? acetaminophen (TYLENOL) 325 MG tablet Take 2 tablets (650 mg total) by mouth every 6 (six) hours as needed.  0   ? amoxicillin (AMOXIL) 500 MG capsule Take 2,000 mg by mouth as needed (1 hour prior to dentist.).      ? aspirin 81 MG EC tablet Take 1 tablet (81 mg total) by mouth daily.  0   ? atorvastatin (LIPITOR) 40 MG tablet Take 1 tablet (40 mg total) by mouth daily. (Patient taking differently: Take 20 mg by mouth daily.       ) 90 tablet 3   ? b complex vitamins tablet Take 1 tablet by mouth every other day.      ? cholecalciferol, vitamin D3, 1,000 unit tablet Take 1,000 Units by  mouth every other day.      ? EPINEPHrine (EPIPEN) 0.3 mg/0.3 mL atIn Inject 0.3 mg into the shoulder, thigh, or buttocks once as needed (allergic reaction).     ? metoprolol succinate (TOPROL-XL) 50 MG 24 hr tablet Take 1 tablet (50 mg total) by mouth daily. 90 tablet 0   ? MULTIVIT &MINERALS/FERROUS FUM (MULTI VITAMIN ORAL) Take 1 tablet by mouth every other day. Pt taking multi vitamin without Iron     ? warfarin ANTICOAGULANT (COUMADIN/JANTOVEN) 5 MG tablet TAKE 1 TABLET (5MG) TO 1 & 1/2 TABLETS (7.5MG)DAILY, AS DIRECTED. ADJUST DOSE BASED ON INR RESULTS. 120 tablet 1     No current facility-administered medications for this visit.       Allergies   Allergen Reactions   ? Bee Venom Protein (Honey Bee) Anaphylaxis      Medical, surgical, family, social history, and medications were all reviewed and updated as necessary.   Lab Results    Chemistry/lipid CBC Cardiac Enzymes/BNP/TSH/INR     Lab Results   Component Value Date    CREATININE 0.86 07/16/2020    BUN 19 07/16/2020     07/16/2020    K 4.3 07/16/2020     07/16/2020    CO2 23 07/16/2020     Creatinine (mg/dL)   Date Value   07/16/2020 0.86   10/14/2019 1.58 (H)   08/14/2019 1.26   04/22/2019 1.06     Lab Results   Component Value Date    BNP 89 (H) 07/16/2020    Lab Results   Component Value Date    WBC 5.4 07/16/2020    HGB 12.8 (L) 07/16/2020    HCT 37.6 (L) 07/16/2020    MCV 95 07/16/2020     07/16/2020     Lab Results   Component Value Date    INR 2.10 (!) 12/10/2020      Lab Results   Component Value Date    CHOL 138 09/09/2020    HDL 45 09/09/2020    LDLCALC 81 09/09/2020    TRIG 60 09/09/2020

## 2021-07-01 NOTE — CONSULTS
Consults by Sampson Galloway MD at 9/24/2017  8:44 AM     Author: Sampson Galloway MD Service: Cardiology Author Type: Physician    Filed: 9/24/2017  9:08 AM Date of Service: 9/24/2017  8:44 AM Status: Signed    : Sampson Galloway MD (Physician)     Consult Orders    1. Inpatient consult to Cardiology Reason for Consult? EP - assess for pace maker; Consult priority: Today (routine); Communication for MD: No phone communication necessary for now [[id number]] ordered by Chloé Cazares, CNP at 09/24/17 0715                 Click to link to [facility] HEART CARE NOTE    Thank you, Dr. Cazares, for asking the [facility] team to see Mr. Obed Haley for evaluation of long pause and bradytachycardia syndrome.      Assessment/Recommendations   Assessment/Plan:  1. Post-op atrial fibrillation, bradytachycardia syndrome, long pause 12 seconds with loss of consciousness, short CPR: It became difficult to manage the patient's atrial fibrillation due to contreras-tachycardia. Also, the patient had a long pauses with longest 12 seconds with loss of consciousness and need CPR.  The patient needs a permanent dual chamber pacemaker which has been discussed with the patient who agreed with the plan.  Will plan to have it done tomorrow.  Please make NPO.  He has epicardial pacing as backup now.  He is on heparin infusion now.  Will start warfarin tomorrow post procedure.    2. S/p Bioprosthetic mitral valve replacement on 9-: 33 mm St robin Epic biologic tissue valve.     3. Non-obstructive coronary artery disease: No chest pain.    4. KYLE: CPAP.    Sent the message to Dr. Whittington.     History of Present Illness/Subjective    It is my pleasure to see Obed Haley at the [facility] clinic for evaluation of a long pause/syncope, bradytachycardia syndrome.  Obed Haley is a 72 y.o. male with a medical history of mitral valve prolapse with severe mitral valve regurgitation s/p St Robin 33 mm  biologic mitral valve replacement on 9-, post-op atrial fibrillation, bradytachycardia syndrome and long pause with loss of consciousness, non-obstructive coronary artery disease, hyperlipidemia and KYLE.    The patient had mitral valve replacement on 9-19 as mentioned above.  Post surgery, he developed atrial fibrillation. He was on metoprolol and amiodarone infusion and then he developed bradycardia with 3-4 seconds of pauses. He requested epicardial pacing post surgery due to bradycardia.  His amiodarone was discontinued and then back to rapid ventricular response again.  He had a 12 seconds of pause in the early morning when he was not in sleep.  He lost consciousness.  He had a short CPR and regained heart rate and pulses. The patient feels fine this morning.  He is in atrial fib with heart rate of ~90. His epicardial pacing is used as a backup.  His electrolytes are in normal range.    Tele:  Atrial fib with V rate of around 90 BPM     Echocardiogram 8-9-2017:  1.  Bileaflet mitral valve prolapse with A2 and P2 involvement. There is partial rupture of prolapsed P2 leading moderate flail.  There is an eccentric jet directed to anterior wall of LA. Put all together, the findings are consistent with severe mitral valve regurgitation.  2.  Normal left ventricular size, wall motion and function.  The estimated left ventricular ejection fraction is 60%.  3.  Normal right ventricular size and function.  4.  Moderately enlarged left atrium.    Coronary angiogram on 8-3-2017:  Anatomy:  LM min dz  LAD mid 30%   Circ mild dz with branch supplies PL territory  RCA mid 50% with PDA extends to apex     Physical Examination Review of Systems   Vitals:    09/24/17 0700   BP: 166/67   Pulse: 97   Resp: 16   Temp: 98.4  F (36.9  C)   SpO2: 95%     Body mass index is 25.7 kg/(m^2).  Wt Readings from Last 3 Encounters:   09/24/17 198 lb 12.8 oz (90.2 kg)   09/18/17 215 lb 2.7 oz (97.6 kg)   08/30/17 211 lb (95.7 kg)        Intake/Output Summary (Last 24 hours) at 09/24/17 0845  Last data filed at 09/24/17 0600   Gross per 24 hour   Intake           863.19 ml   Output              425 ml   Net           438.19 ml       General Appearance:   Awake, Alert, No acute distress.   HEENT:  No scleral icterus; the mucous membranes were moist.   Neck: No cervical bruits. No JVD. No thyromegaly.    Chest: Sternum incision is clean.   Lungs:   Respirations unlabored; Lungs are clear to auscultation. No crackles.  No wheezing.   Cardiovascular:   Irregular rhythm and rate, normal first and second heart sounds with no murmurs. No rubs or gallops.    Abdomen:  Soft. No tenderness. Bowels sounds are present   Extremities: Equal tibial pulses. No leg edema.   Skin: No rashes. Warm, Dry.   Musculoskeletal: No tenderness.   Neurologic: Oriented x 3. Mood and affect are appropriate.     A 12 point comprehensive review of systems was negative except as noted.        Medical History  Surgical History Family History Social History   Past Medical History:   Diagnosis Date   ? High cholesterol    ? MVP (mitral valve prolapse)    ? Sleep apnea, obstructive     uses CPAP    Past Surgical History:   Procedure Laterality Date   ? KNEE ARTHROSCOPY  2007    left   ? LAMINECTOMY  1970, 2003    lumbar X2   ? MITRAL VALVE REPLACEMENT N/A 9/19/2017    Procedure: MITRAL VALVE REPLACEMENT, ANESTHESIA TRANSESOPHAGEAL ECHOCARDIOGRAM, CLOSURE OF THE PFO;  Surgeon: Monica Swenson MD;  Location: [facility] OR;  Service:    ? VT CATH PLACEMENT & NJX CORONARY ART ANGIO IMG S&I N/A 8/3/2017    Procedure: Coronary Angiogram;  Surgeon: Abhinav Redmond MD;  Location: [facility] Cath Lab;  Service: Cardiology   ? VT L HRT CATH W/NJX L VENTRICULOGRAPHY IMG S&I N/A 8/3/2017    Procedure: Left Heart Catheterization with Left Ventriculogram;  Surgeon: Abhinav Redmond MD;  Location: [facility] Cath Lab;  Service: Cardiology   ? VT RIGHT HEART CATH O2  SATURATION & CARDIAC OUTPUT N/A 8/3/2017    Procedure: Right Heart Catheterization;  Surgeon: Abhinav Redmond MD;  Location: [facility] Cath Lab;  Service: Cardiology    Family History   Problem Relation Age of Onset   ? Breast cancer Mother    ? Hemangiomas Sister     Social History     Social History   ? Marital status: Single     Spouse name: N/A   ? Number of children: N/A   ? Years of education: N/A     Occupational History   ? Not on file.     Social History Main Topics   ? Smoking status: Former Smoker     Quit date: 1/14/2007   ? Smokeless tobacco: Never Used   ? Alcohol use No   ? Drug use: No   ? Sexual activity: Not on file     Other Topics Concern   ? Not on file     Social History Narrative          Medications  Allergies   Scheduled Meds:  ? aspirin  81 mg Oral DAILY   ? docusate sodium  100 mg Oral BID   ? influenza vacc high dose (age 65 or >) (PF) 2017-18  0.5 mL Intramuscular Prior to Discharge   ? insulin aspart (NovoLOG) injection   Subcutaneous TID with meals   ? insulin aspart (NovoLOG) injection   Subcutaneous QHS   ? magnesium sulfate IVPB  2 g Intravenous Once   ? melatonin  3 mg Oral QHS   ? omeprazole  20 mg Oral QAM AC   ? polyethylene glycol  17 g Oral DAILY     Continuous Infusions:  ? heparin infusion (100 units/ml) 10 Units/kg/hr (09/23/17 1551)     PRN Meds:.acetaminophen, aluminum-magnesium hydroxide-simethicone, bisacodyl, bisacodyl, dextrose 50 % (D50W), glucagon (human recombinant), magnesium hydroxide, naloxone **OR** naloxone, ondansetron, oxyCODONE, sodium chloride, sodium phosphates 133 mL, traZODone Allergies   Allergen Reactions   ? Bee Venom Protein (Honey Bee) Anaphylaxis         Lab Results    Chemistry/lipid CBC Cardiac Enzymes/BNP/TSH/INR   Lab Results   Component Value Date    CHOL 177 08/03/2017    HDL 43 08/03/2017    LDLCALC 122 08/03/2017    TRIG 58 08/03/2017    CREATININE 0.78 09/24/2017    BUN 19 09/24/2017    K 4.1 09/24/2017     (L) 09/24/2017     CL 99 09/24/2017    CO2 29 09/24/2017    Lab Results   Component Value Date    WBC 8.4 09/23/2017    HGB 8.8 (L) 09/24/2017    HCT 24.2 (L) 09/23/2017    MCV 90 09/23/2017     09/24/2017    Lab Results   Component Value Date    BNP 22 07/07/2017    INR 1.15 (H) 09/22/2017

## 2021-07-04 NOTE — PROGRESS NOTES
Progress Notes by Paulo Henao MD (Ted) at 6/15/2021  1:10 PM     Author: Paulo Henao MD (Ted) Service: -- Author Type: Physician    Filed: 6/15/2021  1:49 PM Encounter Date: 6/15/2021 Status: Signed    : Paulo Henao MD (Ted) (Physician)           Cardiology Progress Note    Assessment:  Mitral valve prolapse status post mitral valve replacement with [place] 33 mm bioprosthesis in September 2017, normal function  LV systolic dysfunction likely related to long-standing mitral regurgitation and postop tachycardia; normalized LVEF  Lower extremity edema venous insufficiency, improved with diuretics  Tachycardia-bradycardia syndrome, status post permanent pacemaker, normal device function  Persistent atrial flutter, brief asymptomatic episodes detected by device, on chronic warfarin  Postop left pleural effusion, resolved  Coronary artery disease, mild to moderate, nonobstructive  Orthostatic lightheadedness  Chronic mild exertional dyspnea, clinically no fluid overload.  Suspect some degree of diastolic dysfunction    Plan:  We will reduce metoprolol to 25 mg a day to see if he feels any less lightheaded/dyspneic.  I encouraged him to stay physically active    Follow-up in 4 months    Subjective:   This is 76 y.o. male who comes in today for follow-up visit.  He has done well.  He denies chest pain.  He has not had heart palpitations.  He states that he can walk unaided level ground for 1 or 2 miles.  He gets troubles walking uphill.  He is easily dyspneic.  He has not PND, orthopnea, pedal edema.  He denies heart palpitations or syncope.  He does get lightheaded when he stands up quickly.  He denies vertigo    Review of Systems:   General: WNL  Eyes: WNL  Ears/Nose/Throat: Hearing Loss  Lungs: Shortness of Breath, Snoring  Heart: Shortness of Breath with activity  Stomach: WNL  Bladder: WNL  Muscle/Joints: Muscle Weakness  Skin: Poor Wound Healing  Nervous  "System: Dizziness  Mental Health: WNL     Blood: Easy Bleeding    Objective:   /80 (Patient Site: Left Arm, Patient Position: Sitting, Cuff Size: Adult Regular)   Pulse 62   Resp 16   Ht 6' 1\" (1.854 m)   Wt (!) 224 lb (101.6 kg) Comment: With Shoes  BMI 29.55 kg/m    Physical Exam:  GENERAL: no distress  NECK: No JVD  LUNGS: Clear to auscultation.  CARDIAC: regular rhythm, S1 & S2 normal.  No heaves, thrills, gallops or murmurs.  ABDOMEN: flat, negative hepatosplenomegaly, soft and non-tender.  EXTREMITIES: No evidence of cyanosis, clubbing or edema.    Current Outpatient Medications   Medication Sig Dispense Refill   ? amoxicillin (AMOXIL) 500 MG capsule Take 2,000 mg by mouth as needed (1 hour prior to dentist.).      ? aspirin 81 MG EC tablet Take 1 tablet (81 mg total) by mouth daily.  0   ? atorvastatin (LIPITOR) 40 MG tablet Take 1 tablet (40 mg total) by mouth daily. (Patient taking differently: Take 20 mg by mouth daily. ) 90 tablet 1   ? b complex vitamins tablet Take 1 tablet by mouth every other day.      ? chlorpheniramine (CHLOR-TRIMETON) 4 mg tablet Take 1 tablet (4 mg total) by mouth every 6 (six) hours as needed for allergies. 30 tablet 11   ? cholecalciferol, vitamin D3, 1,000 unit tablet Take 1,000 Units by mouth every other day.      ? EPINEPHrine (EPIPEN) 0.3 mg/0.3 mL atIn Inject 0.3 mg into the shoulder, thigh, or buttocks once as needed (allergic reaction).     ? MULTIVIT &MINERALS/FERROUS FUM (MULTI VITAMIN ORAL) Take 1 tablet by mouth every other day. Pt taking multi vitamin without Iron     ? warfarin ANTICOAGULANT (COUMADIN/JANTOVEN) 5 MG tablet TAKE 1 TABLET (5MG) TO 1 & 1/2 TABLETS (7.5MG)  BY MOUTH DAILY, AS DIRECTED. ADJUST DOSE BASED ON INR RESULTS9 120 tablet 1   ? metoprolol succinate (TOPROL-XL) 50 MG 24 hr tablet Take 1 tablet (50 mg total) by mouth daily. 30 tablet 12     No current facility-administered medications for this visit.        Cardiographics:    Pacemaker " interrogation: Normal device function 70% atrial paced 40% ventricular paced, 3 brief less than 1 minute episodes of high atrial rate     Echo: August 2020    When compared to the previous study dated 1/17/2019, left ventricular systolic function is improved.    Normal left ventricular size, wall thickness. Septal motion is consistent with artifact of right ventricular pacing lead. Left ventricle ejection fraction is normal. The calculated left ventricular ejection fraction is 62%.    Normal right ventricular size with mildly reduced right ventricular systolic function.    Moderately enlarged both atria.    Normal functioning of bioprosthetic mitral valve.     Coronary angiogram: August 2017  LM min dz  LAD mid 30%   Circ mild dz with branch supplies PL territory  RCA mid 50% with PDA extends to apex     Note distal LAD and PDA vessels are very small around apex    Lab Results:       Lab Results   Component Value Date    CHOL 138 09/09/2020    CHOL 128 07/16/2020    CHOL 139 12/10/2019     Lab Results   Component Value Date    HDL 45 09/09/2020    HDL 41 07/16/2020    HDL 44 12/10/2019     Lab Results   Component Value Date    LDLCALC 81 09/09/2020    LDLCALC 70 07/16/2020    LDLCALC 84 12/10/2019     Lab Results   Component Value Date    TRIG 60 09/09/2020    TRIG 85 07/16/2020    TRIG 56 12/10/2019     BNP   Date Value Ref Range Status   07/16/2020 89 (H) 0 - 76 pg/mL Final       Paulo (Sha)  MD Juliano

## 2021-07-04 NOTE — TELEPHONE ENCOUNTER
Telephone Encounter by Sun Kingsley RN at 6/10/2021 11:52 AM     Author: Sun Kingsley RN Service: -- Author Type: Registered Nurse    Filed: 6/10/2021 12:04 PM Encounter Date: 6/10/2021 Status: Signed    : Sun Kingsley RN (Registered Nurse)       Patient called back to review remote findings. Went over arrhythmia on 4/4/21 @ 1245, pt does not recall feeling symptoms during time of event. He does believe he was in [place] at the time and climbing on a latter. Patient advised when to call in for cardiac symptoms. Patient verbalized understanding and agrees with plan.     Dr. Henao,     Patient had 18 bt run NSVT on 4/4/21. Patient denies symptoms during time of event. He is currently taking metoprolol 50mg daily. Last echo 8/5/2020 EF: 62%. Patient has annual appointment with you on 6/15/21. Please review and give any further recommendations.     Thank you,    Sun Kingsley RN  Device Nurse     Type: routine pacemaker remote transmission.   Presenting rhythm: A-V paced 65 bpm.  Battery/lead status: stable.  Arrhythmias: since 3/10/21; three mode switch episodes all <1min, appears to be atrial tachy arrhythmia. One ventricular high rate episode on 4/4/21, appears to be NSVT 18bts 170-180 bpm, duration 5sec.   Comments: Normal magnet and pacemaker function. Routed to device RN for review. WEST ADD: Reviewed remote, agree with tech findings. Call made to patient to assess symptoms. Routed to Dr. Henao. See epic note. AJM

## 2021-07-04 NOTE — LETTER
Letter by Kate Waldron at      Author: Kate Waldron Service: -- Author Type: --    Filed:  Encounter Date: 6/10/2021 Status: (Other)         Obed BETTENCOURT Sudha  [address]  [place] [address]      Angie 10, 2021      Dear  Sudha,    RE: Remote Results    We are writing to you regarding your recent Remote Pacemaker check from home. Your transmission was received successfully. Battery status is satisfactory at this time.     Your results are being reviewed.  You will be contacted if further information is necessary.     Your next device appointment will be a remote check on September 16, 2021; this will occur automatically.    To schedule or reschedule, please call [phone] and press 1.    NOTE: If you would like to do an extra transmission, please call [phone] and press 3 to speak to a nurse BEFORE transmitting. This ensures that the Device Clinic staff is aware of the reason you are sending a transmission, and can follow-up with you after it has been reviewed.    We will be checking your implanted device from home (remotely) every three months unless otherwise instructed. We will need to see you in the clinic at least once a year. You may need to be seen in the clinic sooner depending on the results of your check.    Please be aware:    The follow-up schedule is like a Physician prescription.    Your remote monitor is paired to your specific implanted device.      Sincerely,    [facility]

## 2021-07-05 PROBLEM — I49.5 SICK SINUS SYNDROME (H): Status: ACTIVE | Noted: 2019-12-30

## 2021-07-06 VITALS
RESPIRATION RATE: 16 BRPM | BODY MASS INDEX: 29.69 KG/M2 | DIASTOLIC BLOOD PRESSURE: 80 MMHG | HEART RATE: 62 BPM | SYSTOLIC BLOOD PRESSURE: 122 MMHG | WEIGHT: 224 LBS | HEIGHT: 73 IN

## 2021-07-14 PROBLEM — I49.5 SSS (SICK SINUS SYNDROME) (H): Status: RESOLVED | Noted: 2017-08-31 | Resolved: 2018-02-06

## 2021-07-14 PROBLEM — I25.10 NONOCCLUSIVE CORONARY ATHEROSCLEROSIS OF NATIVE CORONARY ARTERY: Chronic | Status: RESOLVED | Noted: 2017-08-03 | Resolved: 2017-11-02

## 2021-07-15 DIAGNOSIS — I48.19 PERSISTENT ATRIAL FIBRILLATION (H): Primary | ICD-10-CM

## 2021-07-15 DIAGNOSIS — Z95.3 S/P MITRAL VALVE REPLACEMENT WITH TISSUE VALVE: ICD-10-CM

## 2021-07-15 NOTE — PROGRESS NOTES
Standing POCT INR order placed; transferred order(s) discontinued.     Brandi Nieto RN  [facility] Anticoagulation  [phone]

## 2021-07-23 ENCOUNTER — ANCILLARY PROCEDURE (OUTPATIENT)
Dept: CARDIOLOGY | Facility: CLINIC | Age: 76
End: 2021-07-23
Attending: INTERNAL MEDICINE
Payer: MEDICARE

## 2021-07-23 ENCOUNTER — DOCUMENTATION ONLY (OUTPATIENT)
Dept: CARDIOLOGY | Facility: CLINIC | Age: 76
End: 2021-07-23

## 2021-07-23 DIAGNOSIS — Z95.0 CARDIAC PACEMAKER IN SITU: ICD-10-CM

## 2021-07-23 LAB
MDC_IDC_EPISODE_DTM: NORMAL
MDC_IDC_EPISODE_DURATION: 15 S
MDC_IDC_EPISODE_ID: NORMAL
MDC_IDC_EPISODE_TYPE: NORMAL
MDC_IDC_LEAD_IMPLANT_DT: NORMAL
MDC_IDC_LEAD_IMPLANT_DT: NORMAL
MDC_IDC_LEAD_LOCATION: NORMAL
MDC_IDC_LEAD_LOCATION: NORMAL
MDC_IDC_LEAD_LOCATION_DETAIL_1: NORMAL
MDC_IDC_LEAD_LOCATION_DETAIL_1: NORMAL
MDC_IDC_LEAD_MFG: NORMAL
MDC_IDC_LEAD_MFG: NORMAL
MDC_IDC_LEAD_MODEL: NORMAL
MDC_IDC_LEAD_MODEL: NORMAL
MDC_IDC_LEAD_POLARITY_TYPE: NORMAL
MDC_IDC_LEAD_POLARITY_TYPE: NORMAL
MDC_IDC_LEAD_SERIAL: NORMAL
MDC_IDC_LEAD_SERIAL: NORMAL
MDC_IDC_MSMT_BATTERY_DTM: NORMAL
MDC_IDC_MSMT_BATTERY_REMAINING_LONGEVITY: 90 MO
MDC_IDC_MSMT_BATTERY_REMAINING_PERCENTAGE: 88 %
MDC_IDC_MSMT_BATTERY_STATUS: NORMAL
MDC_IDC_MSMT_LEADCHNL_RA_IMPEDANCE_VALUE: 492 OHM
MDC_IDC_MSMT_LEADCHNL_RV_IMPEDANCE_VALUE: 510 OHM
MDC_IDC_MSMT_LEADCHNL_RV_PACING_THRESHOLD_AMPLITUDE: 0.6 V
MDC_IDC_MSMT_LEADCHNL_RV_PACING_THRESHOLD_PULSEWIDTH: 0.4 MS
MDC_IDC_PG_IMPLANT_DTM: NORMAL
MDC_IDC_PG_MFG: NORMAL
MDC_IDC_PG_MODEL: NORMAL
MDC_IDC_PG_SERIAL: NORMAL
MDC_IDC_PG_TYPE: NORMAL
MDC_IDC_SESS_CLINIC_NAME: NORMAL
MDC_IDC_SESS_DTM: NORMAL
MDC_IDC_SESS_TYPE: NORMAL
MDC_IDC_SET_BRADY_AT_MODE_SWITCH_MODE: NORMAL
MDC_IDC_SET_BRADY_AT_MODE_SWITCH_RATE: 170 {BEATS}/MIN
MDC_IDC_SET_BRADY_LOWRATE: 60 {BEATS}/MIN
MDC_IDC_SET_BRADY_MAX_SENSOR_RATE: 130 {BEATS}/MIN
MDC_IDC_SET_BRADY_MAX_TRACKING_RATE: 130 {BEATS}/MIN
MDC_IDC_SET_BRADY_MODE: NORMAL
MDC_IDC_SET_BRADY_PAV_DELAY_LOW: 200 MS
MDC_IDC_SET_BRADY_SAV_DELAY_LOW: 180 MS
MDC_IDC_SET_LEADCHNL_RA_PACING_AMPLITUDE: 1.5 V
MDC_IDC_SET_LEADCHNL_RA_PACING_CAPTURE_MODE: NORMAL
MDC_IDC_SET_LEADCHNL_RA_PACING_POLARITY: NORMAL
MDC_IDC_SET_LEADCHNL_RA_PACING_PULSEWIDTH: 0.4 MS
MDC_IDC_SET_LEADCHNL_RA_SENSING_ADAPTATION_MODE: NORMAL
MDC_IDC_SET_LEADCHNL_RA_SENSING_POLARITY: NORMAL
MDC_IDC_SET_LEADCHNL_RA_SENSING_SENSITIVITY: 0.25 MV
MDC_IDC_SET_LEADCHNL_RV_PACING_AMPLITUDE: 1.1 V
MDC_IDC_SET_LEADCHNL_RV_PACING_CAPTURE_MODE: NORMAL
MDC_IDC_SET_LEADCHNL_RV_PACING_POLARITY: NORMAL
MDC_IDC_SET_LEADCHNL_RV_PACING_PULSEWIDTH: 0.4 MS
MDC_IDC_SET_LEADCHNL_RV_SENSING_ADAPTATION_MODE: NORMAL
MDC_IDC_SET_LEADCHNL_RV_SENSING_POLARITY: NORMAL
MDC_IDC_SET_LEADCHNL_RV_SENSING_SENSITIVITY: 1.5 MV
MDC_IDC_SET_ZONE_DETECTION_INTERVAL: 375 MS
MDC_IDC_SET_ZONE_TYPE: NORMAL
MDC_IDC_SET_ZONE_VENDOR_TYPE: NORMAL
MDC_IDC_STAT_AT_BURDEN_PERCENT: 100 %
MDC_IDC_STAT_AT_DTM_END: NORMAL
MDC_IDC_STAT_AT_DTM_START: NORMAL
MDC_IDC_STAT_BRADY_DTM_END: NORMAL
MDC_IDC_STAT_BRADY_DTM_START: NORMAL
MDC_IDC_STAT_BRADY_RA_PERCENT_PACED: 38 %
MDC_IDC_STAT_BRADY_RV_PERCENT_PACED: 53 %
MDC_IDC_STAT_EPISODE_RECENT_COUNT: 0
MDC_IDC_STAT_EPISODE_RECENT_COUNT: 1
MDC_IDC_STAT_EPISODE_RECENT_COUNT: 1
MDC_IDC_STAT_EPISODE_RECENT_COUNT_DTM_END: NORMAL
MDC_IDC_STAT_EPISODE_RECENT_COUNT_DTM_START: NORMAL
MDC_IDC_STAT_EPISODE_TYPE: NORMAL
MDC_IDC_STAT_EPISODE_VENDOR_TYPE: NORMAL

## 2021-07-23 NOTE — PROGRESS NOTES
Type: remote done per Shan JUSTICE CNP to see if patient is still in AF.  Presenting rhythm: atrial fibrillation with ventricular pacing, rate 60 bpm.  Battery/lead status: stable. In the past month, predicted battery longevity has decreased 18 months.   Arrhythmias: since 6/23/21, AF in progress since 6/18/21 at 1:46AM, burden 100%, V-rates >/=120 bpm 1%.  One nonsustained ventricular high rate episode, EGM suggests 7 seconds NSVT rate 160's (EF 62% per Echo 8/5/2020).  Anticoagulant: warfarin  Comments: normal magnet and pacemaker function. Routed to device RN. prd       Transmission reviewed, remains in Persistent AF since June, v-rates controlled. On Warfarin.  1 possible NSVT back on 7/6/21 at ~7:30 am, lasting ~7 seconds. (snapshot below)     Decrease in Battery longevity reviewed with [facility] tech support. Per analysis this decline is due to sensing of AF, does not appear to be due to faulty battery and this serial # is not affected by the current advisory (per TS). Continue routine monitoring of remotes, call if another significant decrease is noted.     Reviewed results and tech support call with patient, denies any significant troubles with AF, no correlating symptoms with NSVT. Confirms Warfarin compliance. Advised to call with any changes. Verbalized understanding.     Will forward results to Shan JUSTICE CNP.     Natasha Dickerson RN

## 2021-07-27 NOTE — PROGRESS NOTES
Received: Today  Marsha Salcido APRN CNP Gebel, Mandy L, MARCUS; Hilda Cedeno  Caller: Unspecified (4 days ago, 11:06 AM)  Hilda-please call pt---To be seen in afib clinic in next 1-2 weeks with remote before appt to discuss rate control or DCCV             Above noted, remote scheduled 8/4, with AF clinic visit on 8/5/21.   Natasha Dickerson, RN

## 2021-07-28 ENCOUNTER — ANTICOAGULATION THERAPY VISIT (OUTPATIENT)
Dept: ANTICOAGULATION | Facility: CLINIC | Age: 76
End: 2021-07-28

## 2021-07-28 ENCOUNTER — TELEPHONE (OUTPATIENT)
Dept: NURSING | Facility: CLINIC | Age: 76
End: 2021-07-28

## 2021-07-28 ENCOUNTER — LAB (OUTPATIENT)
Dept: LAB | Facility: CLINIC | Age: 76
End: 2021-07-28
Payer: MEDICARE

## 2021-07-28 ENCOUNTER — TELEPHONE (OUTPATIENT)
Dept: INTERNAL MEDICINE | Facility: CLINIC | Age: 76
End: 2021-07-28

## 2021-07-28 DIAGNOSIS — I48.19 PERSISTENT ATRIAL FIBRILLATION (H): ICD-10-CM

## 2021-07-28 DIAGNOSIS — Z95.3 S/P MITRAL VALVE REPLACEMENT WITH TISSUE VALVE: ICD-10-CM

## 2021-07-28 LAB — INR BLD: 2.3 (ref 0.9–1.1)

## 2021-07-28 PROCEDURE — 36416 COLLJ CAPILLARY BLOOD SPEC: CPT

## 2021-07-28 PROCEDURE — 85610 PROTHROMBIN TIME: CPT

## 2021-07-28 NOTE — PROGRESS NOTES
ANTICOAGULATION MANAGEMENT     Obed Haley 76 year old male is on warfarin with therapeutic INR result. (Goal INR 2.0-3.0)    Recent labs: (last 7 days)     07/28/21  0815   INR 2.3*       ASSESSMENT     Source(s): Chart Review, Patient/Caregiver Call and Template       Warfarin doses taken: Warfarin taken as instructed    Diet: No new diet changes identified    New illness, injury, or hospitalization: No    Medication/supplement changes: reported Metoprolol Succinate dose was decreased.    Signs or symptoms of bleeding or clotting: No    Previous INR: Therapeutic last 2(+) visits    Additional findings:  Currently reported, in atrial fibrillation. F/u with HCC on 8/5/21.     PLAN     Recommended plan for ongoing change(s) affecting INR     Dosing Instructions: Continue your current warfarin dose with next INR in 8 weeks       Summary  As of 7/28/2021    Full warfarin instructions:  5 mg every Mon, Wed, Fri; 7.5 mg all other days   Next INR check:  9/22/2021             Telephone call with Obed who verbalizes understanding and agrees to plan   - advised to keep ACN update, if he will get scheduled for Cardioverions.   - will need wkly INR prior to Cardioversion.   - ensure warfarin is taken daily / stay consistent eating his Vitamin-K foods and not eating more than his usual.    Lab visit scheduled - INR on 9/21/21 during yearly check with Dr. Nguyen.    Education provided: Importance of consistent vitamin K intake, Target INR goal and significance of current INR result and Importance of notifying clinic of upcoming surgeries and procedures 2 weeks in advance    Plan made per [facility] anticoagulation protocol    Yolanda Krishna RN  Anticoagulation Clinic  7/28/2021    _______________________________________________________________________     Anticoagulation Episode Summary     Current INR goal:  2.0-3.0   TTR:  96.0 % (1 y)   Target end date:  9/29/2017   Send INR reminders to:  MULU MULLINSAY       Comments:            Anticoagulation Care Providers     Provider Role Specialty Phone number    Franko Franz MD Referring Internal Medicine [phone]

## 2021-07-28 NOTE — TELEPHONE ENCOUNTER
Reason for Call:  Other call back    Detailed comments: Returning your call regarding INR results     Phone Number Patient can be reached at: Home number on file [phone] (home)    Best Time: any     Can we leave a detailed message on this number? YES    Call taken on 7/28/2021 at 1:18 PM by Kianna Carranza

## 2021-07-28 NOTE — PROGRESS NOTES
Anticoagulation-Extended Recheck Request    Under [facility] Anticoagulation protocol, Anticoagulation team may extend INR recheck interval + 1 week for each stable in range INR to max of 6 weeks. Extended interval rechecks between 8-12 weeks for patients on stable maintenance therapy require an approval (one time) from referring provider.    Anticoagulation clinic suggests consideration of extended intervals in medically stable patients, with standard INR goals, and no change in warfarin dose for last 4-6 months    Obed Haley, 76 year old, male has been on:    Current recheck interval: 8 week per prior [facility] protocol  Compliant: Yes  Stable warfarin dose since: 9/23/2020  INR Goal: 2.0-3.0  Time in Therapeutic range: 96.0%    Lab Results   Component Value Date    INR 2.3 07/28/2021    INR 2.10 06/15/2021    INR 2.50 05/04/2021    INR 2.10 03/23/2021    INR 2.10 02/10/2021    INR 2.80 12/02/2020    INR 2.50 11/19/2020    INR 2.40 11/12/2020    INR 2.60 10/30/2020         Please advise if Obed is approved to have extended interval rechecks every 8 weeks while on stable maintenance therapy    Thank you,    Yolanda Krishna RN

## 2021-08-03 PROBLEM — I48.92 UNSPECIFIED ATRIAL FLUTTER (H): Status: RESOLVED | Noted: 2018-01-05 | Resolved: 2020-10-30

## 2021-08-03 PROBLEM — I42.0 DILATED CARDIOMYOPATHY (H): Status: RESOLVED | Noted: 2017-11-15 | Resolved: 2020-10-30

## 2021-08-04 ENCOUNTER — ANCILLARY PROCEDURE (OUTPATIENT)
Dept: CARDIOLOGY | Facility: CLINIC | Age: 76
End: 2021-08-04
Attending: INTERNAL MEDICINE
Payer: MEDICARE

## 2021-08-04 DIAGNOSIS — Z95.0 PACEMAKER: ICD-10-CM

## 2021-08-04 LAB
MDC_IDC_EPISODE_DTM: NORMAL
MDC_IDC_EPISODE_DURATION: 15 S
MDC_IDC_EPISODE_ID: NORMAL
MDC_IDC_EPISODE_TYPE: NORMAL
MDC_IDC_LEAD_IMPLANT_DT: NORMAL
MDC_IDC_LEAD_IMPLANT_DT: NORMAL
MDC_IDC_LEAD_LOCATION: NORMAL
MDC_IDC_LEAD_LOCATION: NORMAL
MDC_IDC_LEAD_LOCATION_DETAIL_1: NORMAL
MDC_IDC_LEAD_LOCATION_DETAIL_1: NORMAL
MDC_IDC_LEAD_MFG: NORMAL
MDC_IDC_LEAD_MFG: NORMAL
MDC_IDC_LEAD_MODEL: NORMAL
MDC_IDC_LEAD_MODEL: NORMAL
MDC_IDC_LEAD_POLARITY_TYPE: NORMAL
MDC_IDC_LEAD_POLARITY_TYPE: NORMAL
MDC_IDC_LEAD_SERIAL: NORMAL
MDC_IDC_LEAD_SERIAL: NORMAL
MDC_IDC_MSMT_BATTERY_DTM: NORMAL
MDC_IDC_MSMT_BATTERY_REMAINING_LONGEVITY: 90 MO
MDC_IDC_MSMT_BATTERY_REMAINING_PERCENTAGE: 86 %
MDC_IDC_MSMT_BATTERY_STATUS: NORMAL
MDC_IDC_MSMT_LEADCHNL_RA_IMPEDANCE_VALUE: 480 OHM
MDC_IDC_MSMT_LEADCHNL_RV_IMPEDANCE_VALUE: 475 OHM
MDC_IDC_MSMT_LEADCHNL_RV_PACING_THRESHOLD_AMPLITUDE: 0.7 V
MDC_IDC_MSMT_LEADCHNL_RV_PACING_THRESHOLD_PULSEWIDTH: 0.4 MS
MDC_IDC_PG_IMPLANT_DTM: NORMAL
MDC_IDC_PG_MFG: NORMAL
MDC_IDC_PG_MODEL: NORMAL
MDC_IDC_PG_SERIAL: NORMAL
MDC_IDC_PG_TYPE: NORMAL
MDC_IDC_SESS_CLINIC_NAME: NORMAL
MDC_IDC_SESS_DTM: NORMAL
MDC_IDC_SESS_TYPE: NORMAL
MDC_IDC_SET_BRADY_AT_MODE_SWITCH_MODE: NORMAL
MDC_IDC_SET_BRADY_AT_MODE_SWITCH_RATE: 170 {BEATS}/MIN
MDC_IDC_SET_BRADY_LOWRATE: 60 {BEATS}/MIN
MDC_IDC_SET_BRADY_MAX_SENSOR_RATE: 130 {BEATS}/MIN
MDC_IDC_SET_BRADY_MAX_TRACKING_RATE: 130 {BEATS}/MIN
MDC_IDC_SET_BRADY_MODE: NORMAL
MDC_IDC_SET_BRADY_PAV_DELAY_LOW: 200 MS
MDC_IDC_SET_BRADY_SAV_DELAY_LOW: 180 MS
MDC_IDC_SET_LEADCHNL_RA_PACING_AMPLITUDE: 1.5 V
MDC_IDC_SET_LEADCHNL_RA_PACING_CAPTURE_MODE: NORMAL
MDC_IDC_SET_LEADCHNL_RA_PACING_POLARITY: NORMAL
MDC_IDC_SET_LEADCHNL_RA_PACING_PULSEWIDTH: 0.4 MS
MDC_IDC_SET_LEADCHNL_RA_SENSING_ADAPTATION_MODE: NORMAL
MDC_IDC_SET_LEADCHNL_RA_SENSING_POLARITY: NORMAL
MDC_IDC_SET_LEADCHNL_RA_SENSING_SENSITIVITY: 0.25 MV
MDC_IDC_SET_LEADCHNL_RV_PACING_AMPLITUDE: 1.1 V
MDC_IDC_SET_LEADCHNL_RV_PACING_CAPTURE_MODE: NORMAL
MDC_IDC_SET_LEADCHNL_RV_PACING_POLARITY: NORMAL
MDC_IDC_SET_LEADCHNL_RV_PACING_PULSEWIDTH: 0.4 MS
MDC_IDC_SET_LEADCHNL_RV_SENSING_ADAPTATION_MODE: NORMAL
MDC_IDC_SET_LEADCHNL_RV_SENSING_POLARITY: NORMAL
MDC_IDC_SET_LEADCHNL_RV_SENSING_SENSITIVITY: 1.5 MV
MDC_IDC_SET_ZONE_DETECTION_INTERVAL: 375 MS
MDC_IDC_SET_ZONE_TYPE: NORMAL
MDC_IDC_SET_ZONE_VENDOR_TYPE: NORMAL
MDC_IDC_STAT_AT_BURDEN_PERCENT: 18 %
MDC_IDC_STAT_AT_DTM_END: NORMAL
MDC_IDC_STAT_AT_DTM_START: NORMAL
MDC_IDC_STAT_BRADY_DTM_END: NORMAL
MDC_IDC_STAT_BRADY_DTM_START: NORMAL
MDC_IDC_STAT_BRADY_RA_PERCENT_PACED: 33 %
MDC_IDC_STAT_BRADY_RV_PERCENT_PACED: 53 %
MDC_IDC_STAT_EPISODE_RECENT_COUNT: 0
MDC_IDC_STAT_EPISODE_RECENT_COUNT: 1
MDC_IDC_STAT_EPISODE_RECENT_COUNT: 10
MDC_IDC_STAT_EPISODE_RECENT_COUNT_DTM_END: NORMAL
MDC_IDC_STAT_EPISODE_RECENT_COUNT_DTM_START: NORMAL
MDC_IDC_STAT_EPISODE_TYPE: NORMAL
MDC_IDC_STAT_EPISODE_VENDOR_TYPE: NORMAL

## 2021-08-04 PROCEDURE — 93294 REM INTERROG EVL PM/LDLS PM: CPT | Performed by: INTERNAL MEDICINE

## 2021-08-04 PROCEDURE — 93296 REM INTERROG EVL PM/IDS: CPT | Performed by: INTERNAL MEDICINE

## 2021-08-05 ENCOUNTER — OFFICE VISIT (OUTPATIENT)
Dept: CARDIOLOGY | Facility: CLINIC | Age: 76
End: 2021-08-05
Payer: MEDICARE

## 2021-08-05 VITALS
DIASTOLIC BLOOD PRESSURE: 86 MMHG | HEART RATE: 68 BPM | RESPIRATION RATE: 16 BRPM | SYSTOLIC BLOOD PRESSURE: 130 MMHG | BODY MASS INDEX: 29.69 KG/M2 | HEIGHT: 73 IN | WEIGHT: 224 LBS

## 2021-08-05 DIAGNOSIS — I48.19 PERSISTENT ATRIAL FIBRILLATION (H): Primary | ICD-10-CM

## 2021-08-05 DIAGNOSIS — Z95.0 CARDIAC PACEMAKER IN SITU: ICD-10-CM

## 2021-08-05 DIAGNOSIS — Z95.3 S/P MITRAL VALVE REPLACEMENT WITH TISSUE VALVE: ICD-10-CM

## 2021-08-05 PROBLEM — I49.5 SICK SINUS SYNDROME (H): Status: RESOLVED | Noted: 2019-12-30 | Resolved: 2021-08-05

## 2021-08-05 PROCEDURE — 99215 OFFICE O/P EST HI 40 MIN: CPT | Performed by: NURSE PRACTITIONER

## 2021-08-05 RX ORDER — METOPROLOL SUCCINATE 25 MG/1
25 TABLET, EXTENDED RELEASE ORAL DAILY
Qty: 90 TABLET | Refills: 3 | Status: SHIPPED | OUTPATIENT
Start: 2021-08-05 | End: 2022-09-16

## 2021-08-05 ASSESSMENT — MIFFLIN-ST. JEOR: SCORE: 1799.94

## 2021-08-05 NOTE — PROGRESS NOTES
Thank you, Dr. Nguyen for asking the [facility] team to see Mr. Obed Haley to evaluate persistent atrial fibrillation.    Assessment/Recommendations     Assessment/Plan:    Diagnoses and all orders for this visit:  Persistent atrial fibrillation (H) ongoing since June 18, 2021.  On close questioning no symptoms with atrial fibrillation of direct or indirect.  Felt no different after cardioversion last fall.  Therefore appears asymptomatic.  Discussed race and affirm studies which show no difference in longevity or cardiac events with permanent A. fib.  Recommend rate control.  On remote device check showing 53% V pacing.  Recommend to decrease metoprolol succinate to 25 mg p.o. nightly.  See AVS.  -     metoprolol succinate ER (TOPROL-XL) 25 MG 24 hr tablet; Take 1 tablet (25 mg) by mouth daily  -     Follow-Up with Cardiologist; Future  S/P mitral valve replacement with tissue valve and follows with Dr. Henao and could just follow with cardiology.  To see him in October with in clinic device check.  -     Follow-Up with Cardiologist; Future  Cardiac pacemaker in situ, dual chamber and battery life has dropped 18 months suddenly.  Will do in clinic check with follow-up this fall before he goes to [place] in October.  Computer report says it is due to A. fib sensing.  A. fib alerts turned off.  -     Follow-Up with Cardiologist; Future      [id number] score of 3 and on chronic warfarin.  He is on daily aspirin in addition to warfarin and will defer to Dr. Henao whether to continue aspirin.       History of Present Illness/Subjective     Obed Haley is a very pleasant 76 year old male who comes in today for urgent EP appointment due to recent remote alert that he is in persistent A. fib since June 18.  Obed Haley has a known history of mitral valve prolapse and had mitral valve replacement with [place] tissue valve in September 2017.  He has some LV systolic dysfunction in the  past related to longstanding mitral regurgitation.  He has sick sinus syndrome syndrome and received a permanent pacemaker in September 2017.  He had a coronary angiogram in 2017 which showed mild to moderate nonobstructive coronary artery atherosclerosis.  He developed persistent atrial fib and had cardioversion in November 2020 without any change in how he is feeling.  He denies any other cardiac symptoms or changes in tolerance of activity.   He is planning on wintering in [place] for 4 months.    His device shows nonsustained VT of  very short episodes.  This was seen on previous device checks on my review.  He had a negative stress test in the fall of 2019.        Cardiographics (reviewed):  Echo Complete [ECH10] 08/05/2020     Narrative   When compared to the previous study dated 1/17/2019, left ventricular   systolic function is improved.    Normal left ventricular size, wall thickness. Septal motion is   consistent with artifact of right ventricular pacing lead. Left ventricle   ejection fraction is normal. The calculated left ventricular ejection   fraction is 62%.    Normal right ventricular size with mildly reduced right ventricular   systolic function.    Moderately enlarged both atria.    Normal functioning of bioprosthetic mitral valve.              Results for orders placed during the hospital encounter of 08/03/17   Cardiac Catheterization [CATH01] 08/03/2017     Narrative   The left ventricular size is normal. The left ventricular systolic   function is normal. LV systolic pressure is normal. LV end diastolic   pressure is elevated.    The left ventricular ejection fraction is grossly normal.    Estimated blood loss was <20 ml.    The LM vessel was large.    The LAD vessel was moderate .    The left circumflex was large.    The RCA was moderate.    Mid RCA lesion 50% stenosed.    Prox LAD to Mid LAD lesion 35% stenosed.     Pt with hx of MVP/MR with progressive fatigue and dyspnea and abn ECG on  "  stress echo     RHC:   RA mean 5  PA mean 18mmHg  PCWP 13mmHg with V wave  LVEDP 15mmHg     EF 65% no WMA wthi mild to mod MR     Anatomy:  LM min dz  LAD mid 30%   Circ mild dz with branch supplies PL territory  RCA mid 50% with PDA extends to apex     Note distal LAD and PDA vessels are very small around apex     Imp/plan:  1. Dyspnea with hx of MR - noted mild to mod today but given v wave   possible could be severe - will arrange for ZANE with follow up with Dr. Henao   2. CAD - start asp 81mg and atorvastatin 40mg to prevent/slow progression              Results for orders placed during the hospital encounter of 10/11/19   NM Pharmacologic Stress Test     Narrative   The pharmacologic nuclear stress test is negative for inducible   myocardial ischemia or infarction.    The left ventricular ejection fraction is 56%.    There is no prior study available.          Cardiac testing personally reviewed:  Device check shows leads stable and battery ok.  Device functioning appropriately.   Atrial pacing 33% and Ventricular geoice62%.  AT/AFib burden overall 2% since November 2020 but 100% since June 18 of 2021.  1 nonsustained VT episode of 7 beats seen.     Problem List:  Patient Active Problem List   Diagnosis     Asymmetrical sensorineural hearing loss     Non-rheumatic mitral regurgitation     S/P mitral valve replacement with tissue valve     Cardiac pacemaker in situ, dual chamber     Persistent atrial fibrillation (H)     Coronary artery disease involving native coronary artery of native heart with angina pectoris (H)     Revi  e  Physical Examination Review of Systems   w ericstems  /86 (BP Location: Right arm, Patient Position: Sitting, Cuff Size: Adult Regular)   Pulse 68   Resp 16   Ht 1.854 m (6' 1\")   Wt 101.6 kg (224 lb)   BMI 29.55 kg/m    Body mass index is 29.55 kg/m .  Wt Readings from Last 3 Encounters:   08/05/21 101.6 kg (224 lb)   06/15/21 101.6 kg (224 lb)   05/05/21 103 kg (227 " lb)     General Appearance:   Alert, well-appearing and in no acute distress.   HEENT: Atraumatic, normocephalic.  No scleral icterus, normal conjunctivae; mucous membranes pink and moist.     Chest: Chest symmetric, spine straight.   Lungs:   Respirations unlabored: Lungs are clear to auscultation.   Cardiovascular:   Normal first and second heart sounds with no murmurs, rubs, or gallops.  Regular, regular.  Radial and posterior tibial pulses are intact.  Normal JVD, no edema.       Extremities: No cyanosis or clubbing   Musculoskeletal: Moves all extremities   Skin: Warm, dry, intact.    Neurologic: Mood and affect are appropriate, alert and oriented to person, place, time, and situation     ROS: 10 point ROS neg other than the symptoms noted above in the HPI.     Medical History  Surgical History Family History Social History     Past Medical History:   Diagnosis Date     High cholesterol      MVP (mitral valve prolapse)      Nonocclusive coronary atherosclerosis of native coronary artery 8/3/2017     Sleep apnea, obstructive     uses CPAP    Past Surgical History:   Procedure Laterality Date     ARTHROSCOPY KNEE  2007    left     CARDIOVERSION  11/23/2020     EP PACEMAKER INSERT Left 9/25/2017    Procedure: EP Pacemaker Insertion;  Surgeon: Reji Whittington MD;  Location: [facility];  Service:      LAMINECTOMY  1970, 2003    lumbar X2     MA CATH PLACEMENT & NJX CORONARY ART ANGIO IMG S&I N/A 8/3/2017    Procedure: Coronary Angiogram;  Surgeon: Abhinav Redmond MD;  Location: [facility];  Service: Cardiology     MA L HRT CATH W/NJX L VENTRICULOGRAPHY IMG S&I N/A 8/3/2017    Procedure: Left Heart Catheterization with Left Ventriculogram;  Surgeon: Abhinav Redmond MD;  Location: [facility];  Service: Cardiology     MA RIGHT HEART CATH O2 SATURATION & CARDIAC OUTPUT N/A 8/3/2017    Procedure: Right Heart Catheterization;  Surgeon: Abhinav Redmond MD;  Location:   [facility];  Service: Cardiology     REPLACE VALVE MITRAL N/A 9/19/2017    Procedure: MITRAL VALVE REPLACEMENT, ANESTHESIA TRANSESOPHAGEAL ECHOCARDIOGRAM, CLOSURE OF THE PFO;  Surgeon: Monica Swenson MD;  Location: [facility] OR;  Service:     Family History   Problem Relation Age of Onset     Breast Cancer Mother      Cerebral aneurysm Sister      Other - See Comments Father         train acciendt     No Known Problems Sister      No Known Problems Son      Lymphoma Son     History   Smoking Status     Former Smoker     Quit date: 1/14/2007   Smokeless Tobacco     Never Used     Social History    Substance and Sexual Activity      Alcohol use: Yes        Alcohol/week: 3.0 standard drinks        Comment: Alcoholic Drinks/day: occasionally       Medications  Allergies     Current Outpatient Medications   Medication Sig Dispense Refill     amoxicillin (AMOXIL) 500 MG capsule [AMOXICILLIN (AMOXIL) 500 MG CAPSULE] Take 2,000 mg by mouth as needed (1 hour prior to dentist.).        aspirin 81 MG EC tablet [ASPIRIN 81 MG EC TABLET] Take 1 tablet (81 mg total) by mouth daily.  0     atorvastatin (LIPITOR) 40 MG tablet [ATORVASTATIN (LIPITOR) 40 MG TABLET] Take 1 tablet (40 mg total) by mouth daily. (Patient taking differently: Take 20 mg by mouth daily ) 90 tablet 1     b complex vitamins tablet [B COMPLEX VITAMINS TABLET] Take 1 tablet by mouth every other day.        chlorpheniramine (CHLOR-TRIMETON) 4 mg tablet [CHLORPHENIRAMINE (CHLOR-TRIMETON) 4 MG TABLET] Take 1 tablet (4 mg total) by mouth every 6 (six) hours as needed for allergies. 30 tablet 11     cholecalciferol, vitamin D3, 1,000 unit tablet [CHOLECALCIFEROL, VITAMIN D3, 1,000 UNIT TABLET] Take 1,000 Units by mouth every other day.        EPINEPHrine (EPIPEN) 0.3 mg/0.3 mL atIn [EPINEPHRINE (EPIPEN) 0.3 MG/0.3 ML ATIN] Inject 0.3 mg into the shoulder, thigh, or buttocks once as needed (allergic reaction).       metoprolol succinate ER (TOPROL-XL)  25 MG 24 hr tablet Take 1 tablet (25 mg) by mouth daily 90 tablet 3     MULTIVIT &MINERALS/FERROUS FUM (MULTI VITAMIN ORAL) [MULTIVIT &MINERALS/FERROUS FUM (MULTI VITAMIN ORAL)] Take 1 tablet by mouth every other day. Pt taking multi vitamin without Iron       warfarin ANTICOAGULANT (COUMADIN/JANTOVEN) 5 MG tablet [WARFARIN ANTICOAGULANT (COUMADIN/JANTOVEN) 5 MG TABLET] TAKE 1 TABLET (5MG) TO 1 & 1/2 TABLETS (7.5MG)  BY MOUTH DAILY, AS DIRECTED. ADJUST DOSE BASED ON INR RESULTS9 120 tablet 1      Allergies   Allergen Reactions     Bee Venom Protein (Honey Bee) [Bee Venom] Anaphylaxis      Medical, surgical, family, social history, and medications were all reviewed and updated as necessary.   Lab Results    Chemistry/lipid CBC Cardiac Enzymes/BNP/TSH/INR   Recent Labs   Lab Test 09/09/20  1155   CHOL 138   HDL 45   LDL 81   TRIG 60     Recent Labs   Lab Test 09/09/20  1155 07/16/20  0752 12/10/19  0730   LDL 81 70 84     Recent Labs   Lab Test 07/16/20  0752      POTASSIUM 4.3   CHLORIDE 106   CO2 23   GLC 98   BUN 19   CR 0.86   GFRESTIMATED >60   NOVA 9.0     Recent Labs   Lab Test 07/16/20  0752 10/14/19  1626 08/14/19  0955   CR 0.86 1.58* 1.26     Recent Labs   Lab Test 09/18/17  0835   A1C 5.5          Recent Labs   Lab Test 07/16/20  0752   WBC 5.4   HGB 12.8*   HCT 37.6*   MCV 95        Recent Labs   Lab Test 07/16/20  0752 10/14/19  1626   HGB 12.8* 12.7*    No results for input(s): TROPONINI in the last 82955 hours.  Recent Labs   Lab Test 07/16/20  0752 10/14/19  1626 04/10/19  1354   BNP 89* 73 213*     Recent Labs   Lab Test 05/05/21  0817   TSH 5.83*     Recent Labs   Lab Test 07/28/21  0815 06/15/21  0824 05/04/21  1334   INR 2.3* 2.10* 2.50*          Total Time- 40 minutes spent on date of encounter doing chart review, history and exam, documentation and further activities as noted above.  This note has been dictated using voice recognition software. Any grammatical, typographical, or  context distortions are unintentional and inherent to the software.

## 2021-08-05 NOTE — PATIENT INSTRUCTIONS
Obed Haley,    It was a pleasure to see you today at the [facility].     My recommendations after this visit include:    I discussed that you will likely be in atrial fibrillation all the time from now on.      Decrease metoprolol succinate 50 mg 1/2 tab orally every evening and with refill go to 25 mg 1 tab every evening.    To followup with device check in clinic see Dr. Henao as planned in October 2021.  To follow with me as needed.      My contact information:  Shan Salcido CNP  After Hours or Scheduling  [phone]  My Nurse---Bertha Crenshaw [phone]

## 2021-08-05 NOTE — LETTER
8/5/2021    Franko Nguyen MD  [facility] [address] [facility] [address]    RE: Obed Haley       Dear Colleague,    I had the pleasure of seeing Obed Haley in the [facility].      Thank you, Dr. Nguyen for asking the [facility] team to see Mr. Obed Haley to evaluate persistent atrial fibrillation.    Assessment/Recommendations     Assessment/Plan:    Diagnoses and all orders for this visit:  Persistent atrial fibrillation (H) ongoing since June 18, 2021.  On close questioning no symptoms with atrial fibrillation of direct or indirect.  Felt no different after cardioversion last fall.  Therefore appears asymptomatic.  Discussed race and affirm studies which show no difference in longevity or cardiac events with permanent A. fib.  Recommend rate control.  On remote device check showing 53% V pacing.  Recommend to decrease metoprolol succinate to 25 mg p.o. nightly.  See AVS.  -     metoprolol succinate ER (TOPROL-XL) 25 MG 24 hr tablet; Take 1 tablet (25 mg) by mouth daily  -     Follow-Up with Cardiologist; Future  S/P mitral valve replacement with tissue valve and follows with Dr. Henao and could just follow with cardiology.  To see him in October with in clinic device check.  -     Follow-Up with Cardiologist; Future  Cardiac pacemaker in situ, dual chamber and battery life has dropped 18 months suddenly.  Will do in clinic check with follow-up this fall before he goes to [place] in October.  Computer report says it is due to A. fib sensing.  A. fib alerts turned off.  -     Follow-Up with Cardiologist; Future      [id number] score of 3 and on chronic warfarin.  He is on daily aspirin in addition to warfarin and will defer to Dr. Henao whether to continue aspirin.       History of Present Illness/Subjective     Obed Haley is a very pleasant 76 year old male who comes in today for urgent EP  appointment due to recent remote alert that he is in persistent A. fib since June 18.  Obed Haley has a known history of mitral valve prolapse and had mitral valve replacement with [place] tissue valve in September 2017.  He has some LV systolic dysfunction in the past related to longstanding mitral regurgitation.  He has sick sinus syndrome syndrome and received a permanent pacemaker in September 2017.  He had a coronary angiogram in 2017 which showed mild to moderate nonobstructive coronary artery atherosclerosis.  He developed persistent atrial fib and had cardioversion in November 2020 without any change in how he is feeling.  He denies any other cardiac symptoms or changes in tolerance of activity.   He is planning on wintering in [place] for 4 months.    His device shows nonsustained VT of  very short episodes.  This was seen on previous device checks on my review.  He had a negative stress test in the fall of 2019.        Cardiographics (reviewed):  Echo Complete [ECH10] 08/05/2020     Narrative   When compared to the previous study dated 1/17/2019, left ventricular   systolic function is improved.    Normal left ventricular size, wall thickness. Septal motion is   consistent with artifact of right ventricular pacing lead. Left ventricle   ejection fraction is normal. The calculated left ventricular ejection   fraction is 62%.    Normal right ventricular size with mildly reduced right ventricular   systolic function.    Moderately enlarged both atria.    Normal functioning of bioprosthetic mitral valve.              Results for orders placed during the hospital encounter of 08/03/17   Cardiac Catheterization [CATH01] 08/03/2017     Narrative   The left ventricular size is normal. The left ventricular systolic   function is normal. LV systolic pressure is normal. LV end diastolic   pressure is elevated.    The left ventricular ejection fraction is grossly normal.    Estimated blood loss was <20 ml.     The LM vessel was large.    The LAD vessel was moderate .    The left circumflex was large.    The RCA was moderate.    Mid RCA lesion 50% stenosed.    Prox LAD to Mid LAD lesion 35% stenosed.     Pt with hx of MVP/MR with progressive fatigue and dyspnea and abn ECG on   stress echo     RHC:   RA mean 5  PA mean 18mmHg  PCWP 13mmHg with V wave  LVEDP 15mmHg     EF 65% no WMA wthi mild to mod MR     Anatomy:  LM min dz  LAD mid 30%   Circ mild dz with branch supplies PL territory  RCA mid 50% with PDA extends to apex     Note distal LAD and PDA vessels are very small around apex     Imp/plan:  1. Dyspnea with hx of MR - noted mild to mod today but given v wave   possible could be severe - will arrange for ZANE with follow up with Dr. Henao   2. CAD - start asp 81mg and atorvastatin 40mg to prevent/slow progression              Results for orders placed during the hospital encounter of 10/11/19   NM Pharmacologic Stress Test     Narrative   The pharmacologic nuclear stress test is negative for inducible   myocardial ischemia or infarction.    The left ventricular ejection fraction is 56%.    There is no prior study available.          Cardiac testing personally reviewed:  Device check shows leads stable and battery ok.  Device functioning appropriately.   Atrial pacing 33% and Ventricular voqfns26%.  AT/AFib burden overall 2% since November 2020 but 100% since June 18 of 2021.  1 nonsustained VT episode of 7 beats seen.     Problem List:  Patient Active Problem List   Diagnosis     Asymmetrical sensorineural hearing loss     Non-rheumatic mitral regurgitation     S/P mitral valve replacement with tissue valve     Cardiac pacemaker in situ, dual chamber     Persistent atrial fibrillation (H)     Coronary artery disease involving native coronary artery of native heart with angina pectoris (H)     Revi  e  Physical Examination Review of Systems   w [facility]  /86 (BP Location: Right arm, Patient Position:  "Sitting, Cuff Size: Adult Regular)   Pulse 68   Resp 16   Ht 1.854 m (6' 1\")   Wt 101.6 kg (224 lb)   BMI 29.55 kg/m    Body mass index is 29.55 kg/m .  Wt Readings from Last 3 Encounters:   08/05/21 101.6 kg (224 lb)   06/15/21 101.6 kg (224 lb)   05/05/21 103 kg (227 lb)     General Appearance:   Alert, well-appearing and in no acute distress.   HEENT: Atraumatic, normocephalic.  No scleral icterus, normal conjunctivae; mucous membranes pink and moist.     Chest: Chest symmetric, spine straight.   Lungs:   Respirations unlabored: Lungs are clear to auscultation.   Cardiovascular:   Normal first and second heart sounds with no murmurs, rubs, or gallops.  Regular, regular.  Radial and posterior tibial pulses are intact.  Normal JVD, no edema.       Extremities: No cyanosis or clubbing   Musculoskeletal: Moves all extremities   Skin: Warm, dry, intact.    Neurologic: Mood and affect are appropriate, alert and oriented to person, place, time, and situation     ROS: 10 point ROS neg other than the symptoms noted above in the HPI.     Medical History  Surgical History Family History Social History     Past Medical History:   Diagnosis Date     High cholesterol      MVP (mitral valve prolapse)      Nonocclusive coronary atherosclerosis of native coronary artery 8/3/2017     Sleep apnea, obstructive     uses CPAP    Past Surgical History:   Procedure Laterality Date     ARTHROSCOPY KNEE  2007    left     CARDIOVERSION  11/23/2020     EP PACEMAKER INSERT Left 9/25/2017    Procedure: EP Pacemaker Insertion;  Surgeon: Reji Whittington MD;  Location: [facility] Cath Lab;  Service:      LAMINECTOMY  1970, 2003    lumbar X2     MO CATH PLACEMENT & NJX CORONARY ART ANGIO IMG S&I N/A 8/3/2017    Procedure: Coronary Angiogram;  Surgeon: Abhinav Redmond MD;  Location: [facility] Cath Lab;  Service: Cardiology     MO L HRT CATH W/NJX L VENTRICULOGRAPHY IMG S&I N/A 8/3/2017    Procedure: Left Heart Catheterization with " Left Ventriculogram;  Surgeon: Abhinav Redmond MD;  Location: [facility] Cath Lab;  Service: Cardiology     MS RIGHT HEART CATH O2 SATURATION & CARDIAC OUTPUT N/A 8/3/2017    Procedure: Right Heart Catheterization;  Surgeon: Abhinav Redmond MD;  Location: [facility] Cath Lab;  Service: Cardiology     REPLACE VALVE MITRAL N/A 9/19/2017    Procedure: MITRAL VALVE REPLACEMENT, ANESTHESIA TRANSESOPHAGEAL ECHOCARDIOGRAM, CLOSURE OF THE PFO;  Surgeon: Monica Swenson MD;  Location: [facility] Main OR;  Service:     Family History   Problem Relation Age of Onset     Breast Cancer Mother      Cerebral aneurysm Sister      Other - See Comments Father         train acciendt     No Known Problems Sister      No Known Problems Son      Lymphoma Son     History   Smoking Status     Former Smoker     Quit date: 1/14/2007   Smokeless Tobacco     Never Used     Social History    Substance and Sexual Activity      Alcohol use: Yes        Alcohol/week: 3.0 standard drinks        Comment: Alcoholic Drinks/day: occasionally       Medications  Allergies     Current Outpatient Medications   Medication Sig Dispense Refill     amoxicillin (AMOXIL) 500 MG capsule [AMOXICILLIN (AMOXIL) 500 MG CAPSULE] Take 2,000 mg by mouth as needed (1 hour prior to dentist.).        aspirin 81 MG EC tablet [ASPIRIN 81 MG EC TABLET] Take 1 tablet (81 mg total) by mouth daily.  0     atorvastatin (LIPITOR) 40 MG tablet [ATORVASTATIN (LIPITOR) 40 MG TABLET] Take 1 tablet (40 mg total) by mouth daily. (Patient taking differently: Take 20 mg by mouth daily ) 90 tablet 1     b complex vitamins tablet [B COMPLEX VITAMINS TABLET] Take 1 tablet by mouth every other day.        chlorpheniramine (CHLOR-TRIMETON) 4 mg tablet [CHLORPHENIRAMINE (CHLOR-TRIMETON) 4 MG TABLET] Take 1 tablet (4 mg total) by mouth every 6 (six) hours as needed for allergies. 30 tablet 11     cholecalciferol, vitamin D3, 1,000 unit tablet [CHOLECALCIFEROL, VITAMIN D3, 1,000  UNIT TABLET] Take 1,000 Units by mouth every other day.        EPINEPHrine (EPIPEN) 0.3 mg/0.3 mL atIn [EPINEPHRINE (EPIPEN) 0.3 MG/0.3 ML ATIN] Inject 0.3 mg into the shoulder, thigh, or buttocks once as needed (allergic reaction).       metoprolol succinate ER (TOPROL-XL) 25 MG 24 hr tablet Take 1 tablet (25 mg) by mouth daily 90 tablet 3     MULTIVIT &MINERALS/FERROUS FUM (MULTI VITAMIN ORAL) [MULTIVIT &MINERALS/FERROUS FUM (MULTI VITAMIN ORAL)] Take 1 tablet by mouth every other day. Pt taking multi vitamin without Iron       warfarin ANTICOAGULANT (COUMADIN/JANTOVEN) 5 MG tablet [WARFARIN ANTICOAGULANT (COUMADIN/JANTOVEN) 5 MG TABLET] TAKE 1 TABLET (5MG) TO 1 & 1/2 TABLETS (7.5MG)  BY MOUTH DAILY, AS DIRECTED. ADJUST DOSE BASED ON INR RESULTS9 120 tablet 1      Allergies   Allergen Reactions     Bee Venom Protein (Honey Bee) [Bee Venom] Anaphylaxis      Medical, surgical, family, social history, and medications were all reviewed and updated as necessary.   Lab Results    Chemistry/lipid CBC Cardiac Enzymes/BNP/TSH/INR   Recent Labs   Lab Test 09/09/20  1155   CHOL 138   HDL 45   LDL 81   TRIG 60     Recent Labs   Lab Test 09/09/20  1155 07/16/20  0752 12/10/19  0730   LDL 81 70 84     Recent Labs   Lab Test 07/16/20  0752      POTASSIUM 4.3   CHLORIDE 106   CO2 23   GLC 98   BUN 19   CR 0.86   GFRESTIMATED >60   NOVA 9.0     Recent Labs   Lab Test 07/16/20  0752 10/14/19  1626 08/14/19  0955   CR 0.86 1.58* 1.26     Recent Labs   Lab Test 09/18/17  0835   A1C 5.5          Recent Labs   Lab Test 07/16/20  0752   WBC 5.4   HGB 12.8*   HCT 37.6*   MCV 95        Recent Labs   Lab Test 07/16/20  0752 10/14/19  1626   HGB 12.8* 12.7*    No results for input(s): TROPONINI in the last 64509 hours.  Recent Labs   Lab Test 07/16/20  0752 10/14/19  1626 04/10/19  1354   BNP 89* 73 213*     Recent Labs   Lab Test 05/05/21  0817   TSH 5.83*     Recent Labs   Lab Test 07/28/21  0815 06/15/21  0824 05/04/21  8929    INR 2.3* 2.10* 2.50*          Total Time- 40 minutes spent on date of encounter doing chart review, history and exam, documentation and further activities as noted above.  This note has been dictated using voice recognition software. Any grammatical, typographical, or context distortions are unintentional and inherent to the software.                             Thank you for allowing me to participate in the care of your patient.      Sincerely,     [facility]  cc:   No referring provider defined for this encounter.

## 2021-09-09 ENCOUNTER — TELEPHONE (OUTPATIENT)
Dept: INTERNAL MEDICINE | Facility: CLINIC | Age: 76
End: 2021-09-09

## 2021-09-09 DIAGNOSIS — I25.119 CORONARY ARTERY DISEASE INVOLVING NATIVE CORONARY ARTERY OF NATIVE HEART WITH ANGINA PECTORIS (H): ICD-10-CM

## 2021-09-09 DIAGNOSIS — E03.8 SUBCLINICAL HYPOTHYROIDISM: ICD-10-CM

## 2021-09-09 DIAGNOSIS — I48.0 PAROXYSMAL ATRIAL FIBRILLATION (H): Primary | ICD-10-CM

## 2021-09-09 DIAGNOSIS — Z12.5 SCREENING FOR PROSTATE CANCER: ICD-10-CM

## 2021-09-09 NOTE — TELEPHONE ENCOUNTER
Reason for Call:  Other call back    Detailed comments: Pt wondering if he should have blood work done prior to his appt with Dr Nguyen on 09/21/2021  He is requesting:  PSA  TSH  Thyroid Peroxidase AB  Lipid Cascade  And whatever Dr Nguyen deems advisable    Please advise    Phone Number Patient can be reached at: Cell number on file:    Telephone Information:   Mobile [phone]       Best Time: anytime    Can we leave a detailed message on this number? YES    Call taken on 9/9/2021 at 11:24 AM by Elli Ribera

## 2021-09-10 DIAGNOSIS — E78.5 HYPERLIPIDEMIA LDL GOAL <100: ICD-10-CM

## 2021-09-10 RX ORDER — ATORVASTATIN CALCIUM 40 MG/1
40 TABLET, FILM COATED ORAL DAILY
Qty: 90 TABLET | Refills: 1 | Status: SHIPPED | OUTPATIENT
Start: 2021-09-10 | End: 2021-10-15

## 2021-09-15 ENCOUNTER — LAB (OUTPATIENT)
Dept: LAB | Facility: HOSPITAL | Age: 76
End: 2021-09-15
Payer: MEDICARE

## 2021-09-15 DIAGNOSIS — E03.8 SUBCLINICAL HYPOTHYROIDISM: ICD-10-CM

## 2021-09-15 DIAGNOSIS — Z12.5 SCREENING FOR PROSTATE CANCER: ICD-10-CM

## 2021-09-15 DIAGNOSIS — I25.119 CORONARY ARTERY DISEASE INVOLVING NATIVE CORONARY ARTERY OF NATIVE HEART WITH ANGINA PECTORIS (H): ICD-10-CM

## 2021-09-15 LAB
ALBUMIN SERPL-MCNC: 3.9 G/DL (ref 3.5–5)
ALP SERPL-CCNC: 65 U/L (ref 45–120)
ALT SERPL W P-5'-P-CCNC: 18 U/L (ref 0–45)
ANION GAP SERPL CALCULATED.3IONS-SCNC: 9 MMOL/L (ref 5–18)
AST SERPL W P-5'-P-CCNC: 24 U/L (ref 0–40)
BILIRUB SERPL-MCNC: 1 MG/DL (ref 0–1)
BUN SERPL-MCNC: 20 MG/DL (ref 8–28)
CALCIUM SERPL-MCNC: 9.5 MG/DL (ref 8.5–10.5)
CHLORIDE BLD-SCNC: 109 MMOL/L (ref 98–107)
CHOLEST SERPL-MCNC: 133 MG/DL
CO2 SERPL-SCNC: 25 MMOL/L (ref 22–31)
CREAT SERPL-MCNC: 1 MG/DL (ref 0.7–1.3)
ERYTHROCYTE [DISTWIDTH] IN BLOOD BY AUTOMATED COUNT: 13.9 % (ref 10–15)
FASTING STATUS PATIENT QL REPORTED: YES
GFR SERPL CREATININE-BSD FRML MDRD: 73 ML/MIN/1.73M2
GLUCOSE BLD-MCNC: 98 MG/DL (ref 70–125)
HCT VFR BLD AUTO: 41 % (ref 40–53)
HDLC SERPL-MCNC: 46 MG/DL
HGB BLD-MCNC: 13.6 G/DL (ref 13.3–17.7)
LDLC SERPL CALC-MCNC: 74 MG/DL
MCH RBC QN AUTO: 32 PG (ref 26.5–33)
MCHC RBC AUTO-ENTMCNC: 33.2 G/DL (ref 31.5–36.5)
MCV RBC AUTO: 97 FL (ref 78–100)
PLATELET # BLD AUTO: 176 10E3/UL (ref 150–450)
POTASSIUM BLD-SCNC: 4.2 MMOL/L (ref 3.5–5)
PROT SERPL-MCNC: 7 G/DL (ref 6–8)
PSA SERPL-MCNC: 0.41 UG/L (ref 0–6.5)
RBC # BLD AUTO: 4.25 10E6/UL (ref 4.4–5.9)
SODIUM SERPL-SCNC: 143 MMOL/L (ref 136–145)
T4 FREE SERPL-MCNC: 0.82 NG/DL (ref 0.7–1.8)
TRIGL SERPL-MCNC: 66 MG/DL
TSH SERPL DL<=0.005 MIU/L-ACNC: 7.2 UIU/ML (ref 0.3–5)
WBC # BLD AUTO: 6.2 10E3/UL (ref 4–11)

## 2021-09-15 PROCEDURE — 84439 ASSAY OF FREE THYROXINE: CPT

## 2021-09-15 PROCEDURE — 80053 COMPREHEN METABOLIC PANEL: CPT

## 2021-09-15 PROCEDURE — 82465 ASSAY BLD/SERUM CHOLESTEROL: CPT

## 2021-09-15 PROCEDURE — 84443 ASSAY THYROID STIM HORMONE: CPT

## 2021-09-15 PROCEDURE — 85027 COMPLETE CBC AUTOMATED: CPT

## 2021-09-15 PROCEDURE — 36415 COLL VENOUS BLD VENIPUNCTURE: CPT

## 2021-09-15 PROCEDURE — 86376 MICROSOMAL ANTIBODY EACH: CPT

## 2021-09-15 PROCEDURE — G0103 PSA SCREENING: HCPCS

## 2021-09-16 LAB — THYROPEROXIDASE AB SERPL-ACNC: 838 IU/ML (ref 0–6)

## 2021-09-30 ENCOUNTER — ANTICOAGULATION THERAPY VISIT (OUTPATIENT)
Dept: ANTICOAGULATION | Facility: CLINIC | Age: 76
End: 2021-09-30

## 2021-09-30 ENCOUNTER — OFFICE VISIT (OUTPATIENT)
Dept: INTERNAL MEDICINE | Facility: CLINIC | Age: 76
End: 2021-09-30
Payer: MEDICARE

## 2021-09-30 VITALS
WEIGHT: 223.7 LBS | HEIGHT: 73 IN | BODY MASS INDEX: 29.65 KG/M2 | DIASTOLIC BLOOD PRESSURE: 80 MMHG | OXYGEN SATURATION: 97 % | HEART RATE: 66 BPM | SYSTOLIC BLOOD PRESSURE: 138 MMHG

## 2021-09-30 DIAGNOSIS — Z00.00 ANNUAL PHYSICAL EXAM: Primary | ICD-10-CM

## 2021-09-30 DIAGNOSIS — Z95.3 S/P MITRAL VALVE REPLACEMENT WITH TISSUE VALVE: ICD-10-CM

## 2021-09-30 DIAGNOSIS — E03.9 ACQUIRED HYPOTHYROIDISM: ICD-10-CM

## 2021-09-30 DIAGNOSIS — I25.119 CORONARY ARTERY DISEASE INVOLVING NATIVE CORONARY ARTERY OF NATIVE HEART WITH ANGINA PECTORIS (H): ICD-10-CM

## 2021-09-30 DIAGNOSIS — Z95.0 CARDIAC PACEMAKER IN SITU: ICD-10-CM

## 2021-09-30 DIAGNOSIS — I34.0 NON-RHEUMATIC MITRAL REGURGITATION: ICD-10-CM

## 2021-09-30 DIAGNOSIS — I48.19 PERSISTENT ATRIAL FIBRILLATION (H): ICD-10-CM

## 2021-09-30 DIAGNOSIS — L03.112 CELLULITIS OF LEFT AXILLA: ICD-10-CM

## 2021-09-30 LAB
INR BLD: 2.2 (ref 0.9–1.1)
TSH SERPL DL<=0.005 MIU/L-ACNC: 4.74 UIU/ML (ref 0.3–5)

## 2021-09-30 PROCEDURE — G0008 ADMIN INFLUENZA VIRUS VAC: HCPCS | Performed by: INTERNAL MEDICINE

## 2021-09-30 PROCEDURE — 84443 ASSAY THYROID STIM HORMONE: CPT | Performed by: INTERNAL MEDICINE

## 2021-09-30 PROCEDURE — 85610 PROTHROMBIN TIME: CPT | Performed by: INTERNAL MEDICINE

## 2021-09-30 PROCEDURE — 90662 IIV NO PRSV INCREASED AG IM: CPT | Performed by: INTERNAL MEDICINE

## 2021-09-30 PROCEDURE — 99214 OFFICE O/P EST MOD 30 MIN: CPT | Mod: 25 | Performed by: INTERNAL MEDICINE

## 2021-09-30 PROCEDURE — G0439 PPPS, SUBSEQ VISIT: HCPCS | Performed by: INTERNAL MEDICINE

## 2021-09-30 PROCEDURE — 36415 COLL VENOUS BLD VENIPUNCTURE: CPT | Performed by: INTERNAL MEDICINE

## 2021-09-30 RX ORDER — LEVOTHYROXINE SODIUM 25 UG/1
25 TABLET ORAL DAILY
Qty: 30 TABLET | Refills: 1 | Status: SHIPPED | OUTPATIENT
Start: 2021-09-30 | End: 2021-10-20

## 2021-09-30 RX ORDER — CEPHALEXIN 500 MG/1
500 CAPSULE ORAL 4 TIMES DAILY
Qty: 20 CAPSULE | Refills: 0 | Status: SHIPPED | OUTPATIENT
Start: 2021-09-30 | End: 2021-10-05

## 2021-09-30 ASSESSMENT — ACTIVITIES OF DAILY LIVING (ADL): CURRENT_FUNCTION: NO ASSISTANCE NEEDED

## 2021-09-30 ASSESSMENT — MIFFLIN-ST. JEOR: SCORE: 1798.58

## 2021-09-30 NOTE — PROGRESS NOTES
"SUBJECTIVE:   Obed Haley is a 76 year old male who presents for Preventive Visit.  This 76-year-old man comes in for annual wellness visit.  Overall doing well.  Underlying history of mitral regurgitation for which he had mitral valve replacement.  Underlying atrial fibrillation.  He is on both warfarin and aspirin without issues of bleeding.  He would like me to look at an area of possible infection in the left axilla.  He has been following his TSH which has been elevated with positive TPO antibodies    Patient has been advised of split billing requirements and indicates understanding: Yes   Are you in the first 12 months of your Medicare coverage?  No    Healthy Habits:     In general, how would you rate your overall health?  Good    Frequency of exercise:  6-7 days/week    Duration of exercise:  30-45 minutes    Do you usually eat at least 4 servings of fruit and vegetables a day, include whole grains    & fiber and avoid regularly eating high fat or \"junk\" foods?  Yes    Taking medications regularly:  No    Medication side effects:  Lightheadedness    Ability to successfully perform activities of daily living:  No assistance needed    Home Safety:  No safety concerns identified    Hearing Impairment:  Difficulty following a conversation in a noisy restaurant or crowded room, need to ask people to speak up or repeat themselves and difficulty understanding soft or whispered speech    In the past 6 months, have you been bothered by leaking of urine?  No    In general, how would you rate your overall mental or emotional health?  Good      PHQ-2 Total Score: 0    Additional concerns today:  Yes    Do you feel safe in your environment? Yes    Have you ever done Advance Care Planning? (For example, a Health Directive, POLST, or a discussion with a medical provider or your loved ones about your wishes): Yes, advance care planning is on file.       Fall risk  Fallen 2 or more times in the past year?: Yes  Any fall " "with injury in the past year?: No  Timed Up and Go Test (>13.5 is fall risk; contact physician) : 8    Cognitive Screening   1) Repeat 3 items (Leader, Season, Table)    2) Clock draw: NORMAL  3) 3 item recall: Recalls 3 objects  Results: 3 items recalled: COGNITIVE IMPAIRMENT LESS LIKELY    Mini-CogTM Copyright IGNACIO Kaye. Licensed by the author for use in [facility]; reprinted with permission (felicia@[facility]). All rights reserved.          Reviewed and updated as needed this visit by clinical staff   Allergies  Meds              Reviewed and updated as needed this visit by Provider                Social History     Tobacco Use     Smoking status: Former Smoker     Quit date: 2007     Years since quittin.7     Smokeless tobacco: Never Used   Substance Use Topics     Alcohol use: Yes     Alcohol/week: 3.0 standard drinks     Comment: Alcoholic Drinks/day: occasionally         Alcohol Use 2021   Prescreen: >3 drinks/day or >7 drinks/week? No               Current providers sharing in care for this patient include:   Patient Care Team:  Franko Nguyen MD as PCP - General  Franko Nguyen MD as Assigned PCP  Sha Henao MD as Assigned Heart and Vascular Provider    The following health maintenance items are reviewed in Epic and correct as of today:  Health Maintenance Due   Topic Date Due     ANNUAL REVIEW OF  ORDERS  Never done     INFLUENZA VACCINE (1) 2021               Review of Systems  Constitutional, HEENT, cardiovascular, pulmonary, GI, , musculoskeletal, neuro, skin, endocrine and psych systems are negative, except as otherwise noted.    OBJECTIVE:   /80 (BP Location: Left arm, Patient Position: Sitting, Cuff Size: Adult Regular)   Pulse 66   Ht 1.854 m (6' 1\")   Wt 101.5 kg (223 lb 11.2 oz)   SpO2 97%   BMI 29.51 kg/m   Estimated body mass index is 29.51 kg/m  as calculated from the following:    Height as of this encounter: 1.854 m (6' 1\").    Weight as of " this encounter: 101.5 kg (223 lb 11.2 oz).  Physical Exam  EYES: Eyelids, conjunctiva, and sclera were normal. Pupils were normal. Cornea, iris, and lens were normal bilaterally.  HEAD, EARS, NOSE, MOUTH, AND THROAT: Head and face were normal. Hearing was normal to voice and the ears were normal to external exam. Nose appearance was normal and there was no discharge. Oropharynx was normal.  NECK: Neck appearance was normal. There were no neck masses and the thyroid was not enlarged.  RESPIRATORY: Breathing pattern was normal and the chest moved symmetrically.  Percussion/auscultatory percussion was normal.  Lung sounds were normal and there were no abnormal sounds.  CARDIOVASCULAR: Heart rate and rhythm were normal.  S1 and S2 were normal and there were no extra sounds or murmurs. Peripheral pulses in arms and legs were normal.  Jugular venous pressure was normal.  There was no peripheral edema.  GASTROINTESTINAL: The abdomen was normal in contour.  Bowel sounds were present.  Percussion detected no organ enlargement or tenderness.  Palpation detected no tenderness, mass, or enlarged organs.   MUSCULOSKELETAL: Skeletal configuration was normal and muscle mass was normal for age. Joint appearance was overall normal.  LYMPHATIC: There were no enlarged nodes.  SKIN/HAIR/NAILS: Skin color was normal.  Wall abscess/cellulitis in the left axilla.  Hair and nails were normal.  NEUROLOGIC: The patient was alert and oriented to person, place, time, and circumstance. Speech was normal. Cranial nerves were normal. Motor strength was normal for age. The patient was normally coordinated.  PSYCHIATRIC:  Mood and affect were normal and the patient had normal recent and remote memory. The patient's judgment and insight were normal.      ASSESSMENT / PLAN:   1. Annual physical exam  This is a 76-year-old man with issues as discussed below    2. Non-rheumatic mitral regurgitation  3. S/P mitral valve replacement with tissue  "valve  Doing well, continue current plan  - INR point of care    4. Persistent atrial fibrillation (H)  Continue both warfarin and aspirin, reviewed guidelines for tissue mitral valve with concurrent atrial fibrillation and combination of warfarin and aspirin is reasonable but will assess fall and bleeding risk on an annual basis  - INR point of care    5. Coronary artery disease involving native coronary artery of native heart with angina pectoris (H)  Continue both aspirin and statin    6. Cardiac pacemaker in situ, dual chamber    7. Acquired hypothyroidism  Recommend starting levothyroxine, check TSH in 1 month, adjust dose as needed  - levothyroxine (SYNTHROID/LEVOTHROID) 25 MCG tablet; Take 1 tablet (25 mcg) by mouth daily  Dispense: 30 tablet; Refill: 1  - TSH; Standing  - TSH    8. Cellulitis of left axilla  - cephALEXin (KEFLEX) 500 MG capsule; Take 1 capsule (500 mg) by mouth 4 times daily for 5 days  Dispense: 20 capsule; Refill: 0    Estimated body mass index is 29.51 kg/m  as calculated from the following:    Height as of this encounter: 1.854 m (6' 1\").    Weight as of this encounter: 101.5 kg (223 lb 11.2 oz).        He reports that he quit smoking about 14 years ago. He has never used smokeless tobacco.      Appropriate preventive services were discussed with this patient, including applicable screening as appropriate for cardiovascular disease, diabetes, osteopenia/osteoporosis, and glaucoma.  As appropriate for age/gender, discussed screening for colorectal cancer, prostate cancer, breast cancer, and cervical cancer. Checklist reviewing preventive services available has been given to the patient.    Reviewed patients plan of care and provided an AVS. The Basic Care Plan (routine screening as documented in Health Maintenance) for Obed meets the Care Plan requirement. This Care Plan has been established and reviewed with the Patient.    Counseling Resources:  ATP IV Guidelines  Pooled Cohorts " Equation Calculator  Breast Cancer Risk Calculator  Breast Cancer: Medication to Reduce Risk  FRAX Risk Assessment  ICSI Preventive Guidelines  Dietary Guidelines for Americans, 2010  [employer]'s MyPlate  ASA Prophylaxis  Lung CA Screening    Franko Nguyen MD  [facility]    Identified Health Risks:

## 2021-09-30 NOTE — PROGRESS NOTES
ANTICOAGULATION MANAGEMENT     Obed BETTENCOURT Hernandopapoalexa 76 year old male is on warfarin with therapeutic INR result. (Goal INR 2.0-3.0)    Recent labs: (last 7 days)     09/30/21  0822   INR 2.2*       ASSESSMENT     Source(s): Chart Review, Patient/Caregiver Call and Template       Warfarin doses taken: Warfarin taken as instructed    Diet: No new diet changes identified    New illness, injury, or hospitalization: Yes:    Cellulitis of left axilla.   High TSH level.  (Has not started Levothyroxine yet. This med with concurrent use of ORAL ANTICOAGULANTS and THYROID HORMONES may result in a minor increased risk of bleeding. ).    Medication/supplement changes:  Yes.    RX - Cephalexin 500mg QID for 5 days, from 10/1 - 10/6.   Vaccinated with Quad. high dose Flu Shot.    Signs or symptoms of bleeding or clotting: No    Previous INR: Therapeutic last 11 INR visits    Additional findings: None     PLAN     Recommended plan for ongoing change(s) affecting INR     Dosing Instructions: Continue your current warfarin dose with next INR in 8 weeks       Summary  As of 9/30/2021    Full warfarin instructions:  5 mg every Mon, Wed, Fri; 7.5 mg all other days   Next INR check:  11/25/2021             Telephone call with Obed ([phone]) who verbalizes understanding and agrees to plan    - recommend INR status check on 4th day of ABX treatment, d/t known interaction with warfarin for risk of bleeding.    Lab visit scheduled - INR on 10/5/21 @ [facility].    Education provided: Importance of consistent vitamin K intake, Goal range and significance of current result, Potential interaction between warfarin and Cephalexin and Levothyroxine  and Monitoring for bleeding signs and symptoms    Plan made per [facility] anticoagulation protocol    Yolanda Krishna RN  Anticoagulation Clinic  9/30/2021    _______________________________________________________________________     Anticoagulation Episode Summary     Current INR goal:  2.0-3.0   TTR:   100.0 % (1 y)   Target end date:  9/29/2017   Send INR reminders to:  [facility]       Comments:           Anticoagulation Care Providers     Provider Role Specialty Phone number    Franko Franz MD Referring Internal Medicine [phone]

## 2021-10-05 ENCOUNTER — ANTICOAGULATION THERAPY VISIT (OUTPATIENT)
Dept: ANTICOAGULATION | Facility: CLINIC | Age: 76
End: 2021-10-05

## 2021-10-05 ENCOUNTER — LAB (OUTPATIENT)
Dept: LAB | Facility: CLINIC | Age: 76
End: 2021-10-05
Payer: MEDICARE

## 2021-10-05 DIAGNOSIS — E03.9 ACQUIRED HYPOTHYROIDISM: ICD-10-CM

## 2021-10-05 DIAGNOSIS — I48.19 PERSISTENT ATRIAL FIBRILLATION (H): ICD-10-CM

## 2021-10-05 DIAGNOSIS — Z95.3 S/P MITRAL VALVE REPLACEMENT WITH TISSUE VALVE: ICD-10-CM

## 2021-10-05 LAB
INR BLD: 2.7 (ref 0.9–1.1)
TSH SERPL DL<=0.005 MIU/L-ACNC: 7.28 UIU/ML (ref 0.3–5)

## 2021-10-05 PROCEDURE — 84443 ASSAY THYROID STIM HORMONE: CPT | Performed by: FAMILY MEDICINE

## 2021-10-05 PROCEDURE — 85610 PROTHROMBIN TIME: CPT | Performed by: FAMILY MEDICINE

## 2021-10-05 PROCEDURE — 36415 COLL VENOUS BLD VENIPUNCTURE: CPT | Performed by: FAMILY MEDICINE

## 2021-10-05 NOTE — PROGRESS NOTES
ANTICOAGULATION MANAGEMENT     Obed Haley 76 year old male is on warfarin with therapeutic INR result. (Goal INR 2.0-3.0)    Recent labs: (last 7 days)     10/05/21  0718   INR 2.7*       ASSESSMENT     Source(s): Chart Review, Patient/Caregiver Call and Template       Warfarin doses taken: Warfarin taken as instructed    Diet: No new diet changes identified    New illness, injury, or hospitalization: Yes:    Cellulitis of left axilla - improving.    Medication/supplement changes: Yes.    Started Levothyroxine started on 10/1/21, can potentially increase risk for bleeding.    5 day course with Cephalexin and will complete on 10/6.    Signs or symptoms of bleeding or clotting: No    Previous INR: Therapeutic last 12 INR visits    Additional findings: None     PLAN     Recommended plan for ongoing change(s) affecting INR     Dosing Instructions: Continue your current warfarin dose with next INR in 2 weeks       Summary  As of 10/5/2021    Full warfarin instructions:  5 mg every Mon, Wed, Fri; 7.5 mg all other days   Next INR check:  10/19/21             Telephone call with Obed ([phone]) who verbalizes understanding and agrees to plan    Lab visit scheduled - INR on 10/19/21 @ [facility].    Education provided: Goal range and significance of current result and Potential interaction between warfarin and Levothyroxine    Plan made per [facility] anticoagulation protocol    Yolanda Krishna RN  Anticoagulation Clinic  10/5/2021    _______________________________________________________________________     Anticoagulation Episode Summary     Current INR goal:  2.0-3.0   TTR:  100.0 % (1 y)   Target end date:  9/29/2017   Send INR reminders to:  [facility]       Comments:           Anticoagulation Care Providers     Provider Role Specialty Phone number    Franko Franz MD Referring Internal Medicine [phone]

## 2021-10-15 ENCOUNTER — OFFICE VISIT (OUTPATIENT)
Dept: CARDIOLOGY | Facility: CLINIC | Age: 76
End: 2021-10-15
Attending: INTERNAL MEDICINE

## 2021-10-15 ENCOUNTER — ANCILLARY PROCEDURE (OUTPATIENT)
Dept: CARDIOLOGY | Facility: CLINIC | Age: 76
End: 2021-10-15
Attending: INTERNAL MEDICINE
Payer: MEDICARE

## 2021-10-15 VITALS
DIASTOLIC BLOOD PRESSURE: 80 MMHG | RESPIRATION RATE: 16 BRPM | HEIGHT: 74 IN | SYSTOLIC BLOOD PRESSURE: 128 MMHG | WEIGHT: 225 LBS | BODY MASS INDEX: 28.88 KG/M2 | HEART RATE: 64 BPM

## 2021-10-15 DIAGNOSIS — I48.19 PERSISTENT ATRIAL FIBRILLATION (H): Primary | ICD-10-CM

## 2021-10-15 DIAGNOSIS — E78.5 HYPERLIPIDEMIA LDL GOAL <100: ICD-10-CM

## 2021-10-15 DIAGNOSIS — I49.5 SSS (SICK SINUS SYNDROME) (H): ICD-10-CM

## 2021-10-15 DIAGNOSIS — Z95.0 PACEMAKER: ICD-10-CM

## 2021-10-15 LAB
MDC_IDC_LEAD_IMPLANT_DT: NORMAL
MDC_IDC_LEAD_IMPLANT_DT: NORMAL
MDC_IDC_LEAD_LOCATION: NORMAL
MDC_IDC_LEAD_LOCATION: NORMAL
MDC_IDC_LEAD_LOCATION_DETAIL_1: NORMAL
MDC_IDC_LEAD_LOCATION_DETAIL_1: NORMAL
MDC_IDC_LEAD_MFG: NORMAL
MDC_IDC_LEAD_MFG: NORMAL
MDC_IDC_LEAD_MODEL: NORMAL
MDC_IDC_LEAD_MODEL: NORMAL
MDC_IDC_LEAD_POLARITY_TYPE: NORMAL
MDC_IDC_LEAD_POLARITY_TYPE: NORMAL
MDC_IDC_LEAD_SERIAL: NORMAL
MDC_IDC_LEAD_SERIAL: NORMAL
MDC_IDC_MSMT_BATTERY_DTM: NORMAL
MDC_IDC_MSMT_BATTERY_REMAINING_LONGEVITY: 84 MO
MDC_IDC_MSMT_BATTERY_REMAINING_PERCENTAGE: 85 %
MDC_IDC_MSMT_BATTERY_STATUS: NORMAL
MDC_IDC_MSMT_LEADCHNL_RA_IMPEDANCE_VALUE: 494 OHM
MDC_IDC_MSMT_LEADCHNL_RV_IMPEDANCE_VALUE: 503 OHM
MDC_IDC_MSMT_LEADCHNL_RV_PACING_THRESHOLD_AMPLITUDE: 0.5 V
MDC_IDC_MSMT_LEADCHNL_RV_PACING_THRESHOLD_AMPLITUDE: 0.6 V
MDC_IDC_MSMT_LEADCHNL_RV_PACING_THRESHOLD_PULSEWIDTH: 0.4 MS
MDC_IDC_MSMT_LEADCHNL_RV_PACING_THRESHOLD_PULSEWIDTH: 0.4 MS
MDC_IDC_PG_IMPLANT_DTM: NORMAL
MDC_IDC_PG_MFG: NORMAL
MDC_IDC_PG_MODEL: NORMAL
MDC_IDC_PG_SERIAL: NORMAL
MDC_IDC_PG_TYPE: NORMAL
MDC_IDC_SESS_CLINIC_NAME: NORMAL
MDC_IDC_SESS_DTM: NORMAL
MDC_IDC_SESS_TYPE: NORMAL
MDC_IDC_SET_BRADY_AT_MODE_SWITCH_MODE: NORMAL
MDC_IDC_SET_BRADY_AT_MODE_SWITCH_RATE: 170 {BEATS}/MIN
MDC_IDC_SET_BRADY_LOWRATE: 60 {BEATS}/MIN
MDC_IDC_SET_BRADY_MAX_SENSOR_RATE: 130 {BEATS}/MIN
MDC_IDC_SET_BRADY_MAX_TRACKING_RATE: 130 {BEATS}/MIN
MDC_IDC_SET_BRADY_MODE: NORMAL
MDC_IDC_SET_BRADY_PAV_DELAY_LOW: 200 MS
MDC_IDC_SET_BRADY_SAV_DELAY_LOW: 180 MS
MDC_IDC_SET_LEADCHNL_RA_PACING_AMPLITUDE: 1.5 V
MDC_IDC_SET_LEADCHNL_RA_PACING_CAPTURE_MODE: NORMAL
MDC_IDC_SET_LEADCHNL_RA_PACING_POLARITY: NORMAL
MDC_IDC_SET_LEADCHNL_RA_PACING_PULSEWIDTH: 0.4 MS
MDC_IDC_SET_LEADCHNL_RA_SENSING_ADAPTATION_MODE: NORMAL
MDC_IDC_SET_LEADCHNL_RA_SENSING_POLARITY: NORMAL
MDC_IDC_SET_LEADCHNL_RA_SENSING_SENSITIVITY: 0.25 MV
MDC_IDC_SET_LEADCHNL_RV_PACING_AMPLITUDE: NORMAL
MDC_IDC_SET_LEADCHNL_RV_PACING_CAPTURE_MODE: NORMAL
MDC_IDC_SET_LEADCHNL_RV_PACING_POLARITY: NORMAL
MDC_IDC_SET_LEADCHNL_RV_PACING_PULSEWIDTH: 0.4 MS
MDC_IDC_SET_LEADCHNL_RV_SENSING_ADAPTATION_MODE: NORMAL
MDC_IDC_SET_LEADCHNL_RV_SENSING_POLARITY: NORMAL
MDC_IDC_SET_LEADCHNL_RV_SENSING_SENSITIVITY: 1.5 MV
MDC_IDC_SET_ZONE_DETECTION_INTERVAL: 375 MS
MDC_IDC_SET_ZONE_TYPE: NORMAL
MDC_IDC_SET_ZONE_VENDOR_TYPE: NORMAL
MDC_IDC_STAT_AT_BURDEN_PERCENT: 37 %
MDC_IDC_STAT_AT_DTM_END: NORMAL
MDC_IDC_STAT_AT_DTM_START: NORMAL
MDC_IDC_STAT_AT_MODE_SW_COUNT: 13
MDC_IDC_STAT_BRADY_DTM_END: NORMAL
MDC_IDC_STAT_BRADY_DTM_START: NORMAL
MDC_IDC_STAT_BRADY_RA_PERCENT_PACED: 18 %
MDC_IDC_STAT_BRADY_RV_PERCENT_PACED: 52 %
MDC_IDC_STAT_EPISODE_RECENT_COUNT: 0
MDC_IDC_STAT_EPISODE_RECENT_COUNT: 13
MDC_IDC_STAT_EPISODE_RECENT_COUNT: 29
MDC_IDC_STAT_EPISODE_RECENT_COUNT_DTM_END: NORMAL
MDC_IDC_STAT_EPISODE_RECENT_COUNT_DTM_START: NORMAL
MDC_IDC_STAT_EPISODE_TYPE: NORMAL
MDC_IDC_STAT_EPISODE_VENDOR_TYPE: NORMAL

## 2021-10-15 PROCEDURE — 99214 OFFICE O/P EST MOD 30 MIN: CPT | Performed by: INTERNAL MEDICINE

## 2021-10-15 PROCEDURE — 93283 PRGRMG EVAL IMPLANTABLE DFB: CPT | Performed by: INTERNAL MEDICINE

## 2021-10-15 RX ORDER — ATORVASTATIN CALCIUM 40 MG/1
20 TABLET, FILM COATED ORAL DAILY
Qty: 30 TABLET | Refills: 11 | Status: SHIPPED | OUTPATIENT
Start: 2021-10-15 | End: 2023-01-02

## 2021-10-15 ASSESSMENT — MIFFLIN-ST. JEOR: SCORE: 1812.4

## 2021-10-15 NOTE — PROGRESS NOTES
"    Cardiology Progress Note     Assessment:    Mitral valve prolapse status post mitral valve replacement with Saint Robin 33 mm bioprosthesis in September 2017, normal function  LV systolic dysfunction likely related to long-standing mitral regurgitation and postop tachycardia; normalized LVEF  Lower extremity edema venous insufficiency, improved with diuretics  Tachycardia-bradycardia syndrome, status post permanent pacemaker, normal device function  Persistent atrial flutter, brief asymptomatic episodes detected by device, on chronic warfarin  Postop left pleural effusion, resolved  Coronary artery disease, mild to moderate, nonobstructive  Orthostatic lightheadedness, mild  Hypercholesterolemia, history of statin intolerance, adequate control with modest dose of atorvastatin    Plan:  I encouraged him to stay physically active  We discussed limited benefit of combining aspirin and warfarin at this point of his disease process.  For the sake of safety we should discontinue aspirin and continue warfarin alone.    Return to clinic in 6 months    Subjective:   This is 76 year old male who comes in today follow-up visit.  He reports no new episode of heart palpitations or syncope.  He has not had chest pains.  He gets mildly short of breath when he exerts himself.  He also gets mildly lightheaded when he stands up abruptly.  He states that lightheadedness has improved over the time especially after discontinuation of losartan.    Review of Systems:   Negative other than history of present illness    Objective:   /80 (BP Location: Right arm, Patient Position: Sitting, Cuff Size: Adult Large)   Pulse 64   Resp 16   Ht 1.867 m (6' 1.5\")   Wt 102.1 kg (225 lb)   BMI 29.28 kg/m    Physical Exam:  GENERAL: no distress  NECK: No JVD  LUNGS: Clear to auscultation.  CARDIAC: regular rhythm, S1 & S2 normal.  No heaves, thrills, gallops or murmurs.  ABDOMEN: flat, negative hepatosplenomegaly, soft and " non-tender.  EXTREMITIES: No evidence of cyanosis, clubbing or edema.    Current Outpatient Medications   Medication Sig Dispense Refill     amoxicillin (AMOXIL) 500 MG capsule [AMOXICILLIN (AMOXIL) 500 MG CAPSULE] Take 2,000 mg by mouth as needed (1 hour prior to dentist.).        atorvastatin (LIPITOR) 40 MG tablet Take 0.5 tablets (20 mg) by mouth daily 30 tablet 11     b complex vitamins tablet [B COMPLEX VITAMINS TABLET] Take 1 tablet by mouth every other day.        chlorpheniramine (CHLOR-TRIMETON) 4 mg tablet [CHLORPHENIRAMINE (CHLOR-TRIMETON) 4 MG TABLET] Take 1 tablet (4 mg total) by mouth every 6 (six) hours as needed for allergies. 30 tablet 11     cholecalciferol, vitamin D3, 1,000 unit tablet [CHOLECALCIFEROL, VITAMIN D3, 1,000 UNIT TABLET] Take 1,000 Units by mouth every other day.        EPINEPHrine (EPIPEN) 0.3 mg/0.3 mL atIn [EPINEPHRINE (EPIPEN) 0.3 MG/0.3 ML ATIN] Inject 0.3 mg into the shoulder, thigh, or buttocks once as needed (allergic reaction).       levothyroxine (SYNTHROID/LEVOTHROID) 25 MCG tablet Take 1 tablet (25 mcg) by mouth daily 30 tablet 1     metoprolol succinate ER (TOPROL-XL) 25 MG 24 hr tablet Take 1 tablet (25 mg) by mouth daily 90 tablet 3     MULTIVIT &MINERALS/FERROUS FUM (MULTI VITAMIN ORAL) [MULTIVIT &MINERALS/FERROUS FUM (MULTI VITAMIN ORAL)] Take 1 tablet by mouth every other day. Pt taking multi vitamin without Iron       warfarin ANTICOAGULANT (COUMADIN) 5 MG tablet Take 1 tablet (5mg) to 1 &1/2 tablets (7.5mg) by mouth daily, as directed.  Adjust dose based on INR results. 120 tablet 1       Cardiographics:    Pacemaker interrogation: 18% atrial paced 52% ventricular paced A. fib burden 37% controlled ventricular response normal battery status    Echo: August 2020    When compared to the previous study dated 1/17/2019, left ventricular systolic function is improved.    Normal left ventricular size, wall thickness. Septal motion is consistent with artifact of right  ventricular pacing lead. Left ventricle ejection fraction is normal. The calculated left ventricular ejection fraction is 62%.    Normal right ventricular size with mildly reduced right ventricular systolic function.    Moderately enlarged both atria.    Normal functioning of bioprosthetic mitral valve.     Coronary angiogram: August 2017  LM min dz  LAD mid 30%   Circ mild dz with branch supplies PL territory  RCA mid 50% with PDA extends to apex     Note distal LAD and PDA vessels are very small around apex     Lab Results    Chemistry/lipid CBC Cardiac Enzymes/BNP/TSH/INR   Recent Labs   Lab Test 09/15/21  0812   CHOL 133   HDL 46   LDL 74   TRIG 66     Recent Labs   Lab Test 09/15/21  0812 09/09/20  1155 07/16/20  0752   LDL 74 81 70     Recent Labs   Lab Test 09/15/21  0812      POTASSIUM 4.2   CHLORIDE 109*   CO2 25   GLC 98   BUN 20   CR 1.00   GFRESTIMATED 73   NOVA 9.5     Recent Labs   Lab Test 09/15/21  0812 07/16/20  0752 10/14/19  1626   CR 1.00 0.86 1.58*     Recent Labs   Lab Test 09/18/17  0835   A1C 5.5          Recent Labs   Lab Test 09/15/21  0812   WBC 6.2   HGB 13.6   HCT 41.0   MCV 97        Recent Labs   Lab Test 09/15/21  0812 07/16/20  0752 10/14/19  1626   HGB 13.6 12.8* 12.7*    No results for input(s): TROPONINI in the last 01347 hours.  Recent Labs   Lab Test 07/16/20  0752 10/14/19  1626 04/10/19  1354   BNP 89* 73 213*     Recent Labs   Lab Test 10/05/21  0718   TSH 7.28*     Recent Labs   Lab Test 10/05/21  0718 09/30/21  0822 07/28/21  0815   INR 2.7* 2.2* 2.3*

## 2021-10-15 NOTE — PATIENT INSTRUCTIONS
Obed Haley,    It was a pleasure to see you today at the [facility].     My recommendations after this visit include:    Stop aspirin    WFlorence Henao MD, FACC, MILI

## 2021-10-15 NOTE — LETTER
"10/15/2021    Franko Nguyen MD  [facility] [address] [facility] [address]    RE: Obed Haley       Dear Colleague,    I had the pleasure of seeing Obed Haley in the [facility].        Cardiology Progress Note     Assessment:    Mitral valve prolapse status post mitral valve replacement with Saint Robin 33 mm bioprosthesis in September 2017, normal function  LV systolic dysfunction likely related to long-standing mitral regurgitation and postop tachycardia; normalized LVEF  Lower extremity edema venous insufficiency, improved with diuretics  Tachycardia-bradycardia syndrome, status post permanent pacemaker, normal device function  Persistent atrial flutter, brief asymptomatic episodes detected by device, on chronic warfarin  Postop left pleural effusion, resolved  Coronary artery disease, mild to moderate, nonobstructive  Orthostatic lightheadedness, mild  Hypercholesterolemia, history of statin intolerance, adequate control with modest dose of atorvastatin    Plan:  I encouraged him to stay physically active  We discussed limited benefit of combining aspirin and warfarin at this point of his disease process.  For the sake of safety we should discontinue aspirin and continue warfarin alone.    Return to clinic in 6 months    Subjective:   This is 76 year old male who comes in today follow-up visit.  He reports no new episode of heart palpitations or syncope.  He has not had chest pains.  He gets mildly short of breath when he exerts himself.  He also gets mildly lightheaded when he stands up abruptly.  He states that lightheadedness has improved over the time especially after discontinuation of losartan.    Review of Systems:   Negative other than history of present illness    Objective:   /80 (BP Location: Right arm, Patient Position: Sitting, Cuff Size: Adult Large)   Pulse 64   Resp 16   Ht 1.867 m (6' 1.5\")   " Wt 102.1 kg (225 lb)   BMI 29.28 kg/m    Physical Exam:  GENERAL: no distress  NECK: No JVD  LUNGS: Clear to auscultation.  CARDIAC: regular rhythm, S1 & S2 normal.  No heaves, thrills, gallops or murmurs.  ABDOMEN: flat, negative hepatosplenomegaly, soft and non-tender.  EXTREMITIES: No evidence of cyanosis, clubbing or edema.    Current Outpatient Medications   Medication Sig Dispense Refill     amoxicillin (AMOXIL) 500 MG capsule [AMOXICILLIN (AMOXIL) 500 MG CAPSULE] Take 2,000 mg by mouth as needed (1 hour prior to dentist.).        atorvastatin (LIPITOR) 40 MG tablet Take 0.5 tablets (20 mg) by mouth daily 30 tablet 11     b complex vitamins tablet [B COMPLEX VITAMINS TABLET] Take 1 tablet by mouth every other day.        chlorpheniramine (CHLOR-TRIMETON) 4 mg tablet [CHLORPHENIRAMINE (CHLOR-TRIMETON) 4 MG TABLET] Take 1 tablet (4 mg total) by mouth every 6 (six) hours as needed for allergies. 30 tablet 11     cholecalciferol, vitamin D3, 1,000 unit tablet [CHOLECALCIFEROL, VITAMIN D3, 1,000 UNIT TABLET] Take 1,000 Units by mouth every other day.        EPINEPHrine (EPIPEN) 0.3 mg/0.3 mL atIn [EPINEPHRINE (EPIPEN) 0.3 MG/0.3 ML ATIN] Inject 0.3 mg into the shoulder, thigh, or buttocks once as needed (allergic reaction).       levothyroxine (SYNTHROID/LEVOTHROID) 25 MCG tablet Take 1 tablet (25 mcg) by mouth daily 30 tablet 1     metoprolol succinate ER (TOPROL-XL) 25 MG 24 hr tablet Take 1 tablet (25 mg) by mouth daily 90 tablet 3     MULTIVIT &MINERALS/FERROUS FUM (MULTI VITAMIN ORAL) [MULTIVIT &MINERALS/FERROUS FUM (MULTI VITAMIN ORAL)] Take 1 tablet by mouth every other day. Pt taking multi vitamin without Iron       warfarin ANTICOAGULANT (COUMADIN) 5 MG tablet Take 1 tablet (5mg) to 1 &1/2 tablets (7.5mg) by mouth daily, as directed.  Adjust dose based on INR results. 120 tablet 1       Cardiographics:    Pacemaker interrogation: 18% atrial paced 52% ventricular paced A. fib burden 37% controlled  ventricular response normal battery status    Echo: August 2020    When compared to the previous study dated 1/17/2019, left ventricular systolic function is improved.    Normal left ventricular size, wall thickness. Septal motion is consistent with artifact of right ventricular pacing lead. Left ventricle ejection fraction is normal. The calculated left ventricular ejection fraction is 62%.    Normal right ventricular size with mildly reduced right ventricular systolic function.    Moderately enlarged both atria.    Normal functioning of bioprosthetic mitral valve.     Coronary angiogram: August 2017  LM min dz  LAD mid 30%   Circ mild dz with branch supplies PL territory  RCA mid 50% with PDA extends to apex     Note distal LAD and PDA vessels are very small around apex     Lab Results    Chemistry/lipid CBC Cardiac Enzymes/BNP/TSH/INR   Recent Labs   Lab Test 09/15/21  0812   CHOL 133   HDL 46   LDL 74   TRIG 66     Recent Labs   Lab Test 09/15/21  0812 09/09/20  1155 07/16/20  0752   LDL 74 81 70     Recent Labs   Lab Test 09/15/21  0812      POTASSIUM 4.2   CHLORIDE 109*   CO2 25   GLC 98   BUN 20   CR 1.00   GFRESTIMATED 73   NOVA 9.5     Recent Labs   Lab Test 09/15/21  0812 07/16/20  0752 10/14/19  1626   CR 1.00 0.86 1.58*     Recent Labs   Lab Test 09/18/17  0835   A1C 5.5          Recent Labs   Lab Test 09/15/21  0812   WBC 6.2   HGB 13.6   HCT 41.0   MCV 97        Recent Labs   Lab Test 09/15/21  0812 07/16/20  0752 10/14/19  1626   HGB 13.6 12.8* 12.7*    No results for input(s): TROPONINI in the last 83057 hours.  Recent Labs   Lab Test 07/16/20  0752 10/14/19  1626 04/10/19  1354   BNP 89* 73 213*     Recent Labs   Lab Test 10/05/21  0718   TSH 7.28*     Recent Labs   Lab Test 10/05/21  0718 09/30/21  0822 07/28/21  0815   INR 2.7* 2.2* 2.3*                        Thank you for allowing me to participate in the care of your patient.      Sincerely,     Sha Henao MD     [facility]  [facility]  cc:   Doug Lewis MD  [address] [place] [id number]

## 2021-10-19 ENCOUNTER — ANTICOAGULATION THERAPY VISIT (OUTPATIENT)
Dept: ANTICOAGULATION | Facility: CLINIC | Age: 76
End: 2021-10-19

## 2021-10-19 ENCOUNTER — LAB (OUTPATIENT)
Dept: LAB | Facility: CLINIC | Age: 76
End: 2021-10-19
Payer: MEDICARE

## 2021-10-19 DIAGNOSIS — E03.9 ACQUIRED HYPOTHYROIDISM: ICD-10-CM

## 2021-10-19 DIAGNOSIS — Z79.01 LONG TERM (CURRENT) USE OF ANTICOAGULANTS: ICD-10-CM

## 2021-10-19 DIAGNOSIS — Z95.3 S/P MITRAL VALVE REPLACEMENT WITH TISSUE VALVE: ICD-10-CM

## 2021-10-19 DIAGNOSIS — I48.19 PERSISTENT ATRIAL FIBRILLATION (H): ICD-10-CM

## 2021-10-19 DIAGNOSIS — I48.19 PERSISTENT ATRIAL FIBRILLATION (H): Primary | ICD-10-CM

## 2021-10-19 LAB
INR BLD: 3.1 (ref 0.9–1.1)
TSH SERPL DL<=0.005 MIU/L-ACNC: 5.13 UIU/ML (ref 0.3–5)

## 2021-10-19 PROCEDURE — 84443 ASSAY THYROID STIM HORMONE: CPT | Performed by: FAMILY MEDICINE

## 2021-10-19 PROCEDURE — 85610 PROTHROMBIN TIME: CPT | Performed by: FAMILY MEDICINE

## 2021-10-19 PROCEDURE — 36415 COLL VENOUS BLD VENIPUNCTURE: CPT | Performed by: FAMILY MEDICINE

## 2021-10-19 NOTE — PROGRESS NOTES
ANTICOAGULATION MANAGEMENT     Obed BETTENCOURT Hernandopapoalexa 76 year old male is on warfarin with supratherapeutic INR result. (Goal INR 2.0-3.0)    Recent labs: (last 7 days)     10/19/21  0720   INR 3.1*       ASSESSMENT     Source(s): Chart Review, Patient/Caregiver Call and Template       Warfarin doses taken: Warfarin taken as instructed    Diet: No new diet changes identified    New illness, injury, or hospitalization: No    Medication/supplement changes: Yes.    Stopped Aspirin on 10/15/21.   Started on Levothyroxine on 10/1/21.    Concurrent use of ASPIRIN / LEVOTHYROXINE and WARFARIN may   result in increased risk of bleeding.    Signs or symptoms of bleeding or clotting: No    Previous INR: Therapeutic last 2(+) visits    Additional findings: None     PLAN     Recommended plan for ongoing change(s) affecting INR     Dosing Instructions:     Decrease your warfarin dose (5.6% change) with next INR in 1-2 weeks       Summary  As of 10/19/2021    Full warfarin instructions:  7.5 mg every Mon, Thu, Sat; 5 mg all other days   Next INR check:  11/2/2021             Telephone call with Obed who verbalizes understanding and agrees to plan    Lab visit scheduled - INR on 11/2/21 @ [facility].    Education provided: Importance of consistent vitamin K intake, Goal range and significance of current result and Potential interaction between warfarin and Aspirin / Levothyroxine    Plan made per [facility] anticoagulation protocol    Yolanda Krishna RN  Anticoagulation Clinic  10/19/2021    _______________________________________________________________________     Anticoagulation Episode Summary     Current INR goal:  2.0-3.0   TTR:  99.0 % (1 y)   Target end date:  Indefinite   Send INR reminders to:  [facility]    Indications    Long term (current) use of anticoagulants [Z79.01]  Persistent atrial fibrillation (H) [I48.19]           Comments:           Anticoagulation Care Providers     Provider Role Specialty Phone number    Maria M  Franko TELLES MD Referring Internal Medicine [phone]    Franko Nguyen MD Referring Internal Medicine [phone]

## 2021-10-20 DIAGNOSIS — E03.9 ACQUIRED HYPOTHYROIDISM: ICD-10-CM

## 2021-10-20 RX ORDER — LEVOTHYROXINE SODIUM 50 UG/1
50 TABLET ORAL DAILY
Qty: 90 TABLET | Refills: 0 | Status: SHIPPED | OUTPATIENT
Start: 2021-10-20 | End: 2021-11-03

## 2021-10-23 DIAGNOSIS — E03.9 ACQUIRED HYPOTHYROIDISM: ICD-10-CM

## 2021-10-25 RX ORDER — LEVOTHYROXINE SODIUM 25 UG/1
TABLET ORAL
Qty: 30 TABLET | Refills: 1 | OUTPATIENT
Start: 2021-10-25

## 2021-11-02 ENCOUNTER — ANTICOAGULATION THERAPY VISIT (OUTPATIENT)
Dept: ANTICOAGULATION | Facility: CLINIC | Age: 76
End: 2021-11-02

## 2021-11-02 ENCOUNTER — LAB (OUTPATIENT)
Dept: LAB | Facility: CLINIC | Age: 76
End: 2021-11-02
Payer: MEDICARE

## 2021-11-02 DIAGNOSIS — I48.19 PERSISTENT ATRIAL FIBRILLATION (H): ICD-10-CM

## 2021-11-02 DIAGNOSIS — Z79.01 LONG TERM (CURRENT) USE OF ANTICOAGULANTS: Primary | ICD-10-CM

## 2021-11-02 DIAGNOSIS — E03.9 ACQUIRED HYPOTHYROIDISM: ICD-10-CM

## 2021-11-02 DIAGNOSIS — Z95.3 S/P MITRAL VALVE REPLACEMENT WITH TISSUE VALVE: ICD-10-CM

## 2021-11-02 LAB
INR BLD: 2.2 (ref 0.9–1.1)
TSH SERPL DL<=0.005 MIU/L-ACNC: 4.82 UIU/ML (ref 0.3–5)

## 2021-11-02 PROCEDURE — 84443 ASSAY THYROID STIM HORMONE: CPT

## 2021-11-02 PROCEDURE — 85610 PROTHROMBIN TIME: CPT

## 2021-11-02 PROCEDURE — 36415 COLL VENOUS BLD VENIPUNCTURE: CPT

## 2021-11-02 NOTE — PROGRESS NOTES
ANTICOAGULATION MANAGEMENT     Obed Haley 76 year old male is on warfarin with therapeutic INR result. (Goal INR 2.0-3.0)    Recent labs: (last 7 days)     11/02/21  0715   INR 2.2*       ASSESSMENT     Source(s): Chart Review and Template       Warfarin doses taken: Warfarin taken as instructed    Diet: No new diet changes identified    New illness, injury, or hospitalization: No    Medication/supplement changes: None noted    Signs or symptoms of bleeding or clotting: No    Previous INR: Supratherapeutic    Additional findings: None     PLAN     Recommended plan for no diet, medication or health factor changes affecting INR     Dosing Instructions:   ANTICOAGULATION MANAGEMENT     Obed Haley 76 year old male is on warfarin with therapeutic INR result. (Goal INR 2.0-3.0)    Recent labs: (last 7 days)     11/02/21  0715   INR 2.2*       ASSESSMENT     Source(s): Chart Review and Patient/Caregiver Call       Warfarin doses taken: Warfarin taken as instructed    Diet: No new diet changes identified    New illness, injury, or hospitalization: No    Medication/supplement changes: None noted    Signs or symptoms of bleeding or clotting: No    Previous INR: Supratherapeutic    Additional findings: None     PLAN     Recommended plan for no diet, medication or health factor changes affecting INR     Dosing Instructions: Continue your current warfarin dose with next INR in 2 weeks       Summary  As of 11/2/2021    Full warfarin instructions:  7.5 mg every Mon, Thu, Sat; 5 mg all other days   Next INR check:  11/16/2021             Telephone call with Obed who verbalizes understanding and agrees to plan    Lab visit scheduled    Education provided: None required    Plan made per [facility] anticoagulation protocol    Hesham Michel RN  Anticoagulation Clinic  11/2/2021    _______________________________________________________________________     Anticoagulation Episode Summary     Current INR goal:  2.0-3.0   TTR:  98.6 %  (1 y)   Target end date:  Indefinite   Send INR reminders to:  [facility]    Indications    Long term (current) use of anticoagulants [Z79.01]  Persistent atrial fibrillation (H) [I48.19]           Comments:           Anticoagulation Care Providers     Provider Role Specialty Phone number    Franko Franz MD Referring Internal Medicine [phone]    Franko Nguyen MD Referring Internal Medicine [phone]

## 2021-11-03 DIAGNOSIS — E03.9 ACQUIRED HYPOTHYROIDISM: ICD-10-CM

## 2021-11-03 RX ORDER — LEVOTHYROXINE SODIUM 75 UG/1
75 TABLET ORAL DAILY
Qty: 90 TABLET | Refills: 3 | Status: SHIPPED | OUTPATIENT
Start: 2021-11-03 | End: 2022-10-03

## 2021-11-15 ENCOUNTER — ANTICOAGULATION THERAPY VISIT (OUTPATIENT)
Dept: ANTICOAGULATION | Facility: CLINIC | Age: 76
End: 2021-11-15

## 2021-11-15 ENCOUNTER — LAB (OUTPATIENT)
Dept: LAB | Facility: CLINIC | Age: 76
End: 2021-11-15
Payer: MEDICARE

## 2021-11-15 DIAGNOSIS — E03.9 ACQUIRED HYPOTHYROIDISM: ICD-10-CM

## 2021-11-15 DIAGNOSIS — I48.19 PERSISTENT ATRIAL FIBRILLATION (H): ICD-10-CM

## 2021-11-15 DIAGNOSIS — Z79.01 LONG TERM (CURRENT) USE OF ANTICOAGULANTS: Primary | ICD-10-CM

## 2021-11-15 DIAGNOSIS — Z95.3 S/P MITRAL VALVE REPLACEMENT WITH TISSUE VALVE: ICD-10-CM

## 2021-11-15 LAB
INR BLD: 2.4 (ref 0.9–1.1)
TSH SERPL DL<=0.005 MIU/L-ACNC: 3.38 UIU/ML (ref 0.3–5)

## 2021-11-15 PROCEDURE — 84443 ASSAY THYROID STIM HORMONE: CPT

## 2021-11-15 PROCEDURE — 85610 PROTHROMBIN TIME: CPT

## 2021-11-15 PROCEDURE — 36416 COLLJ CAPILLARY BLOOD SPEC: CPT

## 2021-11-15 PROCEDURE — 36415 COLL VENOUS BLD VENIPUNCTURE: CPT

## 2021-11-15 NOTE — PROGRESS NOTES
ANTICOAGULATION  MANAGEMENT- Travel planning    Obed Haley reports upcoming travel plans:    Departure date: 11/16/21  Anticipated return date: May 2022.  (6 months in [place])  Alternate contact information (if applicable) -  Cell # [phone]      INR monitoring plan:     [employer] to monitor and dose while traveling. will let us know, if INR standing order is needed. - [facility].     - provided FAX # to patient ([phone])     - informed Obed, if he does not hear from [facility] in 1-2 days, to call us with dosing.  (Means we did not received INR result)     Anticoagulation calendar updated with date of next INR     Instructed to call the Anticoauglation Clinic for any changes, questions or concerns. [phone]  ?   Yolanda Krishna RN

## 2021-11-15 NOTE — PROGRESS NOTES
ANTICOAGULATION MANAGEMENT     Obed Haley 76 year old male is on warfarin with therapeutic INR result. (Goal INR 2.0-3.0)    Recent labs: (last 7 days)     11/15/21  0849   INR 2.4*       ASSESSMENT     Source(s): Chart Review, Patient/Caregiver Call and Template       Warfarin doses taken: Warfarin taken differently, but did not change total weekly dose.   - however, he verbalized back taking correct dose.   - (reported he did not have his paper, so he guessed on the days.)    Diet: No new diet changes identified    New illness, injury, or hospitalization: No    Medication/supplement changes: None noted    Signs or symptoms of bleeding or clotting: No    Previous INR: Therapeutic last visit at 2.2; previously outside of goal range at 3.1    Additional findings:  Yes. will be leaving 11/16 to [place] for 6 months to spend his torre there.     PLAN     Recommended plan for no diet, medication or health factor changes affecting INR     Dosing Instructions:    Continue your current warfarin dose with next INR in 4 weeks       Summary  As of 11/15/2021    Full warfarin instructions:  7.5 mg every Mon, Thu, Sat; 5 mg all other days   Next INR check:  11/29/2021             Telephone call with  Obed ([phone]) who verbalizes understanding and agrees to plan.   - advised to get acclimated to environment, temperature, eating habits.     - then check INR in 2-3 after getting to his destination.    Patient offered & declined to schedule next visit    Education provided: Importance of consistent vitamin K intake, Goal range and significance of current result, Importance of taking warfarin as instructed, Monitoring for bleeding signs and symptoms, When to seek medical attention/emergency care and Travel related clotting risk and prevention    Plan made per [facility] anticoagulation protocol    Yolanda Krishna, RN  Anticoagulation  Clinic  11/15/2021    _______________________________________________________________________     Anticoagulation Episode Summary     Current INR goal:  2.0-3.0   TTR:  98.6 % (1 y)   Target end date:  Indefinite   Send INR reminders to:  [facility]    Indications    Long term (current) use of anticoagulants [Z79.01]  Persistent atrial fibrillation (H) [I48.19]           Comments:           Anticoagulation Care Providers     Provider Role Specialty Phone number    Franko Franz MD Referring Internal Medicine [phone]    Franko Nguyen MD Referring Internal Medicine [phone]

## 2021-12-10 ENCOUNTER — TELEPHONE (OUTPATIENT)
Dept: ANTICOAGULATION | Facility: CLINIC | Age: 76
End: 2021-12-10
Payer: MEDICARE

## 2021-12-10 NOTE — TELEPHONE ENCOUNTER
ANTICOAGULATION     Obed Haley is overdue for INR check.      Spoke with Obed and scheduled lab appointment on will schedule INR next week @ [facility] in [place], and will have them FAX results to us.   - provided where to FAX INR results - [phone]    Yolanda Krishna RN

## 2021-12-21 ENCOUNTER — ANTICOAGULATION THERAPY VISIT (OUTPATIENT)
Dept: ANTICOAGULATION | Facility: CLINIC | Age: 76
End: 2021-12-21
Payer: MEDICARE

## 2021-12-21 DIAGNOSIS — Z79.01 LONG TERM (CURRENT) USE OF ANTICOAGULANTS: Primary | ICD-10-CM

## 2021-12-21 DIAGNOSIS — I48.19 PERSISTENT ATRIAL FIBRILLATION (H): ICD-10-CM

## 2021-12-21 LAB — INR (EXTERNAL): 3.6 (ref 0.9–1.1)

## 2021-12-21 NOTE — PROGRESS NOTES
Obed called from [place].     - had his INR checked this morning @ [facility] in [place].     - INR was 3.6    Verified correct warfairn dose  7.5mg on Mon/Thurs/Sat and 5mg all other days.     - he was advised by doctor at the Clinic to hold warfarin dose today, and recheck INR in 7-10 days.    - INR scheduled on 12/28/21.    - I agree with this recommendation.      - reported had Fior alcoholic drinks yesterday.  In addition, to being in [place] for the last one month.     - will await INR results to be faxed to [facility].

## 2021-12-31 ENCOUNTER — ANTICOAGULATION THERAPY VISIT (OUTPATIENT)
Dept: ANTICOAGULATION | Facility: CLINIC | Age: 76
End: 2021-12-31
Payer: MEDICARE

## 2021-12-31 DIAGNOSIS — I48.19 PERSISTENT ATRIAL FIBRILLATION (H): ICD-10-CM

## 2021-12-31 DIAGNOSIS — Z79.01 LONG TERM (CURRENT) USE OF ANTICOAGULANTS: Primary | ICD-10-CM

## 2021-12-31 NOTE — PROGRESS NOTES
Faxed result received dated 3/23/21.  Closing encounter as it was opened in error.    Brandi Nieto RN  [facility]  [phone]

## 2022-01-05 ENCOUNTER — ANTICOAGULATION THERAPY VISIT (OUTPATIENT)
Dept: ANTICOAGULATION | Facility: CLINIC | Age: 77
End: 2022-01-05
Payer: MEDICARE

## 2022-01-05 ENCOUNTER — TELEPHONE (OUTPATIENT)
Dept: INTERNAL MEDICINE | Facility: CLINIC | Age: 77
End: 2022-01-05
Payer: MEDICARE

## 2022-01-05 DIAGNOSIS — I48.19 PERSISTENT ATRIAL FIBRILLATION (H): ICD-10-CM

## 2022-01-05 DIAGNOSIS — Z79.01 LONG TERM (CURRENT) USE OF ANTICOAGULANTS: Primary | ICD-10-CM

## 2022-01-05 LAB — INR (EXTERNAL): 2.5 (ref 0.9–1.1)

## 2022-01-05 NOTE — PROGRESS NOTES
Incoming fax from [facility]    Result date: 01/05/2022  INR: 2.5    Appears INR was managed in TE from today. See TE for dosing.

## 2022-01-05 NOTE — TELEPHONE ENCOUNTER
ANTICOAGULATION MANAGEMENT     Obed Edmondsalexa 76 year old male is on warfarin with therapeutic INR result. (Goal INR )    Recent labs: (last 7 days)     01/05/22  0000   INR 2.5*       ASSESSMENT     Source(s): Chart Review and Patient/Caregiver Call       Warfarin doses taken: Warfarin taken as instructed    Diet: No new diet changes identified    New illness, injury, or hospitalization: No    Medication/supplement changes: None noted    Signs or symptoms of bleeding or clotting: No    Previous INR: Therapeutic last visit; previously outside of goal range    Additional findings: called [facility] who gave verbal result and will also fax.  Asked that they always fax result to us     PLAN     Recommended plan for no diet, medication or health factor changes affecting INR     Dosing Instructions: Continue your current warfarin dose with next INR in 2 weeks       Summary  As of 1/5/2022    Full warfarin instructions:  7.5 mg every Mon, Thu, Sat; 5 mg all other days   Next INR check:  1/19/2022             Telephone call with Obed who verbalizes understanding and agrees to plan    Patient using outside facility for labs    Education provided: None required    Plan made per [facility] anticoagulation protocol    Hesham Michel RN  Anticoagulation Clinic  1/5/2022    _______________________________________________________________________     Anticoagulation Episode Summary     Current INR goal:  2.0-3.0   TTR:  98.6 % (1 y)   Target end date:  Indefinite   Send INR reminders to:  [facility]    Indications    Long term (current) use of anticoagulants [Z79.01]  Persistent atrial fibrillation (H) [I48.19]           Comments:           Anticoagulation Care Providers     Provider Role Specialty Phone number    Franko Franz MD Referring Internal Medicine [phone]    Franko Nguyen MD Referring Internal Medicine [phone]

## 2022-01-05 NOTE — TELEPHONE ENCOUNTER
Reason for Call:  Other call back    Detailed comments: pt tried to call back regarding INR, no number found on Pt's chart.    Phone Number Patient can be reached at: Home number on file [phone] (home)    Best Time: anytime    Can we leave a detailed message on this number? YES    Call taken on 1/5/2022 at 4:21 PM by Carlos Brewer

## 2022-01-12 DIAGNOSIS — E03.9 ACQUIRED HYPOTHYROIDISM: ICD-10-CM

## 2022-01-14 RX ORDER — LEVOTHYROXINE SODIUM 50 UG/1
50 TABLET ORAL DAILY
Qty: 90 TABLET | Refills: 0 | OUTPATIENT
Start: 2022-01-14

## 2022-01-19 ENCOUNTER — TELEPHONE (OUTPATIENT)
Dept: INTERNAL MEDICINE | Facility: CLINIC | Age: 77
End: 2022-01-19
Payer: MEDICARE

## 2022-01-19 DIAGNOSIS — I48.19 PERSISTENT ATRIAL FIBRILLATION (H): ICD-10-CM

## 2022-01-19 DIAGNOSIS — Z79.01 LONG TERM (CURRENT) USE OF ANTICOAGULANTS: Primary | ICD-10-CM

## 2022-01-19 LAB — INR (EXTERNAL): 2.9 (ref 0.9–1.1)

## 2022-01-19 NOTE — TELEPHONE ENCOUNTER
ANTICOAGULATION MANAGEMENT     Obed Haley 76 year old male is on warfarin with therapeutic INR result. (Goal INR )    No results for input(s): INR in the last 168 hours.   INR on 1/18/22 was 2.9    ASSESSMENT     Source(s): Chart Review and Patient/Caregiver Call       Warfarin doses taken: Warfarin taken as instructed    Diet: No new diet changes identified    Reported eating more Vitamin-K (salads) in [place].    New illness, injury, or hospitalization: No    Medication/supplement changes: None noted    Signs or symptoms of bleeding or clotting: No    Previous INR: Therapeutic last INR at 2.5; and previous INR out of range at 3.6    Additional findings:  He reported INR seems to be going up, even though he has increased his vitamin-K intake.     PLAN     Recommended plan for no diet, medication or health factor changes affecting INR     Dosing Instructions:     Decrease your warfarin dose (5.9% change) with next INR in 2-3 weeks       Summary  As of 1/19/2022    Full warfarin instructions:  7.5 mg every Mon, Thu; 5 mg all other days   Next INR check:  2/9/2022             Telephone call with  Obed ([phone]) who verbalizes understanding and agrees to plan.   - advised Obed to ensure [facility] faxes all his INR's to [phone].    Patient elected to schedule next visit 2-3 wks.    Education provided: Importance of consistent vitamin K intake, Impact of vitamin K foods on INR and Goal range and significance of current result    Plan made per [facility] anticoagulation protocol    Yolanda Krishna RN  Anticoagulation Clinic  1/19/2022    _______________________________________________________________________     Anticoagulation Episode Summary     Current INR goal:  2.0-3.0   TTR:  91.5 % (1 y)   Target end date:  Indefinite   Send INR reminders to:  [facility]    Indications    Long term (current) use of anticoagulants [Z79.01]  Persistent atrial fibrillation (H) [I48.19]           Comments:            Anticoagulation Care Providers     Provider Role Specialty Phone number    Franko Franz MD Referring Internal Medicine [phone]    Franko Nguyen MD Referring Internal Medicine [phone]

## 2022-01-19 NOTE — TELEPHONE ENCOUNTER
Patient is calling to note that he had his INR done late afternoon yesterday at [facility] primary [facility] clinic in [place] and the result was 2.9.    He is calling for dosing and recheck.    Martha Segovia RN    [facility]

## 2022-01-24 ENCOUNTER — ANCILLARY PROCEDURE (OUTPATIENT)
Dept: CARDIOLOGY | Facility: CLINIC | Age: 77
End: 2022-01-24
Attending: INTERNAL MEDICINE
Payer: MEDICARE

## 2022-01-24 DIAGNOSIS — I49.5 SSS (SICK SINUS SYNDROME) (H): ICD-10-CM

## 2022-01-24 DIAGNOSIS — Z95.0 CARDIAC PACEMAKER IN SITU: ICD-10-CM

## 2022-01-24 PROCEDURE — 93294 REM INTERROG EVL PM/LDLS PM: CPT | Performed by: INTERNAL MEDICINE

## 2022-01-24 PROCEDURE — 93296 REM INTERROG EVL PM/IDS: CPT | Performed by: INTERNAL MEDICINE

## 2022-01-25 ENCOUNTER — TELEPHONE (OUTPATIENT)
Dept: CARDIOLOGY | Facility: CLINIC | Age: 77
End: 2022-01-25
Payer: MEDICARE

## 2022-01-25 DIAGNOSIS — I47.29 NSVT (NONSUSTAINED VENTRICULAR TACHYCARDIA) (H): ICD-10-CM

## 2022-01-25 DIAGNOSIS — I48.19 PERSISTENT ATRIAL FIBRILLATION (H): Primary | ICD-10-CM

## 2022-01-25 LAB
MDC_IDC_EPISODE_DTM: NORMAL
MDC_IDC_EPISODE_DURATION: 11 S
MDC_IDC_EPISODE_DURATION: 12 S
MDC_IDC_EPISODE_DURATION: 13 S
MDC_IDC_EPISODE_DURATION: 13 S
MDC_IDC_EPISODE_DURATION: 14 S
MDC_IDC_EPISODE_DURATION: 19 S
MDC_IDC_EPISODE_ID: NORMAL
MDC_IDC_EPISODE_TYPE: NORMAL
MDC_IDC_LEAD_IMPLANT_DT: NORMAL
MDC_IDC_LEAD_IMPLANT_DT: NORMAL
MDC_IDC_LEAD_LOCATION: NORMAL
MDC_IDC_LEAD_LOCATION: NORMAL
MDC_IDC_LEAD_LOCATION_DETAIL_1: NORMAL
MDC_IDC_LEAD_LOCATION_DETAIL_1: NORMAL
MDC_IDC_LEAD_MFG: NORMAL
MDC_IDC_LEAD_MFG: NORMAL
MDC_IDC_LEAD_MODEL: NORMAL
MDC_IDC_LEAD_MODEL: NORMAL
MDC_IDC_LEAD_POLARITY_TYPE: NORMAL
MDC_IDC_LEAD_POLARITY_TYPE: NORMAL
MDC_IDC_LEAD_SERIAL: NORMAL
MDC_IDC_LEAD_SERIAL: NORMAL
MDC_IDC_MSMT_BATTERY_DTM: NORMAL
MDC_IDC_MSMT_BATTERY_REMAINING_LONGEVITY: 84 MO
MDC_IDC_MSMT_BATTERY_REMAINING_PERCENTAGE: 81 %
MDC_IDC_MSMT_BATTERY_STATUS: NORMAL
MDC_IDC_MSMT_LEADCHNL_RA_IMPEDANCE_VALUE: 522 OHM
MDC_IDC_MSMT_LEADCHNL_RV_IMPEDANCE_VALUE: 510 OHM
MDC_IDC_MSMT_LEADCHNL_RV_PACING_THRESHOLD_AMPLITUDE: 0.6 V
MDC_IDC_MSMT_LEADCHNL_RV_PACING_THRESHOLD_PULSEWIDTH: 0.4 MS
MDC_IDC_PG_IMPLANT_DTM: NORMAL
MDC_IDC_PG_MFG: NORMAL
MDC_IDC_PG_MODEL: NORMAL
MDC_IDC_PG_SERIAL: NORMAL
MDC_IDC_PG_TYPE: NORMAL
MDC_IDC_SESS_CLINIC_NAME: NORMAL
MDC_IDC_SESS_DTM: NORMAL
MDC_IDC_SESS_TYPE: NORMAL
MDC_IDC_SET_BRADY_AT_MODE_SWITCH_MODE: NORMAL
MDC_IDC_SET_BRADY_AT_MODE_SWITCH_RATE: 170 {BEATS}/MIN
MDC_IDC_SET_BRADY_LOWRATE: 60 {BEATS}/MIN
MDC_IDC_SET_BRADY_MAX_SENSOR_RATE: 130 {BEATS}/MIN
MDC_IDC_SET_BRADY_MAX_TRACKING_RATE: 130 {BEATS}/MIN
MDC_IDC_SET_BRADY_MODE: NORMAL
MDC_IDC_SET_BRADY_PAV_DELAY_LOW: 200 MS
MDC_IDC_SET_BRADY_SAV_DELAY_LOW: 180 MS
MDC_IDC_SET_LEADCHNL_RA_PACING_AMPLITUDE: 1.5 V
MDC_IDC_SET_LEADCHNL_RA_PACING_CAPTURE_MODE: NORMAL
MDC_IDC_SET_LEADCHNL_RA_PACING_POLARITY: NORMAL
MDC_IDC_SET_LEADCHNL_RA_PACING_PULSEWIDTH: 0.4 MS
MDC_IDC_SET_LEADCHNL_RA_SENSING_ADAPTATION_MODE: NORMAL
MDC_IDC_SET_LEADCHNL_RA_SENSING_POLARITY: NORMAL
MDC_IDC_SET_LEADCHNL_RA_SENSING_SENSITIVITY: 0.25 MV
MDC_IDC_SET_LEADCHNL_RV_PACING_AMPLITUDE: 1.1 V
MDC_IDC_SET_LEADCHNL_RV_PACING_CAPTURE_MODE: NORMAL
MDC_IDC_SET_LEADCHNL_RV_PACING_POLARITY: NORMAL
MDC_IDC_SET_LEADCHNL_RV_PACING_PULSEWIDTH: 0.4 MS
MDC_IDC_SET_LEADCHNL_RV_SENSING_ADAPTATION_MODE: NORMAL
MDC_IDC_SET_LEADCHNL_RV_SENSING_POLARITY: NORMAL
MDC_IDC_SET_LEADCHNL_RV_SENSING_SENSITIVITY: 1.5 MV
MDC_IDC_SET_ZONE_DETECTION_INTERVAL: 375 MS
MDC_IDC_SET_ZONE_TYPE: NORMAL
MDC_IDC_SET_ZONE_VENDOR_TYPE: NORMAL
MDC_IDC_STAT_AT_BURDEN_PERCENT: 100 %
MDC_IDC_STAT_AT_DTM_END: NORMAL
MDC_IDC_STAT_AT_DTM_START: NORMAL
MDC_IDC_STAT_BRADY_DTM_END: NORMAL
MDC_IDC_STAT_BRADY_DTM_START: NORMAL
MDC_IDC_STAT_BRADY_RA_PERCENT_PACED: 0 %
MDC_IDC_STAT_BRADY_RV_PERCENT_PACED: 66 %
MDC_IDC_STAT_EPISODE_RECENT_COUNT: 0
MDC_IDC_STAT_EPISODE_RECENT_COUNT: 17
MDC_IDC_STAT_EPISODE_RECENT_COUNT_DTM_END: NORMAL
MDC_IDC_STAT_EPISODE_RECENT_COUNT_DTM_START: NORMAL
MDC_IDC_STAT_EPISODE_TYPE: NORMAL
MDC_IDC_STAT_EPISODE_VENDOR_TYPE: NORMAL

## 2022-01-25 NOTE — TELEPHONE ENCOUNTER
Type: routine remote pacemaker transmission.  Presenting rhythm: atrial fibrillation with ventricular pacing, rate 60 bpm.  Battery/lead status: stable  Arrhythmias: since 10/15/21, AF appears persistent, burden 100%, V-rates >/=120 bpm ~1%. 17 nonsustained ventricular high rate episodes, only 2 EGMs available; one suggests RVR and episode on 12/7/21 suggests 10 seconds VT rate 179 bpm. MV sensor vector switched 1/13/22.  Anticoagulant: warfarin  Comments: normal magnet and pacemaker function. Routed to device RN.   DONNA Rolon, Device Specialist       Transmission reviewed, agree with above. Known/persistent AF, Overall V-rates controlled/paced. 1 NSVT noted on EGM from 12/7/21 at ~midnight. Duration ~10 seconds, rates 170-180 bpm. Last EF 62% per Echo on 8/5/2020.  Takes Toprol XL 25 mg daily.      Call placed to patient to review results. No answer, LVM.     Will review with Dr. Henao due to duration of NSVT and Echo >1 yr per device protocol.   Snapshot of NSVT below. MV sensor switch noted, due to AF waves not lead noise. No intervention needed at this time.     Natasha Dickerson, RN

## 2022-01-25 NOTE — TELEPHONE ENCOUNTER
Patient called back, denies any troublesome palpitations or lightheaded spells. Confirms Toprol XL 25 mg daily. Advised to call with any troublesome symptoms. Verbalized understanding.     Natasha Dickerson RN

## 2022-01-31 NOTE — PROGRESS NOTES
Rec'd return call from patient who stated he is presently in [place] until April and inquired if he could have echo completed there.    Informed patient that he would need to find a facility that would accept Dr. Henao's order which would then be faxed to specific location - patient verbalized understanding after further discussion and agreed to call back with update.  mg

## 2022-01-31 NOTE — PROGRESS NOTES
Order placed per protocol - left detailed msg for patient informing him of Dr. Henao's recommendations - provided scheduling # to call to arrange echo and requested call back to nurse with any questions/concerns.  mg

## 2022-02-03 ENCOUNTER — MYC MEDICAL ADVICE (OUTPATIENT)
Dept: CARDIOLOGY | Facility: CLINIC | Age: 77
End: 2022-02-03
Payer: MEDICARE

## 2022-02-03 NOTE — TELEPHONE ENCOUNTER
Per [facility]:    Sha Henao MD [place], Mallory J RN  As long as he is asymptomatic the echo can be postponed until he gets home.  He does not need to see a new cardiologist in [place] unless he has new symptoms         ==Writer responded to patient and let him know that as long as he continues to be asymptomatic and does not develop any new cardiac symptoms, he can have his echo when he [place]. [place] [place]

## 2022-02-03 NOTE — TELEPHONE ENCOUNTER
Noted message from patient. He had a recent device check that demonstrate NSVT back on 12/7. As he put in his note, he is down in [place] for the winter. Previous nurse had recommended established care down there. This is a process understandly. Will route to [facility] to see about delaying testing until he is back in [place] and go from there. -[facility] [place]

## 2022-02-08 ENCOUNTER — ANTICOAGULATION THERAPY VISIT (OUTPATIENT)
Dept: ANTICOAGULATION | Facility: CLINIC | Age: 77
End: 2022-02-08
Payer: MEDICARE

## 2022-02-08 DIAGNOSIS — Z79.01 LONG TERM (CURRENT) USE OF ANTICOAGULANTS: Primary | ICD-10-CM

## 2022-02-08 DIAGNOSIS — I48.19 PERSISTENT ATRIAL FIBRILLATION (H): ICD-10-CM

## 2022-02-08 NOTE — PROGRESS NOTES
Incoming fax from [facility] in [place]     INR 2.5 on 2/8/22    Routing to managing [facility] pool     Simon Ireland RN

## 2022-02-09 LAB — INR (EXTERNAL): 2.5 (ref 0.9–1.1)

## 2022-02-09 NOTE — PROGRESS NOTES
ANTICOAGULATION MANAGEMENT     Obed Haley 76 year old male is on warfarin with therapeutic INR result. (Goal INR 2.0-3.0)    Recent labs: (last 7 days)     02/08/22  1600   INR 2.5*       ASSESSMENT     Source(s): Chart Review and Patient/Caregiver Call       Warfarin doses taken: Warfarin taken as instructed    Diet: No new diet changes identified    New illness, injury, or hospitalization: No    Medication/supplement changes: None noted    Signs or symptoms of bleeding or clotting: No    Previous INR: Therapeutic last 2 INR visits.    Additional findings:  Yes.  Pereira in [place] for 4 months from Jan - Apr.     PLAN     Recommended plan for no diet, medication or health factor changes affecting INR     Dosing Instructions:   (5mg tabs)    Continue your current warfarin dose with next INR in 4-5 weeks.      Summary  As of 2/8/2022    Full warfarin instructions:  7.5 mg every Mon, Thu; 5 mg all other days   Next INR check:  3/15/2022             Telephone call with  Obed ([phone]) who verbalizes understanding and agrees to plan    Patient elected to schedule next visit in the next 4-5 wks with [facility] in [place].    Education provided: Importance of consistent vitamin K intake and Goal range and significance of current result    Plan made per [facility] anticoagulation protocol    Yolanda Krishna, RN  Anticoagulation Clinic  2/9/2022    _______________________________________________________________________     Anticoagulation Episode Summary     Current INR goal:  2.0-3.0   TTR:  91.5 % (1 y)   Target end date:  Indefinite   Send INR reminders to:  [facility]    Indications    Long term (current) use of anticoagulants [Z79.01]  Persistent atrial fibrillation (H) [I48.19]           Comments:           Anticoagulation Care Providers     Provider Role Specialty Phone number    Franko Franz MD Referring Internal Medicine [phone]    Franko Nguyen MD Referring Internal Medicine [phone]

## 2022-03-22 ENCOUNTER — TELEPHONE (OUTPATIENT)
Dept: INTERNAL MEDICINE | Facility: CLINIC | Age: 77
End: 2022-03-22
Payer: MEDICARE

## 2022-03-22 ENCOUNTER — TELEPHONE (OUTPATIENT)
Dept: ANTICOAGULATION | Facility: CLINIC | Age: 77
End: 2022-03-22
Payer: MEDICARE

## 2022-03-22 DIAGNOSIS — Z79.01 LONG TERM (CURRENT) USE OF ANTICOAGULANTS: Primary | ICD-10-CM

## 2022-03-22 DIAGNOSIS — I48.19 PERSISTENT ATRIAL FIBRILLATION (H): ICD-10-CM

## 2022-03-22 LAB — INR (EXTERNAL): 2.4 (ref 0.9–1.1)

## 2022-03-22 NOTE — TELEPHONE ENCOUNTER
ANTICOAGULATION     Obed Haley is overdue for INR check.      Left message for patient to call and schedule lab appointment as soon as possible. If returning call, please schedule.     Yolanda Krishna RN

## 2022-03-22 NOTE — TELEPHONE ENCOUNTER
ANTICOAGULATION MANAGEMENT     Obed Haley 76 year old male is on warfarin with therapeutic INR result. (Goal INR )    No results for input(s): INR in the last 168 hours.   - INR on 3/10/22 was 2.4    ASSESSMENT       Source(s): Chart Review and Patient/Caregiver Call       Warfarin doses taken: Warfarin taken as instructed    Diet: No new diet changes identified    New illness, injury, or hospitalization: No    Medication/supplement changes: None noted    Signs or symptoms of bleeding or clotting: No    Previous INR: Therapeutic last 4 INR results.    Additional findings:  Will return to [place] end April 2022.       PLAN     Recommended plan for no diet, medication or health factor changes affecting INR     Dosing Instructions: Continue your current warfarin dose with next INR in 6 weeks       Summary  As of 3/22/2022    Full warfarin instructions:  7.5 mg every Mon, Thu; 5 mg all other days   Next INR check:  5/3/2022             Telephone call with  Obed ([phone]) who verbalizes understanding and agrees to plan    Patient elected to schedule next visit  - he will call when he returns in May 2022 to schedule INR.    Education provided: Please call back if any changes to your diet, medications or how you've been taking warfarin, Importance of consistent vitamin K intake and Goal range and significance of current result    Plan made per [facility] anticoagulation protocol    Yolanda Krishna RN  Anticoagulation Clinic  3/22/2022    _______________________________________________________________________     Anticoagulation Episode Summary     Current INR goal:  2.0-3.0   TTR:  91.2 % (11.8 mo)   Target end date:  Indefinite   Send INR reminders to:  [facility]    Indications    Long term (current) use of anticoagulants [Z79.01]  Persistent atrial fibrillation (H) [I48.19]           Comments:           Anticoagulation Care Providers     Provider Role Specialty Phone number    Franko Franz MD Referring  Internal Medicine [phone]    Franko Nguyen MD [facility] Internal Medicine [phone]

## 2022-03-22 NOTE — TELEPHONE ENCOUNTER
Patient actually wants Sue to call him back, because he has the INR result from a clinic in [place] where he had it done at recently, so does not need an INR with us, but wants to give you the result.  Please call him back! Thank you.     Patient returning call to INR clinic regarding message that he needs to make an INR appt asap. He said that he had his INR done in [place] at [facility].  They should have sent us a fax.  [facility] phone number is [phone].      The patient said that they were faxing his result to the anticoagulation clinic at [phone].  I don't know if that was received by them or not, but the patient did give us the phone number so we could call.     Patient's phone number is [phone], and he has voice mail in case you need to leave a detailed message. Thank you.

## 2022-04-14 ENCOUNTER — ANTICOAGULATION THERAPY VISIT (OUTPATIENT)
Dept: ANTICOAGULATION | Facility: CLINIC | Age: 77
End: 2022-04-14
Payer: MEDICARE

## 2022-04-14 DIAGNOSIS — Z79.01 LONG TERM (CURRENT) USE OF ANTICOAGULANTS: Primary | ICD-10-CM

## 2022-04-14 DIAGNOSIS — I48.19 PERSISTENT ATRIAL FIBRILLATION (H): ICD-10-CM

## 2022-04-14 LAB — INR (EXTERNAL): 1.9 (ref 0.9–1.1)

## 2022-04-15 NOTE — PROGRESS NOTES
ANTICOAGULATION MANAGEMENT     Obed Haley 77 year old male is on warfarin with subtherapeutic INR result. (Goal INR 2.0-3.0)    Recent labs: (last 7 days)     04/14/22  1751   INR 1.9*       ASSESSMENT       Source(s): Chart Review and Patient/Caregiver Call       Warfarin doses taken: Less warfarin taken than planned which may be affecting INR. Obed now stating that since his INR of 3.6 on 12/21 he has been taking 7.5mg on Thur only, 5mg ROW.    Diet: Obed has been making an effort to lose some weight. Therefore eating lower carbs, higher proportion of vegetables, and less alcohol    New illness, injury, or hospitalization: No    Medication/supplement changes: None noted    Signs or symptoms of bleeding or clotting: No    Previous INR: Therapeutic last 2(+) visits, noted to be on a lower than charted dose on the tracking calendar. See above note.     Additional findings: None       PLAN     Recommended plan for ongoing change(s) affecting INR     Dosing Instructions: Increase your warfarin dose (6.7% change) with next INR in 2 weeks       Summary  As of 4/14/2022    Full warfarin instructions:  7.5 mg every Mon, Thu; 5 mg all other days   Next INR check:  4/29/2022             Telephone call with Obed who verbalizes understanding and agrees to plan    Lab visit scheduled    Education provided: Impact of vitamin K foods on INR, Goal range and significance of current result and Contact [phone] with any changes, questions or concerns.     Plan made per [facility] anticoagulation protocol    Christine Lee RN  Anticoagulation Clinic  4/15/2022    _______________________________________________________________________     Anticoagulation Episode Summary     Current INR goal:  2.0-3.0   TTR:  89.5 % (1 y)   Target end date:  Indefinite   Send INR reminders to:  [facility]    Indications    Long term (current) use of anticoagulants [Z79.01]  Persistent atrial fibrillation (H) [I48.19]           Comments:            Anticoagulation Care Providers     Provider Role Specialty Phone number    Franko Franz MD Referring Internal Medicine [phone]    Franko Nguyen MD Referring Internal Medicine [phone]

## 2022-04-27 ENCOUNTER — ANTICOAGULATION THERAPY VISIT (OUTPATIENT)
Dept: ANTICOAGULATION | Facility: CLINIC | Age: 77
End: 2022-04-27

## 2022-04-27 ENCOUNTER — LAB (OUTPATIENT)
Dept: LAB | Facility: CLINIC | Age: 77
End: 2022-04-27
Payer: MEDICARE

## 2022-04-27 DIAGNOSIS — Z95.3 S/P MITRAL VALVE REPLACEMENT WITH TISSUE VALVE: ICD-10-CM

## 2022-04-27 DIAGNOSIS — E03.9 ACQUIRED HYPOTHYROIDISM: ICD-10-CM

## 2022-04-27 DIAGNOSIS — I48.19 PERSISTENT ATRIAL FIBRILLATION (H): ICD-10-CM

## 2022-04-27 DIAGNOSIS — Z79.01 LONG TERM (CURRENT) USE OF ANTICOAGULANTS: Primary | ICD-10-CM

## 2022-04-27 LAB
INR BLD: 2.4 (ref 0.9–1.1)
TSH SERPL DL<=0.005 MIU/L-ACNC: 2.71 UIU/ML (ref 0.3–5)

## 2022-04-27 PROCEDURE — 84443 ASSAY THYROID STIM HORMONE: CPT

## 2022-04-27 PROCEDURE — 36415 COLL VENOUS BLD VENIPUNCTURE: CPT

## 2022-04-27 PROCEDURE — 36416 COLLJ CAPILLARY BLOOD SPEC: CPT

## 2022-04-27 PROCEDURE — 85610 PROTHROMBIN TIME: CPT

## 2022-04-27 NOTE — PROGRESS NOTES
ANTICOAGULATION MANAGEMENT     Obed Haley 77 year old male is on warfarin with therapeutic INR result. (Goal INR 2.0-3.0)    Recent labs: (last 7 days)     04/27/22  0757   INR 2.4*       ASSESSMENT       Source(s): Chart Review, Patient/Caregiver Call and Template       Warfarin doses taken: Warfarin taken as instructed     Diet: No new diet changes identified    New illness, injury, or hospitalization: No    Medication/supplement changes: None noted    Signs or symptoms of bleeding or clotting: No    Previous INR: Subtherapeutic at 1.9 on 4/14/22.    Additional findings: None returned from [place]        PLAN     Recommended plan for no diet, medication or health factor changes affecting INR     Dosing Instructions:    continue your current warfarin dose with next INR in 1-2 weeks       Summary  As of 4/27/2022    Full warfarin instructions:  7.5 mg every Mon, Thu; 5 mg all other days   Next INR check:  5/11/2022             Telephone call with  Obed ([phone]) who verbalizes understanding and agrees to plan    Lab visit scheduled - INR on 5/11/22 @ [facility].    Education provided: Importance of consistent vitamin K intake and Goal range and significance of current result    Plan made per [facility] anticoagulation protocol    Yolanda Krishna RN  Anticoagulation Clinic  4/27/2022    _______________________________________________________________________     Anticoagulation Episode Summary     Current INR goal:  2.0-3.0   TTR:  88.9 % (1 y)   Target end date:  Indefinite   Send INR reminders to:  [facility]    Indications    Long term (current) use of anticoagulants [Z79.01]  Persistent atrial fibrillation (H) [I48.19]           Comments:           Anticoagulation Care Providers     Provider Role Specialty Phone number    Franko Franz MD Referring Internal Medicine [phone]    Franko Nguyen MD Referring Internal Medicine [phone]

## 2022-05-04 ENCOUNTER — ANCILLARY PROCEDURE (OUTPATIENT)
Dept: CARDIOLOGY | Facility: CLINIC | Age: 77
End: 2022-05-04
Attending: INTERNAL MEDICINE
Payer: MEDICARE

## 2022-05-04 ENCOUNTER — HOSPITAL ENCOUNTER (OUTPATIENT)
Dept: CARDIOLOGY | Facility: HOSPITAL | Age: 77
Discharge: HOME | End: 2022-05-04
Attending: INTERNAL MEDICINE | Admitting: INTERNAL MEDICINE
Payer: MEDICARE

## 2022-05-04 DIAGNOSIS — I49.5 SSS (SICK SINUS SYNDROME) (H): ICD-10-CM

## 2022-05-04 DIAGNOSIS — Z95.0 PACEMAKER: ICD-10-CM

## 2022-05-04 DIAGNOSIS — I48.19 PERSISTENT ATRIAL FIBRILLATION (H): ICD-10-CM

## 2022-05-04 DIAGNOSIS — I47.29 NSVT (NONSUSTAINED VENTRICULAR TACHYCARDIA) (H): ICD-10-CM

## 2022-05-04 LAB
MDC_IDC_EPISODE_DTM: NORMAL
MDC_IDC_EPISODE_DURATION: 12 S
MDC_IDC_EPISODE_DURATION: 12 S
MDC_IDC_EPISODE_DURATION: 13 S
MDC_IDC_EPISODE_DURATION: 17 S
MDC_IDC_EPISODE_DURATION: 22 S
MDC_IDC_EPISODE_ID: NORMAL
MDC_IDC_EPISODE_TYPE: NORMAL
MDC_IDC_EPISODE_VENDOR_TYPE: NORMAL
MDC_IDC_LEAD_IMPLANT_DT: NORMAL
MDC_IDC_LEAD_IMPLANT_DT: NORMAL
MDC_IDC_LEAD_LOCATION: NORMAL
MDC_IDC_LEAD_LOCATION: NORMAL
MDC_IDC_LEAD_LOCATION_DETAIL_1: NORMAL
MDC_IDC_LEAD_LOCATION_DETAIL_1: NORMAL
MDC_IDC_LEAD_MFG: NORMAL
MDC_IDC_LEAD_MFG: NORMAL
MDC_IDC_LEAD_MODEL: NORMAL
MDC_IDC_LEAD_MODEL: NORMAL
MDC_IDC_LEAD_POLARITY_TYPE: NORMAL
MDC_IDC_LEAD_POLARITY_TYPE: NORMAL
MDC_IDC_LEAD_SERIAL: NORMAL
MDC_IDC_LEAD_SERIAL: NORMAL
MDC_IDC_MSMT_BATTERY_DTM: NORMAL
MDC_IDC_MSMT_BATTERY_REMAINING_LONGEVITY: 78 MO
MDC_IDC_MSMT_BATTERY_REMAINING_PERCENTAGE: 77 %
MDC_IDC_MSMT_BATTERY_STATUS: NORMAL
MDC_IDC_MSMT_LEADCHNL_RA_IMPEDANCE_VALUE: 523 OHM
MDC_IDC_MSMT_LEADCHNL_RV_IMPEDANCE_VALUE: 515 OHM
MDC_IDC_MSMT_LEADCHNL_RV_PACING_THRESHOLD_AMPLITUDE: 0.6 V
MDC_IDC_MSMT_LEADCHNL_RV_PACING_THRESHOLD_PULSEWIDTH: 0.4 MS
MDC_IDC_PG_IMPLANT_DTM: NORMAL
MDC_IDC_PG_MFG: NORMAL
MDC_IDC_PG_MODEL: NORMAL
MDC_IDC_PG_SERIAL: NORMAL
MDC_IDC_PG_TYPE: NORMAL
MDC_IDC_SESS_CLINIC_NAME: NORMAL
MDC_IDC_SESS_DTM: NORMAL
MDC_IDC_SESS_TYPE: NORMAL
MDC_IDC_SET_BRADY_AT_MODE_SWITCH_MODE: NORMAL
MDC_IDC_SET_BRADY_AT_MODE_SWITCH_RATE: 170 {BEATS}/MIN
MDC_IDC_SET_BRADY_LOWRATE: 60 {BEATS}/MIN
MDC_IDC_SET_BRADY_MAX_SENSOR_RATE: 130 {BEATS}/MIN
MDC_IDC_SET_BRADY_MAX_TRACKING_RATE: 130 {BEATS}/MIN
MDC_IDC_SET_BRADY_MODE: NORMAL
MDC_IDC_SET_BRADY_PAV_DELAY_LOW: 200 MS
MDC_IDC_SET_BRADY_SAV_DELAY_LOW: 180 MS
MDC_IDC_SET_LEADCHNL_RA_PACING_AMPLITUDE: 1.5 V
MDC_IDC_SET_LEADCHNL_RA_PACING_CAPTURE_MODE: NORMAL
MDC_IDC_SET_LEADCHNL_RA_PACING_POLARITY: NORMAL
MDC_IDC_SET_LEADCHNL_RA_PACING_PULSEWIDTH: 0.4 MS
MDC_IDC_SET_LEADCHNL_RA_SENSING_ADAPTATION_MODE: NORMAL
MDC_IDC_SET_LEADCHNL_RA_SENSING_POLARITY: NORMAL
MDC_IDC_SET_LEADCHNL_RA_SENSING_SENSITIVITY: 0.25 MV
MDC_IDC_SET_LEADCHNL_RV_PACING_AMPLITUDE: 1.1 V
MDC_IDC_SET_LEADCHNL_RV_PACING_CAPTURE_MODE: NORMAL
MDC_IDC_SET_LEADCHNL_RV_PACING_POLARITY: NORMAL
MDC_IDC_SET_LEADCHNL_RV_PACING_PULSEWIDTH: 0.4 MS
MDC_IDC_SET_LEADCHNL_RV_SENSING_ADAPTATION_MODE: NORMAL
MDC_IDC_SET_LEADCHNL_RV_SENSING_POLARITY: NORMAL
MDC_IDC_SET_LEADCHNL_RV_SENSING_SENSITIVITY: 1.5 MV
MDC_IDC_SET_ZONE_DETECTION_INTERVAL: 375 MS
MDC_IDC_SET_ZONE_TYPE: NORMAL
MDC_IDC_SET_ZONE_VENDOR_TYPE: NORMAL
MDC_IDC_STAT_AT_BURDEN_PERCENT: 100 %
MDC_IDC_STAT_AT_DTM_END: NORMAL
MDC_IDC_STAT_AT_DTM_START: NORMAL
MDC_IDC_STAT_BRADY_DTM_END: NORMAL
MDC_IDC_STAT_BRADY_DTM_START: NORMAL
MDC_IDC_STAT_BRADY_RA_PERCENT_PACED: 0 %
MDC_IDC_STAT_BRADY_RV_PERCENT_PACED: 65 %
MDC_IDC_STAT_EPISODE_RECENT_COUNT: 0
MDC_IDC_STAT_EPISODE_RECENT_COUNT: 1
MDC_IDC_STAT_EPISODE_RECENT_COUNT: 6
MDC_IDC_STAT_EPISODE_RECENT_COUNT_DTM_END: NORMAL
MDC_IDC_STAT_EPISODE_RECENT_COUNT_DTM_START: NORMAL
MDC_IDC_STAT_EPISODE_TYPE: NORMAL
MDC_IDC_STAT_EPISODE_VENDOR_TYPE: NORMAL

## 2022-05-04 PROCEDURE — 93306 TTE W/DOPPLER COMPLETE: CPT | Mod: 26 | Performed by: INTERNAL MEDICINE

## 2022-05-04 PROCEDURE — 93294 REM INTERROG EVL PM/LDLS PM: CPT | Performed by: INTERNAL MEDICINE

## 2022-05-04 PROCEDURE — 93306 TTE W/DOPPLER COMPLETE: CPT

## 2022-05-04 PROCEDURE — 93296 REM INTERROG EVL PM/IDS: CPT | Performed by: INTERNAL MEDICINE

## 2022-05-06 DIAGNOSIS — E03.9 ACQUIRED HYPOTHYROIDISM: ICD-10-CM

## 2022-05-09 RX ORDER — LEVOTHYROXINE SODIUM 50 UG/1
50 TABLET ORAL DAILY
Qty: 90 TABLET | Refills: 0 | OUTPATIENT
Start: 2022-05-09

## 2022-05-11 ENCOUNTER — LAB (OUTPATIENT)
Dept: LAB | Facility: CLINIC | Age: 77
End: 2022-05-11
Payer: MEDICARE

## 2022-05-11 ENCOUNTER — ANTICOAGULATION THERAPY VISIT (OUTPATIENT)
Dept: ANTICOAGULATION | Facility: CLINIC | Age: 77
End: 2022-05-11

## 2022-05-11 DIAGNOSIS — Z79.01 LONG TERM (CURRENT) USE OF ANTICOAGULANTS: Primary | ICD-10-CM

## 2022-05-11 DIAGNOSIS — I48.19 PERSISTENT ATRIAL FIBRILLATION (H): ICD-10-CM

## 2022-05-11 DIAGNOSIS — Z95.3 S/P MITRAL VALVE REPLACEMENT WITH TISSUE VALVE: ICD-10-CM

## 2022-05-11 LAB — INR BLD: 2.4 (ref 0.9–1.1)

## 2022-05-11 PROCEDURE — 85610 PROTHROMBIN TIME: CPT

## 2022-05-11 PROCEDURE — 36415 COLL VENOUS BLD VENIPUNCTURE: CPT

## 2022-05-11 NOTE — PROGRESS NOTES
ANTICOAGULATION MANAGEMENT     Obed Haley 77 year old male is on warfarin with therapeutic INR result. (Goal INR 2.0-3.0)    Recent labs: (last 7 days)     05/11/22  0813   INR 2.4*       ASSESSMENT       Source(s): Chart Review, Patient/Caregiver Call and Template       Warfarin doses taken: Warfarin taken as instructed    Diet: No new diet changes identified    New illness, injury, or hospitalization: No    Medication/supplement changes: None noted    Signs or symptoms of bleeding or clotting: No    Previous INR: Therapeutic last visit at 2.4; previously outside of goal range at 1.9    Additional findings: None       PLAN     Recommended plan for no diet, medication or health factor changes affecting INR     Dosing Instructions:    continue your current warfarin dose with next INR in 3 weeks       Summary  As of 5/11/2022    Full warfarin instructions:  7.5 mg every Mon, Thu; 5 mg all other days   Next INR check:  6/1/2022             Telephone call with  Obed ([phone]) who verbalizes understanding and agrees to plan    Lab visit scheduled - INR on 5/31/22 during OV with Dr. Henao @ [facility].    Education provided: Importance of consistent vitamin K intake and Goal range and significance of current result    Plan made per [facility] anticoagulation protocol    Yolanda Krishna RN  Anticoagulation Clinic  5/11/2022    _______________________________________________________________________     Anticoagulation Episode Summary     Current INR goal:  2.0-3.0   TTR:  88.9 % (1 y)   Target end date:  Indefinite   Send INR reminders to:  [facility]    Indications    Long term (current) use of anticoagulants [Z79.01]  Persistent atrial fibrillation (H) [I48.19]           Comments:           Anticoagulation Care Providers     Provider Role Specialty Phone number    Franko Franz MD Referring Internal Medicine [phone]    Franko Nguyen MD Referring Internal Medicine [phone]

## 2022-05-31 ENCOUNTER — LAB (OUTPATIENT)
Dept: CARDIOLOGY | Facility: CLINIC | Age: 77
End: 2022-05-31

## 2022-05-31 ENCOUNTER — OFFICE VISIT (OUTPATIENT)
Dept: CARDIOLOGY | Facility: CLINIC | Age: 77
End: 2022-05-31
Attending: INTERNAL MEDICINE
Payer: MEDICARE

## 2022-05-31 ENCOUNTER — ANTICOAGULATION THERAPY VISIT (OUTPATIENT)
Dept: ANTICOAGULATION | Facility: CLINIC | Age: 77
End: 2022-05-31

## 2022-05-31 VITALS
BODY MASS INDEX: 29.07 KG/M2 | SYSTOLIC BLOOD PRESSURE: 132 MMHG | WEIGHT: 226.5 LBS | HEART RATE: 60 BPM | DIASTOLIC BLOOD PRESSURE: 70 MMHG | HEIGHT: 74 IN | RESPIRATION RATE: 18 BRPM

## 2022-05-31 DIAGNOSIS — Z95.3 S/P MITRAL VALVE REPLACEMENT WITH TISSUE VALVE: ICD-10-CM

## 2022-05-31 DIAGNOSIS — I48.19 PERSISTENT ATRIAL FIBRILLATION (H): Primary | ICD-10-CM

## 2022-05-31 DIAGNOSIS — I48.19 PERSISTENT ATRIAL FIBRILLATION (H): ICD-10-CM

## 2022-05-31 DIAGNOSIS — Z79.01 LONG TERM (CURRENT) USE OF ANTICOAGULANTS: Primary | ICD-10-CM

## 2022-05-31 LAB — INR POINT OF CARE: 2 (ref 0.9–1.1)

## 2022-05-31 PROCEDURE — 99214 OFFICE O/P EST MOD 30 MIN: CPT | Performed by: INTERNAL MEDICINE

## 2022-05-31 PROCEDURE — 85610 PROTHROMBIN TIME: CPT

## 2022-05-31 PROCEDURE — 36416 COLLJ CAPILLARY BLOOD SPEC: CPT

## 2022-05-31 NOTE — PROGRESS NOTES
ANTICOAGULATION MANAGEMENT     Obed Haley 77 year old male is on warfarin with therapeutic INR result. (Goal INR 2.0-3.0)    Recent labs: (last 7 days)     05/31/22  0814   INR 2.0*       ASSESSMENT       Source(s): Chart Review and Patient/Caregiver Call       Warfarin doses taken: Warfarin taken as instructed    Diet: No new diet changes identified    New illness, injury, or hospitalization: No    Medication/supplement changes: None noted    Signs or symptoms of bleeding or clotting: No    Previous INR: Therapeutic last 2 visits    Additional findings: None       PLAN     Recommended plan for no diet, medication or health factor changes affecting INR     Dosing Instructions:    continue your current warfarin dose with next INR in 4 weeks       Summary  As of 5/31/2022    Full warfarin instructions:  7.5 mg every Mon, Thu; 5 mg all other days   Next INR check:  6/28/2022             Telephone call with  Obed ([phone]) who verbalizes understanding and agrees to plan    Lab visit scheduled - INR on 6/29/22 @ [facility].    Education provided: Importance of consistent vitamin K intake and Goal range and significance of current result    Plan made per [facility] anticoagulation protocol    Yolanda Krishna RN  Anticoagulation Clinic  5/31/2022    _______________________________________________________________________     Anticoagulation Episode Summary     Current INR goal:  2.0-3.0   TTR:  88.9 % (1 y)   Target end date:  Indefinite   Send INR reminders to:  [facility]    Indications    Long term (current) use of anticoagulants [Z79.01]  Persistent atrial fibrillation (H) [I48.19]           Comments:           Anticoagulation Care Providers     Provider Role Specialty Phone number    Franko Franz MD Referring Internal Medicine [phone]    Franko Nguyen MD Referring Internal Medicine [phone]

## 2022-05-31 NOTE — PATIENT INSTRUCTIONS
Obed Haley,    It was a pleasure to see you today at the [facility].     My recommendations after this visit include:    Stay active  Same medications    DANNY Henao MD, FACC, MILI

## 2022-05-31 NOTE — LETTER
5/31/2022    Franko Nguyen MD  [address] [facility] [address]    RE: Obed Haley       Dear Colleague,     I had the pleasure of seeing Obed Haley in the [facility].      Cardiology Progress Note     Assessment:  Mitral valve prolapse status post mitral valve replacement with [employer] 33 mm bioprosthesis in September 2017, mildly elevated diastolic gradient  LV systolic dysfunction likely related to long-standing mitral regurgitation and postop tachycardia; normalized LVEF  Lower extremity edema venous insufficiency, improved with diuretics  Tachycardia-bradycardia syndrome, status post permanent pacemaker, normal device function  Persistent atrial fibrillation with controlled ventricular response, on chronic warfarin  Postop left pleural effusion, resolved  Coronary artery disease, mild to moderate, nonobstructive  Orthostatic lightheadedness, mild, resolved  Hypercholesterolemia, history of statin intolerance, adequate control with modest dose of atorvastatin      Plan:  We discussed mildly elevated gradient across the mitral valve prosthesis.  This valve may be mildly stenotic.  That may explain to some degree  limitation that he has during more vigorous physical activities.  We will continue to monitor the valve with annual echo.  He may be candidate for valve in valve replacement if he develops severe stenosis of mitral valve prosthesis.    He has had persistent atrial fibrillation since January of this year.  He cannot really tell if he feels any different.  I think likelihood of maintaining sinus rhythm or paced rhythm is very low.  We will settle for a day rate control strategy.    I will see him for follow-up in October of this year    Subjective:   This is 77 year old male who comes in today for follow-up visit.  He reports no new symptoms.  He stays physically active.  He can walk a long distance as long as he does not carry anything current does not have to go  "uphill.  His weight has been stable.  He has no PND and no orthopnea.  He denies exertional chest pains or heart palpitations    Review of Systems:   Negative other than history of present illness    Objective:   /70 (BP Location: Right arm, Patient Position: Sitting, Cuff Size: Adult Regular)   Pulse 60   Resp 18   Ht 1.867 m (6' 1.5\")   Wt 102.7 kg (226 lb 8 oz)   BMI 29.48 kg/m    Physical Exam:  GENERAL: no distress  NECK: No JVD  LUNGS: Clear to auscultation.  CARDIAC: regular rhythm, S1 & S2 normal.  No heaves, thrills, gallops or murmurs.  ABDOMEN: flat, negative hepatosplenomegaly, soft and non-tender.  EXTREMITIES: No evidence of cyanosis, clubbing or edema.    Current Outpatient Medications   Medication Sig Dispense Refill     amoxicillin (AMOXIL) 500 MG capsule [AMOXICILLIN (AMOXIL) 500 MG CAPSULE] Take 2,000 mg by mouth as needed (1 hour prior to dentist.).        atorvastatin (LIPITOR) 40 MG tablet Take 0.5 tablets (20 mg) by mouth daily 30 tablet 11     b complex vitamins tablet [B COMPLEX VITAMINS TABLET] Take 1 tablet by mouth every other day.        chlorpheniramine (CHLOR-TRIMETON) 4 mg tablet [CHLORPHENIRAMINE (CHLOR-TRIMETON) 4 MG TABLET] Take 1 tablet (4 mg total) by mouth every 6 (six) hours as needed for allergies. 30 tablet 11     cholecalciferol, vitamin D3, 1,000 unit tablet [CHOLECALCIFEROL, VITAMIN D3, 1,000 UNIT TABLET] Take 1,000 Units by mouth every other day.        EPINEPHrine (EPIPEN) 0.3 mg/0.3 mL atIn [EPINEPHRINE (EPIPEN) 0.3 MG/0.3 ML ATIN] Inject 0.3 mg into the shoulder, thigh, or buttocks once as needed (allergic reaction).       levothyroxine (SYNTHROID/LEVOTHROID) 75 MCG tablet Take 1 tablet (75 mcg) by mouth daily 90 tablet 3     metoprolol succinate ER (TOPROL-XL) 25 MG 24 hr tablet Take 1 tablet (25 mg) by mouth daily 90 tablet 3     MULTIVIT &MINERALS/FERROUS FUM (MULTI VITAMIN ORAL) [MULTIVIT &MINERALS/FERROUS FUM (MULTI VITAMIN ORAL)] Take 1 tablet by mouth " every other day. Pt taking multi vitamin without Iron       warfarin ANTICOAGULANT (COUMADIN) 5 MG tablet TAKE 1 TABLET (5MG) TO 1 &1/2 TABLETS (7.5MG) BY MOUTH DAILY, AS DIRECTED. ADJUST DOSE BASED ON INR RESULTS. 120 tablet 1       Cardiographics:    Pacemaker interrogation: May 2022  Type: routine remote pacemaker transmission.   Presenting rhythm: atrial fibrillation with ventricular sensing and pacing  bpm.  Battery/lead status: stable.  Arrhythmias: since 1/24/22; AF burden remains 100%, v-rates >/=120bpm <5%. 6 ventricular high rate episodes and one SVT episode, available EGMs appear to be RVR during AF.  Anticoagulant: warfarin.      Echo: August 2020    When compared to the previous study dated 1/17/2019, left ventricular systolic function is improved.    Normal left ventricular size, wall thickness. Septal motion is consistent with artifact of right ventricular pacing lead. Left ventricle ejection fraction is normal. The calculated left ventricular ejection fraction is 62%.    Normal right ventricular size with mildly reduced right ventricular systolic function.    Moderately enlarged both atria.    Normal functioning of bioprosthetic mitral valve.  Mean diastolic gradient 5    May 2022  1. Normal left ventricular size and systolic performance with a visually  estimated ejection fraction of 55%.  2. There is abnormal septal motion consistent with ventricular paced rhythm.  3. There is a bio-prosthetic mitral valve (documented 33 mm [employer]  Epic porcine pericardial tissue valve).  Â  Probably normal mitral valve prosthesis function; mean diastolic gradient is  7 mm Hg.  Â  There is trace mitral insufficiency.  4. Mild right ventricular enlargement with mildly reduced right ventricular  systolic performance.  5. There is moderate to severe biatrial enlargement.     When compared to the prior real-time echocardiogram dated 5 August 2020, the  findings are felt to be fairly similar on both  examinations.       Coronary angiogram: August 2017  LM min dz  LAD mid 30%   Circ mild dz with branch supplies PL territory  RCA mid 50% with PDA extends to apex     Note distal LAD and PDA vessels are very small around apex     Lab Results    Chemistry/lipid CBC Cardiac Enzymes/BNP/TSH/INR   Recent Labs   Lab Test 09/15/21  0812   CHOL 133   HDL 46   LDL 74   TRIG 66     Recent Labs   Lab Test 09/15/21  0812 09/09/20  1155 07/16/20  0752   LDL 74 81 70     Recent Labs   Lab Test 09/15/21  0812      POTASSIUM 4.2   CHLORIDE 109*   CO2 25   GLC 98   BUN 20   CR 1.00   GFRESTIMATED 73   NOVA 9.5     Recent Labs   Lab Test 09/15/21  0812 07/16/20  0752 10/14/19  1626   CR 1.00 0.86 1.58*     Recent Labs   Lab Test 09/18/17  0835   A1C 5.5          Recent Labs   Lab Test 09/15/21  0812   WBC 6.2   HGB 13.6   HCT 41.0   MCV 97        Recent Labs   Lab Test 09/15/21  0812 07/16/20  0752 10/14/19  1626   HGB 13.6 12.8* 12.7*    No results for input(s): TROPONINI in the last 58854 hours.  Recent Labs   Lab Test 07/16/20  0752 10/14/19  1626 04/10/19  1354   BNP 89* 73 213*     Recent Labs   Lab Test 04/27/22  0757   TSH 2.71     Recent Labs   Lab Test 05/31/22  0814 05/11/22  0813 04/27/22  0757   INR 2.0* 2.4* 2.4*                Thank you for allowing me to participate in the care of your patient.      Sincerely,     Sha Henao MD     [facility]  cc:   Sha Henao MD  [address]  [address]  [place] [address]

## 2022-05-31 NOTE — PROGRESS NOTES
Cardiology Progress Note     Assessment:  Mitral valve prolapse status post mitral valve replacement with [employer] 33 mm bioprosthesis in September 2017, mildly elevated diastolic gradient  LV systolic dysfunction likely related to long-standing mitral regurgitation and postop tachycardia; normalized LVEF  Lower extremity edema venous insufficiency, improved with diuretics  Tachycardia-bradycardia syndrome, status post permanent pacemaker, normal device function  Persistent atrial fibrillation with controlled ventricular response, on chronic warfarin  Postop left pleural effusion, resolved  Coronary artery disease, mild to moderate, nonobstructive  Orthostatic lightheadedness, mild, resolved  Hypercholesterolemia, history of statin intolerance, adequate control with modest dose of atorvastatin      Plan:  We discussed mildly elevated gradient across the mitral valve prosthesis.  This valve may be mildly stenotic.  That may explain to some degree  limitation that he has during more vigorous physical activities.  We will continue to monitor the valve with annual echo.  He may be candidate for valve in valve replacement if he develops severe stenosis of mitral valve prosthesis.    He has had persistent atrial fibrillation since January of this year.  He cannot really tell if he feels any different.  I think likelihood of maintaining sinus rhythm or paced rhythm is very low.  We will settle for a day rate control strategy.    I will see him for follow-up in October of this year    Subjective:   This is 77 year old male who comes in today for follow-up visit.  He reports no new symptoms.  He stays physically active.  He can walk a long distance as long as he does not carry anything current does not have to go uphill.  His weight has been stable.  He has no PND and no orthopnea.  He denies exertional chest pains or heart palpitations    Review of Systems:   Negative other than history of present  "illness    Objective:   /70 (BP Location: Right arm, Patient Position: Sitting, Cuff Size: Adult Regular)   Pulse 60   Resp 18   Ht 1.867 m (6' 1.5\")   Wt 102.7 kg (226 lb 8 oz)   BMI 29.48 kg/m    Physical Exam:  GENERAL: no distress  NECK: No JVD  LUNGS: Clear to auscultation.  CARDIAC: regular rhythm, S1 & S2 normal.  No heaves, thrills, gallops or murmurs.  ABDOMEN: flat, negative hepatosplenomegaly, soft and non-tender.  EXTREMITIES: No evidence of cyanosis, clubbing or edema.    Current Outpatient Medications   Medication Sig Dispense Refill     amoxicillin (AMOXIL) 500 MG capsule [AMOXICILLIN (AMOXIL) 500 MG CAPSULE] Take 2,000 mg by mouth as needed (1 hour prior to dentist.).        atorvastatin (LIPITOR) 40 MG tablet Take 0.5 tablets (20 mg) by mouth daily 30 tablet 11     b complex vitamins tablet [B COMPLEX VITAMINS TABLET] Take 1 tablet by mouth every other day.        chlorpheniramine (CHLOR-TRIMETON) 4 mg tablet [CHLORPHENIRAMINE (CHLOR-TRIMETON) 4 MG TABLET] Take 1 tablet (4 mg total) by mouth every 6 (six) hours as needed for allergies. 30 tablet 11     cholecalciferol, vitamin D3, 1,000 unit tablet [CHOLECALCIFEROL, VITAMIN D3, 1,000 UNIT TABLET] Take 1,000 Units by mouth every other day.        EPINEPHrine (EPIPEN) 0.3 mg/0.3 mL atIn [EPINEPHRINE (EPIPEN) 0.3 MG/0.3 ML ATIN] Inject 0.3 mg into the shoulder, thigh, or buttocks once as needed (allergic reaction).       levothyroxine (SYNTHROID/LEVOTHROID) 75 MCG tablet Take 1 tablet (75 mcg) by mouth daily 90 tablet 3     metoprolol succinate ER (TOPROL-XL) 25 MG 24 hr tablet Take 1 tablet (25 mg) by mouth daily 90 tablet 3     MULTIVIT &MINERALS/FERROUS FUM (MULTI VITAMIN ORAL) [MULTIVIT &MINERALS/FERROUS FUM (MULTI VITAMIN ORAL)] Take 1 tablet by mouth every other day. Pt taking multi vitamin without Iron       warfarin ANTICOAGULANT (COUMADIN) 5 MG tablet TAKE 1 TABLET (5MG) TO 1 &1/2 TABLETS (7.5MG) BY MOUTH DAILY, AS DIRECTED. ADJUST " DOSE BASED ON INR RESULTS. 120 tablet 1       Cardiographics:    Pacemaker interrogation: May 2022  Type: routine remote pacemaker transmission.   Presenting rhythm: atrial fibrillation with ventricular sensing and pacing  bpm.  Battery/lead status: stable.  Arrhythmias: since 1/24/22; AF burden remains 100%, v-rates >/=120bpm <5%. 6 ventricular high rate episodes and one SVT episode, available EGMs appear to be RVR during AF.  Anticoagulant: warfarin.      Echo: August 2020    When compared to the previous study dated 1/17/2019, left ventricular systolic function is improved.    Normal left ventricular size, wall thickness. Septal motion is consistent with artifact of right ventricular pacing lead. Left ventricle ejection fraction is normal. The calculated left ventricular ejection fraction is 62%.    Normal right ventricular size with mildly reduced right ventricular systolic function.    Moderately enlarged both atria.    Normal functioning of bioprosthetic mitral valve.  Mean diastolic gradient 5    May 2022  1. Normal left ventricular size and systolic performance with a visually  estimated ejection fraction of 55%.  2. There is abnormal septal motion consistent with ventricular paced rhythm.  3. There is a bio-prosthetic mitral valve (documented 33 mm [employer]  Epic porcine pericardial tissue valve).  Â  Probably normal mitral valve prosthesis function; mean diastolic gradient is  7 mm Hg.  Â  There is trace mitral insufficiency.  4. Mild right ventricular enlargement with mildly reduced right ventricular  systolic performance.  5. There is moderate to severe biatrial enlargement.     When compared to the prior real-time echocardiogram dated 5 August 2020, the  findings are felt to be fairly similar on both examinations.       Coronary angiogram: August 2017  LM min dz  LAD mid 30%   Circ mild dz with branch supplies PL territory  RCA mid 50% with PDA extends to apex     Note distal LAD and PDA  vessels are very small around apex     Lab Results    Chemistry/lipid CBC Cardiac Enzymes/BNP/TSH/INR   Recent Labs   Lab Test 09/15/21  0812   CHOL 133   HDL 46   LDL 74   TRIG 66     Recent Labs   Lab Test 09/15/21  0812 09/09/20  1155 07/16/20  0752   LDL 74 81 70     Recent Labs   Lab Test 09/15/21  0812      POTASSIUM 4.2   CHLORIDE 109*   CO2 25   GLC 98   BUN 20   CR 1.00   GFRESTIMATED 73   NOVA 9.5     Recent Labs   Lab Test 09/15/21  0812 07/16/20  0752 10/14/19  1626   CR 1.00 0.86 1.58*     Recent Labs   Lab Test 09/18/17  0835   A1C 5.5          Recent Labs   Lab Test 09/15/21  0812   WBC 6.2   HGB 13.6   HCT 41.0   MCV 97        Recent Labs   Lab Test 09/15/21  0812 07/16/20  0752 10/14/19  1626   HGB 13.6 12.8* 12.7*    No results for input(s): TROPONINI in the last 95623 hours.  Recent Labs   Lab Test 07/16/20  0752 10/14/19  1626 04/10/19  1354   BNP 89* 73 213*     Recent Labs   Lab Test 04/27/22  0757   TSH 2.71     Recent Labs   Lab Test 05/31/22  0814 05/11/22  0813 04/27/22  0757   INR 2.0* 2.4* 2.4*

## 2022-06-16 ENCOUNTER — DOCUMENTATION ONLY (OUTPATIENT)
Dept: ANTICOAGULATION | Facility: CLINIC | Age: 77
End: 2022-06-16
Payer: MEDICARE

## 2022-06-16 DIAGNOSIS — I48.19 PERSISTENT ATRIAL FIBRILLATION (H): Primary | ICD-10-CM

## 2022-06-16 NOTE — PROGRESS NOTES
ANTICOAGULATION MANAGEMENT      Obed Haley due for annual renewal of referral to anticoagulation monitoring. Order pended for your review and signature.      ANTICOAGULATION SUMMARY      Warfarin indication(s)     Atrial fibrillation, paroxysmal / persistent  Atrial Flutter, persistent    Heart valve present?  Bioprosthetic MVR 9/2017       Current goal range   INR: 2.0-3.0     Goal appropriate for indication? Yes, INR 2-3 appropriate for hx of DVT, PE, hypercoagulable state, Afib, LVAD, or bileaflet AVR without risk factors     Current duration of therapy Indefinite/long term therapy   Time in Therapeutic Range (TTR)  (Goal > 60%) 88.9 %       Office visit with referring provider's group within last year Yes on 9/30/2021       Yolanda Krishna RN

## 2022-06-29 ENCOUNTER — ANTICOAGULATION THERAPY VISIT (OUTPATIENT)
Dept: ANTICOAGULATION | Facility: CLINIC | Age: 77
End: 2022-06-29

## 2022-06-29 ENCOUNTER — LAB (OUTPATIENT)
Dept: LAB | Facility: CLINIC | Age: 77
End: 2022-06-29
Payer: MEDICARE

## 2022-06-29 DIAGNOSIS — I48.19 PERSISTENT ATRIAL FIBRILLATION (H): ICD-10-CM

## 2022-06-29 DIAGNOSIS — Z79.01 LONG TERM (CURRENT) USE OF ANTICOAGULANTS: Primary | ICD-10-CM

## 2022-06-29 LAB — INR BLD: 2 (ref 0.9–1.1)

## 2022-06-29 PROCEDURE — 85610 PROTHROMBIN TIME: CPT

## 2022-06-29 PROCEDURE — 36416 COLLJ CAPILLARY BLOOD SPEC: CPT

## 2022-06-29 NOTE — PROGRESS NOTES
ANTICOAGULATION MANAGEMENT     Obed Haley 77 year old male is on warfarin with therapeutic INR result. (Goal INR 2.0-3.0)    Recent labs: (last 7 days)     06/29/22  0803   INR 2.0*       ASSESSMENT       Source(s): Chart Review, Patient/Caregiver Call and Template       Warfarin doses taken: Warfarin taken as instructed    Diet: Change in alcohol intake may be affecting INR   Reported hs been drinking less alcohol.    New illness, injury, or hospitalization: No    Medication/supplement changes: None noted    Signs or symptoms of bleeding or clotting: No    Previous INR: Therapeutic last 3 visits    Additional findings: None       PLAN     Recommended plan for temporary change(s) affecting INR     Dosing Instructions:    continue your current warfarin dose with next INR in 4 weeks       Summary  As of 6/29/2022    Full warfarin instructions:  7.5 mg every Mon, Thu; 5 mg all other days   Next INR check:  7/27/2022             Telephone call with  Obed ([phone]) who verbalizes understanding and agrees to plan    Lab visit scheduled - INR on 7/27/22 @ [facility].    Education provided: Importance of consistent vitamin K intake, Potential interaction between warfarin and alcohol and Goal range and significance of current result    Plan made per [facility] anticoagulation protocol    Yolanda Krishna, RN  Anticoagulation Clinic  6/29/2022    _______________________________________________________________________     Anticoagulation Episode Summary     Current INR goal:  2.0-3.0   TTR:  88.9 % (1 y)   Target end date:  Indefinite   Send INR reminders to:  [facility]    Indications    Long term (current) use of anticoagulants [Z79.01]  Persistent atrial fibrillation (H) [I48.19]           Comments:           Anticoagulation Care Providers     Provider Role Specialty Phone number    Franko Franz MD Referring Internal Medicine [phone]    Franko Nguyen MD Referring Internal Medicine [phone]

## 2022-07-27 ENCOUNTER — ANTICOAGULATION THERAPY VISIT (OUTPATIENT)
Dept: ANTICOAGULATION | Facility: CLINIC | Age: 77
End: 2022-07-27

## 2022-07-27 ENCOUNTER — LAB (OUTPATIENT)
Dept: LAB | Facility: CLINIC | Age: 77
End: 2022-07-27
Payer: MEDICARE

## 2022-07-27 DIAGNOSIS — E03.9 ACQUIRED HYPOTHYROIDISM: ICD-10-CM

## 2022-07-27 DIAGNOSIS — I48.19 PERSISTENT ATRIAL FIBRILLATION (H): ICD-10-CM

## 2022-07-27 LAB
INR BLD: 2.6 (ref 0.9–1.1)
TSH SERPL DL<=0.005 MIU/L-ACNC: 3.78 UIU/ML (ref 0.3–4.2)

## 2022-07-27 PROCEDURE — 36416 COLLJ CAPILLARY BLOOD SPEC: CPT

## 2022-07-27 PROCEDURE — 36415 COLL VENOUS BLD VENIPUNCTURE: CPT

## 2022-07-27 PROCEDURE — 85610 PROTHROMBIN TIME: CPT

## 2022-07-27 PROCEDURE — 84443 ASSAY THYROID STIM HORMONE: CPT

## 2022-07-27 NOTE — PROGRESS NOTES
ANTICOAGULATION MANAGEMENT     Obed Haley 77 year old male is on warfarin with therapeutic INR result. (Goal INR 2.0-3.0)    Recent labs: (last 7 days)     07/27/22  0801   INR 2.6*       ASSESSMENT       Source(s): Chart Review, Patient/Caregiver Call and Template       Warfarin doses taken: Warfarin taken as instructed    Diet: No new diet changes identified    New illness, injury, or hospitalization: No    Medication/supplement changes: None noted    Signs or symptoms of bleeding or clotting: No    Previous INR: Therapeutic last 2(+) visits    Additional findings: None       PLAN     Recommended plan for no diet, medication or health factor changes affecting INR     Dosing Instructions: Continue your current warfarin dose with next INR in 4 weeks       Summary  As of 7/27/2022    Full warfarin instructions:  7.5 mg every Mon, Thu; 5 mg all other days   Next INR check:  8/24/2022             Telephone call with Obed who verbalizes understanding and agrees to plan    Lab visit scheduled    Education provided: None required    Plan made per [facility] anticoagulation protocol    Chica Alcocer RN  Anticoagulation Clinic  7/27/2022    _______________________________________________________________________     Anticoagulation Episode Summary     Current INR goal:  2.0-3.0   TTR:  88.9 % (1 y)   Target end date:  Indefinite   Send INR reminders to:  [facility]    Indications    Long term (current) use of anticoagulants [Z79.01]  Persistent atrial fibrillation (H) [I48.19]           Comments:           Anticoagulation Care Providers     Provider Role Specialty Phone number    Franko Franz MD Referring Internal Medicine [phone]    Franko Nguyen MD Referring Internal Medicine [phone]

## 2022-08-24 ENCOUNTER — ANTICOAGULATION THERAPY VISIT (OUTPATIENT)
Dept: ANTICOAGULATION | Facility: CLINIC | Age: 77
End: 2022-08-24

## 2022-08-24 ENCOUNTER — LAB (OUTPATIENT)
Dept: LAB | Facility: CLINIC | Age: 77
End: 2022-08-24
Payer: MEDICARE

## 2022-08-24 ENCOUNTER — ANCILLARY PROCEDURE (OUTPATIENT)
Dept: CARDIOLOGY | Facility: CLINIC | Age: 77
End: 2022-08-24
Attending: INTERNAL MEDICINE
Payer: MEDICARE

## 2022-08-24 DIAGNOSIS — I48.19 PERSISTENT ATRIAL FIBRILLATION (H): ICD-10-CM

## 2022-08-24 DIAGNOSIS — I49.5 SSS (SICK SINUS SYNDROME) (H): ICD-10-CM

## 2022-08-24 DIAGNOSIS — Z95.0 PACEMAKER: ICD-10-CM

## 2022-08-24 DIAGNOSIS — Z79.01 LONG TERM (CURRENT) USE OF ANTICOAGULANTS: Primary | ICD-10-CM

## 2022-08-24 LAB — INR BLD: 2.2 (ref 0.9–1.1)

## 2022-08-24 PROCEDURE — 93294 REM INTERROG EVL PM/LDLS PM: CPT | Performed by: INTERNAL MEDICINE

## 2022-08-24 PROCEDURE — 85610 PROTHROMBIN TIME: CPT

## 2022-08-24 PROCEDURE — 93296 REM INTERROG EVL PM/IDS: CPT | Performed by: INTERNAL MEDICINE

## 2022-08-24 PROCEDURE — 36416 COLLJ CAPILLARY BLOOD SPEC: CPT

## 2022-08-24 NOTE — PROGRESS NOTES
ANTICOAGULATION MANAGEMENT     Obed Haley 77 year old male is on warfarin with therapeutic INR result. (Goal INR 2.0-3.0)    Recent labs: (last 7 days)     08/24/22  0812   INR 2.2*       ASSESSMENT       Source(s): Chart Review, Patient/Caregiver Call and Template       Warfarin doses taken: Warfarin taken as instructed    Diet: No new diet changes identified    New illness, injury, or hospitalization: No    Medication/supplement changes: None noted    Signs or symptoms of bleeding or clotting: No    Previous INR: Therapeutic last 5 visits    Additional findings: None       PLAN     Recommended plan for no diet, medication or health factor changes affecting INR     Dosing Instructions:    Continue your current warfarin dose with next INR in 6 weeks       Summary  As of 8/24/2022    Full warfarin instructions:  7.5 mg every Mon, Thu; 5 mg all other days   Next INR check:  10/5/2022             Telephone call with  Obed ([phone]) who verbalizes understanding and agrees to plan    Check at provider office visit - INR on 9/28/22 @ [facility].   - also scheduled for Lipids.    Education provided: Importance of consistent vitamin K intake and Goal range and significance of current result    Plan made per [facility] anticoagulation protocol    Yolanda Krishna RN  Anticoagulation Clinic  8/24/2022    _______________________________________________________________________     Anticoagulation Episode Summary     Current INR goal:  2.0-3.0   TTR:  88.9 % (1 y)   Target end date:  Indefinite   Send INR reminders to:  [facility]    Indications    Long term (current) use of anticoagulants [Z79.01]  Persistent atrial fibrillation (H) [I48.19]           Comments:           Anticoagulation Care Providers     Provider Role Specialty Phone number    Franko Franz MD Referring Internal Medicine [phone]    Franko Nguyen MD Referring Internal Medicine [phone]

## 2022-08-26 LAB
MDC_IDC_EPISODE_DTM: NORMAL
MDC_IDC_EPISODE_DURATION: 12 S
MDC_IDC_EPISODE_DURATION: 15 S
MDC_IDC_EPISODE_DURATION: 16 S
MDC_IDC_EPISODE_ID: NORMAL
MDC_IDC_EPISODE_TYPE: NORMAL
MDC_IDC_LEAD_IMPLANT_DT: NORMAL
MDC_IDC_LEAD_IMPLANT_DT: NORMAL
MDC_IDC_LEAD_LOCATION: NORMAL
MDC_IDC_LEAD_LOCATION: NORMAL
MDC_IDC_LEAD_LOCATION_DETAIL_1: NORMAL
MDC_IDC_LEAD_LOCATION_DETAIL_1: NORMAL
MDC_IDC_LEAD_MFG: NORMAL
MDC_IDC_LEAD_MFG: NORMAL
MDC_IDC_LEAD_MODEL: NORMAL
MDC_IDC_LEAD_MODEL: NORMAL
MDC_IDC_LEAD_POLARITY_TYPE: NORMAL
MDC_IDC_LEAD_POLARITY_TYPE: NORMAL
MDC_IDC_LEAD_SERIAL: NORMAL
MDC_IDC_LEAD_SERIAL: NORMAL
MDC_IDC_MSMT_BATTERY_DTM: NORMAL
MDC_IDC_MSMT_BATTERY_REMAINING_LONGEVITY: 96 MO
MDC_IDC_MSMT_BATTERY_REMAINING_PERCENTAGE: 94 %
MDC_IDC_MSMT_BATTERY_STATUS: NORMAL
MDC_IDC_MSMT_LEADCHNL_RA_IMPEDANCE_VALUE: 437 OHM
MDC_IDC_MSMT_LEADCHNL_RV_IMPEDANCE_VALUE: 474 OHM
MDC_IDC_MSMT_LEADCHNL_RV_PACING_THRESHOLD_AMPLITUDE: 0.6 V
MDC_IDC_MSMT_LEADCHNL_RV_PACING_THRESHOLD_PULSEWIDTH: 0.4 MS
MDC_IDC_PG_IMPLANT_DTM: NORMAL
MDC_IDC_PG_MFG: NORMAL
MDC_IDC_PG_MODEL: NORMAL
MDC_IDC_PG_SERIAL: NORMAL
MDC_IDC_PG_TYPE: NORMAL
MDC_IDC_SESS_CLINIC_NAME: NORMAL
MDC_IDC_SESS_DTM: NORMAL
MDC_IDC_SESS_TYPE: NORMAL
MDC_IDC_SET_BRADY_AT_MODE_SWITCH_MODE: NORMAL
MDC_IDC_SET_BRADY_AT_MODE_SWITCH_RATE: 170 {BEATS}/MIN
MDC_IDC_SET_BRADY_LOWRATE: 60 {BEATS}/MIN
MDC_IDC_SET_BRADY_MAX_TRACKING_RATE: 130 {BEATS}/MIN
MDC_IDC_SET_BRADY_MODE: NORMAL
MDC_IDC_SET_BRADY_PAV_DELAY_LOW: 200 MS
MDC_IDC_SET_BRADY_SAV_DELAY_LOW: 180 MS
MDC_IDC_SET_LEADCHNL_RA_PACING_AMPLITUDE: 1.5 V
MDC_IDC_SET_LEADCHNL_RA_PACING_CAPTURE_MODE: NORMAL
MDC_IDC_SET_LEADCHNL_RA_PACING_POLARITY: NORMAL
MDC_IDC_SET_LEADCHNL_RA_PACING_PULSEWIDTH: 0.4 MS
MDC_IDC_SET_LEADCHNL_RA_SENSING_ADAPTATION_MODE: NORMAL
MDC_IDC_SET_LEADCHNL_RA_SENSING_POLARITY: NORMAL
MDC_IDC_SET_LEADCHNL_RA_SENSING_SENSITIVITY: 0.25 MV
MDC_IDC_SET_LEADCHNL_RV_PACING_AMPLITUDE: 1.2 V
MDC_IDC_SET_LEADCHNL_RV_PACING_CAPTURE_MODE: NORMAL
MDC_IDC_SET_LEADCHNL_RV_PACING_POLARITY: NORMAL
MDC_IDC_SET_LEADCHNL_RV_PACING_PULSEWIDTH: 0.4 MS
MDC_IDC_SET_LEADCHNL_RV_SENSING_ADAPTATION_MODE: NORMAL
MDC_IDC_SET_LEADCHNL_RV_SENSING_POLARITY: NORMAL
MDC_IDC_SET_LEADCHNL_RV_SENSING_SENSITIVITY: 1.5 MV
MDC_IDC_SET_ZONE_DETECTION_INTERVAL: 375 MS
MDC_IDC_SET_ZONE_TYPE: NORMAL
MDC_IDC_SET_ZONE_VENDOR_TYPE: NORMAL
MDC_IDC_STAT_AT_BURDEN_PERCENT: 100 %
MDC_IDC_STAT_AT_DTM_END: NORMAL
MDC_IDC_STAT_AT_DTM_START: NORMAL
MDC_IDC_STAT_BRADY_DTM_END: NORMAL
MDC_IDC_STAT_BRADY_DTM_START: NORMAL
MDC_IDC_STAT_BRADY_RA_PERCENT_PACED: 0 %
MDC_IDC_STAT_BRADY_RV_PERCENT_PACED: 64 %
MDC_IDC_STAT_EPISODE_RECENT_COUNT: 0
MDC_IDC_STAT_EPISODE_RECENT_COUNT: 1
MDC_IDC_STAT_EPISODE_RECENT_COUNT: 23
MDC_IDC_STAT_EPISODE_RECENT_COUNT_DTM_END: NORMAL
MDC_IDC_STAT_EPISODE_RECENT_COUNT_DTM_START: NORMAL
MDC_IDC_STAT_EPISODE_TYPE: NORMAL
MDC_IDC_STAT_EPISODE_VENDOR_TYPE: NORMAL

## 2022-09-16 DIAGNOSIS — I48.19 PERSISTENT ATRIAL FIBRILLATION (H): ICD-10-CM

## 2022-09-16 RX ORDER — METOPROLOL SUCCINATE 25 MG/1
TABLET, EXTENDED RELEASE ORAL
Qty: 90 TABLET | Refills: 3 | Status: SHIPPED | OUTPATIENT
Start: 2022-09-16 | End: 2023-07-13

## 2022-09-25 ENCOUNTER — HEALTH MAINTENANCE LETTER (OUTPATIENT)
Age: 77
End: 2022-09-25

## 2022-09-28 ENCOUNTER — TELEPHONE (OUTPATIENT)
Dept: INTERNAL MEDICINE | Facility: CLINIC | Age: 77
End: 2022-09-28

## 2022-09-28 ENCOUNTER — LAB (OUTPATIENT)
Dept: LAB | Facility: CLINIC | Age: 77
End: 2022-09-28
Payer: MEDICARE

## 2022-09-28 ENCOUNTER — ANTICOAGULATION THERAPY VISIT (OUTPATIENT)
Dept: ANTICOAGULATION | Facility: CLINIC | Age: 77
End: 2022-09-28

## 2022-09-28 DIAGNOSIS — Z79.01 LONG TERM (CURRENT) USE OF ANTICOAGULANTS: Primary | ICD-10-CM

## 2022-09-28 DIAGNOSIS — I48.19 PERSISTENT ATRIAL FIBRILLATION (H): ICD-10-CM

## 2022-09-28 DIAGNOSIS — E03.9 ACQUIRED HYPOTHYROIDISM: ICD-10-CM

## 2022-09-28 DIAGNOSIS — I25.119 CORONARY ARTERY DISEASE INVOLVING NATIVE CORONARY ARTERY OF NATIVE HEART WITH ANGINA PECTORIS (H): Primary | ICD-10-CM

## 2022-09-28 DIAGNOSIS — I25.119 CORONARY ARTERY DISEASE INVOLVING NATIVE CORONARY ARTERY OF NATIVE HEART WITH ANGINA PECTORIS (H): ICD-10-CM

## 2022-09-28 LAB
INR BLD: 2.5 (ref 0.9–1.1)
TSH SERPL DL<=0.005 MIU/L-ACNC: 4.17 UIU/ML (ref 0.3–4.2)

## 2022-09-28 PROCEDURE — 36415 COLL VENOUS BLD VENIPUNCTURE: CPT

## 2022-09-28 PROCEDURE — 85610 PROTHROMBIN TIME: CPT

## 2022-09-28 PROCEDURE — 36416 COLLJ CAPILLARY BLOOD SPEC: CPT

## 2022-09-28 PROCEDURE — 84443 ASSAY THYROID STIM HORMONE: CPT

## 2022-09-28 PROCEDURE — 80061 LIPID PANEL: CPT

## 2022-09-28 NOTE — TELEPHONE ENCOUNTER
Reason for call:  Other   Patient called regarding (reason for call): Patient returning the call from Anticoagulation regarding the question the patient had.  Additional comments: Please call the patient back as soon as possible, agent found no extension in the documentation for transfer.    Phone number to reach patient:  Home number on file [phone] (home)    Best Time:  ASAP    Can we leave a detailed message on this number?  NO    Travel screening: Not Applicable

## 2022-09-28 NOTE — TELEPHONE ENCOUNTER
Talked with Obed, he is concerned hoping his lipid panel has been ordered. Lab is awaiting the order and the request has been sent to Dr Nguyen

## 2022-09-28 NOTE — PROGRESS NOTES
ANTICOAGULATION MANAGEMENT     Obed Haley 77 year old male is on warfarin with therapeutic INR result. (Goal INR 2.0-3.0)    Recent labs: (last 7 days)     09/28/22  0750   INR 2.5*       ASSESSMENT       Source(s): Chart Review and Patient/Caregiver Call       Warfarin doses taken: Warfarin taken as instructed    Diet: No new diet changes identified    New illness, injury, or hospitalization: No    Medication/supplement changes: None noted    Signs or symptoms of bleeding or clotting: No    Previous INR: Therapeutic last 2(+) visits    Additional findings: None       PLAN     Recommended plan for no diet, medication or health factor changes affecting INR     Dosing Instructions: Continue your current warfarin dose with next INR in 4 weeks       Summary  As of 9/28/2022    Full warfarin instructions:  7.5 mg every Mon, Thu; 5 mg all other days   Next INR check:  10/26/2022             Telephone call with Obed who verbalizes understanding and agrees to plan    Lab visit scheduled    Education provided: None required    Plan made per [facility] anticoagulation protocol    Hesham Michel RN  Anticoagulation Clinic  9/28/2022    _______________________________________________________________________     Anticoagulation Episode Summary     Current INR goal:  2.0-3.0   TTR:  88.9 % (1 y)   Target end date:  Indefinite   Send INR reminders to:  [facility]    Indications    Long term (current) use of anticoagulants [Z79.01]  Persistent atrial fibrillation (H) [I48.19]           Comments:           Anticoagulation Care Providers     Provider Role Specialty Phone number    Franko Franz MD Referring Internal Medicine [phone]    Franko Nguyen MD Referring Internal Medicine [phone]

## 2022-09-29 LAB
CHOLEST SERPL-MCNC: 135 MG/DL
HDLC SERPL-MCNC: 46 MG/DL
LDLC SERPL CALC-MCNC: 75 MG/DL
NONHDLC SERPL-MCNC: 89 MG/DL
TRIGL SERPL-MCNC: 69 MG/DL

## 2022-10-03 ENCOUNTER — OFFICE VISIT (OUTPATIENT)
Dept: INTERNAL MEDICINE | Facility: CLINIC | Age: 77
End: 2022-10-03

## 2022-10-03 ENCOUNTER — ANCILLARY PROCEDURE (OUTPATIENT)
Dept: GENERAL RADIOLOGY | Facility: CLINIC | Age: 77
End: 2022-10-03
Attending: INTERNAL MEDICINE
Payer: MEDICARE

## 2022-10-03 VITALS
RESPIRATION RATE: 16 BRPM | HEART RATE: 61 BPM | OXYGEN SATURATION: 98 % | DIASTOLIC BLOOD PRESSURE: 70 MMHG | BODY MASS INDEX: 30.51 KG/M2 | HEIGHT: 73 IN | WEIGHT: 230.2 LBS | SYSTOLIC BLOOD PRESSURE: 131 MMHG

## 2022-10-03 DIAGNOSIS — E03.9 ACQUIRED HYPOTHYROIDISM: ICD-10-CM

## 2022-10-03 DIAGNOSIS — Z95.0 CARDIAC PACEMAKER IN SITU: ICD-10-CM

## 2022-10-03 DIAGNOSIS — Z95.3 S/P MITRAL VALVE REPLACEMENT WITH TISSUE VALVE: ICD-10-CM

## 2022-10-03 DIAGNOSIS — Z00.00 ANNUAL PHYSICAL EXAM: Primary | ICD-10-CM

## 2022-10-03 DIAGNOSIS — R10.31 RIGHT GROIN PAIN: ICD-10-CM

## 2022-10-03 DIAGNOSIS — I25.119 CORONARY ARTERY DISEASE INVOLVING NATIVE CORONARY ARTERY OF NATIVE HEART WITH ANGINA PECTORIS (H): ICD-10-CM

## 2022-10-03 DIAGNOSIS — I34.0 NON-RHEUMATIC MITRAL REGURGITATION: ICD-10-CM

## 2022-10-03 DIAGNOSIS — G47.33 OSA ON CPAP: ICD-10-CM

## 2022-10-03 DIAGNOSIS — I48.19 PERSISTENT ATRIAL FIBRILLATION (H): ICD-10-CM

## 2022-10-03 PROCEDURE — 73502 X-RAY EXAM HIP UNI 2-3 VIEWS: CPT | Mod: TC | Performed by: RADIOLOGY

## 2022-10-03 PROCEDURE — G0008 ADMIN INFLUENZA VIRUS VAC: HCPCS | Performed by: INTERNAL MEDICINE

## 2022-10-03 PROCEDURE — 90662 IIV NO PRSV INCREASED AG IM: CPT | Performed by: INTERNAL MEDICINE

## 2022-10-03 PROCEDURE — 99214 OFFICE O/P EST MOD 30 MIN: CPT | Mod: 25 | Performed by: INTERNAL MEDICINE

## 2022-10-03 PROCEDURE — G0439 PPPS, SUBSEQ VISIT: HCPCS | Performed by: INTERNAL MEDICINE

## 2022-10-03 RX ORDER — LEVOTHYROXINE SODIUM 100 UG/1
100 TABLET ORAL DAILY
Qty: 90 TABLET | Refills: 4 | Status: SHIPPED | OUTPATIENT
Start: 2022-10-03 | End: 2023-11-08

## 2022-10-03 ASSESSMENT — ENCOUNTER SYMPTOMS
CHILLS: 1
WEAKNESS: 0
HEADACHES: 0
HEARTBURN: 0
JOINT SWELLING: 1
COUGH: 0
PARESTHESIAS: 0
DIZZINESS: 0
ABDOMINAL PAIN: 0
DYSURIA: 0
PALPITATIONS: 0
NAUSEA: 0
SORE THROAT: 0
SHORTNESS OF BREATH: 1
NERVOUS/ANXIOUS: 0
DIARRHEA: 0
CONSTIPATION: 0
EYE PAIN: 0
MYALGIAS: 0
FREQUENCY: 0
HEMATOCHEZIA: 0
ARTHRALGIAS: 1
FEVER: 0
HEMATURIA: 0

## 2022-10-03 ASSESSMENT — ACTIVITIES OF DAILY LIVING (ADL): CURRENT_FUNCTION: NO ASSISTANCE NEEDED

## 2022-10-03 ASSESSMENT — PAIN SCALES - GENERAL: PAINLEVEL: NO PAIN (0)

## 2022-10-03 NOTE — PROGRESS NOTES
"SUBJECTIVE:   Obed is a 77 year old who presents for Preventive Visit.  This 77-year-old man comes in for annual wellness visit and follow-up of numerous medical issues.  Overall things have been stable.  He he does have some dyspnea with exertion type symptoms and is following closely with cardiology, may have a little bit of prosthetic valve/mitral stenosis.  He has underlying atrial fibrillation and is on warfarin.  He has had a little bit of hypothyroidism and we have been titrating his levothyroxine.  He does take a statin.    Patient has been advised of split billing requirements and indicates understanding: Yes  Are you in the first 12 months of your Medicare coverage?  No    Healthy Habits:     In general, how would you rate your overall health?  Good    Frequency of exercise:  4-5 days/week    Duration of exercise:  15-30 minutes    Do you usually eat at least 4 servings of fruit and vegetables a day, include whole grains    & fiber and avoid regularly eating high fat or \"junk\" foods?  Yes    Medication side effects:  Lightheadedness    Ability to successfully perform activities of daily living:  No assistance needed    Home Safety:  No safety concerns identified    Hearing Impairment:  Difficulty following a conversation in a noisy restaurant or crowded room and find that men's voices are easier to understand than woman's    In the past 6 months, have you been bothered by leaking of urine?  No    In general, how would you rate your overall mental or emotional health?  Good      PHQ-2 Total Score: 0    Additional concerns today:  Yes    Do you feel safe in your environment? Yes    Have you ever done Advance Care Planning? (For example, a Health Directive, POLST, or a discussion with a medical provider or your loved ones about your wishes): Yes, patient states has an Advance Care Planning document and will bring a copy to the clinic.       Fall risk  Fallen 2 or more times in the past year?: No  Any fall " with injury in the past year?: No    Cognitive Screening   1) Repeat 3 items (Leader, Season, Table)    2) Clock draw: NORMAL  3) 3 item recall: Recalls 3 objects  Results: 3 items recalled: COGNITIVE IMPAIRMENT LESS LIKELY    Mini-CogTM Copyright S Vargas. Licensed by the author for use in [facility]; reprinted with permission (felicia@[facility]). All rights reserved.      Do you have sleep apnea, excessive snoring or daytime drowsiness?: yes    Reviewed and updated as needed this visit by clinical staff   Tobacco  Allergies  Meds   Med Hx  Surg Hx  Fam Hx            Reviewed and updated as needed this visit by Provider                   Social History     Tobacco Use     Smoking status: Former Smoker     Quit date: 1/14/2007     Years since quitting: 15.7     Smokeless tobacco: Never Used   Substance Use Topics     Alcohol use: Yes     Alcohol/week: 3.0 standard drinks     Comment: Alcoholic Drinks/day: occasionally         Alcohol Use 10/3/2022   Prescreen: >3 drinks/day or >7 drinks/week? No               Current providers sharing in care for this patient include:   Patient Care Team:  Franko Nguyen MD as PCP - General  Franko Nguyen MD as Assigned PCP  Sha Henao MD as Assigned Heart and Vascular Provider    The following health maintenance items are reviewed in Epic and correct as of today:  Health Maintenance   Topic Date Due     ANNUAL REVIEW OF HM ORDERS  Never done     COVID-19 Vaccine (3 - Booster for Pfizer series) 07/07/2021     INFLUENZA VACCINE (1) 09/01/2022     MEDICARE ANNUAL WELLNESS VISIT  09/30/2022     FALL RISK ASSESSMENT  10/03/2023     DTAP/TDAP/TD IMMUNIZATION (2 - Td or Tdap) 01/14/2025     ADVANCE CARE PLANNING  09/30/2026     LIPID  09/28/2027     HEPATITIS C SCREENING  Completed     PHQ-2 (once per calendar year)  Completed     Pneumococcal Vaccine: 65+ Years  Completed     ZOSTER IMMUNIZATION  Completed     IPV IMMUNIZATION  Aged Out     MENINGITIS IMMUNIZATION   "Aged Out     HEPATITIS B IMMUNIZATION  Aged Out         Review of Systems   Constitutional: Positive for chills. Negative for fever.   HENT: Positive for hearing loss. Negative for congestion, ear pain and sore throat.    Eyes: Negative for pain and visual disturbance.   Respiratory: Positive for shortness of breath. Negative for cough.    Cardiovascular: Negative for chest pain, palpitations and peripheral edema.   Gastrointestinal: Negative for abdominal pain, constipation, diarrhea, heartburn, hematochezia and nausea.   Genitourinary: Negative for dysuria, frequency, genital sores, hematuria, impotence, penile discharge and urgency.   Musculoskeletal: Positive for arthralgias and joint swelling. Negative for myalgias.   Skin: Negative for rash.   Neurological: Negative for dizziness, weakness, headaches and paresthesias.   Psychiatric/Behavioral: The patient is not nervous/anxious.        OBJECTIVE:   /70 (BP Location: Right arm, Patient Position: Sitting, Cuff Size: Adult Large)   Pulse 61   Resp 16   Ht 1.861 m (6' 1.25\")   Wt 104.4 kg (230 lb 3.2 oz)   SpO2 98%   BMI 30.16 kg/m   Estimated body mass index is 30.16 kg/m  as calculated from the following:    Height as of this encounter: 1.861 m (6' 1.25\").    Weight as of this encounter: 104.4 kg (230 lb 3.2 oz).  Physical Exam  EYES: Eyelids, conjunctiva, and sclera were normal. Pupils were normal. Cornea, iris, and lens were normal bilaterally.  HEAD, EARS, NOSE, MOUTH, AND THROAT: Head and face were normal. Hearing was normal to voice and the ears were normal to external exam.  NECK: Neck appearance was normal. There were no neck masses and the thyroid was not enlarged.  RESPIRATORY: Breathing pattern was normal and the chest moved symmetrically.  Percussion/auscultatory percussion was normal.  Lung sounds were normal and there were no abnormal sounds.  CARDIOVASCULAR: Heart rate and rhythm were normal.  S1 and S2 were normal and there were no " extra sounds or murmurs. Peripheral pulses in arms and legs were normal.  Jugular venous pressure was normal.  There was no peripheral edema.  GASTROINTESTINAL: The abdomen was normal in contour.  Bowel sounds were present.  Percussion detected no organ enlargement or tenderness.  Palpation detected no tenderness, mass, or enlarged organs.   MUSCULOSKELETAL: Skeletal configuration was normal and muscle mass was normal for age. Joint appearance was overall normal.  LYMPHATIC: There were no enlarged nodes.  SKIN/HAIR/NAILS: Skin color was normal.  There were no skin lesions.  Hair and nails were normal.  NEUROLOGIC: The patient was alert and oriented to person, place, time, and circumstance. Speech was normal. Cranial nerves were normal. Motor strength was normal for age. The patient was normally coordinated.  PSYCHIATRIC:  Mood and affect were normal and the patient had normal recent and remote memory. The patient's judgment and insight were normal.    ASSESSMENT / PLAN:   1. Annual physical exam  This is a 77-year-old man with issues as discussed below.  Will get COVID vaccination later    2. Non-rheumatic mitral regurgitation  3. S/P mitral valve replacement with tissue valve  Ongoing evaluation and monitoring/surveillance from cardiology    4. Persistent atrial fibrillation (H)  Continue with current strategy    5. Coronary artery disease involving native coronary artery of native heart with angina pectoris (H)  Continue secondary prevention    6. Cardiac pacemaker in situ, dual chamber    7. Acquired hypothyroidism  Increase levothyroxine  - levothyroxine (SYNTHROID/LEVOTHROID) 100 MCG tablet; Take 1 tablet (100 mcg) by mouth daily  Dispense: 90 tablet; Refill: 4    8. KYLE on CPAP  This patient uses CPAP on a nightly basis.  He receives benefit from this and improvement of his obstructive sleep apnea as well as his symptoms of obstructive sleep apnea.  His AHI at the time of diagnosis was over 38.  His current  "pressure is 11 cm of water.  - CPAP Order for DME - ONLY FOR DME    9. Right groin pain  - XR Hip Right 2-3 Views; Future    COUNSELING:    Estimated body mass index is 30.16 kg/m  as calculated from the following:    Height as of this encounter: 1.861 m (6' 1.25\").    Weight as of this encounter: 104.4 kg (230 lb 3.2 oz).    Weight management plan: Discussed healthy diet and exercise guidelines    He reports that he quit smoking about 15 years ago. He has never used smokeless tobacco.      Appropriate preventive services were discussed with this patient, including applicable screening as appropriate for cardiovascular disease, diabetes, osteopenia/osteoporosis, and glaucoma.  As appropriate for age/gender, discussed screening for colorectal cancer, prostate cancer, breast cancer, and cervical cancer. Checklist reviewing preventive services available has been given to the patient.    Reviewed patients plan of care and provided an AVS. The Basic Care Plan (routine screening as documented in Health Maintenance) for Obed meets the Care Plan requirement. This Care Plan has been established and reviewed with the Patient.    Counseling Resources:  ATP IV Guidelines  Pooled Cohorts Equation Calculator  Breast Cancer Risk Calculator  Breast Cancer: Medication to Reduce Risk  FRAX Risk Assessment  ICSI Preventive Guidelines  Dietary Guidelines for Americans, 2010  [employer]'s MyPlate  ASA Prophylaxis  Lung CA Screening    Franko Nguyen MD  [facility]    Identified Health Risks:  "

## 2022-10-05 ENCOUNTER — TELEPHONE (OUTPATIENT)
Dept: ANTICOAGULATION | Facility: CLINIC | Age: 77
End: 2022-10-05

## 2022-10-05 NOTE — TELEPHONE ENCOUNTER
"Dr. Nguyen,     - please advise or did you want me to run this by our pharmacist for review?     - Pt has one tooth extraction and \"planing\" (cutting bone that has accumulated on the gum, per pt) scheduled for 10/11. He states that the provider doing the procedure is leaving the decision up to the [facility] regarding Coumadin interruption.   Routing to managing pool to review.   Michell Ta RN     "

## 2022-10-05 NOTE — TELEPHONE ENCOUNTER
If his dentist is okay with him staying on warfarin from a bleeding standpoint then he should stay on this

## 2022-10-05 NOTE — TELEPHONE ENCOUNTER
"Pt has one tooth extraction and \"planing\" (cutting bone that has accumulated on the gum, per pt) scheduled for 10/11. He states that the provider doing the procedure is leaving the decision up to the [facility] regarding Coumadin interruption.   Routing to managing pool to review.   Michell Ta RN    "

## 2022-10-05 NOTE — TELEPHONE ENCOUNTER
Spoke with patient and relayed message below from Dr Nguyen. Patient states he has not confirmed with his dentist yet and is still waiting for a call back from them. States he will inform them that Dr Nguyen is ok with him staying on the Warfarin if the dentist is.

## 2022-10-10 ENCOUNTER — LAB (OUTPATIENT)
Dept: LAB | Facility: CLINIC | Age: 77
End: 2022-10-10
Payer: MEDICARE

## 2022-10-10 ENCOUNTER — ANTICOAGULATION THERAPY VISIT (OUTPATIENT)
Dept: ANTICOAGULATION | Facility: CLINIC | Age: 77
End: 2022-10-10

## 2022-10-10 DIAGNOSIS — I48.19 PERSISTENT ATRIAL FIBRILLATION (H): ICD-10-CM

## 2022-10-10 DIAGNOSIS — Z79.01 LONG TERM (CURRENT) USE OF ANTICOAGULANTS: Primary | ICD-10-CM

## 2022-10-10 LAB — INR BLD: 2.4 (ref 0.9–1.1)

## 2022-10-10 PROCEDURE — 36416 COLLJ CAPILLARY BLOOD SPEC: CPT

## 2022-10-10 PROCEDURE — 85610 PROTHROMBIN TIME: CPT

## 2022-10-10 RX ORDER — LEVOTHYROXINE SODIUM 75 UG/1
TABLET ORAL
Qty: 90 TABLET | Refills: 1 | OUTPATIENT
Start: 2022-10-10

## 2022-10-10 NOTE — PROGRESS NOTES
ANTICOAGULATION MANAGEMENT     Obed Haley 77 year old male is on warfarin with therapeutic INR result. (Goal INR 2.0-3.0)    Recent labs: (last 7 days)     10/10/22  1603   INR 2.4*       ASSESSMENT       Source(s): Chart Review and Patient/Caregiver Call       Warfarin doses taken: Warfarin taken as instructed    Diet: No new diet changes identified    New illness, injury, or hospitalization: No    Medication/supplement changes: None noted    Signs or symptoms of bleeding or clotting: No    Previous INR: Therapeutic last 2(+) visits    Additional findings: None       PLAN     Recommended plan for no diet, medication or health factor changes affecting INR     Dosing Instructions: Continue your current warfarin dose with next INR in 2 weeks       Summary  As of 10/10/2022    Full warfarin instructions:  7.5 mg every Mon, Thu; 5 mg all other days   Next INR check:  11/7/2022             Telephone call with Obed who verbalizes understanding and agrees to plan    Lab visit scheduled    Education provided: None required    Plan made per [facility] anticoagulation protocol    Hesham Michel RN  Anticoagulation Clinic  10/10/2022    _______________________________________________________________________     Anticoagulation Episode Summary     Current INR goal:  2.0-3.0   TTR:  88.9 % (1 y)   Target end date:  Indefinite   Send INR reminders to:  [facility]    Indications    Long term (current) use of anticoagulants [Z79.01]  Persistent atrial fibrillation (H) [I48.19]           Comments:           Anticoagulation Care Providers     Provider Role Specialty Phone number    Franko Franz MD Referring Internal Medicine [phone]    Franko Nguyen MD Referring Internal Medicine [phone]

## 2022-10-10 NOTE — TELEPHONE ENCOUNTER
Outpatient Medication Detail     Disp Refills Start End DAVIE   levothyroxine (SYNTHROID/LEVOTHROID) 75 MCG tablet (Discontinued) 90 tablet 3 11/3/2021 10/3/2022 No   Sig - Route: Take 1 tablet (75 mcg) by mouth daily - Oral   Sent to pharmacy as: Levothyroxine Sodium 75 MCG Oral Tablet (SYNTHROID/LEVOTHROID)   Class: E-Prescribe   Notes to Pharmacy: Replaces 50 mcg dose   Reason for Discontinue: Reorder   Order: [id number]   E-Prescribing Status: Receipt confirmed by pharmacy (11/3/2021  1:54 PM CDT)

## 2022-10-17 ENCOUNTER — OFFICE VISIT (OUTPATIENT)
Dept: CARDIOLOGY | Facility: CLINIC | Age: 77
End: 2022-10-17
Payer: MEDICARE

## 2022-10-17 ENCOUNTER — ANCILLARY PROCEDURE (OUTPATIENT)
Dept: CARDIOLOGY | Facility: CLINIC | Age: 77
End: 2022-10-17
Attending: INTERNAL MEDICINE
Payer: MEDICARE

## 2022-10-17 VITALS
HEIGHT: 73 IN | RESPIRATION RATE: 16 BRPM | SYSTOLIC BLOOD PRESSURE: 131 MMHG | WEIGHT: 224.2 LBS | BODY MASS INDEX: 29.72 KG/M2 | HEART RATE: 60 BPM | DIASTOLIC BLOOD PRESSURE: 78 MMHG

## 2022-10-17 DIAGNOSIS — I49.5 SICK SINUS SYNDROME (H): Primary | ICD-10-CM

## 2022-10-17 DIAGNOSIS — Z95.0 PACEMAKER: ICD-10-CM

## 2022-10-17 DIAGNOSIS — I48.19 PERSISTENT ATRIAL FIBRILLATION (H): ICD-10-CM

## 2022-10-17 DIAGNOSIS — Z95.3 S/P MITRAL VALVE REPLACEMENT WITH TISSUE VALVE: Primary | ICD-10-CM

## 2022-10-17 LAB
MDC_IDC_LEAD_IMPLANT_DT: NORMAL
MDC_IDC_LEAD_IMPLANT_DT: NORMAL
MDC_IDC_LEAD_LOCATION: NORMAL
MDC_IDC_LEAD_LOCATION: NORMAL
MDC_IDC_LEAD_LOCATION_DETAIL_1: NORMAL
MDC_IDC_LEAD_LOCATION_DETAIL_1: NORMAL
MDC_IDC_LEAD_MFG: NORMAL
MDC_IDC_LEAD_MFG: NORMAL
MDC_IDC_LEAD_MODEL: NORMAL
MDC_IDC_LEAD_MODEL: NORMAL
MDC_IDC_LEAD_POLARITY_TYPE: NORMAL
MDC_IDC_LEAD_POLARITY_TYPE: NORMAL
MDC_IDC_LEAD_SERIAL: NORMAL
MDC_IDC_LEAD_SERIAL: NORMAL
MDC_IDC_MSMT_BATTERY_DTM: NORMAL
MDC_IDC_MSMT_BATTERY_REMAINING_LONGEVITY: 96 MO
MDC_IDC_MSMT_BATTERY_REMAINING_PERCENTAGE: 96 %
MDC_IDC_MSMT_BATTERY_STATUS: NORMAL
MDC_IDC_MSMT_LEADCHNL_RA_IMPEDANCE_VALUE: 481 OHM
MDC_IDC_MSMT_LEADCHNL_RA_LEAD_CHANNEL_STATUS: NORMAL
MDC_IDC_MSMT_LEADCHNL_RA_PACING_THRESHOLD_AMPLITUDE: 0.7 V
MDC_IDC_MSMT_LEADCHNL_RA_PACING_THRESHOLD_PULSEWIDTH: 0.4 MS
MDC_IDC_MSMT_LEADCHNL_RA_SENSING_INTR_AMPL: 1.3 MV
MDC_IDC_MSMT_LEADCHNL_RV_IMPEDANCE_VALUE: 483 OHM
MDC_IDC_MSMT_LEADCHNL_RV_PACING_THRESHOLD_AMPLITUDE: 0.6 V
MDC_IDC_MSMT_LEADCHNL_RV_PACING_THRESHOLD_PULSEWIDTH: 0.4 MS
MDC_IDC_MSMT_LEADCHNL_RV_SENSING_INTR_AMPL: 25 MV
MDC_IDC_PG_IMPLANT_DTM: NORMAL
MDC_IDC_PG_MFG: NORMAL
MDC_IDC_PG_MODEL: NORMAL
MDC_IDC_PG_SERIAL: NORMAL
MDC_IDC_PG_TYPE: NORMAL
MDC_IDC_SESS_CLINIC_NAME: NORMAL
MDC_IDC_SESS_DTM: NORMAL
MDC_IDC_SESS_TYPE: NORMAL
MDC_IDC_SET_BRADY_LOWRATE: 60 {BEATS}/MIN
MDC_IDC_SET_BRADY_MAX_SENSOR_RATE: 130 {BEATS}/MIN
MDC_IDC_SET_BRADY_MODE: NORMAL
MDC_IDC_SET_LEADCHNL_RA_SENSING_ADAPTATION_MODE: NORMAL
MDC_IDC_SET_LEADCHNL_RA_SENSING_SENSITIVITY: 1.5 MV
MDC_IDC_SET_LEADCHNL_RV_PACING_AMPLITUDE: NORMAL
MDC_IDC_SET_LEADCHNL_RV_PACING_CAPTURE_MODE: NORMAL
MDC_IDC_SET_LEADCHNL_RV_PACING_POLARITY: NORMAL
MDC_IDC_SET_LEADCHNL_RV_PACING_PULSEWIDTH: 0.4 MS
MDC_IDC_SET_LEADCHNL_RV_SENSING_ADAPTATION_MODE: NORMAL
MDC_IDC_SET_LEADCHNL_RV_SENSING_POLARITY: NORMAL
MDC_IDC_SET_LEADCHNL_RV_SENSING_SENSITIVITY: 1.5 MV
MDC_IDC_SET_ZONE_DETECTION_INTERVAL: 375 MS
MDC_IDC_SET_ZONE_TYPE: NORMAL
MDC_IDC_SET_ZONE_VENDOR_TYPE: NORMAL
MDC_IDC_STAT_AT_BURDEN_PERCENT: 100 %
MDC_IDC_STAT_AT_DTM_END: NORMAL
MDC_IDC_STAT_AT_DTM_START: NORMAL
MDC_IDC_STAT_AT_MODE_SW_COUNT: 0
MDC_IDC_STAT_BRADY_DTM_END: NORMAL
MDC_IDC_STAT_BRADY_DTM_START: NORMAL
MDC_IDC_STAT_BRADY_RA_PERCENT_PACED: 0 %
MDC_IDC_STAT_BRADY_RV_PERCENT_PACED: 63 %
MDC_IDC_STAT_EPISODE_RECENT_COUNT: 0
MDC_IDC_STAT_EPISODE_RECENT_COUNT: 1
MDC_IDC_STAT_EPISODE_RECENT_COUNT: 47
MDC_IDC_STAT_EPISODE_RECENT_COUNT_DTM_END: NORMAL
MDC_IDC_STAT_EPISODE_RECENT_COUNT_DTM_START: NORMAL
MDC_IDC_STAT_EPISODE_TYPE: NORMAL
MDC_IDC_STAT_EPISODE_VENDOR_TYPE: NORMAL

## 2022-10-17 PROCEDURE — 99214 OFFICE O/P EST MOD 30 MIN: CPT | Performed by: INTERNAL MEDICINE

## 2022-10-17 PROCEDURE — 93280 PM DEVICE PROGR EVAL DUAL: CPT | Performed by: INTERNAL MEDICINE

## 2022-10-17 NOTE — LETTER
"10/17/2022    Franko Nguyen MD  [address] [facility] [address]    RE: Obed Haley       Dear Colleague,     I had the pleasure of seeing Obed Haley in the [facility].      Cardiology Progress Note     Assessment:  Mitral valve prolapse status post mitral valve replacement with [employer] 33 mm bioprosthesis in September 2017, mildly elevated diastolic gradient  LV systolic dysfunction likely related to long-standing mitral regurgitation and postop tachycardia; normalized LVEF  Lower extremity edema venous insufficiency, improved with diuretics  Tachycardia-bradycardia syndrome, status post permanent pacemaker, normal device function  Permanent atrial fibrillation with controlled ventricular response, on chronic warfarin(left atrial appendage was not excluded/excised during the mitral valve surgery)  Postop left pleural effusion, resolved  Coronary artery disease, mild to moderate, nonobstructive  Orthostatic lightheadedness, mild, resolved  Hypercholesterolemia, history of statin intolerance, adequate control with modest dose of atorvastatin      Plan:  He appears to be well compensated from a cardiac standpoint.  We do need to make any medication changes.  Encouraged him to stay physically active  We will get another echo next spring to check the mitral valve prosthesis gradient.  Follow-up after echo    Subjective:   This is 77 year old male who comes in today for follow-up visit.  He denies any new cardiac symptoms.  He feels less short of breath.  He denies weight gain, PND, orthopnea.  He has not had syncope.  He is unaware of irregular heart rhythm.  He denies chest pains.    Review of Systems:   Negative other than history of present illness    Objective:   /78 (BP Location: Right arm, Patient Position: Sitting, Cuff Size: Adult Large)   Pulse 60   Resp 16   Ht 1.861 m (6' 1.25\")   Wt 101.7 kg (224 lb 3.2 oz)   BMI 29.38 kg/m    Physical Exam:  GENERAL: no " distress  NECK: No JVD  LUNGS: Clear to auscultation.  CARDIAC: irregular rhythm, S1 & S2 normal.  No heaves, thrills, gallops or murmurs.  ABDOMEN: flat, negative hepatosplenomegaly, soft and non-tender.  EXTREMITIES: No evidence of cyanosis, clubbing or edema.    Current Outpatient Medications   Medication Sig Dispense Refill     amoxicillin (AMOXIL) 500 MG capsule [AMOXICILLIN (AMOXIL) 500 MG CAPSULE] Take 2,000 mg by mouth as needed (1 hour prior to dentist.).        atorvastatin (LIPITOR) 40 MG tablet Take 0.5 tablets (20 mg) by mouth daily 30 tablet 11     b complex vitamins tablet [B COMPLEX VITAMINS TABLET] Take 1 tablet by mouth every other day.        chlorpheniramine (CHLOR-TRIMETON) 4 mg tablet [CHLORPHENIRAMINE (CHLOR-TRIMETON) 4 MG TABLET] Take 1 tablet (4 mg total) by mouth every 6 (six) hours as needed for allergies. 30 tablet 11     cholecalciferol, vitamin D3, 1,000 unit tablet [CHOLECALCIFEROL, VITAMIN D3, 1,000 UNIT TABLET] Take 1,000 Units by mouth every other day.        EPINEPHrine (EPIPEN) 0.3 mg/0.3 mL atIn [EPINEPHRINE (EPIPEN) 0.3 MG/0.3 ML ATIN] Inject 0.3 mg into the shoulder, thigh, or buttocks once as needed (allergic reaction).       levothyroxine (SYNTHROID/LEVOTHROID) 100 MCG tablet Take 1 tablet (100 mcg) by mouth daily 90 tablet 4     metoprolol succinate ER (TOPROL XL) 25 MG 24 hr tablet TAKE 1 TABLET BY MOUTH EVERY DAY 90 tablet 3     MULTIVIT &MINERALS/FERROUS FUM (MULTI VITAMIN ORAL) [MULTIVIT &MINERALS/FERROUS FUM (MULTI VITAMIN ORAL)] Take 1 tablet by mouth every other day. Pt taking multi vitamin without Iron       warfarin ANTICOAGULANT (COUMADIN) 5 MG tablet TAKE 1 TABLET (5MG) TO 1 &1/2 TABLETS (7.5MG) BY MOUTH DAILY, AS DIRECTED. ADJUST DOSE BASED ON INR RESULTS. 120 tablet 1       Cardiographics:    Pacemaker interrogation: [phone]% ventricular paced high percent A. fib ventricular rate appears to be controlled    Echo: August 2020    When compared to the previous  study dated 1/17/2019, left ventricular systolic function is improved.    Normal left ventricular size, wall thickness. Septal motion is consistent with artifact of right ventricular pacing lead. Left ventricle ejection fraction is normal. The calculated left ventricular ejection fraction is 62%.    Normal right ventricular size with mildly reduced right ventricular systolic function.    Moderately enlarged both atria.    Normal functioning of bioprosthetic mitral valve.  Mean diastolic gradient 5     May 2022  1. Normal left ventricular size and systolic performance with a visually  estimated ejection fraction of 55%.  2. There is abnormal septal motion consistent with ventricular paced rhythm.  3. There is a bio-prosthetic mitral valve (documented 33 mm [employer]  Epic porcine pericardial tissue valve).  Â  Probably normal mitral valve prosthesis function; mean diastolic gradient is  7 mm Hg.  Â  There is trace mitral insufficiency.  4. Mild right ventricular enlargement with mildly reduced right ventricular  systolic performance.  5. There is moderate to severe biatrial enlargement.     When compared to the prior real-time echocardiogram dated 5 August 2020, the  findings are felt to be fairly similar on both examinations.        Coronary angiogram: August 2017  LM min dz  LAD mid 30%   Circ mild dz with branch supplies PL territory  RCA mid 50% with PDA extends to apex     Note distal LAD and PDA vessels are very small around apex        Lab Results    Chemistry/lipid CBC Cardiac Enzymes/BNP/TSH/INR   Recent Labs   Lab Test 09/28/22  0800   CHOL 135   HDL 46   LDL 75   TRIG 69     Recent Labs   Lab Test 09/28/22  0800 09/15/21  0812 09/09/20  1155   LDL 75 74 81     Recent Labs   Lab Test 09/15/21  0812      POTASSIUM 4.2   CHLORIDE 109*   CO2 25   GLC 98   BUN 20   CR 1.00   GFRESTIMATED 73   NOVA 9.5     Recent Labs   Lab Test 09/15/21  0812 07/16/20  0752 10/14/19  1626   CR 1.00 0.86 1.58*      Recent Labs   Lab Test 09/18/17  0835   A1C 5.5          Recent Labs   Lab Test 09/15/21  0812   WBC 6.2   HGB 13.6   HCT 41.0   MCV 97        Recent Labs   Lab Test 09/15/21  0812 07/16/20  0752 10/14/19  1626   HGB 13.6 12.8* 12.7*    No results for input(s): TROPONINI in the last [id number] hours.  Recent Labs   Lab Test 07/16/20  0752 10/14/19  1626 04/10/19  1354   BNP 89* 73 213*     Recent Labs   Lab Test 09/28/22  0800   TSH 4.17     Recent Labs   Lab Test 10/10/22  1603 09/28/22  0750 08/24/22  0812   INR 2.4* 2.5* 2.2*                Thank you for allowing me to participate in the care of your patient.      Sincerely,     Sha Henao MD     [facility]  cc:   Sha Henao MD  [address]  [address]  [place] [address]

## 2022-10-17 NOTE — PROGRESS NOTES
"    Cardiology Progress Note     Assessment:  Mitral valve prolapse status post mitral valve replacement with [employer] 33 mm bioprosthesis in September 2017, mildly elevated diastolic gradient  LV systolic dysfunction likely related to long-standing mitral regurgitation and postop tachycardia; normalized LVEF  Lower extremity edema venous insufficiency, improved with diuretics  Tachycardia-bradycardia syndrome, status post permanent pacemaker, normal device function  Permanent atrial fibrillation with controlled ventricular response, on chronic warfarin(left atrial appendage was not excluded/excised during the mitral valve surgery)  Postop left pleural effusion, resolved  Coronary artery disease, mild to moderate, nonobstructive  Orthostatic lightheadedness, mild, resolved  Hypercholesterolemia, history of statin intolerance, adequate control with modest dose of atorvastatin      Plan:  He appears to be well compensated from a cardiac standpoint.  We do need to make any medication changes.  Encouraged him to stay physically active  We will get another echo next spring to check the mitral valve prosthesis gradient.  Follow-up after echo    Subjective:   This is 77 year old male who comes in today for follow-up visit.  He denies any new cardiac symptoms.  He feels less short of breath.  He denies weight gain, PND, orthopnea.  He has not had syncope.  He is unaware of irregular heart rhythm.  He denies chest pains.    Review of Systems:   Negative other than history of present illness    Objective:   /78 (BP Location: Right arm, Patient Position: Sitting, Cuff Size: Adult Large)   Pulse 60   Resp 16   Ht 1.861 m (6' 1.25\")   Wt 101.7 kg (224 lb 3.2 oz)   BMI 29.38 kg/m    Physical Exam:  GENERAL: no distress  NECK: No JVD  LUNGS: Clear to auscultation.  CARDIAC: irregular rhythm, S1 & S2 normal.  No heaves, thrills, gallops or murmurs.  ABDOMEN: flat, negative hepatosplenomegaly, soft and " non-tender.  EXTREMITIES: No evidence of cyanosis, clubbing or edema.    Current Outpatient Medications   Medication Sig Dispense Refill     amoxicillin (AMOXIL) 500 MG capsule [AMOXICILLIN (AMOXIL) 500 MG CAPSULE] Take 2,000 mg by mouth as needed (1 hour prior to dentist.).        atorvastatin (LIPITOR) 40 MG tablet Take 0.5 tablets (20 mg) by mouth daily 30 tablet 11     b complex vitamins tablet [B COMPLEX VITAMINS TABLET] Take 1 tablet by mouth every other day.        chlorpheniramine (CHLOR-TRIMETON) 4 mg tablet [CHLORPHENIRAMINE (CHLOR-TRIMETON) 4 MG TABLET] Take 1 tablet (4 mg total) by mouth every 6 (six) hours as needed for allergies. 30 tablet 11     cholecalciferol, vitamin D3, 1,000 unit tablet [CHOLECALCIFEROL, VITAMIN D3, 1,000 UNIT TABLET] Take 1,000 Units by mouth every other day.        EPINEPHrine (EPIPEN) 0.3 mg/0.3 mL atIn [EPINEPHRINE (EPIPEN) 0.3 MG/0.3 ML ATIN] Inject 0.3 mg into the shoulder, thigh, or buttocks once as needed (allergic reaction).       levothyroxine (SYNTHROID/LEVOTHROID) 100 MCG tablet Take 1 tablet (100 mcg) by mouth daily 90 tablet 4     metoprolol succinate ER (TOPROL XL) 25 MG 24 hr tablet TAKE 1 TABLET BY MOUTH EVERY DAY 90 tablet 3     MULTIVIT &MINERALS/FERROUS FUM (MULTI VITAMIN ORAL) [MULTIVIT &MINERALS/FERROUS FUM (MULTI VITAMIN ORAL)] Take 1 tablet by mouth every other day. Pt taking multi vitamin without Iron       warfarin ANTICOAGULANT (COUMADIN) 5 MG tablet TAKE 1 TABLET (5MG) TO 1 &1/2 TABLETS (7.5MG) BY MOUTH DAILY, AS DIRECTED. ADJUST DOSE BASED ON INR RESULTS. 120 tablet 1       Cardiographics:    Pacemaker interrogation: [phone]% ventricular paced high percent A. fib ventricular rate appears to be controlled    Echo: August 2020    When compared to the previous study dated 1/17/2019, left ventricular systolic function is improved.    Normal left ventricular size, wall thickness. Septal motion is consistent with artifact of right ventricular pacing lead.  Left ventricle ejection fraction is normal. The calculated left ventricular ejection fraction is 62%.    Normal right ventricular size with mildly reduced right ventricular systolic function.    Moderately enlarged both atria.    Normal functioning of bioprosthetic mitral valve.  Mean diastolic gradient 5     May 2022  1. Normal left ventricular size and systolic performance with a visually  estimated ejection fraction of 55%.  2. There is abnormal septal motion consistent with ventricular paced rhythm.  3. There is a bio-prosthetic mitral valve (documented 33 mm [employer]  Epic porcine pericardial tissue valve).  Â  Probably normal mitral valve prosthesis function; mean diastolic gradient is  7 mm Hg.  Â  There is trace mitral insufficiency.  4. Mild right ventricular enlargement with mildly reduced right ventricular  systolic performance.  5. There is moderate to severe biatrial enlargement.     When compared to the prior real-time echocardiogram dated 5 August 2020, the  findings are felt to be fairly similar on both examinations.        Coronary angiogram: August 2017  LM min dz  LAD mid 30%   Circ mild dz with branch supplies PL territory  RCA mid 50% with PDA extends to apex     Note distal LAD and PDA vessels are very small around apex        Lab Results    Chemistry/lipid CBC Cardiac Enzymes/BNP/TSH/INR   Recent Labs   Lab Test 09/28/22  0800   CHOL 135   HDL 46   LDL 75   TRIG 69     Recent Labs   Lab Test 09/28/22  0800 09/15/21  0812 09/09/20  1155   LDL 75 74 81     Recent Labs   Lab Test 09/15/21  0812      POTASSIUM 4.2   CHLORIDE 109*   CO2 25   GLC 98   BUN 20   CR 1.00   GFRESTIMATED 73   NOVA 9.5     Recent Labs   Lab Test 09/15/21  0812 07/16/20  0752 10/14/19  1626   CR 1.00 0.86 1.58*     Recent Labs   Lab Test 09/18/17  0835   A1C 5.5          Recent Labs   Lab Test 09/15/21  0812   WBC 6.2   HGB 13.6   HCT 41.0   MCV 97        Recent Labs   Lab Test 09/15/21  0812  07/16/20  0752 10/14/19  1626   HGB 13.6 12.8* 12.7*    No results for input(s): TROPONINI in the last 61872 hours.  Recent Labs   Lab Test 07/16/20  0752 10/14/19  1626 04/10/19  1354   BNP 89* 73 213*     Recent Labs   Lab Test 09/28/22  0800   TSH 4.17     Recent Labs   Lab Test 10/10/22  1603 09/28/22  0750 08/24/22  0812   INR 2.4* 2.5* 2.2*

## 2022-10-17 NOTE — PATIENT INSTRUCTIONS
Obed Haley,    It was a pleasure to see you today at the [facility].     My recommendations after this visit include:    Echo next spring    DANNY Henao MD, FACC, MILI

## 2022-10-19 ENCOUNTER — MYC MEDICAL ADVICE (OUTPATIENT)
Dept: INTERNAL MEDICINE | Facility: CLINIC | Age: 77
End: 2022-10-19

## 2022-10-26 ENCOUNTER — DOCUMENTATION ONLY (OUTPATIENT)
Dept: ANTICOAGULATION | Facility: CLINIC | Age: 77
End: 2022-10-26

## 2022-10-26 ENCOUNTER — ANTICOAGULATION THERAPY VISIT (OUTPATIENT)
Dept: ANTICOAGULATION | Facility: CLINIC | Age: 77
End: 2022-10-26

## 2022-10-26 ENCOUNTER — LAB (OUTPATIENT)
Dept: LAB | Facility: CLINIC | Age: 77
End: 2022-10-26
Payer: MEDICARE

## 2022-10-26 DIAGNOSIS — Z79.01 LONG TERM (CURRENT) USE OF ANTICOAGULANTS: Primary | ICD-10-CM

## 2022-10-26 DIAGNOSIS — I48.19 PERSISTENT ATRIAL FIBRILLATION (H): ICD-10-CM

## 2022-10-26 LAB — INR BLD: 2.6 (ref 0.9–1.1)

## 2022-10-26 PROCEDURE — 36416 COLLJ CAPILLARY BLOOD SPEC: CPT

## 2022-10-26 PROCEDURE — 85610 PROTHROMBIN TIME: CPT

## 2022-10-26 NOTE — PROGRESS NOTES
ANTICOAGULATION  MANAGEMENT- Travel planning    Obed Haley reports upcoming travel plans:    Departure date: 11/13/22  Anticipated return date: April 2023.  Alternate contact information (if applicable): cell phone: [phone]      INR monitoring plan:      Perzo Okabena to monitor and dose while traveling. patient to follow up with [facility] with lab information to fax/mail standing order - if needed.     [facility]. Tel# [phone]    Anticoagulation calendar updated with date of next INR     Instructed to call the Anticoauglation Clinic for any changes, questions or concerns. [phone]  ?   Yolanda Krishna RN

## 2022-11-30 ENCOUNTER — TRANSFERRED RECORDS (OUTPATIENT)
Dept: HEALTH INFORMATION MANAGEMENT | Facility: CLINIC | Age: 77
End: 2022-11-30

## 2022-11-30 ENCOUNTER — ANTICOAGULATION THERAPY VISIT (OUTPATIENT)
Dept: ANTICOAGULATION | Facility: CLINIC | Age: 77
End: 2022-11-30

## 2022-11-30 DIAGNOSIS — Z79.01 LONG TERM (CURRENT) USE OF ANTICOAGULANTS: Primary | ICD-10-CM

## 2022-11-30 DIAGNOSIS — I48.19 PERSISTENT ATRIAL FIBRILLATION (H): ICD-10-CM

## 2022-11-30 LAB — INR (EXTERNAL): 2.3 (ref 0.9–1.1)

## 2022-11-30 NOTE — PROGRESS NOTES
Incoming fax from [facility]    Date of result  11/30/22    INR result 2.3

## 2022-12-28 ENCOUNTER — TELEPHONE (OUTPATIENT)
Dept: ANTICOAGULATION | Facility: CLINIC | Age: 77
End: 2022-12-28

## 2022-12-28 NOTE — TELEPHONE ENCOUNTER
ANTICOAGULATION     Obed Haley is overdue for INR check.      Spoke with Obed and scheduled lab appointment on he will schedule an INR    Yolanda Krishna RN

## 2022-12-29 DIAGNOSIS — E78.5 HYPERLIPIDEMIA LDL GOAL <100: ICD-10-CM

## 2022-12-30 RX ORDER — ATORVASTATIN CALCIUM 40 MG/1
20 TABLET, FILM COATED ORAL DAILY
Qty: 30 TABLET | Refills: 11 | OUTPATIENT
Start: 2022-12-30

## 2022-12-30 NOTE — TELEPHONE ENCOUNTER
"Last Written Prescription Date:  10/15/21  Last Fill Quantity: 30,  # refills: 11   Last office visit provider:   10/3/22    Requested Prescriptions   Pending Prescriptions Disp Refills     atorvastatin (LIPITOR) 40 MG tablet 30 tablet 11     Sig: Take 0.5 tablets (20 mg) by mouth daily       Statins Protocol Passed - 12/29/2022 11:51 AM        Passed - LDL on file in past 12 months     Recent Labs   Lab Test 09/28/22  0800   LDL 75             Passed - No abnormal creatine kinase in past 12 months     No lab results found.             Passed - Recent (12 mo) or future (30 days) visit within the authorizing provider's specialty     Patient has had an office visit with the authorizing provider or a provider within the authorizing providers department within the previous 12 mos or has a future within next 30 days. See \"Patient Info\" tab in inbasket, or \"Choose Columns\" in Meds & Orders section of the refill encounter.              Passed - Medication is active on med list        Passed - Patient is age 18 or older             Romelia Patricio RN 12/30/22 4:56 PM  "

## 2023-01-02 DIAGNOSIS — E78.5 HYPERLIPIDEMIA LDL GOAL <100: ICD-10-CM

## 2023-01-02 RX ORDER — ATORVASTATIN CALCIUM 40 MG/1
20 TABLET, FILM COATED ORAL DAILY
Qty: 30 TABLET | Refills: 11 | Status: SHIPPED | OUTPATIENT
Start: 2023-01-02 | End: 2024-02-12

## 2023-01-04 ENCOUNTER — TRANSFERRED RECORDS (OUTPATIENT)
Dept: HEALTH INFORMATION MANAGEMENT | Facility: CLINIC | Age: 78
End: 2023-01-04

## 2023-01-04 LAB — INR (EXTERNAL): 2.8 (ref 0.9–1.1)

## 2023-01-05 ENCOUNTER — ANTICOAGULATION THERAPY VISIT (OUTPATIENT)
Dept: ANTICOAGULATION | Facility: CLINIC | Age: 78
End: 2023-01-05

## 2023-01-05 DIAGNOSIS — I48.19 PERSISTENT ATRIAL FIBRILLATION (H): ICD-10-CM

## 2023-01-05 DIAGNOSIS — Z79.01 LONG TERM (CURRENT) USE OF ANTICOAGULANTS: Primary | ICD-10-CM

## 2023-01-05 NOTE — PROGRESS NOTES
ANTICOAGULATION MANAGEMENT     Obed Haley 77 year old male is on warfarin with therapeutic INR result. (Goal INR 2.0-3.0)    Recent labs: (last 7 days)     01/04/23  0000   INR 2.8*       ASSESSMENT       Source(s): Chart Review and Patient/Caregiver Call       Warfarin doses taken: Warfarin taken as instructed    Diet: No new diet changes identified    New illness, injury, or hospitalization: No    Medication/supplement changes: None noted    Signs or symptoms of bleeding or clotting: No    Previous INR: Therapeutic last 2(+) visits    Additional findings: None       PLAN     Recommended plan for no diet, medication or health factor changes affecting INR     Dosing Instructions: Continue your current warfarin dose with next INR in 5 weeks       Summary  As of 1/5/2023    Full warfarin instructions:  7.5 mg every Mon, Thu; 5 mg all other days   Next INR check:  2/9/2023             Telephone call with Obed who verbalizes understanding and agrees to plan    Patient using outside facility for labs    Education provided:     Contact [phone] with any changes, questions or concerns.     Plan made per [facility] anticoagulation protocol    Brandi Nieto RN  Anticoagulation Clinic  1/5/2023    _______________________________________________________________________     Anticoagulation Episode Summary     Current INR goal:  2.0-3.0   TTR:  97.4 % (1 y)   Target end date:  Indefinite   Send INR reminders to:  [facility]    Indications    Long term (current) use of anticoagulants [Z79.01]  Persistent atrial fibrillation (H) [I48.19]           Comments:           Anticoagulation Care Providers     Provider Role Specialty Phone number    Franko Franz MD Referring Internal Medicine [phone]    Franko Nguyen MD Referring Internal Medicine [phone]

## 2023-01-17 ENCOUNTER — ANCILLARY PROCEDURE (OUTPATIENT)
Dept: CARDIOLOGY | Facility: CLINIC | Age: 78
End: 2023-01-17
Attending: INTERNAL MEDICINE
Payer: MEDICARE

## 2023-01-17 DIAGNOSIS — Z95.0 PACEMAKER: ICD-10-CM

## 2023-01-17 DIAGNOSIS — I49.5 SICK SINUS SYNDROME (H): ICD-10-CM

## 2023-01-17 LAB
MDC_IDC_EPISODE_DTM: NORMAL
MDC_IDC_EPISODE_DURATION: 11 S
MDC_IDC_EPISODE_DURATION: 12 S
MDC_IDC_EPISODE_DURATION: 13 S
MDC_IDC_EPISODE_DURATION: 14 S
MDC_IDC_EPISODE_DURATION: 14 S
MDC_IDC_EPISODE_DURATION: 15 S
MDC_IDC_EPISODE_DURATION: 16 S
MDC_IDC_EPISODE_DURATION: 24 S
MDC_IDC_EPISODE_ID: NORMAL
MDC_IDC_EPISODE_TYPE: NORMAL
MDC_IDC_LEAD_IMPLANT_DT: NORMAL
MDC_IDC_LEAD_IMPLANT_DT: NORMAL
MDC_IDC_LEAD_LOCATION: NORMAL
MDC_IDC_LEAD_LOCATION: NORMAL
MDC_IDC_LEAD_LOCATION_DETAIL_1: NORMAL
MDC_IDC_LEAD_LOCATION_DETAIL_1: NORMAL
MDC_IDC_LEAD_MFG: NORMAL
MDC_IDC_LEAD_MFG: NORMAL
MDC_IDC_LEAD_MODEL: NORMAL
MDC_IDC_LEAD_MODEL: NORMAL
MDC_IDC_LEAD_POLARITY_TYPE: NORMAL
MDC_IDC_LEAD_POLARITY_TYPE: NORMAL
MDC_IDC_LEAD_SERIAL: NORMAL
MDC_IDC_LEAD_SERIAL: NORMAL
MDC_IDC_MSMT_BATTERY_DTM: NORMAL
MDC_IDC_MSMT_BATTERY_REMAINING_LONGEVITY: 114 MO
MDC_IDC_MSMT_BATTERY_REMAINING_PERCENTAGE: 100 %
MDC_IDC_MSMT_BATTERY_STATUS: NORMAL
MDC_IDC_MSMT_LEADCHNL_RA_IMPEDANCE_VALUE: 507 OHM
MDC_IDC_MSMT_LEADCHNL_RV_IMPEDANCE_VALUE: 468 OHM
MDC_IDC_MSMT_LEADCHNL_RV_PACING_THRESHOLD_AMPLITUDE: 0.7 V
MDC_IDC_MSMT_LEADCHNL_RV_PACING_THRESHOLD_PULSEWIDTH: 0.4 MS
MDC_IDC_PG_IMPLANT_DTM: NORMAL
MDC_IDC_PG_MFG: NORMAL
MDC_IDC_PG_MODEL: NORMAL
MDC_IDC_PG_SERIAL: NORMAL
MDC_IDC_PG_TYPE: NORMAL
MDC_IDC_SESS_CLINIC_NAME: NORMAL
MDC_IDC_SESS_DTM: NORMAL
MDC_IDC_SESS_TYPE: NORMAL
MDC_IDC_SET_BRADY_AT_MODE_SWITCH_RATE: 170 {BEATS}/MIN
MDC_IDC_SET_BRADY_LOWRATE: 60 {BEATS}/MIN
MDC_IDC_SET_BRADY_MAX_SENSOR_RATE: 130 {BEATS}/MIN
MDC_IDC_SET_BRADY_MODE: NORMAL
MDC_IDC_SET_LEADCHNL_RA_SENSING_ADAPTATION_MODE: NORMAL
MDC_IDC_SET_LEADCHNL_RA_SENSING_SENSITIVITY: 1.5 MV
MDC_IDC_SET_LEADCHNL_RV_PACING_AMPLITUDE: 1.1 V
MDC_IDC_SET_LEADCHNL_RV_PACING_CAPTURE_MODE: NORMAL
MDC_IDC_SET_LEADCHNL_RV_PACING_POLARITY: NORMAL
MDC_IDC_SET_LEADCHNL_RV_PACING_PULSEWIDTH: 0.4 MS
MDC_IDC_SET_LEADCHNL_RV_SENSING_ADAPTATION_MODE: NORMAL
MDC_IDC_SET_LEADCHNL_RV_SENSING_POLARITY: NORMAL
MDC_IDC_SET_LEADCHNL_RV_SENSING_SENSITIVITY: 1.5 MV
MDC_IDC_SET_ZONE_DETECTION_INTERVAL: 375 MS
MDC_IDC_SET_ZONE_TYPE: NORMAL
MDC_IDC_SET_ZONE_VENDOR_TYPE: NORMAL
MDC_IDC_STAT_BRADY_DTM_END: NORMAL
MDC_IDC_STAT_BRADY_DTM_START: NORMAL
MDC_IDC_STAT_BRADY_RA_PERCENT_PACED: 0 %
MDC_IDC_STAT_BRADY_RV_PERCENT_PACED: 66 %
MDC_IDC_STAT_EPISODE_RECENT_COUNT: 0
MDC_IDC_STAT_EPISODE_RECENT_COUNT: 10
MDC_IDC_STAT_EPISODE_RECENT_COUNT_DTM_END: NORMAL
MDC_IDC_STAT_EPISODE_RECENT_COUNT_DTM_START: NORMAL
MDC_IDC_STAT_EPISODE_TYPE: NORMAL
MDC_IDC_STAT_EPISODE_VENDOR_TYPE: NORMAL

## 2023-01-17 PROCEDURE — 93296 REM INTERROG EVL PM/IDS: CPT | Performed by: INTERNAL MEDICINE

## 2023-01-17 PROCEDURE — 93294 REM INTERROG EVL PM/LDLS PM: CPT | Performed by: INTERNAL MEDICINE

## 2023-02-20 ENCOUNTER — TELEPHONE (OUTPATIENT)
Dept: ANTICOAGULATION | Facility: CLINIC | Age: 78
End: 2023-02-20
Payer: MEDICARE

## 2023-02-20 ENCOUNTER — TELEPHONE (OUTPATIENT)
Dept: INTERNAL MEDICINE | Facility: CLINIC | Age: 78
End: 2023-02-20
Payer: MEDICARE

## 2023-02-20 DIAGNOSIS — I48.19 PERSISTENT ATRIAL FIBRILLATION (H): ICD-10-CM

## 2023-02-20 DIAGNOSIS — Z79.01 LONG TERM (CURRENT) USE OF ANTICOAGULANTS: Primary | ICD-10-CM

## 2023-02-20 LAB — INR (EXTERNAL): 2.2 (ref 0.9–1.1)

## 2023-02-20 NOTE — TELEPHONE ENCOUNTER
Patient Returning Call    Reason for call:  Patient returning call from Anticoagulation nurse.    Information relayed to patient:  Nurse will call him back later today.    Patient has additional questions:  No    What are your questions/concerns:  Patient said he is getting his INR checked this afternoon.    Could we send this information to you in [employer] or would you prefer to receive a phone call?:   Patient would prefer a phone call   Okay to leave a detailed message?: Yes at Home number on file [phone] ([place])

## 2023-02-20 NOTE — TELEPHONE ENCOUNTER
Fax Received from [facility] in [facility].  INR today is 2.2.  Apears patient may be seeing this provider while in [place] for INR Management?

## 2023-02-20 NOTE — TELEPHONE ENCOUNTER
ANTICOAGULATION MANAGEMENT     Obed Edmondsalexa 77 year old male is on warfarin with therapeutic INR result. (Goal INR )    Recent labs: (last 7 days)     02/20/23  1000   INR 2.2*       ASSESSMENT       Source(s): Chart Review       Previous INR: Therapeutic last 2 visits    Additional findings:  [place] in [place] from Jan - April.    Check when patient is returning to [place]    Check if [facility] is dosing patient's warfarin therapy.       PLAN     Unable to reach Obed today.    Left message to continue current dose of warfarin 7.5 mg tonight. Request call back for assessment.    Follow up required to confirm warfarin dose taken and assess for changes    Yolanda Krishna, RN  Anticoagulation Clinic  2/20/2023

## 2023-02-21 NOTE — TELEPHONE ENCOUNTER
ANTICOAGULATION MANAGEMENT     Obed Haley 77 year old male is on warfarin with therapeutic INR result. (Goal INR 2.0 - 3.0 )    Recent labs: (last 7 days)     02/20/23  1000   INR 2.2*       ASSESSMENT       Source(s): Chart Review and Patient/Caregiver Call       Warfarin doses taken: Warfarin taken as instructed    Diet: No new diet changes identified    New illness, injury, or hospitalization: No.   Reported doing well.    Medication/supplement changes: None noted    Signs or symptoms of bleeding or clotting: No    Previous INR: Therapeutic last 2 visits    Additional findings:  Currently in [place] for the winter, from Jan - April. (driving back to [place] 1st or 2nd of April.    Obed reported, [facility] only checks INR and no dosing instructions are given.       PLAN     Recommended plan for no diet, medication or health factor changes affecting INR     Dosing Instructions: Continue your current warfarin dose with next INR in 4 weeks       Summary  As of 2/20/2023    Full warfarin instructions:  7.5 mg every Mon, Thu; 5 mg all other days   Next INR check:  3/20/2023             Telephone call with  Obed ([phone]) who verbalizes understanding and agrees to plan    Patient elected to schedule next visit  reported he will do another INR check prior to driving back to [place].    Education provided:     Taking warfarin: Importance of taking warfarin as instructed    Goal range and lab monitoring: goal range and significance of current result    Dietary considerations: importance of consistent vitamin K intake    Plan made per [facility] anticoagulation protocol    Yolanda Krishna, RN  Anticoagulation Clinic  2/21/2023    _______________________________________________________________________     Anticoagulation Episode Summary     Current INR goal:  2.0-3.0   TTR:  97.4 % (1 y)   Target end date:  Indefinite   Send INR reminders to:  [facility]    Indications    Long term (current) use of anticoagulants  [Z79.01]  Persistent atrial fibrillation (H) [I48.19]           Comments:           Anticoagulation Care Providers     Provider Role Specialty Phone number    Franko Franz MD Referring Internal Medicine [phone]    Franko Nguyen MD Referring Internal Medicine [phone]

## 2023-02-21 NOTE — TELEPHONE ENCOUNTER
General Call    Contacts       Type Contact Phone/Fax    02/20/2023 10:45 AM CST Phone (Incoming) Obed Haley (Self) [phone] (H)    02/20/2023 05:31 PM CST Phone (Outgoing) Obed Haley (Self) [phone] (H)    Left Message     02/20/2023 05:34 PM CST Phone (Outgoing) Obed Haley (Self) [phone] (H)    Left Message -  ALREADY DOSED - [facility] WILL F/U ON 2/21.    02/21/2023 10:55 AM CST Phone (Outgoing) Obed Haley (Self) [phone] (H)    Left Message -  Transfer call to VICENTE  [phone]        Reason for Call:  Patient returning call to Vicente.    What are your questions or concerns:      Date of last appointment with provider: 10-26-22    Could we send this information to you in [facility] or would you prefer to receive a phone call?:   Patient would prefer a phone call   Okay to leave a detailed message?: no at Home number on file [phone] (home)

## 2023-03-24 ENCOUNTER — TRANSFERRED RECORDS (OUTPATIENT)
Dept: HEALTH INFORMATION MANAGEMENT | Facility: CLINIC | Age: 78
End: 2023-03-24
Payer: MEDICARE

## 2023-03-24 LAB — INR (EXTERNAL): 2.2 (ref 0.9–1.1)

## 2023-03-27 ENCOUNTER — ANTICOAGULATION THERAPY VISIT (OUTPATIENT)
Dept: ANTICOAGULATION | Facility: CLINIC | Age: 78
End: 2023-03-27
Payer: MEDICARE

## 2023-03-27 DIAGNOSIS — Z79.01 LONG TERM (CURRENT) USE OF ANTICOAGULANTS: Primary | ICD-10-CM

## 2023-03-27 DIAGNOSIS — I48.19 PERSISTENT ATRIAL FIBRILLATION (H): ICD-10-CM

## 2023-03-27 NOTE — PROGRESS NOTES
Incoming fax from [facility] in [place], [place]    Encounter date: 03/24/2023    In one place on the form it says that the INR was 2.2. In another place it says the INR was 3.0. Please clarify with the patient.

## 2023-03-27 NOTE — PROGRESS NOTES
ANTICOAGULATION MANAGEMENT     Obed Haley 77 year old male is on warfarin with therapeutic INR result. (Goal INR 2.0-3.0)    Recent labs: (last 7 days)     03/24/23  1800   INR 2.2*       ASSESSMENT       Source(s): Chart Review and Patient/Caregiver Call       Warfarin doses taken: Less warfarin taken than planned which may be affecting INR    Reported taking less warfarin with 2.5mg warfarin dose on 3/25-26, d/t INR being at 3.0    Diet: No new diet changes identified    New illness, injury, or hospitalization: No    Medication/supplement changes: None noted    Signs or symptoms of bleeding or clotting: No    Previous INR: Therapeutic last 2 visits    Additional findings: Currently in [place] for the winter, from Jan - April. (driving back to [place] on 3/28/23).         PLAN     Recommended plan for temporary change(s) affecting INR     Dosing Instructions: Continue your current warfarin dose with next INR in 1-2 weeks when he returns from [place]    Summary  As of 3/27/2023    Full warfarin instructions:  7.5 mg every Mon, Thu; 5 mg all other days   Next INR check:  4/24/2023             Telephone call with  Obed ([phone]) who verbalizes understanding and agrees to plan    - advised to ensure he takes correct warfarin dose.    Patient elected to schedule next visit 1-2 wks after arriving in [place]    Education provided:     Taking warfarin: Importance of taking warfarin as instructed    Goal range and lab monitoring: goal range and significance of current result    Symptom monitoring: monitoring for clotting signs and symptoms and travel related clotting risk and prevention    Plan made per [facility] anticoagulation protocol    Yolanda Krishna RN  Anticoagulation Clinic  3/27/2023    _______________________________________________________________________     Anticoagulation Episode Summary     Current INR goal:  2.0-3.0   TTR:  97.4 % (1 y)   Target end date:  Indefinite   Send INR reminders to:  [facility]     Indications    Long term (current) use of anticoagulants [Z79.01]  Persistent atrial fibrillation (H) [I48.19]           Comments:           Anticoagulation Care Providers     Provider Role Specialty Phone number    Franko Franz MD Referring Internal Medicine [phone]    Franko Nguyen MD Referring Internal Medicine [phone]

## 2023-04-05 ENCOUNTER — ANTICOAGULATION THERAPY VISIT (OUTPATIENT)
Dept: ANTICOAGULATION | Facility: CLINIC | Age: 78
End: 2023-04-05

## 2023-04-05 ENCOUNTER — LAB (OUTPATIENT)
Dept: LAB | Facility: CLINIC | Age: 78
End: 2023-04-05
Payer: MEDICARE

## 2023-04-05 DIAGNOSIS — Z79.01 LONG TERM (CURRENT) USE OF ANTICOAGULANTS: Primary | ICD-10-CM

## 2023-04-05 DIAGNOSIS — I48.19 PERSISTENT ATRIAL FIBRILLATION (H): ICD-10-CM

## 2023-04-05 LAB — INR BLD: 2.3 (ref 0.9–1.1)

## 2023-04-05 PROCEDURE — 85610 PROTHROMBIN TIME: CPT

## 2023-04-05 PROCEDURE — 36416 COLLJ CAPILLARY BLOOD SPEC: CPT

## 2023-04-05 NOTE — PROGRESS NOTES
ANTICOAGULATION MANAGEMENT     Obed Haley 78 year old male is on warfarin with therapeutic INR result. (Goal INR 2.0-3.0)    Recent labs: (last 7 days)     04/05/23  0807   INR 2.3*       ASSESSMENT     Warfarin Lab Questionnaire        4/5/2023     8:05 AM   Dose in Tablet or Mg   TAB or MG? - warfarin 5mg tablets milligram (mg)     Pt Rptd Dose SUNDAY MONDAY TUESDAY WED THURS FRIDAY SATURDAY 4/5/2023   8:05 AM 5 7.5 5 5 7.5 5 5         4/5/2023     8:05 AM   Warfarin Lab Questionnaire   Missed doses? No   Medication changes? No   Abnormal bleeding? No   Shortness of breath? No   Injuries or illness since last INR? No   Changes in diet or alcohol? No   Upcoming surgery, procedure? No   Best number to call with results? [phone]       Additional findings: Back in [place] from spending [place] in [place] from January - March.       PLAN     Recommended plan for no diet, medication or health factor changes affecting INR     Dosing Instructions: Continue your current warfarin dose with next INR in 4 weeks       Summary  As of 4/5/2023    Full warfarin instructions:  7.5 mg every Mon, Thu; 5 mg all other days   Next INR check:  5/3/2023             Telephone call with  Obed ([phone]) who verbalizes understanding and agrees to plan    Lab visit scheduled - INR on 5/3/23 @ [place]    Education provided:     Taking warfarin: Importance of taking warfarin as instructed    Goal range and lab monitoring: goal range and significance of current result    Plan made per [facility] anticoagulation protocol    Yolanda Krishna, RN  Anticoagulation Clinic  4/5/2023    _______________________________________________________________________     Anticoagulation Episode Summary     Current INR goal:  2.0-3.0   TTR:  97.4 % (1 y)   Target end date:  Indefinite   Send INR reminders to:  [facility]    Indications    Long term (current) use of anticoagulants [Z79.01]  Persistent atrial fibrillation (H) [I48.19]           Comments:            Anticoagulation Care Providers     Provider Role Specialty Phone number    Franko Franz MD Referring Internal Medicine [phone]    Franko Nguyen MD Referring Internal Medicine [phone]

## 2023-04-07 ENCOUNTER — HOSPITAL ENCOUNTER (OUTPATIENT)
Dept: CARDIOLOGY | Facility: HOSPITAL | Age: 78
Discharge: HOME | End: 2023-04-07
Attending: INTERNAL MEDICINE | Admitting: INTERNAL MEDICINE
Payer: MEDICARE

## 2023-04-07 DIAGNOSIS — Z95.3 S/P MITRAL VALVE REPLACEMENT WITH TISSUE VALVE: ICD-10-CM

## 2023-04-07 DIAGNOSIS — I48.19 PERSISTENT ATRIAL FIBRILLATION (H): ICD-10-CM

## 2023-04-07 PROCEDURE — 93306 TTE W/DOPPLER COMPLETE: CPT

## 2023-04-07 PROCEDURE — 93306 TTE W/DOPPLER COMPLETE: CPT | Mod: 26 | Performed by: INTERNAL MEDICINE

## 2023-04-24 ENCOUNTER — TELEPHONE (OUTPATIENT)
Dept: CARDIOLOGY | Facility: CLINIC | Age: 78
End: 2023-04-24

## 2023-04-24 ENCOUNTER — ANCILLARY PROCEDURE (OUTPATIENT)
Dept: CARDIOLOGY | Facility: CLINIC | Age: 78
End: 2023-04-24
Attending: INTERNAL MEDICINE
Payer: MEDICARE

## 2023-04-24 DIAGNOSIS — I49.5 SICK SINUS SYNDROME (H): ICD-10-CM

## 2023-04-24 DIAGNOSIS — I48.19 PERSISTENT ATRIAL FIBRILLATION (H): ICD-10-CM

## 2023-04-24 DIAGNOSIS — Z95.0 PACEMAKER: ICD-10-CM

## 2023-04-24 NOTE — TELEPHONE ENCOUNTER
4/24/23: called patient to review remote transmission showing 11 seconds of NSVT on 2/10/23 at 20:31 for 30 beats,  bpm. Patient denid feeling anything- stated he usually doesn't feel any fast heart rates. Patient had 7 NSVT in October and only 3 since then. Reviewed we just like to check to see if any symptoms.  Cherelle Dillon, Device RN

## 2023-04-28 ENCOUNTER — TRANSFERRED RECORDS (OUTPATIENT)
Dept: HEALTH INFORMATION MANAGEMENT | Facility: CLINIC | Age: 78
End: 2023-04-28
Payer: MEDICARE

## 2023-05-03 ENCOUNTER — ANTICOAGULATION THERAPY VISIT (OUTPATIENT)
Dept: ANTICOAGULATION | Facility: CLINIC | Age: 78
End: 2023-05-03

## 2023-05-03 ENCOUNTER — LAB (OUTPATIENT)
Dept: LAB | Facility: CLINIC | Age: 78
End: 2023-05-03
Payer: MEDICARE

## 2023-05-03 ENCOUNTER — DOCUMENTATION ONLY (OUTPATIENT)
Dept: INTERNAL MEDICINE | Facility: CLINIC | Age: 78
End: 2023-05-03

## 2023-05-03 DIAGNOSIS — I48.92 ATRIAL FLUTTER (H): ICD-10-CM

## 2023-05-03 DIAGNOSIS — Z79.01 LONG TERM (CURRENT) USE OF ANTICOAGULANTS: Primary | ICD-10-CM

## 2023-05-03 DIAGNOSIS — I48.19 PERSISTENT ATRIAL FIBRILLATION (H): ICD-10-CM

## 2023-05-03 DIAGNOSIS — Z95.3 S/P MITRAL VALVE REPLACEMENT WITH TISSUE VALVE: ICD-10-CM

## 2023-05-03 LAB
INR BLD: 2 (ref 0.9–1.1)
MDC_IDC_EPISODE_DTM: NORMAL
MDC_IDC_EPISODE_DURATION: 14 S
MDC_IDC_EPISODE_ID: NORMAL
MDC_IDC_EPISODE_TYPE: NORMAL
MDC_IDC_LEAD_IMPLANT_DT: NORMAL
MDC_IDC_LEAD_IMPLANT_DT: NORMAL
MDC_IDC_LEAD_LOCATION: NORMAL
MDC_IDC_LEAD_LOCATION: NORMAL
MDC_IDC_LEAD_LOCATION_DETAIL_1: NORMAL
MDC_IDC_LEAD_LOCATION_DETAIL_1: NORMAL
MDC_IDC_LEAD_MFG: NORMAL
MDC_IDC_LEAD_MFG: NORMAL
MDC_IDC_LEAD_MODEL: NORMAL
MDC_IDC_LEAD_MODEL: NORMAL
MDC_IDC_LEAD_POLARITY_TYPE: NORMAL
MDC_IDC_LEAD_POLARITY_TYPE: NORMAL
MDC_IDC_LEAD_SERIAL: NORMAL
MDC_IDC_LEAD_SERIAL: NORMAL
MDC_IDC_MSMT_BATTERY_DTM: NORMAL
MDC_IDC_MSMT_BATTERY_REMAINING_LONGEVITY: 114 MO
MDC_IDC_MSMT_BATTERY_REMAINING_PERCENTAGE: 100 %
MDC_IDC_MSMT_BATTERY_STATUS: NORMAL
MDC_IDC_MSMT_LEADCHNL_RA_IMPEDANCE_VALUE: 526 OHM
MDC_IDC_MSMT_LEADCHNL_RV_IMPEDANCE_VALUE: 474 OHM
MDC_IDC_MSMT_LEADCHNL_RV_PACING_THRESHOLD_AMPLITUDE: 0.7 V
MDC_IDC_MSMT_LEADCHNL_RV_PACING_THRESHOLD_PULSEWIDTH: 0.4 MS
MDC_IDC_PG_IMPLANT_DTM: NORMAL
MDC_IDC_PG_MFG: NORMAL
MDC_IDC_PG_MODEL: NORMAL
MDC_IDC_PG_SERIAL: NORMAL
MDC_IDC_PG_TYPE: NORMAL
MDC_IDC_SESS_CLINIC_NAME: NORMAL
MDC_IDC_SESS_DTM: NORMAL
MDC_IDC_SESS_TYPE: NORMAL
MDC_IDC_SET_BRADY_AT_MODE_SWITCH_RATE: 170 {BEATS}/MIN
MDC_IDC_SET_BRADY_LOWRATE: 60 {BEATS}/MIN
MDC_IDC_SET_BRADY_MAX_SENSOR_RATE: 130 {BEATS}/MIN
MDC_IDC_SET_BRADY_MODE: NORMAL
MDC_IDC_SET_LEADCHNL_RA_SENSING_ADAPTATION_MODE: NORMAL
MDC_IDC_SET_LEADCHNL_RA_SENSING_SENSITIVITY: 1.5 MV
MDC_IDC_SET_LEADCHNL_RV_PACING_AMPLITUDE: 1.2 V
MDC_IDC_SET_LEADCHNL_RV_PACING_CAPTURE_MODE: NORMAL
MDC_IDC_SET_LEADCHNL_RV_PACING_POLARITY: NORMAL
MDC_IDC_SET_LEADCHNL_RV_PACING_PULSEWIDTH: 0.4 MS
MDC_IDC_SET_LEADCHNL_RV_SENSING_ADAPTATION_MODE: NORMAL
MDC_IDC_SET_LEADCHNL_RV_SENSING_POLARITY: NORMAL
MDC_IDC_SET_LEADCHNL_RV_SENSING_SENSITIVITY: 1.5 MV
MDC_IDC_SET_ZONE_DETECTION_INTERVAL: 375 MS
MDC_IDC_SET_ZONE_TYPE: NORMAL
MDC_IDC_SET_ZONE_VENDOR_TYPE: NORMAL
MDC_IDC_STAT_BRADY_DTM_END: NORMAL
MDC_IDC_STAT_BRADY_DTM_START: NORMAL
MDC_IDC_STAT_BRADY_RA_PERCENT_PACED: 0 %
MDC_IDC_STAT_BRADY_RV_PERCENT_PACED: 64 %
MDC_IDC_STAT_EPISODE_RECENT_COUNT: 0
MDC_IDC_STAT_EPISODE_RECENT_COUNT: 1
MDC_IDC_STAT_EPISODE_RECENT_COUNT_DTM_END: NORMAL
MDC_IDC_STAT_EPISODE_RECENT_COUNT_DTM_START: NORMAL
MDC_IDC_STAT_EPISODE_TYPE: NORMAL
MDC_IDC_STAT_EPISODE_VENDOR_TYPE: NORMAL

## 2023-05-03 PROCEDURE — 93294 REM INTERROG EVL PM/LDLS PM: CPT | Performed by: INTERNAL MEDICINE

## 2023-05-03 PROCEDURE — 93296 REM INTERROG EVL PM/IDS: CPT | Performed by: INTERNAL MEDICINE

## 2023-05-03 PROCEDURE — 85610 PROTHROMBIN TIME: CPT

## 2023-05-03 PROCEDURE — 36416 COLLJ CAPILLARY BLOOD SPEC: CPT

## 2023-05-03 NOTE — PROGRESS NOTES
ANTICOAGULATION MANAGEMENT     Obed Haley 78 year old male is on warfarin with therapeutic INR result. (Goal INR 2.0-3.0)    Recent labs: (last 7 days)     05/03/23  0801   INR 2.0*       ASSESSMENT     Warfarin Lab Questionnaire    Warfarin Doses Last 7 Days      5/3/2023     8:06 AM   Dose in Tablet or Mg   TAB or MG? - warfarin 5mg tablets milligram (mg)     Pt Rptd Dose ASMITA MONDAY TUESDAY WED THURS FRIDAY SATURDAY   5/3/2023   8:06 AM 5 7.5 5 5 7.5 5 5         5/3/2023   Warfarin Lab Questionnaire   Missed doses within past 14 days? No   Changes in diet or alcohol within past 14 days? No - Reported eating a little more Vitamin-K foods than usual.   Medication changes since last result? No   Injuries or illness since last result? No   New shortness of breath, severe headaches or sudden changes in vision since last result? No   Abnormal bleeding since last result? No   Upcoming surgery, procedure? No   Best number to call with results? [phone]        Previous result: Therapeutic last 7(+) visits.    Additional findings: None       PLAN     Recommended plan for temporary change(s) affecting INR     Dosing Instructions: Continue your current warfarin dose with next INR in 4 weeks       Summary  As of 5/3/2023    Full warfarin instructions:  7.5 mg every Mon, Thu; 5 mg all other days   Next INR check:  5/31/2023             Telephone call with  Obed ([phone]) who verbalizes understanding and agrees to plan    - advised less Vitamin-K serving for the next 2 days, then resume usual intake.    Lab visit scheduled - INR on 6/5/23 @ [facility].   - also has follow-up appt with Dr. Henao.    Education provided:     Taking warfarin: Importance of taking warfarin as instructed    Goal range and lab monitoring: goal range and significance of current result    Dietary considerations: importance of consistent vitamin K intake and impact of vitamin K foods on INR    Plan made per [facility] anticoagulation  protocol    Yolanda Krishna, RN  Anticoagulation Clinic  5/3/2023    _______________________________________________________________________     Anticoagulation Episode Summary     Current INR goal:  2.0-3.0   TTR:  100.0 % (1 y)   Target end date:  Indefinite   Send INR reminders to:  [facility]    Indications    Long term (current) use of anticoagulants [Z79.01]  Persistent atrial fibrillation (H) [I48.19]           Comments:           Anticoagulation Care Providers     Provider Role Specialty Phone number    Franko Franz MD Referring Internal Medicine [phone]    Franko Nguyen MD Referring Internal Medicine [phone]

## 2023-05-03 NOTE — PROGRESS NOTES
ANTICOAGULATION CLINIC REFERRAL RENEWAL REQUEST       An annual renewal order is required for all patients referred to [facility].?  Please review and sign the pended referral order for Obed Haley.       ANTICOAGULATION SUMMARY      Warfarin indication(s)   - Persistent / permanent / Paroxysmal Atrial Fibrillation   - Persistent Atrial Flutter   - S/p MVR  - (St. Robin tissue valve) in 9/2017.       Mechanical heart valve present?  NO       Current goal range   INR: 2.0-3.0     Goal appropriate for indication? Goal INR 2-3, standard for indication(s) above     Time in Therapeutic Range (TTR)  (Goal > 60%) 100.0 %       Office visit with referring provider's group within last year Yes on 10/3/2022.       Yolanda Krishna RN  [facility]

## 2023-05-31 ENCOUNTER — TELEPHONE (OUTPATIENT)
Dept: INTERNAL MEDICINE | Facility: CLINIC | Age: 78
End: 2023-05-31
Payer: MEDICARE

## 2023-05-31 DIAGNOSIS — E03.9 ACQUIRED HYPOTHYROIDISM: Primary | ICD-10-CM

## 2023-05-31 NOTE — TELEPHONE ENCOUNTER
Order/Referral Request    Who is requesting: Patient    Orders being requested: TSH    Reason service is needed/diagnosis: Patient want to check TSH level    When are orders needed by: Patient has an INR appointment 6/5/2023 so will need an order enter in before 6/5/2023    Has this been discussed with Provider: No    Does patient have a preference on a Group/Provider/Facility? N/A    Does patient have an appointment scheduled?: No    Where to send orders: Place orders within Epic    Could we send this information [place] you [place] or would you prefer to receive a phone call?:   Patient would prefer a phone call   Okay to leave a detailed message?: Yes at Cell number on file:    Telephone Information:   Mobile [phone]

## 2023-05-31 NOTE — TELEPHONE ENCOUNTER
Patient requesting TSH lab.      Most recent labs:   Latest Reference Range & Units 11/15/21 08:50 04/27/22 07:57   TSH 0.30 - 5.00 uIU/mL 3.38 2.71

## 2023-06-05 ENCOUNTER — LAB (OUTPATIENT)
Dept: CARDIOLOGY | Facility: CLINIC | Age: 78
End: 2023-06-05
Payer: MEDICARE

## 2023-06-05 ENCOUNTER — ANTICOAGULATION THERAPY VISIT (OUTPATIENT)
Dept: ANTICOAGULATION | Facility: CLINIC | Age: 78
End: 2023-06-05

## 2023-06-05 ENCOUNTER — OFFICE VISIT (OUTPATIENT)
Dept: CARDIOLOGY | Facility: CLINIC | Age: 78
End: 2023-06-05
Payer: MEDICARE

## 2023-06-05 VITALS
WEIGHT: 226 LBS | HEART RATE: 60 BPM | SYSTOLIC BLOOD PRESSURE: 132 MMHG | OXYGEN SATURATION: 98 % | BODY MASS INDEX: 29.61 KG/M2 | DIASTOLIC BLOOD PRESSURE: 78 MMHG | RESPIRATION RATE: 16 BRPM

## 2023-06-05 DIAGNOSIS — Z95.3 S/P MITRAL VALVE REPLACEMENT WITH TISSUE VALVE: ICD-10-CM

## 2023-06-05 DIAGNOSIS — I48.19 PERSISTENT ATRIAL FIBRILLATION (H): ICD-10-CM

## 2023-06-05 DIAGNOSIS — Z79.01 LONG TERM (CURRENT) USE OF ANTICOAGULANTS: Primary | ICD-10-CM

## 2023-06-05 DIAGNOSIS — Z79.01 LONG TERM (CURRENT) USE OF ANTICOAGULANTS: ICD-10-CM

## 2023-06-05 DIAGNOSIS — E03.9 ACQUIRED HYPOTHYROIDISM: ICD-10-CM

## 2023-06-05 LAB
INR POINT OF CARE: 2.7 (ref 0.9–1.1)
TSH SERPL DL<=0.005 MIU/L-ACNC: 0.65 UIU/ML (ref 0.3–5)

## 2023-06-05 PROCEDURE — 84443 ASSAY THYROID STIM HORMONE: CPT | Performed by: INTERNAL MEDICINE

## 2023-06-05 PROCEDURE — 85610 PROTHROMBIN TIME: CPT

## 2023-06-05 PROCEDURE — 99214 OFFICE O/P EST MOD 30 MIN: CPT | Performed by: INTERNAL MEDICINE

## 2023-06-05 PROCEDURE — 36415 COLL VENOUS BLD VENIPUNCTURE: CPT | Performed by: INTERNAL MEDICINE

## 2023-06-05 NOTE — PROGRESS NOTES
Anticoagulation-Extended Recheck Request    Under [facility] Anticoagulation protocol, Anticoagulation team may extend INR recheck interval + 1 week for each stable in range INR to max of 6 weeks. Extended interval rechecks between 8-12 weeks for patients on stable maintenance therapy require approval from referring provider.    Anticoagulation clinic suggests consideration of extended intervals in medically stable patients, with standard INR goals, and no change in warfarin dose for last 4-6 months    Obed Haley, 78 year old, male has been on:    Current recheck interval: 8 week per prior [facility] protocol  Compliant: Yes  Stable warfarin dose since: 5/31/22  INR Goal: 2.0-3.0  Time in Therapeutic range: 100%    Lab Results   Component Value Date    INR 2.7 06/05/2023    INR 2.0 05/03/2023    INR 2.3 04/05/2023    INR 2.2 03/24/2023    INR 2.2 02/20/2023    INR 2.8 01/04/2023    INR 2.3 11/30/2022    INR 2.6 10/26/2022    INR 2.4 10/10/2022    INR 2.5 09/28/2022    INR 2.2 08/24/2022    INR 2.6 07/27/2022    INR 2.0 06/29/2022    INR 2.0 05/31/2022    INR 1.9 04/14/2022    INR 2.4 03/10/2022    INR 2.5 02/08/2022    INR 2.9 01/18/2022         Please advise if Obed is approved to have extended interval rechecks every 8 weeks while on stable maintenance therapy    Thank you,    Yolanda Krishna RN

## 2023-06-05 NOTE — PROGRESS NOTES
Cardiology Progress Note     Assessment:  Mitral valve prolapse status post mitral valve replacement with [employer] 33 mm bioprosthesis in September 2017, borderline to mildly elevated diastolic gradient, stable  LV systolic dysfunction likely related to long-standing mitral regurgitation and postop tachycardia; normalized LVEF  Lower extremity edema venous insufficiency, improved with diuretics  Tachycardia-bradycardia syndrome, status post permanent pacemaker, normal device function  Permanent atrial fibrillation with controlled ventricular response, on chronic warfarin(left atrial appendage was not excluded/excised during the mitral valve surgery)  Postop left pleural effusion, resolved  Coronary artery disease, mild to moderate, nonobstructive  Orthostatic lightheadedness, mild, chronic  Hypercholesterolemia, history of statin intolerance, adequate control with modest dose of atorvastatin  Borderline dilatation of  aortic root    Plan:  Continue current cardiac medications  I encouraged him to stay physically active    We reviewed  the results of echo and recent pacemaker interrogation    Echo and follow-up in 1 year    Subjective:   This is 78 year old male who comes in today for follow-up visit.  He reports no new cardiac symptoms.  He gets mildly lightheaded when he stands up.  Sometimes he feels tired but denies weight gain, PND, orthopnea.  He has not had heart palpitations syncope.  He denies chest pains.  He is compliant with cardiac medications    Review of Systems:   Negative other than history of present illness    Objective:   /78 (BP Location: Left arm, Patient Position: Sitting, Cuff Size: Adult Large)   Pulse 60   Resp 16   Wt 102.5 kg (226 lb)   SpO2 98%   BMI 29.61 kg/m    Physical Exam:  GENERAL: no distress  NECK: No JVD  LUNGS: Clear to auscultation.  CARDIAC: regular rhythm, S1 & S2 normal.  No heaves, thrills, gallops or murmurs.  ABDOMEN: flat, negative hepatosplenomegaly,  soft and non-tender.  EXTREMITIES: No evidence of cyanosis, clubbing or edema.    Current Outpatient Medications   Medication Sig Dispense Refill     amoxicillin (AMOXIL) 500 MG capsule [AMOXICILLIN (AMOXIL) 500 MG CAPSULE] Take 2,000 mg by mouth as needed (1 hour prior to dentist.).        atorvastatin (LIPITOR) 40 MG tablet Take 0.5 tablets (20 mg) by mouth daily 30 tablet 11     b complex vitamins tablet [B COMPLEX VITAMINS TABLET] Take 1 tablet by mouth every other day.        chlorpheniramine (CHLOR-TRIMETON) 4 mg tablet [CHLORPHENIRAMINE (CHLOR-TRIMETON) 4 MG TABLET] Take 1 tablet (4 mg total) by mouth every 6 (six) hours as needed for allergies. 30 tablet 11     cholecalciferol, vitamin D3, 1,000 unit tablet [CHOLECALCIFEROL, VITAMIN D3, 1,000 UNIT TABLET] Take 1,000 Units by mouth every other day.        EPINEPHrine (EPIPEN) 0.3 mg/0.3 mL atIn [EPINEPHRINE (EPIPEN) 0.3 MG/0.3 ML ATIN] Inject 0.3 mg into the shoulder, thigh, or buttocks once as needed (allergic reaction).       levothyroxine (SYNTHROID/LEVOTHROID) 100 MCG tablet Take 1 tablet (100 mcg) by mouth daily 90 tablet 4     metoprolol succinate ER (TOPROL XL) 25 MG 24 hr tablet TAKE 1 TABLET BY MOUTH EVERY DAY 90 tablet 3     MULTIVIT &MINERALS/FERROUS FUM (MULTI VITAMIN ORAL) [MULTIVIT &MINERALS/FERROUS FUM (MULTI VITAMIN ORAL)] Take 1 tablet by mouth every other day. Pt taking multi vitamin without Iron       warfarin ANTICOAGULANT (COUMADIN) 5 MG tablet TAKE 1 TAB (5MG) TO 1&1/2 TABS (7.5MG) BY MOUTH DAILY AS DIRECTED.ADJUST DOSE BASED ON INR RESULTS 120 tablet 1       Cardiographics:    Pacemaker interrogation: April 2023  Pacing%/Programmed: AP 0%,  64%, in VVIR  ppm mode.  Lead(s): stable  Battery longevity: estimated 9.5 yrs  Presenting: ventricular pacing rate 60 bpm.  Atrial arrhythmias: n/a, history of persistent AF.  Anticoagulant: warfarin     Echocardiogram: April 2023  1. Normal left ventricular size and systolic performance  with a visually  estimated ejection fraction of 55%.  2. There is abnormal septal motion consistent with ventricular paced rhythm.  3. There is a bio-prosthetic mitral valve (documented 33 mm [employer]  Epic porcine pericardial tissue valve).  Â  Probably normal mitral valve prosthesis function; mean diastolic gradient is  5 mm Hg.  Â  There is trace mitral insufficiency.  4. Mild right ventricular enlargement with borderline reduced right  ventricular systolic performance.  5. There is severe biatrial enlargement.    Coronary angiogram: August 2017  LM min dz  LAD mid 30%   Circ mild dz with branch supplies PL territory  RCA mid 50% with PDA extends to apex     Note distal LAD and PDA vessels are very small around apex     Lab Results    Chemistry/lipid CBC Cardiac Enzymes/BNP/TSH/INR   Recent Labs   Lab Test 09/28/22  0800   CHOL 135   HDL 46   LDL 75   TRIG 69     Recent Labs   Lab Test 09/28/22  0800 09/15/21  0812 09/09/20  1155   LDL 75 74 81     Recent Labs   Lab Test 09/15/21  0812      POTASSIUM 4.2   CHLORIDE 109*   CO2 25   GLC 98   BUN 20   CR 1.00   GFRESTIMATED 73   NOVA 9.5     Recent Labs   Lab Test 09/15/21  0812 07/16/20  0752 10/14/19  1626   CR 1.00 0.86 1.58*     Recent Labs   Lab Test 09/18/17  0835   A1C 5.5          Recent Labs   Lab Test 09/15/21  0812   WBC 6.2   HGB 13.6   HCT 41.0   MCV 97        Recent Labs   Lab Test 09/15/21  0812 07/16/20  0752 10/14/19  1626   HGB 13.6 12.8* 12.7*    No results for input(s): TROPONINI in the last 74985 hours.  Recent Labs   Lab Test 07/16/20  0752 10/14/19  1626 04/10/19  1354   BNP 89* 73 213*     Recent Labs   Lab Test 09/28/22  0800   TSH 4.17     Recent Labs   Lab Test 06/05/23  0825 05/03/23  0801 04/05/23  0807   INR 2.7* 2.0* 2.3*

## 2023-06-05 NOTE — PATIENT INSTRUCTIONS
Obed Haley,    It was a pleasure to see you today at the [facility].     My recommendations after this visit include:    Echo in April next year    DANNY Henao MD, FACC, MILI

## 2023-06-05 NOTE — LETTER
6/5/2023    Franko Nguyen MD  [address] [address]    RE: Obed Haley       Dear Colleague,     I had the pleasure of seeing Obed Haley in the [facility].      Cardiology Progress Note     Assessment:  Mitral valve prolapse status post mitral valve replacement with [employer] 33 mm bioprosthesis in September 2017, borderline to mildly elevated diastolic gradient, stable  LV systolic dysfunction likely related to long-standing mitral regurgitation and postop tachycardia; normalized LVEF  Lower extremity edema venous insufficiency, improved with diuretics  Tachycardia-bradycardia syndrome, status post permanent pacemaker, normal device function  Permanent atrial fibrillation with controlled ventricular response, on chronic warfarin(left atrial appendage was not excluded/excised during the mitral valve surgery)  Postop left pleural effusion, resolved  Coronary artery disease, mild to moderate, nonobstructive  Orthostatic lightheadedness, mild, chronic  Hypercholesterolemia, history of statin intolerance, adequate control with modest dose of atorvastatin  Borderline dilatation of  aortic root    Plan:  Continue current cardiac medications  I encouraged him to stay physically active    We reviewed  the results of echo and recent pacemaker interrogation    Echo and follow-up in 1 year    Subjective:   This is 78 year old male who comes in today for follow-up visit.  He reports no new cardiac symptoms.  He gets mildly lightheaded when he stands up.  Sometimes he feels tired but denies weight gain, PND, orthopnea.  He has not had heart palpitations syncope.  He denies chest pains.  He is compliant with cardiac medications    Review of Systems:   Negative other than history of present illness    Objective:   /78 (BP Location: Left arm, Patient Position: Sitting, Cuff Size: Adult Large)   Pulse 60   Resp 16   Wt 102.5 kg (226 lb)   SpO2 98%   BMI 29.61 kg/m    Physical  Exam:  GENERAL: no distress  NECK: No JVD  LUNGS: Clear to auscultation.  CARDIAC: regular rhythm, S1 & S2 normal.  No heaves, thrills, gallops or murmurs.  ABDOMEN: flat, negative hepatosplenomegaly, soft and non-tender.  EXTREMITIES: No evidence of cyanosis, clubbing or edema.    Current Outpatient Medications   Medication Sig Dispense Refill    amoxicillin (AMOXIL) 500 MG capsule [AMOXICILLIN (AMOXIL) 500 MG CAPSULE] Take 2,000 mg by mouth as needed (1 hour prior to dentist.).       atorvastatin (LIPITOR) 40 MG tablet Take 0.5 tablets (20 mg) by mouth daily 30 tablet 11    b complex vitamins tablet [B COMPLEX VITAMINS TABLET] Take 1 tablet by mouth every other day.       chlorpheniramine (CHLOR-TRIMETON) 4 mg tablet [CHLORPHENIRAMINE (CHLOR-TRIMETON) 4 MG TABLET] Take 1 tablet (4 mg total) by mouth every 6 (six) hours as needed for allergies. 30 tablet 11    cholecalciferol, vitamin D3, 1,000 unit tablet [CHOLECALCIFEROL, VITAMIN D3, 1,000 UNIT TABLET] Take 1,000 Units by mouth every other day.       EPINEPHrine (EPIPEN) 0.3 mg/0.3 mL atIn [EPINEPHRINE (EPIPEN) 0.3 MG/0.3 ML ATIN] Inject 0.3 mg into the shoulder, thigh, or buttocks once as needed (allergic reaction).      levothyroxine (SYNTHROID/LEVOTHROID) 100 MCG tablet Take 1 tablet (100 mcg) by mouth daily 90 tablet 4    metoprolol succinate ER (TOPROL XL) 25 MG 24 hr tablet TAKE 1 TABLET BY MOUTH EVERY DAY 90 tablet 3    MULTIVIT &MINERALS/FERROUS FUM (MULTI VITAMIN ORAL) [MULTIVIT &MINERALS/FERROUS FUM (MULTI VITAMIN ORAL)] Take 1 tablet by mouth every other day. Pt taking multi vitamin without Iron      warfarin ANTICOAGULANT (COUMADIN) 5 MG tablet TAKE 1 TAB (5MG) TO 1&1/2 TABS (7.5MG) BY MOUTH DAILY AS DIRECTED.ADJUST DOSE BASED ON INR RESULTS 120 tablet 1       Cardiographics:    Pacemaker interrogation: April 2023  Pacing%/Programmed: AP 0%,  64%, in VVIR  ppm mode.  Lead(s): stable  Battery longevity: estimated 9.5 yrs  Presenting:  ventricular pacing rate 60 bpm.  Atrial arrhythmias: n/a, history of persistent AF.  Anticoagulant: warfarin     Echocardiogram: April 2023  1. Normal left ventricular size and systolic performance with a visually  estimated ejection fraction of 55%.  2. There is abnormal septal motion consistent with ventricular paced rhythm.  3. There is a bio-prosthetic mitral valve (documented 33 mm [employer]  Epic porcine pericardial tissue valve).  Â  Probably normal mitral valve prosthesis function; mean diastolic gradient is  5 mm Hg.  Â  There is trace mitral insufficiency.  4. Mild right ventricular enlargement with borderline reduced right  ventricular systolic performance.  5. There is severe biatrial enlargement.    Coronary angiogram: August 2017  LM min dz  LAD mid 30%   Circ mild dz with branch supplies PL territory  RCA mid 50% with PDA extends to apex     Note distal LAD and PDA vessels are very small around apex     Lab Results    Chemistry/lipid CBC Cardiac Enzymes/BNP/TSH/INR   Recent Labs   Lab Test 09/28/22  0800   CHOL 135   HDL 46   LDL 75   TRIG 69     Recent Labs   Lab Test 09/28/22  0800 09/15/21  0812 09/09/20  1155   LDL 75 74 81     Recent Labs   Lab Test 09/15/21  0812      POTASSIUM 4.2   CHLORIDE 109*   CO2 25   GLC 98   BUN 20   CR 1.00   GFRESTIMATED 73   NOVA 9.5     Recent Labs   Lab Test 09/15/21  0812 07/16/20  0752 10/14/19  1626   CR 1.00 0.86 1.58*     Recent Labs   Lab Test 09/18/17  0835   A1C 5.5          Recent Labs   Lab Test 09/15/21  0812   WBC 6.2   HGB 13.6   HCT 41.0   MCV 97        Recent Labs   Lab Test 09/15/21  0812 07/16/20  0752 10/14/19  1626   HGB 13.6 12.8* 12.7*    No results for input(s): TROPONINI in the last 31152 hours.  Recent Labs   Lab Test 07/16/20  0752 10/14/19  1626 04/10/19  1354   BNP 89* 73 213*     Recent Labs   Lab Test 09/28/22  0800   TSH 4.17     Recent Labs   Lab Test 06/05/23  0825 05/03/23  0801 04/05/23  0807   INR 2.7* 2.0* 2.3*                         Thank you for allowing me to participate in the care of your patient.      Sincerely,     Sha Henao MD     [facility]  cc:   Sha Henao MD  [address] [address]

## 2023-06-05 NOTE — PROGRESS NOTES
ANTICOAGULATION MANAGEMENT     Obed Haley 78 year old male is on warfarin with therapeutic INR result. (Goal INR 2.0-3.0)    Recent labs: (last 7 days)     06/05/23  0825   INR 2.7*       ASSESSMENT       Source(s): Chart Review and Patient/Caregiver Call       Warfarin doses taken: Warfarin taken as instructed    Diet: No new diet changes identified    Medication/supplement changes: None noted    New illness, injury, or hospitalization: Yes:    F/u visit today with Dr. Henao - persistent atrial fibrillation.    Signs or symptoms of bleeding or clotting: No    Previous result: Therapeutic last 7(+) visits    Additional findings: None         PLAN     Recommended plan for no diet, medication or health factor changes affecting INR     Dosing Instructions: Continue your current warfarin dose with next INR in 8 weeks       Summary  As of 6/5/2023    Full warfarin instructions:  7.5 mg every Mon, Thu; 5 mg all other days   Next INR check:  7/31/2023             Telephone call with  Obed ([phone]) who verbalizes understanding and agrees to plan    Lab visit scheduled - INR on 7/31/23 @ [place]    Education provided:     Taking warfarin: Importance of taking warfarin as instructed    Goal range and lab monitoring: goal range and significance of current result    Dietary considerations: importance of consistent vitamin K intake    Plan made per [facility] anticoagulation protocol    Yolanda Krishna RN  Anticoagulation Clinic  6/5/2023    _______________________________________________________________________     Anticoagulation Episode Summary     Current INR goal:  2.0-3.0   TTR:  100.0 % (1 y)   Target end date:  Indefinite   Send INR reminders to:  [facility]    Indications    Long term (current) use of anticoagulants [Z79.01]  Persistent atrial fibrillation (H) [I48.19]  S/P mitral valve replacement with tissue valve [Z95.3]           Comments:           Anticoagulation Care Providers     Provider Role  Specialty Phone number    Franko Franz MD Referring Internal Medicine [phone]    Franko Nguyen MD Referring Internal Medicine [phone]

## 2023-07-05 NOTE — LETTER
Letter by Kate Waldron at      Author: Kate Waldron Service: -- Author Type: --    Filed:  Encounter Date: 12/9/2020 Status: (Other)         Obed SG Haley  [address]  [place] [address]      December 9, 2020      Dear Mr. Haley,    RE: Remote Results    We are writing to you regarding your recent Remote Pacemaker check from home. Your transmission was received successfully. Battery status is satisfactory at this time.     Your results are showing no significant changes.    Your next device appointment will be a remote check on March 10, 2021; this will occur automatically.    To schedule or reschedule, please call [phone] and press 1.    NOTE: If you would like to do an extra transmission, please call [phone] and press 3 to speak to a nurse BEFORE transmitting. This ensures that the Device Clinic staff is aware of the reason you are sending a transmission, and can follow-up with you after it has been reviewed.    We will be checking your implanted device from home (remotely) every three months unless otherwise instructed. We will need to see you in the clinic at least once a year. You may need to be seen in the clinic sooner depending on the results of your check.    Please be aware:    The follow-up schedule is like a Physician prescription.    Your remote monitor is paired to your specific implanted device.      Sincerely,    [facility]        This RN and Duane RN tried to establish pt's IV access. TUNDE RN called to attempt IV access.

## 2023-07-13 ENCOUNTER — TELEPHONE (OUTPATIENT)
Dept: CARDIOLOGY | Facility: CLINIC | Age: 78
End: 2023-07-13
Payer: MEDICARE

## 2023-07-13 DIAGNOSIS — I48.19 PERSISTENT ATRIAL FIBRILLATION (H): ICD-10-CM

## 2023-07-13 RX ORDER — METOPROLOL SUCCINATE 25 MG/1
25 TABLET, EXTENDED RELEASE ORAL DAILY
Qty: 90 TABLET | Refills: 0 | Status: SHIPPED | OUTPATIENT
Start: 2023-07-13 | End: 2023-08-16

## 2023-07-13 NOTE — TELEPHONE ENCOUNTER
[facility] Call Center    Phone Message    May a detailed message be left on voicemail: yes     Reason for Call: Medication Refill Request    Has the patient contacted the pharmacy for the refill? Yes   Name of medication being requested: metoprolol succinate ER (TOPROL XL) 25 MG 24 hr tablet  Provider who prescribed the medication:     [place] [address]  Phone [phone]    Date medication is needed: Out of medications. Patient is out of town and pharmacy is asking patient to get the ok for medication.     Action Taken: Other: Cardiology     Travel Screening: Not Applicable     Thank you!  [facility]

## 2023-08-02 ENCOUNTER — ANTICOAGULATION THERAPY VISIT (OUTPATIENT)
Dept: ANTICOAGULATION | Facility: CLINIC | Age: 78
End: 2023-08-02

## 2023-08-02 ENCOUNTER — ANCILLARY PROCEDURE (OUTPATIENT)
Dept: CARDIOLOGY | Facility: CLINIC | Age: 78
End: 2023-08-02
Attending: INTERNAL MEDICINE
Payer: MEDICARE

## 2023-08-02 ENCOUNTER — LAB (OUTPATIENT)
Dept: LAB | Facility: CLINIC | Age: 78
End: 2023-08-02
Payer: MEDICARE

## 2023-08-02 DIAGNOSIS — Z79.01 LONG TERM (CURRENT) USE OF ANTICOAGULANTS: Primary | ICD-10-CM

## 2023-08-02 DIAGNOSIS — I48.19 PERSISTENT ATRIAL FIBRILLATION (H): ICD-10-CM

## 2023-08-02 DIAGNOSIS — Z95.3 S/P MITRAL VALVE REPLACEMENT WITH TISSUE VALVE: ICD-10-CM

## 2023-08-02 DIAGNOSIS — Z79.01 LONG TERM (CURRENT) USE OF ANTICOAGULANTS: ICD-10-CM

## 2023-08-02 DIAGNOSIS — Z95.0 PACEMAKER: ICD-10-CM

## 2023-08-02 DIAGNOSIS — I49.5 SICK SINUS SYNDROME (H): ICD-10-CM

## 2023-08-02 LAB
INR BLD: 2.5 (ref 0.9–1.1)
MDC_IDC_EPISODE_DTM: NORMAL
MDC_IDC_EPISODE_DURATION: 11 S
MDC_IDC_EPISODE_DURATION: 16 S
MDC_IDC_EPISODE_ID: NORMAL
MDC_IDC_EPISODE_TYPE: NORMAL
MDC_IDC_LEAD_IMPLANT_DT: NORMAL
MDC_IDC_LEAD_IMPLANT_DT: NORMAL
MDC_IDC_LEAD_LOCATION: NORMAL
MDC_IDC_LEAD_LOCATION: NORMAL
MDC_IDC_LEAD_LOCATION_DETAIL_1: NORMAL
MDC_IDC_LEAD_LOCATION_DETAIL_1: NORMAL
MDC_IDC_LEAD_MFG: NORMAL
MDC_IDC_LEAD_MFG: NORMAL
MDC_IDC_LEAD_MODEL: NORMAL
MDC_IDC_LEAD_MODEL: NORMAL
MDC_IDC_LEAD_POLARITY_TYPE: NORMAL
MDC_IDC_LEAD_POLARITY_TYPE: NORMAL
MDC_IDC_LEAD_SERIAL: NORMAL
MDC_IDC_LEAD_SERIAL: NORMAL
MDC_IDC_MSMT_BATTERY_DTM: NORMAL
MDC_IDC_MSMT_BATTERY_REMAINING_LONGEVITY: 108 MO
MDC_IDC_MSMT_BATTERY_REMAINING_PERCENTAGE: 100 %
MDC_IDC_MSMT_BATTERY_STATUS: NORMAL
MDC_IDC_MSMT_LEADCHNL_RA_IMPEDANCE_VALUE: 475 OHM
MDC_IDC_MSMT_LEADCHNL_RV_IMPEDANCE_VALUE: 459 OHM
MDC_IDC_MSMT_LEADCHNL_RV_PACING_THRESHOLD_AMPLITUDE: 0.7 V
MDC_IDC_MSMT_LEADCHNL_RV_PACING_THRESHOLD_PULSEWIDTH: 0.4 MS
MDC_IDC_PG_IMPLANT_DTM: NORMAL
MDC_IDC_PG_MFG: NORMAL
MDC_IDC_PG_MODEL: NORMAL
MDC_IDC_PG_SERIAL: NORMAL
MDC_IDC_PG_TYPE: NORMAL
MDC_IDC_SESS_CLINIC_NAME: NORMAL
MDC_IDC_SESS_DTM: NORMAL
MDC_IDC_SESS_TYPE: NORMAL
MDC_IDC_SET_BRADY_AT_MODE_SWITCH_RATE: 170 {BEATS}/MIN
MDC_IDC_SET_BRADY_LOWRATE: 60 {BEATS}/MIN
MDC_IDC_SET_BRADY_MAX_SENSOR_RATE: 130 {BEATS}/MIN
MDC_IDC_SET_BRADY_MODE: NORMAL
MDC_IDC_SET_LEADCHNL_RA_SENSING_ADAPTATION_MODE: NORMAL
MDC_IDC_SET_LEADCHNL_RA_SENSING_SENSITIVITY: 1.5 MV
MDC_IDC_SET_LEADCHNL_RV_PACING_AMPLITUDE: 1.2 V
MDC_IDC_SET_LEADCHNL_RV_PACING_CAPTURE_MODE: NORMAL
MDC_IDC_SET_LEADCHNL_RV_PACING_POLARITY: NORMAL
MDC_IDC_SET_LEADCHNL_RV_PACING_PULSEWIDTH: 0.4 MS
MDC_IDC_SET_LEADCHNL_RV_SENSING_ADAPTATION_MODE: NORMAL
MDC_IDC_SET_LEADCHNL_RV_SENSING_POLARITY: NORMAL
MDC_IDC_SET_LEADCHNL_RV_SENSING_SENSITIVITY: 1.5 MV
MDC_IDC_SET_ZONE_DETECTION_INTERVAL: 375 MS
MDC_IDC_SET_ZONE_TYPE: NORMAL
MDC_IDC_SET_ZONE_VENDOR_TYPE: NORMAL
MDC_IDC_STAT_BRADY_DTM_END: NORMAL
MDC_IDC_STAT_BRADY_DTM_START: NORMAL
MDC_IDC_STAT_BRADY_RA_PERCENT_PACED: 0 %
MDC_IDC_STAT_BRADY_RV_PERCENT_PACED: 65 %
MDC_IDC_STAT_EPISODE_RECENT_COUNT: 0
MDC_IDC_STAT_EPISODE_RECENT_COUNT: 2
MDC_IDC_STAT_EPISODE_RECENT_COUNT_DTM_END: NORMAL
MDC_IDC_STAT_EPISODE_RECENT_COUNT_DTM_START: NORMAL
MDC_IDC_STAT_EPISODE_TYPE: NORMAL
MDC_IDC_STAT_EPISODE_VENDOR_TYPE: NORMAL

## 2023-08-02 PROCEDURE — 85610 PROTHROMBIN TIME: CPT

## 2023-08-02 PROCEDURE — 36416 COLLJ CAPILLARY BLOOD SPEC: CPT

## 2023-08-02 PROCEDURE — 93294 REM INTERROG EVL PM/LDLS PM: CPT | Performed by: INTERNAL MEDICINE

## 2023-08-02 PROCEDURE — 93296 REM INTERROG EVL PM/IDS: CPT | Performed by: INTERNAL MEDICINE

## 2023-08-02 NOTE — PROGRESS NOTES
ANTICOAGULATION MANAGEMENT     Obed Haley 78 year old male is on warfarin with therapeutic INR result. (Goal INR 2.0-3.0)    Recent labs: (last 7 days)     08/02/23  0757   INR 2.5*       ASSESSMENT     Warfarin Lab Questionnaire    Warfarin Doses Last 7 Days    Pt Rptd Dose SUNDAY MONDAY TUESDAY WED THURS FRIDAY SATURDAY 8/2/2023   8:00 AM 5 7.5 5 5 7.5 5 5         8/2/2023   Warfarin Lab Questionnaire   Missed doses within past 14 days? No   Changes in diet or alcohol within past 14 days? No   Medication changes since last result? No   Injuries or illness since last result? No   New shortness of breath, severe headaches or sudden changes in vision since last result? No   Abnormal bleeding since last result? No   Upcoming surgery, procedure? No   Best number to call with results? [phone]     Previous result: Therapeutic last 2(+) visits  Additional findings: None       PLAN     Recommended plan for no diet, medication or health factor changes affecting INR     Dosing Instructions: Continue your current warfarin dose with next INR in 8 weeks       Summary  As of 8/2/2023      Full warfarin instructions:  7.5 mg every Mon, Thu; 5 mg all other days   Next INR check:  9/27/2023               Detailed voice message left for Obed with dosing instructions and follow up date.     Contact [phone] to schedule and with any changes, questions or concerns.     Education provided:   Please call back if any changes to your diet, medications or how you've been taking warfarin    Plan made per [facility] anticoagulation protocol    Chloé Westbrook, RN  Anticoagulation Clinic  8/2/2023    _______________________________________________________________________     Anticoagulation Episode Summary       Current INR goal:  2.0-3.0   TTR:  100.0 % (1 y)   Target end date:  Indefinite   Send INR reminders to:  [facility]    Indications    Long term (current) use of anticoagulants [Z79.01]  Persistent atrial fibrillation  (H) [I48.19]  S/P mitral valve replacement with tissue valve [Z95.3]             Comments:               Anticoagulation Care Providers       Provider Role Specialty Phone number    Franko Franz MD Referring Internal Medicine [phone]    Franko Nguyen MD Referring Internal Medicine [phone]

## 2023-08-16 DIAGNOSIS — I48.19 PERSISTENT ATRIAL FIBRILLATION (H): ICD-10-CM

## 2023-08-16 RX ORDER — METOPROLOL SUCCINATE 25 MG/1
25 TABLET, EXTENDED RELEASE ORAL DAILY
Qty: 90 TABLET | Refills: 3 | Status: SHIPPED | OUTPATIENT
Start: 2023-08-16 | End: 2024-08-30

## 2023-09-14 ENCOUNTER — TELEPHONE (OUTPATIENT)
Dept: INTERNAL MEDICINE | Facility: CLINIC | Age: 78
End: 2023-09-14
Payer: MEDICARE

## 2023-09-14 NOTE — TELEPHONE ENCOUNTER
September 14, 2023    Home health orders was received via fax for Dr. Nguyen to sign.  Patient label was attached to paperwork and placed in provider's inbox to be signed.    Martha Cuevas

## 2023-09-18 NOTE — TELEPHONE ENCOUNTER
September 18, 2023    Home health orders was picked up from outbox of Dr. Nguyen.  Paperwork has been reviewed and is complete.  Per initial initial request, this was sent via fax to [phone].     Martha Cuevas

## 2023-09-20 ENCOUNTER — LAB (OUTPATIENT)
Dept: LAB | Facility: CLINIC | Age: 78
End: 2023-09-20
Payer: MEDICARE

## 2023-09-20 ENCOUNTER — TELEPHONE (OUTPATIENT)
Dept: INTERNAL MEDICINE | Facility: CLINIC | Age: 78
End: 2023-09-20

## 2023-09-20 ENCOUNTER — ANTICOAGULATION THERAPY VISIT (OUTPATIENT)
Dept: ANTICOAGULATION | Facility: CLINIC | Age: 78
End: 2023-09-20

## 2023-09-20 DIAGNOSIS — Z79.01 LONG TERM (CURRENT) USE OF ANTICOAGULANTS: ICD-10-CM

## 2023-09-20 DIAGNOSIS — I48.19 PERSISTENT ATRIAL FIBRILLATION (H): ICD-10-CM

## 2023-09-20 DIAGNOSIS — Z95.3 S/P MITRAL VALVE REPLACEMENT WITH TISSUE VALVE: ICD-10-CM

## 2023-09-20 DIAGNOSIS — Z79.01 LONG TERM (CURRENT) USE OF ANTICOAGULANTS: Primary | ICD-10-CM

## 2023-09-20 LAB — INR BLD: 2.1 (ref 0.9–1.1)

## 2023-09-20 PROCEDURE — 36416 COLLJ CAPILLARY BLOOD SPEC: CPT

## 2023-09-20 PROCEDURE — 85610 PROTHROMBIN TIME: CPT

## 2023-09-20 NOTE — PROGRESS NOTES
ANTICOAGULATION MANAGEMENT     Obed Haley 78 year old male is on warfarin with therapeutic INR result. (Goal INR 2.0-3.0)    Recent labs: (last 7 days)     09/20/23  0705   INR 2.1*       ASSESSMENT     Warfarin Lab Questionnaire    Warfarin Doses Last 7 Days      9/20/2023     7:06 AM   Dose in Tablet or Mg   TAB or MG? - 5mg warfarin tablets (bedtime) milligram (mg)     Pt Rptd Dose SUNDAY MONDAY TUESDAY WED THURS FRIDAY SATURDAY 9/20/2023   7:06 AM 5 7.5 5 5 7.5 5 5         9/20/2023   Warfarin Lab Questionnaire   Missed doses within past 14 days? No   Changes in diet or alcohol within past 14 days? No   Medication changes since last result? No   Injuries or illness since last result? No   New shortness of breath, severe headaches or sudden changes in vision since last result? No   Abnormal bleeding since last result? No   Upcoming surgery, procedure? No   Best number to call with results? [phone]     Previous result: Therapeutic last 7(+) INR visits.    Additional findings: Leaving and driving on 10/7 or 10/8/23, to spend his torre 4-5 months in [place].   - will check INR prior to leaving.       PLAN     Recommended plan for no diet, medication or health factor changes affecting INR     Dosing Instructions: Continue your current warfarin dose with next INR in 2 weeks       Summary  As of 9/20/2023      Full warfarin instructions:  7.5 mg every Mon, Thu; 5 mg all other days   Next INR check:  10/4/2023               Telephone call with Obed who verbalizes understanding and agrees to plan    Check at provider office visit - INR and annual check with Dr. Nguyen on 10/4/23.    Education provided:   Taking warfarin: Importance of taking warfarin as instructed  Goal range and lab monitoring: goal range and significance of current result    Plan made per [facility] anticoagulation protocol    Yolanda Krishna, RN  Anticoagulation  Clinic  9/20/2023    _______________________________________________________________________     Anticoagulation Episode Summary       Current INR goal:  2.0-3.0   TTR:  100.0 % (1 y)   Target end date:  Indefinite   Send INR reminders to:  [facility]    Indications    Long term (current) use of anticoagulants [Z79.01]  Persistent atrial fibrillation (H) [I48.19]  S/P mitral valve replacement with tissue valve [Z95.3]             Comments:               Anticoagulation Care Providers       Provider Role Specialty Phone number    Franko Franz MD Referring Internal Medicine [phone]    Franko Nguyen MD Referring Internal Medicine [phone]

## 2023-09-20 NOTE — TELEPHONE ENCOUNTER
Order/Referral Request    Who is requesting: Patient for upcoming appt and then please call pt to let him know to schedule appt    Orders being requested: Tsh, Lipid and any other labs Dr Nguyen would want for his appt on 10/4    Reason service is needed/diagnosis: n/a    When are orders needed by: asap    Has this been discussed with Provider: Yes    Does patient have an appointment scheduled?: No-he had appt 9/20 but no orders in chart just had inr  Where to send orders: Place orders within Epic    Could we send this information to you in [facility] or would you prefer to receive a phone call?:   Patient would prefer a phone call   Okay to leave a detailed message?: Yes at Home number on file [phone] (home)

## 2023-09-22 ENCOUNTER — ANCILLARY PROCEDURE (OUTPATIENT)
Dept: CARDIOLOGY | Facility: CLINIC | Age: 78
End: 2023-09-22
Attending: INTERNAL MEDICINE
Payer: MEDICARE

## 2023-09-22 DIAGNOSIS — Z95.0 PACEMAKER: ICD-10-CM

## 2023-09-22 DIAGNOSIS — I48.19 PERSISTENT ATRIAL FIBRILLATION (H): ICD-10-CM

## 2023-09-22 DIAGNOSIS — I49.5 SICK SINUS SYNDROME (H): ICD-10-CM

## 2023-09-22 LAB
MDC_IDC_EPISODE_DTM: NORMAL
MDC_IDC_EPISODE_ID: NORMAL
MDC_IDC_EPISODE_TYPE: NORMAL
MDC_IDC_LEAD_IMPLANT_DT: NORMAL
MDC_IDC_LEAD_IMPLANT_DT: NORMAL
MDC_IDC_LEAD_LOCATION: NORMAL
MDC_IDC_LEAD_LOCATION: NORMAL
MDC_IDC_LEAD_LOCATION_DETAIL_1: NORMAL
MDC_IDC_LEAD_LOCATION_DETAIL_1: NORMAL
MDC_IDC_LEAD_MFG: NORMAL
MDC_IDC_LEAD_MFG: NORMAL
MDC_IDC_LEAD_MODEL: NORMAL
MDC_IDC_LEAD_MODEL: NORMAL
MDC_IDC_LEAD_POLARITY_TYPE: NORMAL
MDC_IDC_LEAD_POLARITY_TYPE: NORMAL
MDC_IDC_LEAD_SERIAL: NORMAL
MDC_IDC_LEAD_SERIAL: NORMAL
MDC_IDC_MSMT_BATTERY_DTM: NORMAL
MDC_IDC_MSMT_BATTERY_REMAINING_LONGEVITY: 108 MO
MDC_IDC_MSMT_BATTERY_REMAINING_PERCENTAGE: 100 %
MDC_IDC_MSMT_BATTERY_STATUS: NORMAL
MDC_IDC_MSMT_LEADCHNL_RA_IMPEDANCE_VALUE: 517 OHM
MDC_IDC_MSMT_LEADCHNL_RV_IMPEDANCE_VALUE: 441 OHM
MDC_IDC_MSMT_LEADCHNL_RV_PACING_THRESHOLD_AMPLITUDE: 0.6 V
MDC_IDC_MSMT_LEADCHNL_RV_PACING_THRESHOLD_PULSEWIDTH: 0.4 MS
MDC_IDC_MSMT_LEADCHNL_RV_SENSING_INTR_AMPL: 20.8 MV
MDC_IDC_PG_IMPLANT_DTM: NORMAL
MDC_IDC_PG_MFG: NORMAL
MDC_IDC_PG_MODEL: NORMAL
MDC_IDC_PG_SERIAL: NORMAL
MDC_IDC_PG_TYPE: NORMAL
MDC_IDC_SESS_CLINIC_NAME: NORMAL
MDC_IDC_SESS_DTM: NORMAL
MDC_IDC_SESS_TYPE: NORMAL
MDC_IDC_SET_BRADY_LOWRATE: 60 {BEATS}/MIN
MDC_IDC_SET_BRADY_MAX_SENSOR_RATE: 130 {BEATS}/MIN
MDC_IDC_SET_BRADY_MODE: NORMAL
MDC_IDC_SET_LEADCHNL_RA_SENSING_ADAPTATION_MODE: NORMAL
MDC_IDC_SET_LEADCHNL_RA_SENSING_SENSITIVITY: 1.5 MV
MDC_IDC_SET_LEADCHNL_RV_PACING_AMPLITUDE: NORMAL
MDC_IDC_SET_LEADCHNL_RV_PACING_CAPTURE_MODE: NORMAL
MDC_IDC_SET_LEADCHNL_RV_PACING_POLARITY: NORMAL
MDC_IDC_SET_LEADCHNL_RV_PACING_PULSEWIDTH: 0.4 MS
MDC_IDC_SET_LEADCHNL_RV_SENSING_ADAPTATION_MODE: NORMAL
MDC_IDC_SET_LEADCHNL_RV_SENSING_POLARITY: NORMAL
MDC_IDC_SET_LEADCHNL_RV_SENSING_SENSITIVITY: 1.5 MV
MDC_IDC_SET_ZONE_DETECTION_INTERVAL: 375 MS
MDC_IDC_SET_ZONE_TYPE: NORMAL
MDC_IDC_SET_ZONE_VENDOR_TYPE: NORMAL
MDC_IDC_STAT_AT_MODE_SW_COUNT: 0
MDC_IDC_STAT_BRADY_DTM_END: NORMAL
MDC_IDC_STAT_BRADY_DTM_START: NORMAL
MDC_IDC_STAT_BRADY_RA_PERCENT_PACED: 0 %
MDC_IDC_STAT_BRADY_RV_PERCENT_PACED: 65 %
MDC_IDC_STAT_EPISODE_RECENT_COUNT: 0
MDC_IDC_STAT_EPISODE_RECENT_COUNT: 15
MDC_IDC_STAT_EPISODE_RECENT_COUNT_DTM_END: NORMAL
MDC_IDC_STAT_EPISODE_RECENT_COUNT_DTM_START: NORMAL
MDC_IDC_STAT_EPISODE_TYPE: NORMAL
MDC_IDC_STAT_EPISODE_VENDOR_TYPE: NORMAL

## 2023-09-22 PROCEDURE — 93279 PRGRMG DEV EVAL PM/LDLS PM: CPT | Performed by: INTERNAL MEDICINE

## 2023-09-22 NOTE — TELEPHONE ENCOUNTER
Spoke to pt regarding provider message. Pt verbalized understanding, had no further questions at this time.

## 2023-10-03 ENCOUNTER — HOSPITAL ENCOUNTER (EMERGENCY)
Facility: HOSPITAL | Age: 78
Discharge: HOME | End: 2023-10-03
Payer: MEDICARE

## 2023-10-03 VITALS
TEMPERATURE: 98.6 F | OXYGEN SATURATION: 97 % | DIASTOLIC BLOOD PRESSURE: 77 MMHG | RESPIRATION RATE: 18 BRPM | SYSTOLIC BLOOD PRESSURE: 154 MMHG | HEART RATE: 60 BPM

## 2023-10-03 DIAGNOSIS — S51.812A LACERATION OF LEFT FOREARM, INITIAL ENCOUNTER: ICD-10-CM

## 2023-10-03 PROCEDURE — 12035 INTMD RPR S/A/T/EXT 12.6-20: CPT

## 2023-10-03 PROCEDURE — 90714 TD VACC NO PRESV 7 YRS+ IM: CPT

## 2023-10-03 PROCEDURE — 250N000011 HC RX IP 250 OP 636

## 2023-10-03 PROCEDURE — 250N000009 HC RX 250

## 2023-10-03 PROCEDURE — 250N000013 HC RX MED GY IP 250 OP 250 PS 637

## 2023-10-03 PROCEDURE — 90471 IMMUNIZATION ADMIN: CPT

## 2023-10-03 PROCEDURE — 99283 EMERGENCY DEPT VISIT LOW MDM: CPT | Mod: 25

## 2023-10-03 RX ORDER — CEPHALEXIN 500 MG/1
500 CAPSULE ORAL 4 TIMES DAILY
Qty: 28 CAPSULE | Refills: 0 | Status: SHIPPED | OUTPATIENT
Start: 2023-10-03 | End: 2023-10-10

## 2023-10-03 RX ORDER — ACETAMINOPHEN 325 MG/1
975 TABLET ORAL ONCE
Status: COMPLETED | OUTPATIENT
Start: 2023-10-03 | End: 2023-10-03

## 2023-10-03 RX ORDER — GINSENG 100 MG
CAPSULE ORAL ONCE
Status: COMPLETED | OUTPATIENT
Start: 2023-10-03 | End: 2023-10-03

## 2023-10-03 RX ADMIN — CLOSTRIDIUM TETANI TOXOID ANTIGEN (FORMALDEHYDE INACTIVATED) AND CORYNEBACTERIUM DIPHTHERIAE TOXOID ANTIGEN (FORMALDEHYDE INACTIVATED) 0.5 ML: 5; 2 INJECTION, SUSPENSION INTRAMUSCULAR at 18:17

## 2023-10-03 RX ADMIN — ACETAMINOPHEN 975 MG: 325 TABLET ORAL at 19:39

## 2023-10-03 RX ADMIN — BACITRACIN: 500 OINTMENT TOPICAL at 21:43

## 2023-10-03 ASSESSMENT — ENCOUNTER SYMPTOMS
WOUND: 1
NUMBNESS: 0
WEAKNESS: 0

## 2023-10-03 ASSESSMENT — ACTIVITIES OF DAILY LIVING (ADL)
ADLS_ACUITY_SCORE: 35
ADLS_ACUITY_SCORE: 33

## 2023-10-03 NOTE — ED PROVIDER NOTES
EMERGENCY DEPARTMENT ENCOUNTER      NAME: Obed Haley  AGE: 78 year old male  YOB: 1945  MRN: [id number]  EVALUATION DATE & TIME: No admission date for patient encounter.    PCP: Franko Nguyen    ED PROVIDER: Chica Espinoza PA-C      Chief Complaint   Patient presents with    Laceration         FINAL IMPRESSION:  1. Laceration of left forearm, initial encounter          ED COURSE & MEDICAL DECISION MAKIN:29 PM Met with patient for initial interview. Plan for care discussed.  8:45 PM Performed laceration repair.   9:14 PM I discussed the plan for discharge with the patient, and patient is agreeable.  We discussed supportive cares at home and reasons for return to the ER including new or worsening symptoms - all questions and concerns addressed.  Patient to be discharged by RN.     Pertinent Labs & Imaging studies reviewed. (See chart for details)  78 year old male with a history of afib, s/p pacemaker and mitral valve replacement, chronic anticoagulation presents to the Emergency Department for evaluation of laceration after accidentally cutting arm on metal fence. Upon exam, patient is afebrile, hemodynamically stable, and in no acute distress. Large subcutaneous laceration to left forearm as pictured below. Neurovascularly intact distally. No tendon involvement or obvious foreign body. Compartments soft. 5/5 strength. Discussed option of XR to rule out foreign body, which patient ultimately declines.    Tetanus updated in ED today. Based on the presenting symptoms, the wound was irrigated, explored, and closed with 4 absorbable subcutaneous sutures and 27 superficial sutures as documented below. Given depth of laceration and need for absorbable subcutaneous sutures, plan to discharge home on Keflex. Patient was educated on signs of infection including redness, drainage, warmth, fever/chills with instructions to return immediately if infection suspected or new weakness/numbness/tingling,  decreased ROM, or cold extremity. Patient also educated to keep the wound clean and dry for the next 24 hours. Patient advised to set up an appointment with PCP in 8-12 days for suture removal. The patient was advised to return to the ER if new or worsening symptoms develop. The patient was stable and well appearing throughout entire ER visit and upon discharge. The patient verbalizes understanding and agrees with the plan.    Medical Decision Making    History:  Supplemental history from: N/A  External Record(s) reviewed: Other: Tetanus records    Work Up:  Chart documentation includes differential considered and any EKGs or imaging independently interpreted by provider, where specified.  In additional to work up documented, I considered the following work up: Documented in chart, if applicable.    External consultation:  Discussion of management with another provider: Documented in chart, if applicable    Complicating factors:  Care impacted by chronic illness: Anticoagulated State  Care affected by social determinants of health: N/A    Disposition considerations: Discharge. I prescribed additional prescription strength medication(s) as charted. See documentation for any additional details.        MEDICATIONS GIVEN IN THE EMERGENCY:  Medications   bacitracin ointment (has no administration in time range)   Td (tetanus & diphtheria toxoids) -  adult formulation - for ages 7 years and older (0.5 mLs Intramuscular $Given 10/3/23 1817)   acetaminophen (TYLENOL) tablet 975 mg (975 mg Oral $Given 10/3/23 1939)       NEW PRESCRIPTIONS STARTED AT TODAY'S ER VISIT  New Prescriptions    CEPHALEXIN (KEFLEX) 500 MG CAPSULE    Take 1 capsule (500 mg) by mouth 4 times daily for 7 days          =================================================================    HPI    Patient information was obtained from: Patient    Use of : N/A         Obed BETTENCOURT Sudha is a 78 year old male with a pertinent history of atrial  fibrillation, CAD, cardiac pacemaker, and s/p mitral valve replacement who presents to this ED by walk-in for evaluation of a laceration.     Per chart review: Patient's last tetanus was 01/14/2015    Patient states he tripped around 5:30 PM and caught his arm on a metal gate lacerating his left forearm. Patient endorses being on warfarin. Patient notes he has an appointment with his PCP tomorrow morning. Patient denies numbness, tingling, weakness, or any other complaints at this time.       REVIEW OF SYSTEMS   Review of Systems   Skin:  Positive for wound (laceration to left forearm).   Neurological:  Negative for weakness and numbness (or tingling).   All other systems reviewed and are negative.       PAST MEDICAL HISTORY:  Past Medical History:   Diagnosis Date    High cholesterol     MVP (mitral valve prolapse)     Nonocclusive coronary atherosclerosis of native coronary artery 8/3/2017    Sleep apnea, obstructive     uses CPAP       PAST SURGICAL HISTORY:  Past Surgical History:   Procedure Laterality Date    ARTHROSCOPY KNEE  2007    left    CARDIOVERSION  11/23/2020    EP PACEMAKER INSERT Left 9/25/2017    Procedure: EP Pacemaker Insertion;  Surgeon: Reji Whittington MD;  Location: [facility] Cath Lab;  Service:     LAMINECTOMY  1970, 2003    lumbar X2    ID CATH PLACEMENT & NJX CORONARY ART ANGIO IMG S&I N/A 8/3/2017    Procedure: Coronary Angiogram;  Surgeon: Abhinav Redmond MD;  Location: [facility] Cath Lab;  Service: Cardiology    ID L HRT CATH W/NJX L VENTRICULOGRAPHY IMG S&I N/A 8/3/2017    Procedure: Left Heart Catheterization with Left Ventriculogram;  Surgeon: Abhinav Redmond MD;  Location: [facility] Cath Lab;  Service: Cardiology    ID RIGHT HEART CATH O2 SATURATION & CARDIAC OUTPUT N/A 8/3/2017    Procedure: Right Heart Catheterization;  Surgeon: Abhinav Redmond MD;  Location: [facility] Cath Lab;  Service: Cardiology    REPLACE VALVE MITRAL N/A 9/19/2017    Procedure:  "MITRAL VALVE REPLACEMENT, ANESTHESIA TRANSESOPHAGEAL ECHOCARDIOGRAM, CLOSURE OF THE PFO;  Surgeon: Monica Swenson MD;  Location: [facility];  Service:            CURRENT MEDICATIONS:    cephALEXin (KEFLEX) 500 MG capsule  amoxicillin (AMOXIL) 500 MG capsule  atorvastatin (LIPITOR) 40 MG tablet  b complex vitamins tablet  chlorpheniramine (CHLOR-TRIMETON) 4 mg tablet  cholecalciferol, vitamin D3, 1,000 unit tablet  EPINEPHrine (EPIPEN) 0.3 mg/0.3 mL atIn  levothyroxine (SYNTHROID/LEVOTHROID) 100 MCG tablet  metoprolol succinate ER (TOPROL XL) 25 MG 24 hr tablet  MULTIVIT &MINERALS/FERROUS FUM (MULTI VITAMIN ORAL)  warfarin ANTICOAGULANT (COUMADIN) 5 MG tablet        ALLERGIES:  Allergies   Allergen Reactions    Bee Venom Protein (Honey Bee) [Bee Venom] Anaphylaxis       FAMILY HISTORY:  Family History   Problem Relation Age of Onset    Breast Cancer Mother     Other - See Comments Father         train acciendt    Cerebral aneurysm Sister     No Known Problems Sister     No Known Problems Son     Lymphoma Son        SOCIAL HISTORY:   Social History     Socioeconomic History    Marital status: Single   Tobacco Use    Smoking status: Former     Types: Cigarettes     Quit date: 2007     Years since quittin.7    Smokeless tobacco: Never   Vaping Use    Vaping Use: Never used   Substance and Sexual Activity    Alcohol use: Yes     Alcohol/week: 3.0 standard drinks of alcohol     Comment: Alcoholic Drinks/day: occasionally    Drug use: No   Social History Narrative    Lives with \"life , Ani.  Retired mortician.  Two sons, Lei and Henry. Five grandchildren.  [employer].   [employer] in [place].         VITALS:  /70   Pulse 66   Temp 98.6  F (37  C) (Temporal)   Resp 18   SpO2 96%     PHYSICAL EXAM    Constitutional:  Alert, in no acute distress. Cooperative.  EYES: Conjunctivae clear.  HENT:  Atraumatic, normocephalic.  Respiratory:  Respirations even, unlabored, " in no acute respiratory distress.  Cardiovascular:  Regular rate, good peripheral perfusion.  GI: Soft, flat, non-distended.  Musculoskeletal: Left upper extremity: 15 cm laceration over left forearm as pictured below. Bleeding well controlled with direct pressure. No surrounding erythema, warmth, purulence, fluctuance, swelling, crepitus, or obvious bony deformity. No obvious foreign body visualized. No nail or tendon involvement. Full ROM without pain. Neurovascularly intact distally. Cap refill <2 seconds. 5/5 strength. Surrounding compartments soft.  Integument: Warm, Dry.   Neurologic:  Alert & oriented. No focal deficits noted.  Psych: Normal mood and affect.        LAB:  All pertinent labs reviewed and interpreted.       RADIOLOGY:  Reviewed all pertinent imaging. Please see official radiology report.  No orders to display       PROCEDURES:   PROCEDURE: Laceration Repair   INDICATIONS: Laceration   PROCEDURE PROVIDER: Chica Espinoza PA-C   SITE: Left forearm   TYPE/SIZE: complex, subcutaneous, clean, ragged edges, and no foreign body visualized  15 cm (total length)   FUNCTIONAL ASSESSMENT: Distal sensation, circulation, motor, and tendon function intact   MEDICATION: 5 mLs of 2% Lidocaine with epinephrine   PREPARATION: irrigation with Normal saline   DEBRIDEMENT: no debridement   CLOSURE:  Deep layer closed with 4 stitches of 4-0 chromic gut simple interrupted. Superficial layer closed with 27 stiches of 4-0 prolene simple interrupted.    Total number of sutures/staples placed: 31       I, Sonam Mesa, am serving as a scribe to document services personally performed by Chica Espinoza PA-C based on my observation and the provider's statements to me. I, Chica Espinoza PA-C, attest that Sonam Mesa is acting in a scribe capacity, has observed my performance of the services and has documented them in accordance with my direction.    Chica Espinoza PA-C  [facility] EMERGENCY  DEPARTMENT  [address] [place] [id number]  [phone]        Chica Espinoza PA-C  10/03/23 2128       Chica Espinoza PA-C  10/03/23 2128

## 2023-10-03 NOTE — ED TRIAGE NOTES
Pt here with c/o of a laceration to L forearm. Pt tripped and caught arm on fence. Wrapped in ACE wrap and tape - wound not visualized in triage. Pt on thinners - bleeding controlled.      Triage Assessment       Row Name 10/03/23 2855       Triage Assessment (Adult)    Airway WDL WDL       Respiratory WDL    Respiratory WDL WDL       Peripheral/Neurovascular WDL    Peripheral Neurovascular WDL X;neurovascular assessment upper       LUE Neurovascular Assessment    Temperature LUE warm    Color LUE no discoloration    Sensation LUE tingling present;tenderness present;no numbness       RUE Neurovascular Assessment    Temperature RUE warm    Color RUE no discoloration    Sensation RUE no numbness;no tenderness;no tingling

## 2023-10-04 ENCOUNTER — ANTICOAGULATION THERAPY VISIT (OUTPATIENT)
Dept: ANTICOAGULATION | Facility: CLINIC | Age: 78
End: 2023-10-04

## 2023-10-04 ENCOUNTER — DOCUMENTATION ONLY (OUTPATIENT)
Dept: ANTICOAGULATION | Facility: CLINIC | Age: 78
End: 2023-10-04

## 2023-10-04 ENCOUNTER — OFFICE VISIT (OUTPATIENT)
Dept: INTERNAL MEDICINE | Facility: CLINIC | Age: 78
End: 2023-10-04
Payer: MEDICARE

## 2023-10-04 ENCOUNTER — ANCILLARY PROCEDURE (OUTPATIENT)
Dept: GENERAL RADIOLOGY | Facility: CLINIC | Age: 78
End: 2023-10-04
Attending: INTERNAL MEDICINE
Payer: MEDICARE

## 2023-10-04 VITALS
DIASTOLIC BLOOD PRESSURE: 78 MMHG | OXYGEN SATURATION: 99 % | HEART RATE: 60 BPM | RESPIRATION RATE: 16 BRPM | WEIGHT: 222.7 LBS | SYSTOLIC BLOOD PRESSURE: 134 MMHG | TEMPERATURE: 98 F | HEIGHT: 72 IN | BODY MASS INDEX: 30.16 KG/M2

## 2023-10-04 DIAGNOSIS — H90.3 ASYMMETRICAL SENSORINEURAL HEARING LOSS: ICD-10-CM

## 2023-10-04 DIAGNOSIS — I48.19 PERSISTENT ATRIAL FIBRILLATION (H): ICD-10-CM

## 2023-10-04 DIAGNOSIS — G47.33 OSA ON CPAP: ICD-10-CM

## 2023-10-04 DIAGNOSIS — I34.0 NON-RHEUMATIC MITRAL REGURGITATION: ICD-10-CM

## 2023-10-04 DIAGNOSIS — Z79.01 LONG TERM (CURRENT) USE OF ANTICOAGULANTS: Primary | ICD-10-CM

## 2023-10-04 DIAGNOSIS — I25.119 CORONARY ARTERY DISEASE INVOLVING NATIVE CORONARY ARTERY OF NATIVE HEART WITH ANGINA PECTORIS (H): ICD-10-CM

## 2023-10-04 DIAGNOSIS — R05.9 COUGH, UNSPECIFIED TYPE: ICD-10-CM

## 2023-10-04 DIAGNOSIS — Z00.00 ANNUAL PHYSICAL EXAM: Primary | ICD-10-CM

## 2023-10-04 DIAGNOSIS — Z12.5 SCREENING FOR PROSTATE CANCER: ICD-10-CM

## 2023-10-04 DIAGNOSIS — Z79.01 LONG TERM (CURRENT) USE OF ANTICOAGULANTS: ICD-10-CM

## 2023-10-04 DIAGNOSIS — Z95.0 CARDIAC PACEMAKER IN SITU: ICD-10-CM

## 2023-10-04 DIAGNOSIS — Z95.3 S/P MITRAL VALVE REPLACEMENT WITH TISSUE VALVE: ICD-10-CM

## 2023-10-04 DIAGNOSIS — E03.9 ACQUIRED HYPOTHYROIDISM: ICD-10-CM

## 2023-10-04 DIAGNOSIS — D64.9 NORMOCYTIC ANEMIA: ICD-10-CM

## 2023-10-04 LAB
ALBUMIN SERPL BCG-MCNC: 4.2 G/DL (ref 3.5–5.2)
ALBUMIN UR-MCNC: NEGATIVE MG/DL
ALP SERPL-CCNC: 64 U/L (ref 40–129)
ALT SERPL W P-5'-P-CCNC: 37 U/L (ref 0–70)
ANION GAP SERPL CALCULATED.3IONS-SCNC: 11 MMOL/L (ref 7–15)
APPEARANCE UR: CLEAR
AST SERPL W P-5'-P-CCNC: 49 U/L (ref 0–45)
BILIRUB SERPL-MCNC: 1.1 MG/DL
BILIRUB UR QL STRIP: NEGATIVE
BUN SERPL-MCNC: 24.6 MG/DL (ref 8–23)
CALCIUM SERPL-MCNC: 9.6 MG/DL (ref 8.8–10.2)
CHLORIDE SERPL-SCNC: 107 MMOL/L (ref 98–107)
CHOLEST SERPL-MCNC: 131 MG/DL
COLOR UR AUTO: YELLOW
CREAT SERPL-MCNC: 0.89 MG/DL (ref 0.67–1.17)
DEPRECATED HCO3 PLAS-SCNC: 24 MMOL/L (ref 22–29)
EGFRCR SERPLBLD CKD-EPI 2021: 88 ML/MIN/1.73M2
ERYTHROCYTE [DISTWIDTH] IN BLOOD BY AUTOMATED COUNT: 14.3 % (ref 10–15)
GLUCOSE SERPL-MCNC: 92 MG/DL (ref 70–99)
GLUCOSE UR STRIP-MCNC: NEGATIVE MG/DL
HCT VFR BLD AUTO: 38.1 % (ref 40–53)
HDLC SERPL-MCNC: 49 MG/DL
HGB BLD-MCNC: 12.6 G/DL (ref 13.3–17.7)
HGB UR QL STRIP: NEGATIVE
INR BLD: 2 (ref 0.9–1.1)
KETONES UR STRIP-MCNC: NEGATIVE MG/DL
LDLC SERPL CALC-MCNC: 72 MG/DL
LEUKOCYTE ESTERASE UR QL STRIP: NEGATIVE
MCH RBC QN AUTO: 32.3 PG (ref 26.5–33)
MCHC RBC AUTO-ENTMCNC: 33.1 G/DL (ref 31.5–36.5)
MCV RBC AUTO: 98 FL (ref 78–100)
NITRATE UR QL: NEGATIVE
NONHDLC SERPL-MCNC: 82 MG/DL
PH UR STRIP: 6 [PH] (ref 5–8)
PLATELET # BLD AUTO: 138 10E3/UL (ref 150–450)
POTASSIUM SERPL-SCNC: 5.2 MMOL/L (ref 3.4–5.3)
PROT SERPL-MCNC: 7 G/DL (ref 6.4–8.3)
PSA SERPL DL<=0.01 NG/ML-MCNC: 0.39 NG/ML (ref 0–6.5)
RBC # BLD AUTO: 3.9 10E6/UL (ref 4.4–5.9)
SODIUM SERPL-SCNC: 142 MMOL/L (ref 135–145)
SP GR UR STRIP: 1.02 (ref 1–1.03)
TRIGL SERPL-MCNC: 49 MG/DL
TSH SERPL DL<=0.005 MIU/L-ACNC: 1.84 UIU/ML (ref 0.3–4.2)
UROBILINOGEN UR STRIP-ACNC: 0.2 E.U./DL
WBC # BLD AUTO: 5.6 10E3/UL (ref 4–11)

## 2023-10-04 PROCEDURE — 82728 ASSAY OF FERRITIN: CPT | Performed by: INTERNAL MEDICINE

## 2023-10-04 PROCEDURE — G0439 PPPS, SUBSEQ VISIT: HCPCS | Performed by: INTERNAL MEDICINE

## 2023-10-04 PROCEDURE — 71046 X-RAY EXAM CHEST 2 VIEWS: CPT | Mod: TC | Performed by: RADIOLOGY

## 2023-10-04 PROCEDURE — 80061 LIPID PANEL: CPT | Performed by: INTERNAL MEDICINE

## 2023-10-04 PROCEDURE — G0103 PSA SCREENING: HCPCS | Performed by: INTERNAL MEDICINE

## 2023-10-04 PROCEDURE — 80053 COMPREHEN METABOLIC PANEL: CPT | Performed by: INTERNAL MEDICINE

## 2023-10-04 PROCEDURE — 81003 URINALYSIS AUTO W/O SCOPE: CPT | Performed by: INTERNAL MEDICINE

## 2023-10-04 PROCEDURE — 85027 COMPLETE CBC AUTOMATED: CPT | Performed by: INTERNAL MEDICINE

## 2023-10-04 PROCEDURE — 85045 AUTOMATED RETICULOCYTE COUNT: CPT | Performed by: INTERNAL MEDICINE

## 2023-10-04 PROCEDURE — 84443 ASSAY THYROID STIM HORMONE: CPT | Performed by: INTERNAL MEDICINE

## 2023-10-04 PROCEDURE — 99214 OFFICE O/P EST MOD 30 MIN: CPT | Mod: 25 | Performed by: INTERNAL MEDICINE

## 2023-10-04 PROCEDURE — 85610 PROTHROMBIN TIME: CPT | Performed by: INTERNAL MEDICINE

## 2023-10-04 PROCEDURE — 36415 COLL VENOUS BLD VENIPUNCTURE: CPT | Performed by: INTERNAL MEDICINE

## 2023-10-04 ASSESSMENT — ENCOUNTER SYMPTOMS
WEAKNESS: 0
NERVOUS/ANXIOUS: 0
HEMATURIA: 0
SHORTNESS OF BREATH: 1
HEMATOCHEZIA: 0
MYALGIAS: 0
EYE PAIN: 0
COUGH: 1
ABDOMINAL PAIN: 0
DIZZINESS: 1
FEVER: 0
FREQUENCY: 0
JOINT SWELLING: 0
NAUSEA: 0
HEARTBURN: 0
CONSTIPATION: 0
ARTHRALGIAS: 0
PARESTHESIAS: 0
DIARRHEA: 0
PALPITATIONS: 0
SORE THROAT: 0
DYSURIA: 0
CHILLS: 0

## 2023-10-04 ASSESSMENT — ACTIVITIES OF DAILY LIVING (ADL): CURRENT_FUNCTION: NO ASSISTANCE NEEDED

## 2023-10-04 NOTE — PROGRESS NOTES
"SUBJECTIVE:   Obed is a 78 year old who presents for Preventive Visit.      10/4/2023     8:13 AM   Additional Questions   Roomed by chris         10/4/2023     8:13 AM   Patient Reported Additional Medications   Patient reports taking the following new medications no       Are you in the first 12 months of your Medicare coverage?  No    Healthy Habits:     In general, how would you rate your overall health?  Good    Frequency of exercise:  4-5 days/week    Duration of exercise:  15-30 minutes    Do you usually eat at least 4 servings of fruit and vegetables a day, include whole grains    & fiber and avoid regularly eating high fat or \"junk\" foods?  Yes    Taking medications regularly:  Yes    Medication side effects:  Lightheadedness    Ability to successfully perform activities of daily living:  No assistance needed    Home Safety:  No safety concerns identified    Hearing Impairment:  Difficulty following a conversation in a noisy restaurant or crowded room, find that men's voices are easier to understand than woman's and difficulty understanding soft or whispered speech    In the past 6 months, have you been bothered by leaking of urine?  No    In general, how would you rate your overall mental or emotional health?  Excellent    Additional concerns today:  Yes      Today's PHQ-2 Score:       10/4/2023     8:09 AM   PHQ-2 ( 1999 [employer])   Q1: Little interest or pleasure in doing things 0   Q2: Feeling down, depressed or hopeless 0   PHQ-2 Score 0   Q1: Little interest or pleasure in doing things Not at all   Q2: Feeling down, depressed or hopeless Not at all   PHQ-2 Score 0           Have you ever done Advance Care Planning? (For example, a Health Directive, POLST, or a discussion with a medical provider or your loved ones about your wishes): Yes, advance care planning is on file.      Fall risk  Fallen 2 or more times in the past year?: Yes  Any fall with injury in the past year?: Yes    Cognitive " Screening   1) Repeat 3 items (Leader, Season, Table)    2) Clock draw: NORMAL  3) 3 item recall: Recalls 3 objects  Results: 3 items recalled: COGNITIVE IMPAIRMENT LESS LIKELY    Mini-CogTM Copyright IGNAC[employer] aKye. Licensed by the author for use in [facility]; reprinted with permission (felicia@[facility]). All rights reserved.      Do you have sleep apnea, excessive snoring or daytime drowsiness? : yes    Reviewed and updated as needed this visit by clinical staff   Tobacco  Allergies  Meds    Surg Hx  Fam Hx          Reviewed and updated as needed this visit by Provider        Surg Hx  Fam Hx         Social History     Tobacco Use    Smoking status: Former     Types: Cigarettes     Quit date: 2007     Years since quittin.7    Smokeless tobacco: Never   Substance Use Topics    Alcohol use: Yes     Alcohol/week: 3.0 standard drinks of alcohol     Comment: Alcoholic Drinks/day: occasionally             10/4/2023     8:08 AM   Alcohol Use   Prescreen: >3 drinks/day or >7 drinks/week? No     Do you have a current opioid prescription? No  Do you use any other controlled substances or medications that are not prescribed by a provider? None    Current providers sharing in care for this patient include:   Patient Care Team:  Franko Nguyen MD as PCP - General  Franko Nguyen MD as Assigned PCP  Sha Henao MD as Assigned Heart and Vascular Provider    The following health maintenance items are reviewed in Epic and correct as of today:  Health Maintenance   Topic Date Due    INFLUENZA VACCINE (1) 2023    COVID-19 Vaccine (3 - 2023-24 season) 2023    TSH W/FREE T4 REFLEX  2024    MEDICARE ANNUAL WELLNESS VISIT  10/04/2024    ANNUAL REVIEW OF HM ORDERS  10/04/2024    FALL RISK ASSESSMENT  10/04/2024    LIPID  2027    ADVANCE CARE PLANNING  10/04/2028    DTAP/TDAP/TD IMMUNIZATION (3 - Td or Tdap) 10/03/2033    HEPATITIS C SCREENING  Completed    PHQ-2 (once per calendar year)   Completed    Pneumococcal Vaccine: 65+ Years  Completed    ZOSTER IMMUNIZATION  Completed    IPV IMMUNIZATION  Aged Out    HPV IMMUNIZATION  Aged Out    MENINGITIS IMMUNIZATION  Aged Out    COLORECTAL CANCER SCREENING  Discontinued     Review of Systems   Constitutional:  Negative for chills and fever.   HENT:  Positive for hearing loss. Negative for ear pain and sore throat.    Eyes:  Negative for pain and visual disturbance.   Respiratory:  Positive for cough and shortness of breath.    Cardiovascular:  Negative for chest pain, palpitations and peripheral edema.   Gastrointestinal:  Negative for abdominal pain, constipation, diarrhea, heartburn, hematochezia and nausea.   Genitourinary:  Negative for dysuria, frequency, genital sores, hematuria, impotence, penile discharge and urgency.   Musculoskeletal:  Negative for arthralgias, joint swelling and myalgias.   Skin:  Negative for rash.   Neurological:  Positive for dizziness. Negative for weakness and paresthesias.   Psychiatric/Behavioral:  Negative for mood changes. The patient is not nervous/anxious.        OBJECTIVE:   /78   Pulse 60   Temp 98  F (36.7  C) (Oral)   Resp 16   Ht 1.829 m (6')   Wt 101 kg (222 lb 11.2 oz)   SpO2 99%   BMI 30.20 kg/m   Estimated body mass index is 30.2 kg/m  as calculated from the following:    Height as of this encounter: 1.829 m (6').    Weight as of this encounter: 101 kg (222 lb 11.2 oz).  Physical Exam  EYES: Eyelids, conjunctiva, and sclera were normal. Pupils were normal. Cornea, iris, and lens were normal bilaterally.  HEAD, EARS, NOSE, MOUTH, AND THROAT: Head and face were normal. Hearing was normal to voice and the ears were normal to external exam. Nose appearance was normal and there was no discharge. Oropharynx was normal.  NECK: Neck appearance was normal. There were no neck masses and the thyroid was not enlarged.  RESPIRATORY: Breathing pattern was normal and the chest moved symmetrically.   Percussion/auscultatory percussion was normal.  Lung sounds were normal and there were no abnormal sounds.  CARDIOVASCULAR: Heart rate and rhythm were normal.  S1 and S2 were normal and there were no extra sounds or murmurs. Peripheral pulses in arms and legs were normal.  Jugular venous pressure was normal.  There was no peripheral edema.  GASTROINTESTINAL: The abdomen was normal in contour.    NEUROLOGIC: The patient was alert and oriented to person, place, time, and circumstance. Speech was normal. Cranial nerves were normal. Motor strength was normal for age. The patient was normally coordinated.  PSYCHIATRIC:  Mood and affect were normal and the patient had normal recent and remote memory. The patient's judgment and insight were normal.    ASSESSMENT / PLAN:   1. Annual physical exam  This is a 70-year-old man with issues as discussed below    2. Screening for prostate cancer  - Prostate Specific Antigen Screen; Future  - Prostate Specific Antigen Screen    3. Non-rheumatic mitral regurgitation  4. S/P mitral valve replacement with tissue valve  Continue with current plan,  follows with cardiology  - INR point of care    5. Cardiac pacemaker in situ, dual chamber    6. Persistent atrial fibrillation (H)  Continue with warfarin and metoprolol  - INR point of care    7. Coronary artery disease involving native coronary artery of native heart with angina pectoris (H24)  Continue with secondary prevention  - CBC with platelets; Future  - Comprehensive metabolic panel; Future  - Lipid panel reflex to direct LDL Fasting; Future  - UA Macroscopic with reflex to Microscopic and Culture; Future  - CBC with platelets  - Comprehensive metabolic panel  - Lipid panel reflex to direct LDL Fasting  - UA Macroscopic with reflex to Microscopic and Culture    8. KYLE on CPAP    9. Asymmetrical sensorineural hearing loss  Hearing aids    10. Cough, unspecified type  - XR Chest 2 Views; Future    11. Acquired  hypothyroidism  Continue with levothyroxine  - TSH with free T4 reflex; Future  - TSH with free T4 reflex    12. Long term (current) use of anticoagulants  - INR point of care    Patient has been advised of split billing requirements and indicates understanding: Yes    COUNSELING:  Reviewed preventive health counseling, as reflected in patient instructions      BMI:   Estimated body mass index is 30.2 kg/m  as calculated from the following:    Height as of this encounter: 1.829 m (6').    Weight as of this encounter: 101 kg (222 lb 11.2 oz).   Weight management plan: Discussed healthy diet and exercise guidelines      He reports that he quit smoking about 16 years ago. His smoking use included cigarettes. He has never used smokeless tobacco.      Appropriate preventive services were discussed with this patient, including applicable screening as appropriate for fall prevention, nutrition, physical activity, Tobacco-use cessation, weight loss and cognition.  Checklist reviewing preventive services available has been given to the patient.    Reviewed patients plan of care and provided an AVS. The Basic Care Plan (routine screening as documented in Health Maintenance) for Obed meets the Care Plan requirement. This Care Plan has been established and reviewed with the Patient.          Franko Nguyen MD  [facility]    Identified Health Risks:  I have reviewed Opioid Use Disorder and Substance Use Disorder risk factors and made any needed referrals.

## 2023-10-04 NOTE — PROGRESS NOTES
ANTICOAGULATION MANAGEMENT     Obed Haley 78 year old male is on warfarin with therapeutic INR result. (Goal INR 2.0-3.0)    Recent labs: (last 7 days)     10/04/23  0936   INR 2.0*       ASSESSMENT     Source(s): Chart Review and Patient/Caregiver Call     Warfarin doses taken: Warfarin taken as instructed  Diet: No new diet changes identified  Medication/supplement changes:  Yes.   Cephalexin 500mg 4x/daily for 7 days, from 10/4-11/23.  New illness, injury, or hospitalization: Yes:    10/4/23 Annual check with Dr. Nguyen.   Presented in ED on 10/3/23 for wound laceration of left forearm.  Signs or symptoms of bleeding or clotting: No  Previous result: Therapeutic last 7(+) INR visits  Additional findings: None       PLAN     Recommended plan for temporary change(s) affecting INR     Dosing Instructions: Continue your current warfarin dose with next INR in 5 days       Summary  As of 10/4/2023      Full warfarin instructions:  7.5 mg every Mon, Thu; 5 mg all other days   Next INR check:  10/6/2023               Telephone call with Obed who verbalizes understanding and agrees to plan    Lab visit scheduled - INR on 10/9/23 @ [place]    Education provided:   Taking warfarin: Importance of taking warfarin as instructed  Goal range and lab monitoring: goal range and significance of current result  Interaction IS anticipated between warfarin and Cephalexin    Plan made per [facility] anticoagulation protocol    Yolanda Krishna RN  Anticoagulation Clinic  10/4/2023    _______________________________________________________________________     Anticoagulation Episode Summary       Current INR goal:  2.0-3.0   TTR:  100.0 % (1 y)   Target end date:  Indefinite   Send INR reminders to:  [facility]    Indications    Long term (current) use of anticoagulants [Z79.01]  Persistent atrial fibrillation (H) [I48.19]  S/P mitral valve replacement with tissue valve [Z95.3]             Comments:               Anticoagulation  Care Providers       Provider Role Specialty Phone number    Franko Franz MD Referring Internal Medicine [phone]    Franko Nguyen MD Referring Internal Medicine [phone]

## 2023-10-04 NOTE — DISCHARGE INSTRUCTIONS
You were seen in the ER for evaluation of laceration. Your laceration was cleaned and repaired with 27 sutures. Your tetanus was updated today.    You were prescribed an antibiotic, Keflex, please take this as directed.    Rest, ice, elevate your extremity, Tylenol as needed for pain. Keep your bandage in place and clean and dry for the first 24 hours, then only clean water - no dirty water (swimming pools, hot tubes, saunas, lakes, etc.) until your sutures are removed.     Tylenol (Acetaminophen) Discharge Instructions:  You may take 2 tablets of regular strength, over-the-counter, Tylenol (acetaminophen) every 4-6 hours as needed for pain.  Take no more than 4000 mg of Tylenol in a 24-hour period.      Avoid taking more than 1 acetaminophen-containing product at a time and be aware that many over-the-counter medications contain a combination of acetaminophen and other products.  If you are taking Tylenol in addition to a combination product please keep track of your daily acetaminophen dose to make sure you do not exceed the recommended 4000 mg.  Taking too much acetaminophen can cause permanent damage to your liver.    Follow-up with your primary care provider in 8-12 days for reevaluation and suture removal.     Return to the emergency department for any new or worsening symptoms including worsening pain, redness/warmth/drainage/swelling, streaking redness up your extremity, fever/chills, new weakness/numbness/tingling, decreased range of motion,  cool extremity, or any other concerning symptoms.    Take Care!  - Chica Espinoza PA-C

## 2023-10-04 NOTE — COMMUNITY RESOURCES LIST (ENGLISH)
10/04/2023   [facility]  N/A  For questions about this resource list or additional care needs, please contact your primary care clinic or care manager.  Phone: [phone]   Email: N/A   Address: [address] [place] [id number]   Hours: N/A        Financial Stability       Utility payment assistance  1  [place] [employer] - [place]'s Telephone Assistance Plan (TAP) and Federal Lifeline and Affordable Connectivity Program (ACP) Distance: 4.3 miles      Phone/Virtual   [address] [place] [address]  Language: English  Fees: Free   Phone: (969) 710-3792 Email: consumer.puc@[facility]. Website: https://[place].gov/puc/consumers/telephone/     2  Community Action Partnership (CAP) [facility] - Energy Assistance Distance: 5.08 miles      Phone/Virtual   [address] [place] [address]  Language: English, Hmong, Belgian, Upper sorbian  Hours: Mon - Fri 8:00 AM - 12:00 PM , Mon - Fri 1:00 PM - 4:30 PM  Fees: Free   Phone: (283) 191-9372 Email: araceli@caprw.org Website: http://www.caprw.org          Important Numbers & Websites       Emergency Services   911  City Services   311  Poison Control   (311) 180-8294  Suicide Prevention Lifeline   (825) 899-7891 (TALK)  Child Abuse Hotline   (145) 919-6451 (4-A-Child)  Sexual Assault Hotline   (437) 873-2914 (HOPE)  National Runaway Safeline   (624) 144-4821 (RUNAWAY)  All-Options Talkline   (182) 959-3725  Substance Abuse Referral   (243) 555-4546 (HELP)

## 2023-10-04 NOTE — PROGRESS NOTES
ANTICOAGULATION  MANAGEMENT: Discharge Review    Obed Haley chart reviewed for anticoagulation continuity of care    Emergency room visit on 10/3/23 for incurred wound after accidentally cutting arm on metal fence.  - laceration left forearm.       Discharge disposition: Home    Results:    Recent labs: (last 7 days)     10/04/23  0936   INR 2.0*     Anticoagulation inpatient management:     not applicable     Anticoagulation discharge instructions:     Warfarin dosing: home regimen continued   Bridging: No   INR goal change: No      Medication changes affecting anticoagulation: Yes: Cephalexin 500mg 4x/daily for 7 days from 10/4-11/23.    Additional factors affecting anticoagulation: observe for any s/sx of infection.     PLAN     No adjustment to anticoagulation plan needed    Spoke with Obed - INR scheduled on 10/9/23.    Anticoagulation Calendar updated    Yolanda Krishna RN

## 2023-10-04 NOTE — COMMUNITY RESOURCES LIST (ENGLISH)
10/04/2023   [facility]  N/A  For questions about this resource list or additional care needs, please contact your primary care clinic or care manager.  Phone: [phone]   Email: N/A   Address: [address] [place] [address]   Hours: N/A        Financial Stability       Utility payment assistance  1  [place] [employer] - [place]'s Telephone Assistance Plan (TAP) and Federal Lifeline and Affordable Connectivity Program (ACP) Distance: 4.3 miles      Phone/Virtual   [address] [place] [address]  Language: English  Fees: Free   Phone: (127) 521-9834 Email: consumer.puc@[facility]. Website: https://[place].gov/puc/consumers/telephone/     2  Community Action Partnership (CAP) [facility] [place] - Energy Assistance Distance: 5.08 miles      Phone/Virtual   [address] [place] [address]  Language: English, Hmong, Norwegian, Bengali  Hours: Mon - Fri 8:00 AM - 12:00 PM , Mon - Fri 1:00 PM - 4:30 PM  Fees: Free   Phone: (208) 588-6495 Email: araceli@caprw.org Website: http://www.caprw.org          Important Numbers & Websites       Emergency Services   911  City Services   311  Poison Control   (323) 479-9010  Suicide Prevention Lifeline   (792) 462-2598 (TALK)  Child Abuse Hotline   (741) 784-8118 (4-A-Child)  Sexual Assault Hotline   (301) 772-8707 (HOPE)  National Runaway Safeline   (504) 748-2544 (RUNAWAY)  All-Options Talkline   (151) 897-2649  Substance Abuse Referral   (175) 367-2234 (HELP)

## 2023-10-05 LAB
FERRITIN SERPL-MCNC: 497 NG/ML (ref 31–409)
RETICS # AUTO: 0.04 10E6/UL (ref 0.01–0.11)
RETICS/RBC NFR AUTO: 1 % (ref 0.8–2.7)

## 2023-10-11 ENCOUNTER — TELEPHONE (OUTPATIENT)
Dept: INTERNAL MEDICINE | Facility: CLINIC | Age: 78
End: 2023-10-11
Payer: MEDICARE

## 2023-10-11 NOTE — TELEPHONE ENCOUNTER
Writer called and spoke to the patient verify his upcoming RN and lab appointment on 10/13/23.    Patient stated he is coming for an RN appointment to have sutures removed from his left forearm at 10:00 am on 10/13/23, Friday.    Sutures were placed at [facility] ED on 10/3/23.    Patient also has an INR lab only appointment scheduled for 10:30 am on 10/13/23.    All other labs have been drawn per the patient.    Writer changed appointment notes to reflect why the patient is coming in on 10/13/23 as he was scheduled for a TSH, INR, and lipid panel, but no suture removal    Denies other questions or concerns at this time.    Alexandria Dickinson, RN, BSN  [facility]

## 2023-10-13 ENCOUNTER — DOCUMENTATION ONLY (OUTPATIENT)
Dept: ANTICOAGULATION | Facility: CLINIC | Age: 78
End: 2023-10-13

## 2023-10-13 ENCOUNTER — ANTICOAGULATION THERAPY VISIT (OUTPATIENT)
Dept: ANTICOAGULATION | Facility: CLINIC | Age: 78
End: 2023-10-13

## 2023-10-13 ENCOUNTER — LAB (OUTPATIENT)
Dept: LAB | Facility: CLINIC | Age: 78
End: 2023-10-13
Payer: MEDICARE

## 2023-10-13 ENCOUNTER — ALLIED HEALTH/NURSE VISIT (OUTPATIENT)
Dept: FAMILY MEDICINE | Facility: CLINIC | Age: 78
End: 2023-10-13
Payer: MEDICARE

## 2023-10-13 DIAGNOSIS — I48.19 PERSISTENT ATRIAL FIBRILLATION (H): ICD-10-CM

## 2023-10-13 DIAGNOSIS — Z95.3 S/P MITRAL VALVE REPLACEMENT WITH TISSUE VALVE: ICD-10-CM

## 2023-10-13 DIAGNOSIS — Z79.01 LONG TERM (CURRENT) USE OF ANTICOAGULANTS: Primary | ICD-10-CM

## 2023-10-13 DIAGNOSIS — Z79.01 LONG TERM (CURRENT) USE OF ANTICOAGULANTS: ICD-10-CM

## 2023-10-13 DIAGNOSIS — Z48.02 ENCOUNTER FOR REMOVAL OF SUTURES: Primary | ICD-10-CM

## 2023-10-13 LAB — INR BLD: 1.9 (ref 0.9–1.1)

## 2023-10-13 PROCEDURE — 99207 PR NO CHARGE NURSE ONLY: CPT

## 2023-10-13 PROCEDURE — 36416 COLLJ CAPILLARY BLOOD SPEC: CPT

## 2023-10-13 PROCEDURE — 85610 PROTHROMBIN TIME: CPT

## 2023-10-13 NOTE — PROGRESS NOTES
ANTICOAGULATION MANAGEMENT     Obed Villagomezpapoalexa 78 year old male is on warfarin with subtherapeutic INR result. (Goal INR 2.0-3.0)    Recent labs: (last 7 days)     10/13/23  1005   INR 1.9*       ASSESSMENT     Warfarin Lab Questionnaire    Warfarin Doses Last 7 Days      10/13/2023    10:04 AM   Dose in Tablet or Mg   TAB or MG? milligram (mg)     Pt Rptd Dose ASMITA MONDAY TUESDAY WED THURS FRIDAY SATURDAY   10/13/2023  10:04 AM 5 7.5 5 5 7.5 5 5         10/13/2023   Warfarin Lab Questionnaire   Missed doses within past 14 days? No   Changes in diet or alcohol within past 14 days? No   Medication changes since last result? No   Injuries or illness since last result? Yes  - Follow-up visit today and 4 sutures removed.  Wound healing well.          - accidentally cut his arm on metal fence; incurred laceration of left forearm on 10/3/23 and sutured in ED   If yes, please explain: see above     New shortness of breath, severe headaches or sudden changes in vision since last result? No   Abnormal bleeding since last result? Yes   If yes, please explain: laceration on left arm   Upcoming surgery, procedure? No     Previous result: Therapeutic last 8(+) INR visits.    Additional findings:  INR trending lower.   - already leaving on 10/19/23 and driving to [place] for the winter from Jan - April.   - Will document Travel plan - [facility] will still management warfarin / INR.    (Goes to [facility] for INR check and they do a fingerstick).       PLAN     Recommended plan for temporary change(s) affecting INR     Dosing Instructions: Increase your warfarin dose (6.2% change) with next INR in 2 weeks       Summary  As of 10/13/2023      Full warfarin instructions:  7.5 mg every Mon, Wed, Fri; 5 mg all other days   Next INR check:  10/27/2023               Telephone call with Obed who verbalizes understanding and agrees to plan    Patient offered & declined to schedule next visit   - he will schedule INR in 2 wks in  [place]    Education provided:   Taking warfarin: Importance of taking warfarin as instructed  Goal range and lab monitoring: goal range and significance of current result  Symptom monitoring: travel related clotting risk and prevention    Plan made per [facility] anticoagulation protocol    Yolanda Krishna RN  Anticoagulation Clinic  10/13/2023    _______________________________________________________________________     Anticoagulation Episode Summary       Current INR goal:  2.0-3.0   TTR:  97.5% (1 y)   Target end date:  Indefinite   Send INR reminders to:  [facility]    Indications    Long term (current) use of anticoagulants [Z79.01]  Persistent atrial fibrillation (H) [I48.19]  S/P mitral valve replacement with tissue valve [Z95.3]             Comments:               Anticoagulation Care Providers       Provider Role Specialty Phone number    Franko Franz MD Referring Internal Medicine [phone]    Franko Nguyen MD Referring Internal Medicine [phone]

## 2023-10-13 NOTE — PROGRESS NOTES
ANTICOAGULATION  MANAGEMENT- Travel planning    Obed SG [employer]and[employer] reports upcoming travel plans to [place] FOR THE WINTER.    Departure date: 10/19/23 (DRIVING)    Anticipated return date: FROM OCT - APRIL    Alternate contact information (if applicable): [phone]      INR monitoring plan:     [employer] to monitor and dose while traveling. Obed will call if he needs INR standing order for [facility].  Advised Obed to call on scheduled day of INR (fingerstick.  Than way, [facility] can follow-up with INR results  [facility] is not good on faxing or calling INR results in.    Anticoagulation calendar updated with date of next INR   - advised Obed to ensure he checks INR before the end of Oct 2023.   - he agreed with plan.     Instructed to call the Anticoauglation Clinic for any changes, questions or concerns. [phone]  ?   Yolanda Krishna RN

## 2023-10-13 NOTE — TELEPHONE ENCOUNTER
See suture removal documentation from 10/13/23 from the patient's left forearm.    Alexandria Dickinson RN, BSN  [facility]

## 2023-10-13 NOTE — PROGRESS NOTES
"Per ED note from Chica Espinoza PA-C, on 10/3/23 at [facility]:    \"Laceration of left forearm, initial encounter    Based on the presenting symptoms, the wound was irrigated, explored, and closed with 4 absorbable subcutaneous sutures and 27 superficial sutures as documented below    Patient advised to set up an appointment with PCP in 8-12 days for suture removal. \"     Obed Haley presents to the clinic today for removal of sutures.  The patient has had the sutures in place for 11 days.  There has been no history of infection or drainage. Patient denies any pain associated with the area.    Writer huddled with , who evaluated the laceration and was comfortable removing the sutures.    Laceration had some scabbing at the proximal end of the laceration. Laceration was reddened around the sutures, but Dr. Barker stated the redness is likely from inflammation and not infection.    27 sutures are seen located on the inside of left forearm.  The wound is healing well with no signs of infection.  Tetanus status is up to date-tetanus booster at the [facility] ED on 10/3/23.   All sutures were easily removed today by Dr. Barker, who also shaved the hair around the laceration and applied the 9 steri-strips to the forearm laceration after the suture removal.    Writer applied a Telfa gauze to the laceration and wrapped the left forearm with coban.     Routine wound care discussed.  The patient will follow up as needed.     Denies other questions or concerns at this time.    Alexandria Dickinson RN, BSN  [facility]      "

## 2023-10-17 NOTE — PROGRESS NOTES
Ice 20 minutes 3-4 times per day for swelling, pain or after exercise.    Elevate affected extremity above your heart when possible for swelling.  Avoid any painful activity or exercise.  Wear the left ankle brace daily as given today with supportive tennis shoes. This can be worn while playing volleyball as needed.  A left lower extremity compression stocking (15-20 mm hg pressure, knee high jobst) can be helpful with swelling and discomfort, which can be purchased on [employer].  Over the counter Tylenol as needed for pain- you can take 1,000 mg Tylenol every 8 hours as needed.  Restart Xarelto as previously prescribed.  Do not take NSAIDs while taking Xarelto  Home exercises as given today- do rehabilitative exercises daily.  Follow up as needed or if your pain doesn't improve as expected.    ANKLE PUMPS    With leg elevated above the level of the heart, gently flex and extend the ankle.  Repeat 10-15 times.  Do 2 sets.  Perform exercise 2 times per day.    ANKLE CIRCLES      Movement  Rotating at the ankle, slowly do 20 circles clockwise, then do 20 circles counterclockwise.  Do 2 times per day.  Tip  Make sure to keep your upper leg still as you move your foot.    ANKLE ABC's    Using the ankle and foot only, trace the letters of the alphabet.  Perform A to Z twice.  Do 2 times per day.     Progress Note    CV Surg   DOS 9/19/2017  POD # 8  EF 60%  A1c 5.5    Assessment/Plan  9/27 Replace mag. Home today     9/26 Temp spike last evening, WBC- normal, CXR reviewed with [facility].  procalcitonin normal. Pacer to be interrogated. Plan dc in am if no further temp spikes and stable.    9/24-NPO after midnight in anticipation of Pacer in am.   Review of evening events: Atrial fib with CVR than  RVR treated with metoprolol, no immediate response but then went asystole with agonal breathing and code was called. ROSC within 15 sec of compressions and back in atrial fib.  Continue heparin, start coumadin after PPM.    9/22-Doing well overall.  Underlying rhythm rate is 45-55 SR- /- cap wires and watch. Transfer to tele 40/41 9/22 Atrial fib rate variable :- trial low dose metoprolol. Dr. Hall changed pacer to atrial paced rate of 68 from Vent paced at 80.     9/21 Has had three 3-5 second pauses, despite Amio being off since this morning, discussed with Dr. Swenson. Plan is to V pace at a rate of 80.  Atrial fib last night and treated with Amio bolus and gtt. Currently Atrial fib with rate of 50-60 with back up pacer needed when rate drops to 35. Amio stopped.  9/20 Was A-V paced  With underlying slow junctional rate    1. S/P MVR-tissue- asa, heparin, BB   Pre op HR SBrady -50-60   PPM - atrial paced-   INR goal 2-3    INR sub therapeutic- 1.25   Coumadin for 3 month per Dr. Swenson routine       2. Acute resp failure- resolved- RA sat's 94%  3. Acute blood loss anemia-8.0 - follow  4. Volume overload- at baseline  5. Atrial fib-converted to SR- Atrial fib RVR - atrial paced   6. PPM placed 9/25   7. Fever 9/25 - WBC nml, procalcitonin nml, UA neg,       PLAN  Replace Magnesium  Home today     Discharge follow up    Dr. Franz in 5-7  days   INR check on Friday 9/30 and every 3 days until INR at goal and stable  Oct 3 7:50  Post op clinic device check   CV surg: Oct 17 at 10:40 [facility]  [facility]  Cardiology: Oct 30 at 12:50 with Dr. Juliano Welch  Nov 1 Home device check    Subjective/Objective  Up in chair, NAD  Neuro's grossly intact    CXR:9/26  Left subclavian pacemaker with leads superimposed over the right atrium and right ventricle. Sternal wires. Small left pleural effusion with associated atelectasis versus consolidation left lower lobe. Blunting of the right costophrenic angle consistent with small right pleural effusion. Degenerative changes of the thoracic spine  Vital signs in last 24 hours  /61 (Patient Position: Lying)  Pulse 60  Temp 99.3  F (37.4  C) (Oral)   Resp 18  Ht 6' 1.75\" (1.873 m)  Wt 210 lb 6.4 oz (95.4 kg)  SpO2 93%  BMI 27.2 kg/m2  O2 Sat's on RA  95%    Weight:   210 lb 6.4 oz (95.4 kg)  Yesterday 96.2 kg  Pre op 95.3 kg  Admit 95.3 kg      Ambulation: 800 ft, up in ritter  Physical Exam  Neuro: A & O DEJESUS = tristian  Lungs: decreased bs at bases - IS   Cor: Paced   INC: intact  ABD: soft, +BM  EXT: trace edema       Pertinent Labs   INR 1.25  Mag 1.6  Lab Results: personally reviewed.   Lab Results   Component Value Date     (L) 09/24/2017    K 4.4 09/27/2017    CL 99 09/24/2017    CO2 29 09/24/2017    BUN 19 09/24/2017    CREATININE 0.77 09/27/2017    CALCIUM 8.8 09/24/2017     Lab Results   Component Value Date    WBC 6.5 09/26/2017    HGB 8.7 (L) 09/26/2017    HCT 25.9 (L) 09/26/2017    MCV 94 09/26/2017     09/26/2017                 "

## 2023-10-23 ENCOUNTER — ANTICOAGULATION THERAPY VISIT (OUTPATIENT)
Dept: ANTICOAGULATION | Facility: CLINIC | Age: 78
End: 2023-10-23
Payer: MEDICARE

## 2023-10-23 ENCOUNTER — TRANSFERRED RECORDS (OUTPATIENT)
Dept: HEALTH INFORMATION MANAGEMENT | Facility: CLINIC | Age: 78
End: 2023-10-23
Payer: MEDICARE

## 2023-10-23 DIAGNOSIS — Z95.3 S/P MITRAL VALVE REPLACEMENT WITH TISSUE VALVE: ICD-10-CM

## 2023-10-23 DIAGNOSIS — I48.19 PERSISTENT ATRIAL FIBRILLATION (H): ICD-10-CM

## 2023-10-23 DIAGNOSIS — Z79.01 LONG TERM (CURRENT) USE OF ANTICOAGULANTS: Primary | ICD-10-CM

## 2023-10-23 LAB — INR (EXTERNAL): 2.4 (ref 0.9–1.1)

## 2023-10-23 NOTE — PROGRESS NOTES
Received a faxed INR result for Obed Haley    From: [facility]     Result 2.4     Date and time of collection: 10/23/2023

## 2023-10-23 NOTE — PROGRESS NOTES
ANTICOAGULATION MANAGEMENT     Obed Haley 78 year old male is on warfarin with therapeutic INR result. (Goal INR 2.0-3.0)    Recent labs: (last 7 days)     10/23/23  0800   INR 2.4*       ASSESSMENT     Source(s): Chart Review and Patient/Caregiver Call     Warfarin doses taken: Warfarin taken as instructed  Diet: No new diet changes identified  Medication/supplement changes: None noted  New illness, injury, or hospitalization: No   Reported wound is healing very good.  Signs or symptoms of bleeding or clotting: No  Previous result: Subtherapeutic at 1.9 on 10/13/23.  Additional findings:  Already in [place] since 10/19/23.       PLAN     Recommended plan for no diet, medication or health factor changes affecting INR     Dosing Instructions: Continue your current warfarin dose with next INR in 2-3 weeks       Summary  As of 10/23/2023      Full warfarin instructions:  7.5 mg every Mon, Wed, Fri; 5 mg all other days   Next INR check:  11/6/2023               Telephone call with Obed who verbalizes understanding and agrees to plan    Patient elected to schedule next visit 2-3 wks in [place]    Education provided:   Taking warfarin: Importance of taking warfarin as instructed  Goal range and lab monitoring: goal range and significance of current result    Plan made per [facility] anticoagulation protocol    Yolanda Krishna RN  Anticoagulation Clinic  10/23/2023    _______________________________________________________________________     Anticoagulation Episode Summary       Current INR goal:  2.0-3.0   TTR:  97.0% (1 y)   Target end date:  Indefinite   Send INR reminders to:  [facility]    Indications    Long term (current) use of anticoagulants [Z79.01]  Persistent atrial fibrillation (H) [I48.19]  S/P mitral valve replacement with tissue valve [Z95.3]             Comments:               Anticoagulation Care Providers       Provider Role Specialty Phone number    Franko Franz MD Referring Internal Medicine  [phone]    Franko Nguyen MD [facility] Internal Medicine [phone]

## 2023-10-27 ENCOUNTER — TELEPHONE (OUTPATIENT)
Dept: INTERNAL MEDICINE | Facility: CLINIC | Age: 78
End: 2023-10-27
Payer: MEDICARE

## 2023-10-27 NOTE — TELEPHONE ENCOUNTER
October 27, 2023    [facility] CPAP Supplies was received via fax for Dr. Nguyen to sign.  Patient label was attached to paperwork and placed in provider's inbox to be signed.    Elli Ribera

## 2023-11-03 NOTE — TELEPHONE ENCOUNTER
November 3, 2023    CPAP supplies was picked up from outbox of Dr. Nguyen.  Paperwork has been reviewed and is complete.  Per initial initial request, this was sent via fax to [phone].     Martha Cuevas

## 2023-11-14 ENCOUNTER — LAB (OUTPATIENT)
Dept: LAB | Facility: CLINIC | Age: 78
End: 2023-11-14
Payer: MEDICARE

## 2023-11-14 ENCOUNTER — ANTICOAGULATION THERAPY VISIT (OUTPATIENT)
Dept: ANTICOAGULATION | Facility: CLINIC | Age: 78
End: 2023-11-14

## 2023-11-14 DIAGNOSIS — I48.19 PERSISTENT ATRIAL FIBRILLATION (H): ICD-10-CM

## 2023-11-14 DIAGNOSIS — Z79.01 LONG TERM (CURRENT) USE OF ANTICOAGULANTS: Primary | ICD-10-CM

## 2023-11-14 DIAGNOSIS — Z95.3 S/P MITRAL VALVE REPLACEMENT WITH TISSUE VALVE: ICD-10-CM

## 2023-11-14 DIAGNOSIS — Z79.01 LONG TERM (CURRENT) USE OF ANTICOAGULANTS: ICD-10-CM

## 2023-11-14 LAB — INR BLD: 2.7 (ref 0.9–1.1)

## 2023-11-14 PROCEDURE — 36416 COLLJ CAPILLARY BLOOD SPEC: CPT

## 2023-11-14 PROCEDURE — 85610 PROTHROMBIN TIME: CPT

## 2023-11-14 NOTE — PROGRESS NOTES
ANTICOAGULATION MANAGEMENT     Obed Villagomezpapoalexa 78 year old male is on warfarin with therapeutic INR result. (Goal INR 2.0-3.0)    Recent labs: (last 7 days)     11/14/23  0704   INR 2.7*       ASSESSMENT     Warfarin Lab Questionnaire    Warfarin Doses Last 7 Days      11/14/2023     7:06 AM   Dose in Tablet or Mg   TAB or MG? - 5mg warfarin tablets (evenings) tablet (tab)     Pt Rptd Dose SUNDAY MONDAY TUESDAY WED THURS FRIDAY SATURDAY 11/14/2023   7:06 AM 5 7.5 5 7.5 5 7.5 5         11/14/2023   Warfarin Lab Questionnaire   Missed doses within past 14 days? No   Changes in diet or alcohol within past 14 days? No   Medication changes since last result? No   Injuries or illness since last result? Yes - got 2 black eyes after hitting his nose.   If yes, please explain: see above     New shortness of breath, severe headaches or sudden changes in vision since last result? No   Abnormal bleeding since last result? Yes   If yes, please explain: hit my nose and bled   Upcoming surgery, procedure? No   Best number to call with results? [phone]     Previous result: Therapeutic last visit at 2.4; previously outside of goal range at 1.9    Additional findings:  Back in [place]  just for one week.  Leaving for [place] on 11/16/23.   - torre in [place] from Jan-April.       PLAN     Recommended plan for temporary change(s) affecting INR     Dosing Instructions: Continue your current warfarin dose with next INR in 3 weeks       Summary  As of 11/14/2023      Full warfarin instructions:  7.5 mg every Mon, Wed, Fri; 5 mg all other days   Next INR check:  12/5/2023               Telephone call with Obed who verbalizes understanding and agrees to plan    Patient offered & declined to schedule next visit    Education provided:   Taking warfarin: Importance of taking warfarin as instructed  Goal range and lab monitoring: goal range and significance of current result    Plan made per [facility] anticoagulation protocol    Yolanda Krishna,  RN  Anticoagulation Clinic  11/14/2023    _______________________________________________________________________     Anticoagulation Episode Summary       Current INR goal:  2.0-3.0   TTR:  97.0% (1 y)   Target end date:  Indefinite   Send INR reminders to:  [facility]    Indications    Long term (current) use of anticoagulants [Z79.01]  Persistent atrial fibrillation (H) [I48.19]  S/P mitral valve replacement with tissue valve [Z95.3]             Comments:               Anticoagulation Care Providers       Provider Role Specialty Phone number    Franko Franz MD Referring Internal Medicine [phone]    Franko Nguyen MD Referring Internal Medicine [phone]

## 2023-12-12 ENCOUNTER — TELEPHONE (OUTPATIENT)
Dept: ANTICOAGULATION | Facility: CLINIC | Age: 78
End: 2023-12-12
Payer: MEDICARE

## 2023-12-12 NOTE — TELEPHONE ENCOUNTER
ANTICOAGULATION     Obed Haley is overdue for an INR check.     Left message for patient to call and schedule lab appointment as soon as possible. If returning call, please schedule.    - goes to [facility], [place]    Yolanda Krishna RN

## 2023-12-19 ENCOUNTER — ANTICOAGULATION THERAPY VISIT (OUTPATIENT)
Dept: ANTICOAGULATION | Facility: CLINIC | Age: 78
End: 2023-12-19
Payer: MEDICARE

## 2023-12-19 ENCOUNTER — TRANSFERRED RECORDS (OUTPATIENT)
Dept: HEALTH INFORMATION MANAGEMENT | Facility: CLINIC | Age: 78
End: 2023-12-19
Payer: MEDICARE

## 2023-12-19 ENCOUNTER — TELEPHONE (OUTPATIENT)
Dept: ANTICOAGULATION | Facility: CLINIC | Age: 78
End: 2023-12-19
Payer: MEDICARE

## 2023-12-19 DIAGNOSIS — Z79.01 LONG TERM (CURRENT) USE OF ANTICOAGULANTS: Primary | ICD-10-CM

## 2023-12-19 DIAGNOSIS — Z95.3 S/P MITRAL VALVE REPLACEMENT WITH TISSUE VALVE: ICD-10-CM

## 2023-12-19 DIAGNOSIS — I48.19 PERSISTENT ATRIAL FIBRILLATION (H): ICD-10-CM

## 2023-12-19 LAB — INR (EXTERNAL): 3.9 (ref 0.9–1.1)

## 2023-12-19 NOTE — TELEPHONE ENCOUNTER
FYI - Status Update    Who is Calling: patient    Update: Patient called to report INR result. He states it is 3.9.    Does caller want a call/response back: Yes     Could we send this information to you in [employer] or would you prefer to receive a phone call?:   Patient would prefer a phone call   Okay to leave a detailed message?: No at Home number on file [phone] (home)

## 2023-12-19 NOTE — PROGRESS NOTES
ANTICOAGULATION MANAGEMENT     Obed Villagomezpapoalexa 78 year old male is on warfarin with supratherapeutic INR result. (Goal INR 2.0-3.0)    Recent labs: (last 7 days)     12/19/23  1430   INR 3.9*       ASSESSMENT     Source(s): Chart Review and Patient/Caregiver Call     Warfarin doses taken: Warfarin taken as instructed  Diet: No new diet changes identified  Medication/supplement changes: None noted  New illness, injury, or hospitalization: No  Signs or symptoms of bleeding or clotting: No  Previous result: Therapeutic last 2(+) visits  Additional findings:  Now in [place] for winter. Has inrs at  [facility]  They did fax result       PLAN     Recommended plan for no diet, medication or health factor changes affecting INR     Dosing Instructions: booster dose then continue your current warfarin dose with next INR in 2 weeks       Summary  As of 12/19/2023      Full warfarin instructions:  12/19: Hold; Otherwise 7.5 mg every Mon, Fri; 5 mg all other days   Next INR check:  1/2/2024               Telephone call with Obed who verbalizes understanding and agrees to plan    Patient using outside facility for labs [facility]    Education provided:   Please call back if any changes to your diet, medications or how you've been taking warfarin    Plan made per [facility] anticoagulation protocol    Hesham Michel RN  Anticoagulation Clinic  12/19/2023    _______________________________________________________________________     Anticoagulation Episode Summary       Current INR goal:  2.0-3.0   TTR:  89.7% (1 y)   Target end date:  Indefinite   Send INR reminders to:  [facility]    Indications    Long term (current) use of anticoagulants [Z79.01]  Persistent atrial fibrillation (H) [I48.19]  S/P mitral valve replacement with tissue valve [Z95.3]             Comments:               Anticoagulation Care Providers       Provider Role Specialty Phone number    Franko Franz MD Referring Internal  Medicine [phone]    Franko Nguyen MD [facility] Internal Medicine [phone]

## 2023-12-27 ENCOUNTER — ANCILLARY PROCEDURE (OUTPATIENT)
Dept: CARDIOLOGY | Facility: CLINIC | Age: 78
End: 2023-12-27
Attending: INTERNAL MEDICINE
Payer: MEDICARE

## 2023-12-27 DIAGNOSIS — Z95.0 PACEMAKER: ICD-10-CM

## 2023-12-27 DIAGNOSIS — I49.5 SICK SINUS SYNDROME (H): ICD-10-CM

## 2023-12-27 DIAGNOSIS — I48.19 PERSISTENT ATRIAL FIBRILLATION (H): ICD-10-CM

## 2023-12-28 ENCOUNTER — ANTICOAGULATION THERAPY VISIT (OUTPATIENT)
Dept: ANTICOAGULATION | Facility: CLINIC | Age: 78
End: 2023-12-28
Payer: MEDICARE

## 2023-12-28 ENCOUNTER — TRANSFERRED RECORDS (OUTPATIENT)
Dept: HEALTH INFORMATION MANAGEMENT | Facility: CLINIC | Age: 78
End: 2023-12-28

## 2023-12-28 DIAGNOSIS — Z95.3 S/P MITRAL VALVE REPLACEMENT WITH TISSUE VALVE: ICD-10-CM

## 2023-12-28 DIAGNOSIS — Z79.01 LONG TERM (CURRENT) USE OF ANTICOAGULANTS: Primary | ICD-10-CM

## 2023-12-28 DIAGNOSIS — I48.19 PERSISTENT ATRIAL FIBRILLATION (H): ICD-10-CM

## 2023-12-28 LAB
INR (EXTERNAL): 2.7 (ref 0.9–1.1)
MDC_IDC_EPISODE_DTM: NORMAL
MDC_IDC_EPISODE_DURATION: 14 S
MDC_IDC_EPISODE_DURATION: 18 S
MDC_IDC_EPISODE_ID: NORMAL
MDC_IDC_EPISODE_TYPE: NORMAL
MDC_IDC_EPISODE_TYPE_INDUCED: NO
MDC_IDC_EPISODE_TYPE_INDUCED: NO
MDC_IDC_LEAD_CONNECTION_STATUS: NORMAL
MDC_IDC_LEAD_CONNECTION_STATUS: NORMAL
MDC_IDC_LEAD_IMPLANT_DT: NORMAL
MDC_IDC_LEAD_IMPLANT_DT: NORMAL
MDC_IDC_LEAD_LOCATION: NORMAL
MDC_IDC_LEAD_LOCATION: NORMAL
MDC_IDC_LEAD_LOCATION_DETAIL_1: NORMAL
MDC_IDC_LEAD_LOCATION_DETAIL_1: NORMAL
MDC_IDC_LEAD_MFG: NORMAL
MDC_IDC_LEAD_MFG: NORMAL
MDC_IDC_LEAD_MODEL: NORMAL
MDC_IDC_LEAD_MODEL: NORMAL
MDC_IDC_LEAD_POLARITY_TYPE: NORMAL
MDC_IDC_LEAD_POLARITY_TYPE: NORMAL
MDC_IDC_LEAD_SERIAL: NORMAL
MDC_IDC_LEAD_SERIAL: NORMAL
MDC_IDC_MSMT_BATTERY_DTM: NORMAL
MDC_IDC_MSMT_BATTERY_REMAINING_LONGEVITY: 108 MO
MDC_IDC_MSMT_BATTERY_REMAINING_PERCENTAGE: 100 %
MDC_IDC_MSMT_BATTERY_STATUS: NORMAL
MDC_IDC_MSMT_LEADCHNL_RA_IMPEDANCE_VALUE: 483 OHM
MDC_IDC_MSMT_LEADCHNL_RV_IMPEDANCE_VALUE: 460 OHM
MDC_IDC_MSMT_LEADCHNL_RV_PACING_THRESHOLD_AMPLITUDE: 0.7 V
MDC_IDC_MSMT_LEADCHNL_RV_PACING_THRESHOLD_PULSEWIDTH: 0.4 MS
MDC_IDC_PG_IMPLANT_DTM: NORMAL
MDC_IDC_PG_MFG: NORMAL
MDC_IDC_PG_MODEL: NORMAL
MDC_IDC_PG_SERIAL: NORMAL
MDC_IDC_PG_TYPE: NORMAL
MDC_IDC_SESS_CLINIC_NAME: NORMAL
MDC_IDC_SESS_DTM: NORMAL
MDC_IDC_SESS_TYPE: NORMAL
MDC_IDC_SET_BRADY_AT_MODE_SWITCH_RATE: 170 {BEATS}/MIN
MDC_IDC_SET_BRADY_LOWRATE: 60 {BEATS}/MIN
MDC_IDC_SET_BRADY_MAX_SENSOR_RATE: 130 {BEATS}/MIN
MDC_IDC_SET_BRADY_MODE: NORMAL
MDC_IDC_SET_LEADCHNL_RA_SENSING_ADAPTATION_MODE: NORMAL
MDC_IDC_SET_LEADCHNL_RA_SENSING_SENSITIVITY: 1.5 MV
MDC_IDC_SET_LEADCHNL_RV_PACING_AMPLITUDE: 1.2 V
MDC_IDC_SET_LEADCHNL_RV_PACING_CAPTURE_MODE: NORMAL
MDC_IDC_SET_LEADCHNL_RV_PACING_POLARITY: NORMAL
MDC_IDC_SET_LEADCHNL_RV_PACING_PULSEWIDTH: 0.4 MS
MDC_IDC_SET_LEADCHNL_RV_SENSING_ADAPTATION_MODE: NORMAL
MDC_IDC_SET_LEADCHNL_RV_SENSING_POLARITY: NORMAL
MDC_IDC_SET_LEADCHNL_RV_SENSING_SENSITIVITY: 1.5 MV
MDC_IDC_SET_ZONE_DETECTION_INTERVAL: 375 MS
MDC_IDC_SET_ZONE_STATUS: NORMAL
MDC_IDC_SET_ZONE_TYPE: NORMAL
MDC_IDC_SET_ZONE_TYPE: NORMAL
MDC_IDC_SET_ZONE_VENDOR_TYPE: NORMAL
MDC_IDC_STAT_BRADY_DTM_END: NORMAL
MDC_IDC_STAT_BRADY_DTM_START: NORMAL
MDC_IDC_STAT_BRADY_RA_PERCENT_PACED: 0 %
MDC_IDC_STAT_BRADY_RV_PERCENT_PACED: 71 %
MDC_IDC_STAT_EPISODE_RECENT_COUNT: 0
MDC_IDC_STAT_EPISODE_RECENT_COUNT: 0
MDC_IDC_STAT_EPISODE_RECENT_COUNT: 2
MDC_IDC_STAT_EPISODE_RECENT_COUNT_DTM_END: NORMAL
MDC_IDC_STAT_EPISODE_RECENT_COUNT_DTM_START: NORMAL
MDC_IDC_STAT_EPISODE_TYPE: NORMAL
MDC_IDC_STAT_EPISODE_VENDOR_TYPE: NORMAL
MDC_IDC_STAT_EPISODE_VENDOR_TYPE: NORMAL

## 2023-12-28 PROCEDURE — 93296 REM INTERROG EVL PM/IDS: CPT | Performed by: INTERNAL MEDICINE

## 2023-12-28 PROCEDURE — 93294 REM INTERROG EVL PM/LDLS PM: CPT | Performed by: INTERNAL MEDICINE

## 2023-12-28 NOTE — PROGRESS NOTES
ANTICOAGULATION MANAGEMENT     Obed BETTENCOURT Hernandopapoalexa 78 year old male is on warfarin with therapeutic INR result. (Goal INR 2.0-3.0)    Recent labs: (last 7 days)     12/28/23  1657   INR 2.7*       ASSESSMENT     Source(s): Chart Review and Patient/Caregiver Call     Warfarin doses taken: Warfarin taken as instructed  Diet: No new diet changes identified  Medication/supplement changes: None noted  New illness, injury, or hospitalization: No  Signs or symptoms of bleeding or clotting: No  Previous result: Supratherapeutic at 3.9 on 12/19/23.  Additional findings:  Currently spending torre in [place] from - 11/16/23 - thru April 2024.   - INRs checked @ [place], [place].       PLAN     Recommended plan for no diet, medication or health factor changes affecting INR     Dosing Instructions: Continue your current warfarin dose with next INR in 2-3 weeks       Summary  As of 12/28/2023      Full warfarin instructions:  7.5 mg every Mon, Fri; 5 mg all other days   Next INR check:  1/11/2024               Telephone call with Obed who verbalizes understanding and agrees to plan    Patient elected to schedule next visit 2-3 wks     Education provided:   Taking warfarin: Importance of taking warfarin as instructed  Goal range and lab monitoring: goal range and significance of current result  Dietary considerations: importance of consistent vitamin K intake    Plan made per [facility] anticoagulation protocol    Yolanda Krishna RN  Anticoagulation Clinic  12/28/2023    _______________________________________________________________________     Anticoagulation Episode Summary       Current INR goal:  2.0-3.0   TTR:  87.9% (1 y)   Target end date:  Indefinite   Send INR reminders to:  [facility]    Indications    Long term (current) use of anticoagulants [Z79.01]  Persistent atrial fibrillation (H) [I48.19]  S/P mitral valve replacement with tissue valve [Z95.3]             Comments:               Anticoagulation Care  Providers       Provider Role Specialty Phone number    Franko Franz MD Referring Internal Medicine [phone]    Franko Nguyen MD Referring Internal Medicine [phone]

## 2024-01-17 ENCOUNTER — ANTICOAGULATION THERAPY VISIT (OUTPATIENT)
Dept: ANTICOAGULATION | Facility: CLINIC | Age: 79
End: 2024-01-17
Payer: MEDICARE

## 2024-01-17 ENCOUNTER — TRANSFERRED RECORDS (OUTPATIENT)
Dept: HEALTH INFORMATION MANAGEMENT | Facility: CLINIC | Age: 79
End: 2024-01-17
Payer: MEDICARE

## 2024-01-17 DIAGNOSIS — Z95.3 S/P MITRAL VALVE REPLACEMENT WITH TISSUE VALVE: ICD-10-CM

## 2024-01-17 DIAGNOSIS — Z79.01 LONG TERM (CURRENT) USE OF ANTICOAGULANTS: Primary | ICD-10-CM

## 2024-01-17 DIAGNOSIS — I48.19 PERSISTENT ATRIAL FIBRILLATION (H): ICD-10-CM

## 2024-01-17 LAB — INR (EXTERNAL): 2.6 (ref 0.9–1.1)

## 2024-01-17 NOTE — PROGRESS NOTES
ANTICOAGULATION MANAGEMENT     Obed Haley 78 year old male is on warfarin with therapeutic INR result. (Goal INR 2.0-3.0)    Recent labs: (last 7 days)     01/17/24  1200   INR 2.6*       ASSESSMENT     Source(s):  Chart reviewed.      Medication/supplement changes: None noted  New illness, injury, or hospitalization: No  Previous result: Therapeutic last visit at 2.7; previously outside of goal range at 3.9  Additional findings: None       PLAN     Recommended plan for no diet, medication or health factor changes affecting INR     Dosing Instructions: Continue your current warfarin dose with next INR in 4 weeks       Summary  As of 1/17/2024      Full warfarin instructions:  7.5 mg every Mon, Fri; 5 mg all other days   Next INR check:  2/14/2024               Detailed voice message left for Obed with dosing instructions and follow up date.     Contact [phone] to schedule and with any changes, questions or concerns.     Education provided:   Please call back if any changes to your diet, medications or how you've been taking warfarin  Taking warfarin: Importance of taking warfarin as instructed  Goal range and lab monitoring: goal range and significance of current result  Dietary considerations: importance of consistent vitamin K intake    Plan made per [facility]agulation protocol    Yolanda Krishna RN  Anticoagulation Clinic  1/17/2024    _______________________________________________________________________     Anticoagulation Episode Summary       Current INR goal:  2.0-3.0   TTR:  87.9% (1 y)   Target end date:  Indefinite   Send INR reminders to:  [facility]    Indications    Long term (current) use of anticoagulants [Z79.01]  Persistent atrial fibrillation (H) [I48.19]  S/P mitral valve replacement with tissue valve [Z95.3]             Comments:               Anticoagulation Care Providers       Provider Role Specialty Phone number    Franko Franz MD Referring Internal Medicine  [phone]    Franko Nguyen MD [facility] Internal Medicine [phone]

## 2024-02-11 DIAGNOSIS — E78.5 HYPERLIPIDEMIA LDL GOAL <100: ICD-10-CM

## 2024-02-12 RX ORDER — ATORVASTATIN CALCIUM 40 MG/1
20 TABLET, FILM COATED ORAL DAILY
Qty: 45 TABLET | Refills: 44 | Status: SHIPPED | OUTPATIENT
Start: 2024-02-12 | End: 2024-10-04

## 2024-02-15 ENCOUNTER — ANTICOAGULATION THERAPY VISIT (OUTPATIENT)
Dept: ANTICOAGULATION | Facility: CLINIC | Age: 79
End: 2024-02-15
Payer: MEDICARE

## 2024-02-15 DIAGNOSIS — I48.19 PERSISTENT ATRIAL FIBRILLATION (H): ICD-10-CM

## 2024-02-15 DIAGNOSIS — Z95.3 S/P MITRAL VALVE REPLACEMENT WITH TISSUE VALVE: ICD-10-CM

## 2024-02-15 DIAGNOSIS — Z79.01 LONG TERM (CURRENT) USE OF ANTICOAGULANTS: Primary | ICD-10-CM

## 2024-02-15 LAB — INR (EXTERNAL): 2.9 (ref 0.9–1.1)

## 2024-02-15 NOTE — PROGRESS NOTES
ANTICOAGULATION MANAGEMENT     Obed Haley 78 year old male is on warfarin with therapeutic INR result. (Goal INR 2.0-3.0)    Recent labs: (last 7 days)     02/15/24  0800   INR 2.9*       ASSESSMENT     Source(s): Chart Reviewed     Medication/supplement changes: None noted  New illness, injury, or hospitalization: No  Previous result: Therapeutic last 2 INR visits  Additional findings: None       PLAN     Recommended plan for no diet, medication or health factor changes affecting INR     Dosing Instructions: Continue your current warfarin dose with next INR in 4-5 weeks       Summary  As of 2/15/2024      Full warfarin instructions:  7.5 mg every Mon, Thu; 5 mg all other days   Next INR check:  3/14/2024               Detailed voice message left for Obed with dosing instructions and follow up date.     Contact [phone] to schedule and with any changes, questions or concerns.     Education provided:   Please call back if any changes to your diet, medications or how you've been taking warfarin  Taking warfarin: Importance of taking warfarin as instructed  Goal range and lab monitoring: goal range and significance of current result  Dietary considerations: importance of consistent vitamin K intake and impact of vitamin K foods on INR    Plan made per [facility] anticoagulation protocol    Yolanda Krishna, RN  Anticoagulation Clinic  2/15/2024    _______________________________________________________________________     Anticoagulation Episode Summary       Current INR goal:  2.0-3.0   TTR:  87.9% (1 y)   Target end date:  Indefinite   Send INR reminders to:  [facility]    Indications    Long term (current) use of anticoagulants [Z79.01]  Persistent atrial fibrillation (H) [I48.19]  S/P mitral valve replacement with tissue valve [Z95.3]             Comments:               Anticoagulation Care Providers       Provider Role Specialty Phone number    Franko Franz MD Referring Internal Medicine [phone]     Franko Nguyen MD [facility] Internal Medicine [phone]

## 2024-03-04 ENCOUNTER — ANTICOAGULATION THERAPY VISIT (OUTPATIENT)
Dept: ANTICOAGULATION | Facility: CLINIC | Age: 79
End: 2024-03-04
Payer: MEDICARE

## 2024-03-04 ENCOUNTER — TRANSFERRED RECORDS (OUTPATIENT)
Dept: HEALTH INFORMATION MANAGEMENT | Facility: CLINIC | Age: 79
End: 2024-03-04
Payer: MEDICARE

## 2024-03-04 DIAGNOSIS — I48.19 PERSISTENT ATRIAL FIBRILLATION (H): ICD-10-CM

## 2024-03-04 DIAGNOSIS — Z79.01 LONG TERM (CURRENT) USE OF ANTICOAGULANTS: Primary | ICD-10-CM

## 2024-03-04 DIAGNOSIS — Z95.3 S/P MITRAL VALVE REPLACEMENT WITH TISSUE VALVE: ICD-10-CM

## 2024-03-04 LAB — INR (EXTERNAL): 2.6 (ref 0.9–1.1)

## 2024-03-04 NOTE — PROGRESS NOTES
"ANTICOAGULATION MANAGEMENT     Obed BETTENCOURT Greyalexa 78 year old male is on warfarin with therapeutic INR result. (Goal INR 2.0-3.0)    Recent labs: (last 7 days)     03/04/24  0800   INR 2.6*       ASSESSMENT     Source(s): Chart Review and Patient/Caregiver Call     Warfarin doses taken: Warfarin taken as instructed  Diet: No new diet changes identified  Medication/supplement changes: None noted  New illness, injury, or hospitalization:  Yes.   Reported last week he had a fever, sore throat, bloody nose, and coughing up \"cloudy\" phlegm, but not greenish-yellowish.  Though he has hx of sinus problems.  That is why he is checking INR sooner.  Signs or symptoms of bleeding or clotting:  Yes.   Reported bloody nose when he had a fever.    Previous result: Therapeutic last 3 visits  Additional findings:  tor[place] in [place] from Jan - April.       PLAN     Recommended plan for temporary change(s) affecting INR     Dosing Instructions: Continue your current warfarin dose with next INR in 6 weeks       Summary  As of 3/4/2024      Full warfarin instructions:  7.5 mg every Mon, Thu; 5 mg all other days   Next INR check:  4/8/2024               Telephone call with Obed who verbalizes understanding and agrees to plan   - advised Obed, if worsening or persistent ongoing sinus problems, to call Dr. Nguyen if he needs ABX   - advised, if prescribed ABX, will need to check INR on the 4-5 days of treatment.   - he reported possibly rechecking INR prior to leaving [place], either the 1-2 wk of April.    Patient offered & declined to schedule next visit    Education provided:   Taking warfarin: Importance of taking warfarin as instructed  Goal range and lab monitoring: goal range and significance of current result  Symptom monitoring: monitoring for bleeding signs and symptoms and when to seek medical attention/emergency care  Importance of notifying anticoagulation clinic for: diarrhea, nausea/vomiting, reduced intake, cold/flu, and/or " infections; a sooner lab recheck maybe needed    Plan made per [facility] anticoagulation protocol    Yolanda Krishna RN  Anticoagulation Clinic  3/4/2024    _______________________________________________________________________     Anticoagulation Episode Summary       Current INR goal:  2.0-3.0   TTR:  87.9% (1 y)   Target end date:  Indefinite   Send INR reminders to:  [facility]    Indications    Long term (current) use of anticoagulants [Z79.01]  Persistent atrial fibrillation (H) [I48.19]  S/P mitral valve replacement with tissue valve [Z95.3]             Comments:               Anticoagulation Care Providers       Provider Role Specialty Phone number    Franko Franz MD Referring Internal Medicine [phone]    Franko Nguyen MD Referring Internal Medicine [phone]

## 2024-04-04 ENCOUNTER — ANCILLARY PROCEDURE (OUTPATIENT)
Dept: CARDIOLOGY | Facility: CLINIC | Age: 79
End: 2024-04-04
Attending: INTERNAL MEDICINE
Payer: MEDICARE

## 2024-04-04 DIAGNOSIS — I48.19 PERSISTENT ATRIAL FIBRILLATION (H): ICD-10-CM

## 2024-04-04 DIAGNOSIS — I49.5 SICK SINUS SYNDROME (H): ICD-10-CM

## 2024-04-04 DIAGNOSIS — Z95.0 PACEMAKER: ICD-10-CM

## 2024-04-04 LAB
MDC_IDC_EPISODE_DTM: NORMAL
MDC_IDC_EPISODE_DURATION: 14 S
MDC_IDC_EPISODE_DURATION: 16 S
MDC_IDC_EPISODE_ID: NORMAL
MDC_IDC_EPISODE_TYPE: NORMAL
MDC_IDC_EPISODE_TYPE_INDUCED: NO
MDC_IDC_EPISODE_TYPE_INDUCED: NO
MDC_IDC_LEAD_CONNECTION_STATUS: NORMAL
MDC_IDC_LEAD_CONNECTION_STATUS: NORMAL
MDC_IDC_LEAD_IMPLANT_DT: NORMAL
MDC_IDC_LEAD_IMPLANT_DT: NORMAL
MDC_IDC_LEAD_LOCATION: NORMAL
MDC_IDC_LEAD_LOCATION: NORMAL
MDC_IDC_LEAD_LOCATION_DETAIL_1: NORMAL
MDC_IDC_LEAD_LOCATION_DETAIL_1: NORMAL
MDC_IDC_LEAD_MFG: NORMAL
MDC_IDC_LEAD_MFG: NORMAL
MDC_IDC_LEAD_MODEL: NORMAL
MDC_IDC_LEAD_MODEL: NORMAL
MDC_IDC_LEAD_POLARITY_TYPE: NORMAL
MDC_IDC_LEAD_POLARITY_TYPE: NORMAL
MDC_IDC_LEAD_SERIAL: NORMAL
MDC_IDC_LEAD_SERIAL: NORMAL
MDC_IDC_MSMT_BATTERY_DTM: NORMAL
MDC_IDC_MSMT_BATTERY_REMAINING_LONGEVITY: 108 MO
MDC_IDC_MSMT_BATTERY_REMAINING_PERCENTAGE: 100 %
MDC_IDC_MSMT_BATTERY_STATUS: NORMAL
MDC_IDC_MSMT_LEADCHNL_RA_IMPEDANCE_VALUE: 500 OHM
MDC_IDC_MSMT_LEADCHNL_RV_IMPEDANCE_VALUE: 448 OHM
MDC_IDC_MSMT_LEADCHNL_RV_PACING_THRESHOLD_AMPLITUDE: 0.7 V
MDC_IDC_MSMT_LEADCHNL_RV_PACING_THRESHOLD_PULSEWIDTH: 0.4 MS
MDC_IDC_PG_IMPLANT_DTM: NORMAL
MDC_IDC_PG_MFG: NORMAL
MDC_IDC_PG_MODEL: NORMAL
MDC_IDC_PG_SERIAL: NORMAL
MDC_IDC_PG_TYPE: NORMAL
MDC_IDC_SESS_CLINIC_NAME: NORMAL
MDC_IDC_SESS_DTM: NORMAL
MDC_IDC_SESS_TYPE: NORMAL
MDC_IDC_SET_BRADY_AT_MODE_SWITCH_RATE: 170 {BEATS}/MIN
MDC_IDC_SET_BRADY_LOWRATE: 60 {BEATS}/MIN
MDC_IDC_SET_BRADY_MAX_SENSOR_RATE: 130 {BEATS}/MIN
MDC_IDC_SET_BRADY_MODE: NORMAL
MDC_IDC_SET_LEADCHNL_RA_SENSING_ADAPTATION_MODE: NORMAL
MDC_IDC_SET_LEADCHNL_RA_SENSING_SENSITIVITY: 1.5 MV
MDC_IDC_SET_LEADCHNL_RV_PACING_AMPLITUDE: 1.2 V
MDC_IDC_SET_LEADCHNL_RV_PACING_CAPTURE_MODE: NORMAL
MDC_IDC_SET_LEADCHNL_RV_PACING_POLARITY: NORMAL
MDC_IDC_SET_LEADCHNL_RV_PACING_PULSEWIDTH: 0.4 MS
MDC_IDC_SET_LEADCHNL_RV_SENSING_ADAPTATION_MODE: NORMAL
MDC_IDC_SET_LEADCHNL_RV_SENSING_POLARITY: NORMAL
MDC_IDC_SET_LEADCHNL_RV_SENSING_SENSITIVITY: 1.5 MV
MDC_IDC_SET_ZONE_DETECTION_INTERVAL: 375 MS
MDC_IDC_SET_ZONE_STATUS: NORMAL
MDC_IDC_SET_ZONE_TYPE: NORMAL
MDC_IDC_SET_ZONE_VENDOR_TYPE: NORMAL
MDC_IDC_STAT_BRADY_DTM_END: NORMAL
MDC_IDC_STAT_BRADY_DTM_START: NORMAL
MDC_IDC_STAT_BRADY_RA_PERCENT_PACED: 0 %
MDC_IDC_STAT_BRADY_RV_PERCENT_PACED: 69 %
MDC_IDC_STAT_EPISODE_RECENT_COUNT: 0
MDC_IDC_STAT_EPISODE_RECENT_COUNT: 2
MDC_IDC_STAT_EPISODE_RECENT_COUNT_DTM_END: NORMAL
MDC_IDC_STAT_EPISODE_RECENT_COUNT_DTM_END: NORMAL
MDC_IDC_STAT_EPISODE_RECENT_COUNT_DTM_START: NORMAL
MDC_IDC_STAT_EPISODE_RECENT_COUNT_DTM_START: NORMAL
MDC_IDC_STAT_EPISODE_TYPE: NORMAL
MDC_IDC_STAT_EPISODE_TYPE: NORMAL
MDC_IDC_STAT_EPISODE_VENDOR_TYPE: NORMAL

## 2024-04-04 PROCEDURE — 93294 REM INTERROG EVL PM/LDLS PM: CPT | Performed by: INTERNAL MEDICINE

## 2024-04-04 PROCEDURE — 93296 REM INTERROG EVL PM/IDS: CPT | Performed by: INTERNAL MEDICINE

## 2024-04-16 ENCOUNTER — DOCUMENTATION ONLY (OUTPATIENT)
Dept: ANTICOAGULATION | Facility: CLINIC | Age: 79
End: 2024-04-16
Payer: MEDICARE

## 2024-04-16 DIAGNOSIS — Z79.01 LONG TERM (CURRENT) USE OF ANTICOAGULANTS: Primary | ICD-10-CM

## 2024-04-16 DIAGNOSIS — I48.92 ATRIAL FLUTTER (H): ICD-10-CM

## 2024-04-16 DIAGNOSIS — I48.19 PERSISTENT ATRIAL FIBRILLATION (H): ICD-10-CM

## 2024-04-16 NOTE — PROGRESS NOTES
ANTICOAGULATION CLINIC REFERRAL RENEWAL REQUEST       An annual renewal order is required for all patients referred to [facility].?  Please review and sign the pended referral order for Obed Haley.       ANTICOAGULATION SUMMARY      Warfarin indication(s)   - Persistent / permanent / Paroxysmal Atrial Fibrillation  - Persistent Atrial Flutter  - S/p MVR  - ([place] tissue valve) in 9/2017.     Mechanical heart valve present?  NO       Current goal range   INR: 2.0-3.0     Goal appropriate for indication? Goal INR 2-3, standard for indication(s) above     Time in Therapeutic Range (TTR)  (Goal > 60%) 87.9 %       Office visit with referring provider's group within last year Yes on 10/3/2023       Yolanda Krishna RN  [facility]

## 2024-04-18 ENCOUNTER — ANTICOAGULATION THERAPY VISIT (OUTPATIENT)
Dept: ANTICOAGULATION | Facility: CLINIC | Age: 79
End: 2024-04-18

## 2024-04-18 ENCOUNTER — LAB (OUTPATIENT)
Dept: LAB | Facility: CLINIC | Age: 79
End: 2024-04-18
Payer: MEDICARE

## 2024-04-18 DIAGNOSIS — Z95.3 S/P MITRAL VALVE REPLACEMENT WITH TISSUE VALVE: ICD-10-CM

## 2024-04-18 DIAGNOSIS — I48.19 PERSISTENT ATRIAL FIBRILLATION (H): ICD-10-CM

## 2024-04-18 DIAGNOSIS — Z79.01 LONG TERM (CURRENT) USE OF ANTICOAGULANTS: Primary | ICD-10-CM

## 2024-04-18 DIAGNOSIS — Z79.01 LONG TERM (CURRENT) USE OF ANTICOAGULANTS: ICD-10-CM

## 2024-04-18 LAB — INR BLD: 3 (ref 0.9–1.1)

## 2024-04-18 PROCEDURE — 36416 COLLJ CAPILLARY BLOOD SPEC: CPT

## 2024-04-18 PROCEDURE — 85610 PROTHROMBIN TIME: CPT

## 2024-04-18 NOTE — PROGRESS NOTES
ANTICOAGULATION MANAGEMENT     Obed Haley 79 year old male is on warfarin with therapeutic INR result. (Goal INR 2.0-3.0)    Recent labs: (last 7 days)     04/18/24  0711   INR 3.0*       ASSESSMENT     Source(s): Chart Review and Patient/Caregiver Call     Warfarin doses taken: Warfarin taken as instructed  Diet: No new diet changes identified  Medication/supplement changes: None noted  New illness, injury, or hospitalization: No  Signs or symptoms of bleeding or clotting: No  Previous result: Therapeutic last 4 INR visits  Additional findings:  Just return to [place] 1 - 1/2 wks ago, after spending torre in [place] from Oct - Apr..           PLAN     Recommended plan for no diet, medication or health factor changes affecting INR     Dosing Instructions: Continue your current warfarin dose with next INR in 2 weeks       Summary  As of 4/18/2024      Full warfarin instructions:  7.5 mg every Mon, Thu; 5 mg all other days   Next INR check:  5/2/2024               Telephone call with Obed who verbalizes understanding and agrees to plan   - currently at the grocery store.  Requested to send [employer] message.    Lab visit scheduled - INR on 5/2/24 @ [facility]    Education provided:   Taking warfarin: Importance of taking warfarin as instructed  Goal range and lab monitoring: goal range and significance of current result  Dietary considerations: impact of vitamin K foods on INR    Plan made per [facility] anticoagulation protocol    Yolanda Krishna RN  Anticoagulation Clinic  4/18/2024    _______________________________________________________________________     Anticoagulation Episode Summary       Current INR goal:  2.0-3.0   TTR:  87.9% (1 y)   Target end date:  Indefinite   Send INR reminders to:  [facility]    Indications    Long term (current) use of anticoagulants [Z79.01]  Persistent atrial fibrillation (H) [I48.19]  S/P mitral valve replacement with tissue valve [Z95.3]             Comments:                Anticoagulation Care Providers       Provider Role Specialty Phone number    Franko Franz MD Referring Internal Medicine [phone]    Franko Nguyen MD Referring Internal Medicine [phone]

## 2024-05-02 ENCOUNTER — ANTICOAGULATION THERAPY VISIT (OUTPATIENT)
Dept: ANTICOAGULATION | Facility: CLINIC | Age: 79
End: 2024-05-02

## 2024-05-02 ENCOUNTER — LAB (OUTPATIENT)
Dept: LAB | Facility: CLINIC | Age: 79
End: 2024-05-02
Payer: MEDICARE

## 2024-05-02 DIAGNOSIS — I48.19 PERSISTENT ATRIAL FIBRILLATION (H): ICD-10-CM

## 2024-05-02 DIAGNOSIS — Z95.3 S/P MITRAL VALVE REPLACEMENT WITH TISSUE VALVE: ICD-10-CM

## 2024-05-02 DIAGNOSIS — Z79.01 LONG TERM (CURRENT) USE OF ANTICOAGULANTS: Primary | ICD-10-CM

## 2024-05-02 DIAGNOSIS — Z79.01 LONG TERM (CURRENT) USE OF ANTICOAGULANTS: ICD-10-CM

## 2024-05-02 LAB — INR BLD: 2.4 (ref 0.9–1.1)

## 2024-05-02 PROCEDURE — 36416 COLLJ CAPILLARY BLOOD SPEC: CPT

## 2024-05-02 PROCEDURE — 85610 PROTHROMBIN TIME: CPT

## 2024-05-02 NOTE — PROGRESS NOTES
ANTICOAGULATION MANAGEMENT     Obed Haley 79 year old male is on warfarin with therapeutic INR result. (Goal INR 2.0-3.0)    Recent labs: (last 7 days)     05/02/24  0732   INR 2.4*       ASSESSMENT     Source(s): Chart Review and Patient/Caregiver Call     Warfarin doses taken: Warfarin taken as instructed  Diet: No new diet changes identified  Medication/supplement changes: None noted  New illness, injury, or hospitalization: No  Signs or symptoms of bleeding or clotting: No  Previous result: Therapeutic last 5 INR visits  Additional findings: None       PLAN     Recommended plan for no diet, medication or health factor changes affecting INR     Dosing Instructions: Continue your current warfarin dose with next INR in 6 weeks       Summary  As of 5/2/2024      Full warfarin instructions:  7.5 mg every Mon, Thu; 5 mg all other days   Next INR check:  6/13/2024               Telephone call with Obed who verbalizes understanding and agrees to plan    Lab visit scheduled - INR on 6/5/24 @ [facility].    Education provided:   Taking warfarin: Importance of taking warfarin as instructed  Goal range and lab monitoring: goal range and significance of current result    Plan made per [facility]agulation protocol    Yolanda Krishna, RN  Anticoagulation Clinic  5/2/2024    _______________________________________________________________________     Anticoagulation Episode Summary       Current INR goal:  2.0-3.0   TTR:  87.9% (1 y)   Target end date:  Indefinite   Send INR reminders to:  [facility]    Indications    Long term (current) use of anticoagulants [Z79.01]  Persistent atrial fibrillation (H) [I48.19]  S/P mitral valve replacement with tissue valve [Z95.3]             Comments:               Anticoagulation Care Providers       Provider Role Specialty Phone number    Franko Franz MD Referring Internal Medicine [phone]    Franko Nguyen MD Referring Internal Medicine [phone]

## 2024-05-03 ENCOUNTER — TRANSFERRED RECORDS (OUTPATIENT)
Dept: HEALTH INFORMATION MANAGEMENT | Facility: CLINIC | Age: 79
End: 2024-05-03
Payer: MEDICARE

## 2024-05-24 DIAGNOSIS — Z79.01 LONG TERM CURRENT USE OF ANTICOAGULANT THERAPY: ICD-10-CM

## 2024-05-24 DIAGNOSIS — I48.0 PAROXYSMAL ATRIAL FIBRILLATION (H): ICD-10-CM

## 2024-05-24 DIAGNOSIS — Z95.3 S/P MITRAL VALVE REPLACEMENT WITH TISSUE VALVE: ICD-10-CM

## 2024-05-24 RX ORDER — WARFARIN SODIUM 5 MG/1
TABLET ORAL
Qty: 120 TABLET | Refills: 1 | Status: SHIPPED | OUTPATIENT
Start: 2024-05-24

## 2024-06-04 ENCOUNTER — HOSPITAL ENCOUNTER (OUTPATIENT)
Dept: CARDIOLOGY | Facility: HOSPITAL | Age: 79
Discharge: HOME | End: 2024-06-04
Attending: INTERNAL MEDICINE | Admitting: INTERNAL MEDICINE
Payer: MEDICARE

## 2024-06-04 DIAGNOSIS — I48.19 PERSISTENT ATRIAL FIBRILLATION (H): ICD-10-CM

## 2024-06-04 LAB — LVEF ECHO: NORMAL

## 2024-06-04 PROCEDURE — 93306 TTE W/DOPPLER COMPLETE: CPT

## 2024-06-04 PROCEDURE — 93306 TTE W/DOPPLER COMPLETE: CPT | Mod: 26 | Performed by: INTERNAL MEDICINE

## 2024-06-05 ENCOUNTER — ANTICOAGULATION THERAPY VISIT (OUTPATIENT)
Dept: ANTICOAGULATION | Facility: CLINIC | Age: 79
End: 2024-06-05

## 2024-06-05 ENCOUNTER — LAB (OUTPATIENT)
Dept: LAB | Facility: CLINIC | Age: 79
End: 2024-06-05
Payer: MEDICARE

## 2024-06-05 DIAGNOSIS — Z79.01 LONG TERM (CURRENT) USE OF ANTICOAGULANTS: ICD-10-CM

## 2024-06-05 DIAGNOSIS — Z95.3 S/P MITRAL VALVE REPLACEMENT WITH TISSUE VALVE: ICD-10-CM

## 2024-06-05 DIAGNOSIS — I48.19 PERSISTENT ATRIAL FIBRILLATION (H): ICD-10-CM

## 2024-06-05 DIAGNOSIS — Z79.01 LONG TERM (CURRENT) USE OF ANTICOAGULANTS: Primary | ICD-10-CM

## 2024-06-05 LAB — INR BLD: 2.4 (ref 0.9–1.1)

## 2024-06-05 PROCEDURE — 36416 COLLJ CAPILLARY BLOOD SPEC: CPT

## 2024-06-05 PROCEDURE — 85610 PROTHROMBIN TIME: CPT

## 2024-06-05 NOTE — PROGRESS NOTES
ANTICOAGULATION MANAGEMENT     Obed Haley 79 year old male is on warfarin with therapeutic INR result. (Goal INR 2.0-3.0)    Recent labs: (last 7 days)     06/05/24  0731   INR 2.4*       ASSESSMENT     Source(s): Chart Review and Patient/Caregiver Call     Warfarin doses taken: Warfarin taken as instructed  Diet: No new diet changes identified  Medication/supplement changes: None noted  New illness, injury, or hospitalization: No   Reported doing well.  Signs or symptoms of bleeding or clotting: No  Previous result: Therapeutic last 6 INR visits  Additional findings: None       PLAN     Recommended plan for no diet, medication or health factor changes affecting INR     Dosing Instructions: Continue your current warfarin dose with next INR in 6 weeks       Summary  As of 6/5/2024      Full warfarin instructions:  7.5 mg every Mon, Thu; 5 mg all other days   Next INR check:  7/17/2024               Telephone call with Obed who verbalizes understanding and agrees to plan    Lab visit scheduled - IN Manuel 7/10/24 during OV with Cardiologist @ [facility].    Education provided:   Taking warfarin: Importance of taking warfarin as instructed  Goal range and lab monitoring: goal range and significance of current result    Plan made per [facility] anticoagulation protocol    Yolanda Krishna, RN  Anticoagulation Clinic  6/5/2024    _______________________________________________________________________     Anticoagulation Episode Summary       Current INR goal:  2.0-3.0   TTR:  87.9% (1 y)   Target end date:  Indefinite   Send INR reminders to:  [facility]    Indications    Long term (current) use of anticoagulants [Z79.01]  Persistent atrial fibrillation (H) [I48.19]  S/P mitral valve replacement with tissue valve [Z95.3]             Comments:               Anticoagulation Care Providers       Provider Role Specialty Phone number    Franko Franz MD Referring Internal Medicine [phone]    Franko Nguyen MD  [facility] Internal Medicine [phone]

## 2024-07-09 NOTE — PROGRESS NOTES
ANTICOAGULATION MANAGEMENT     Obed SG Haley 77 year old male is on warfarin with therapeutic INR result. (Goal INR 2.0-3.0)    Recent labs: (last 7 days)     10/26/22  0815   INR 2.6*       ASSESSMENT       Source(s): Chart Review, Patient/Caregiver Call and Template       Warfarin doses taken: Warfarin taken as instructed    Diet: No new diet changes identified    New illness, injury, or hospitalization: No    Medication/supplement changes: None noted    Signs or symptoms of bleeding or clotting: No    Previous INR: Therapeutic last 8+ visits    Additional findings: None     Leaving on 11/13/22 to spend torre in [place] and usually returns in April.       PLAN     Recommended plan for no diet, medication or health factor changes affecting INR     Dosing Instructions: Continue your current warfarin dose with next INR in 8 weeks       Summary  As of 10/26/2022    Full warfarin instructions:  7.5 mg every Mon, Thu; 5 mg all other days; Starting 10/26/2022   Next INR check:  12/2/2022             Telephone call with  Obed ([phone]) who verbalizes understanding and agrees to plan   - advised to check INR in 1-2 wks after arrival in [place]    Patient offered & declined to schedule next visit    Education provided:     Taking warfarin: Importance of taking warfarin as instructed    Goal range and lab monitoring: goal range and significance of current result    Dietary considerations: importance of consistent vitamin K intake    Healthy lifestyle considerations: potential interaction between warfarin and alcohol and limit alcohol intake to no more than 1 to 2 drinks in 24 hours if you choose to drink alcohol    Symptom monitoring: monitoring for bleeding signs and symptoms, monitoring for clotting signs and symptoms and when to seek medical attention/emergency care    Importance of notifying anticoagulation clinic for: changes in medications; a sooner lab recheck maybe needed and diarrhea, nausea/vomiting, reduced intake,  cold/flu, and/or infections; a sooner lab recheck maybe needed    Contact [phone] with any changes, questions or concerns.     Plan made per [facility] anticoagulation protocol    Yolanda Krishna RN  Anticoagulation Clinic  10/26/2022    _______________________________________________________________________     Anticoagulation Episode Summary     Current INR goal:  2.0-3.0   TTR:  90.3 % (1 y)   Target end date:  Indefinite   Send INR reminders to:  [facility]    Indications    Long term (current) use of anticoagulants [Z79.01]  Persistent atrial fibrillation (H) [I48.19]           Comments:           Anticoagulation Care Providers     Provider Role Specialty Phone number    Franko Franz MD Referring Internal Medicine [phone]    Franko Nguyen MD Referring Internal Medicine [phone]           Decreased mobility, ability to perform ADLs

## 2024-07-10 ENCOUNTER — OFFICE VISIT (OUTPATIENT)
Dept: CARDIOLOGY | Facility: CLINIC | Age: 79
End: 2024-07-10
Payer: MEDICARE

## 2024-07-10 ENCOUNTER — ANTICOAGULATION THERAPY VISIT (OUTPATIENT)
Dept: ANTICOAGULATION | Facility: CLINIC | Age: 79
End: 2024-07-10

## 2024-07-10 ENCOUNTER — LAB (OUTPATIENT)
Dept: CARDIOLOGY | Facility: CLINIC | Age: 79
End: 2024-07-10
Payer: MEDICARE

## 2024-07-10 VITALS
WEIGHT: 217 LBS | RESPIRATION RATE: 16 BRPM | DIASTOLIC BLOOD PRESSURE: 72 MMHG | SYSTOLIC BLOOD PRESSURE: 134 MMHG | HEIGHT: 72 IN | HEART RATE: 60 BPM | BODY MASS INDEX: 29.39 KG/M2

## 2024-07-10 DIAGNOSIS — I48.19 PERSISTENT ATRIAL FIBRILLATION (H): ICD-10-CM

## 2024-07-10 DIAGNOSIS — Z79.01 LONG TERM (CURRENT) USE OF ANTICOAGULANTS: ICD-10-CM

## 2024-07-10 DIAGNOSIS — Z95.3 S/P MITRAL VALVE REPLACEMENT WITH TISSUE VALVE: ICD-10-CM

## 2024-07-10 DIAGNOSIS — Z79.01 LONG TERM (CURRENT) USE OF ANTICOAGULANTS: Primary | ICD-10-CM

## 2024-07-10 DIAGNOSIS — Z95.3 S/P MITRAL VALVE REPLACEMENT WITH TISSUE VALVE: Primary | ICD-10-CM

## 2024-07-10 LAB — INR POINT OF CARE: 2.2 (ref 0.9–1.1)

## 2024-07-10 PROCEDURE — 99214 OFFICE O/P EST MOD 30 MIN: CPT | Performed by: INTERNAL MEDICINE

## 2024-07-10 PROCEDURE — 36416 COLLJ CAPILLARY BLOOD SPEC: CPT

## 2024-07-10 PROCEDURE — G2211 COMPLEX E/M VISIT ADD ON: HCPCS | Performed by: INTERNAL MEDICINE

## 2024-07-10 PROCEDURE — 85610 PROTHROMBIN TIME: CPT

## 2024-07-10 NOTE — LETTER
7/10/2024    Franko Nguyen MD  [address] [facility] [address]    RE: Obed Haley       Dear Colleague,     I had the pleasure of seeing Obed Haley in the [facility].      Cardiology Progress Note     Assessment:  Mitral valve prolapse status post mitral valve replacement with [employer] 33 mm bioprosthesis in September 2017, borderline to mildly elevated diastolic gradient, stable, normal auscultation  LV systolic dysfunction likely related to long-standing mitral regurgitation and postop tachycardia; normalized LVEF  Chronic lower extremity edema due to venous insufficiency, normal CVP estimates an echo  Tachycardia-bradycardia syndrome, status post permanent pacemaker, normal device function  Permanent atrial fibrillation with controlled ventricular response, on chronic warfarin(left atrial appendage was not excluded/excised during the mitral valve surgery)  Postop left pleural effusion, resolved  Coronary artery disease, mild to moderate, nonobstructive  Orthostatic lightheadedness, mild, chronic  Hypercholesterolemia, history of statin intolerance, adequate control with modest dose of atorvastatin  Borderline dilatation of  aortic root      Plan:  We went over the results of recent echo.  He appears to have a stable function of bioprosthesis and LV function.  He does not have any new cardiac symptoms.  Lower extremity edema is unrelated to heart.  I recommend leg elevations or compressive stockings.  We discussed symptoms of progression of coronary artery disease.  Will refrain from doing any provocative testing without new symptoms.    Echo and follow-up in 1 year    Subjective:   This is 79 year old male who comes in today for follow-up visit.  He denies chest pain or shortness of breath.  He is fairly physically active.  On hot days his feet swell.  Swelling goes away as soon as he lays down for an hour or so.  He denies PND and orthopnea.  He is weight has been stable.   He has not had heart palpitations.  He is unaware of atrial fibrillation.  He tolerates warfarin well.  INR stays within therapeutic range.  He does not have any bleeding complications.    Review of Systems:   Negative other than history of present illness    Objective:   /72 (BP Location: Left arm, Patient Position: Sitting, Cuff Size: Adult Regular)   Pulse 60   Resp 16   Ht 1.829 m (6')   Wt 98.4 kg (217 lb)   BMI 29.43 kg/m    Physical Exam:  GENERAL: no distress  NECK: No JVD  LUNGS: Clear to auscultation.  CARDIAC: regular rhythm, S1 & S2 normal.  No heaves, thrills, gallops or murmurs.  ABDOMEN: flat, negative hepatosplenomegaly, soft and non-tender.  EXTREMITIES: No evidence of cyanosis, clubbing or edema.    Current Outpatient Medications   Medication Sig Dispense Refill    amoxicillin (AMOXIL) 500 MG capsule [AMOXICILLIN (AMOXIL) 500 MG CAPSULE] Take 2,000 mg by mouth as needed (1 hour prior to dentist.).       atorvastatin (LIPITOR) 40 MG tablet TAKE 0.5 TABLETS BY MOUTH DAILY 45 tablet 44    b complex vitamins tablet [B COMPLEX VITAMINS TABLET] Take 1 tablet by mouth every other day.       cholecalciferol, vitamin D3, 1,000 unit tablet [CHOLECALCIFEROL, VITAMIN D3, 1,000 UNIT TABLET] Take 1,000 Units by mouth every other day.       EPINEPHrine (EPIPEN) 0.3 mg/0.3 mL atIn [EPINEPHRINE (EPIPEN) 0.3 MG/0.3 ML ATIN] Inject 0.3 mg into the shoulder, thigh, or buttocks once as needed (allergic reaction).      levothyroxine (SYNTHROID/LEVOTHROID) 100 MCG tablet TAKE 1 TABLET BY MOUTH EVERY DAY 90 tablet 2    metoprolol succinate ER (TOPROL XL) 25 MG 24 hr tablet TAKE 1 TABLET BY MOUTH EVERY DAY 90 tablet 3    MULTIVIT &MINERALS/FERROUS FUM (MULTI VITAMIN ORAL) [MULTIVIT &MINERALS/FERROUS FUM (MULTI VITAMIN ORAL)] Take 1 tablet by mouth every other day. Pt taking multi vitamin without Iron      warfarin ANTICOAGULANT (COUMADIN) 5 MG tablet TAKE 1 TAB (5MG) TO 1&1/2 TABS (7.5MG) BY MOUTH DAILY AS  DIRECTED.ADJUST DOSE BASED ON INR RESULTS 120 tablet 1    chlorpheniramine (CHLOR-TRIMETON) 4 mg tablet [CHLORPHENIRAMINE (CHLOR-TRIMETON) 4 MG TABLET] Take 1 tablet (4 mg total) by mouth every 6 (six) hours as needed for allergies. (Patient not taking: Reported on 7/10/2024) 30 tablet 11       Cardiographics:    Pacemaker interrogation: April 2024   Pacing % /Programmed:  69% @ VVIR 60 bpm  Lead(s): Stable measurements and trends.  Battery longevity: 9 years estimated  Presenting: , VS 60-69 bpm  Atrial high rates: N/A hx persistent AFib  Anticoagulant: Warfarin    Echocardiogram: April 2023  1. Normal left ventricular size and systolic performance with a visually  estimated ejection fraction of 55%.  2. There is abnormal septal motion consistent with ventricular paced rhythm.  3. There is a bio-prosthetic mitral valve (documented 33 mm [employer]  Epic porcine pericardial tissue valve).  Â  Probably normal mitral valve prosthesis function; mean diastolic gradient is  5 mm Hg.  Â  There is trace mitral insufficiency.  4. Mild right ventricular enlargement with borderline reduced right  ventricular systolic performance.  5. There is severe biatrial enlargement.     June 2024  The left ventricle is normal in size with mild concentric left ventricular  hypertrophy.  Left ventricular function is decreased. The ejection fraction is 50-55%  (borderline). Septal motion is consistent with post-operative state.  Normal right ventricle size and systolic function.  Both atria are severely dilated.  There is a bioprosthetic mitral valve (documented 33mm Epic) The mean gradient  is 6 mmHg at a heart rate of 60 bpm.  Compared to prior study, there is no significant change.      Coronary angiogram: August 2017  LM min dz  LAD mid 30%   Circ mild dz with branch supplies PL territory  RCA mid 50% with PDA extends to apex          Lab Results    Chemistry/lipid CBC Cardiac Enzymes/BNP/TSH/INR   Recent Labs   Lab Test  "10/04/23  0936   CHOL 131   HDL 49   LDL 72   TRIG 49     Recent Labs   Lab Test 10/04/23  0936 09/28/22  0800 09/15/21  0812   LDL 72 75 74     Recent Labs   Lab Test 10/04/23  0936      POTASSIUM 5.2   CHLORIDE 107   CO2 24   GLC 92   BUN 24.6*   CR 0.89   GFRESTIMATED 88   NOVA 9.6     Recent Labs   Lab Test 10/04/23  0936 09/15/21  0812 07/16/20  0752   CR 0.89 1.00 0.86     Recent Labs   Lab Test 09/18/17  0835   A1C 5.5          Recent Labs   Lab Test 10/04/23  0937   WBC 5.6   HGB 12.6*   HCT 38.1*   MCV 98   *     Recent Labs   Lab Test 10/04/23  0937 09/15/21  0812 07/16/20  0752   HGB 12.6* 13.6 12.8*    No results for input(s): \"TROPONINI\" in the last 50393 hours.  Recent Labs   Lab Test 07/16/20  0752 10/14/19  1626 04/10/19  1354   BNP 89* 73 213*     Recent Labs   Lab Test 10/04/23  0936   TSH 1.84     Recent Labs   Lab Test 07/10/24  0818 06/05/24  0731 05/02/24  0732   INR 2.2* 2.4* 2.4*                         Thank you for allowing me to participate in the care of your patient.      Sincerely,     Sha Henao MD     [facility]  cc:   Paulo Henao MD  [address]  [address]  [place] [address]      " 0

## 2024-07-10 NOTE — PROGRESS NOTES
ANTICOAGULATION MANAGEMENT     Obed Haley 79 year old male is on warfarin with therapeutic INR result. (Goal INR 2.0-3.0)    Recent labs: (last 7 days)     07/10/24  0818   INR 2.2*       ASSESSMENT     Source(s): Chart Review and Patient/Caregiver Call     Warfarin doses taken: Warfarin taken as instructed  Diet: No new diet changes identified  Medication/supplement changes: None noted  New illness, injury, or hospitalization: No   7/10/24 follow-up visit with Dr. Hneao - no new changes and to follow-up with echo in one year.  Signs or symptoms of bleeding or clotting: No  Previous result: Therapeutic last 7 INR visits  Additional findings: None       PLAN     Recommended plan for no diet, medication or health factor changes affecting INR     Dosing Instructions: Continue your current warfarin dose with next INR in 8 weeks       Summary  As of 7/10/2024      Full warfarin instructions:  7.5 mg every Mon, Thu; 5 mg all other days   Next INR check:  9/4/2024               Telephone call with Obed who verbalizes understanding and agrees to plan    Lab visit scheduled - INR on 9/4/24 @ [facility]    Education provided: Please call back if any changes to your diet, medications or how you've been taking warfarin  Taking warfarin: Importance of taking warfarin as instructed  Goal range and lab monitoring: goal range and significance of current result  Symptom monitoring: monitoring for bleeding signs and symptoms    Plan made per [facility] anticoagulation protocol    Yolanda Krishna RN  Anticoagulation Clinic  7/10/2024    _______________________________________________________________________     Anticoagulation Episode Summary       Current INR goal:  2.0-3.0   TTR:  87.9% (1 y)   Target end date:  Indefinite   Send INR reminders to:  [facility]    Indications    Long term (current) use of anticoagulants [Z79.01]  Persistent atrial fibrillation (H) [I48.19]  S/P mitral valve replacement with tissue valve  [Z95.3]             Comments:               Anticoagulation Care Providers       Provider Role Specialty Phone number    Franko Franz MD Referring Internal Medicine [phone]    Franko Nguyen MD Referring Internal Medicine [phone]

## 2024-07-10 NOTE — PATIENT INSTRUCTIONS
Obed Haley,    It was a pleasure to see you today at [facility].     My recommendations after this visit include:    Echo and follow up in 1 year    DANNY Henao MD, FACC, MILI

## 2024-07-10 NOTE — PROGRESS NOTES
Cardiology Progress Note     Assessment:  Mitral valve prolapse status post mitral valve replacement with [employer] 33 mm bioprosthesis in September 2017, borderline to mildly elevated diastolic gradient, stable, normal auscultation  LV systolic dysfunction likely related to long-standing mitral regurgitation and postop tachycardia; normalized LVEF  Chronic lower extremity edema due to venous insufficiency, normal CVP estimates an echo  Tachycardia-bradycardia syndrome, status post permanent pacemaker, normal device function  Permanent atrial fibrillation with controlled ventricular response, on chronic warfarin(left atrial appendage was not excluded/excised during the mitral valve surgery)  Postop left pleural effusion, resolved  Coronary artery disease, mild to moderate, nonobstructive  Orthostatic lightheadedness, mild, chronic  Hypercholesterolemia, history of statin intolerance, adequate control with modest dose of atorvastatin  Borderline dilatation of  aortic root      Plan:  We went over the results of recent echo.  He appears to have a stable function of bioprosthesis and LV function.  He does not have any new cardiac symptoms.  Lower extremity edema is unrelated to heart.  I recommend leg elevations or compressive stockings.  We discussed symptoms of progression of coronary artery disease.  Will refrain from doing any provocative testing without new symptoms.    Echo and follow-up in 1 year    Subjective:   This is 79 year old male who comes in today for follow-up visit.  He denies chest pain or shortness of breath.  He is fairly physically active.  On hot days his feet swell.  Swelling goes away as soon as he lays down for an hour or so.  He denies PND and orthopnea.  He is weight has been stable.  He has not had heart palpitations.  He is unaware of atrial fibrillation.  He tolerates warfarin well.  INR stays within therapeutic range.  He does not have any bleeding complications.    Review of  Systems:   Negative other than history of present illness    Objective:   /72 (BP Location: Left arm, Patient Position: Sitting, Cuff Size: Adult Regular)   Pulse 60   Resp 16   Ht 1.829 m (6')   Wt 98.4 kg (217 lb)   BMI 29.43 kg/m    Physical Exam:  GENERAL: no distress  NECK: No JVD  LUNGS: Clear to auscultation.  CARDIAC: regular rhythm, S1 & S2 normal.  No heaves, thrills, gallops or murmurs.  ABDOMEN: flat, negative hepatosplenomegaly, soft and non-tender.  EXTREMITIES: No evidence of cyanosis, clubbing or edema.    Current Outpatient Medications   Medication Sig Dispense Refill    amoxicillin (AMOXIL) 500 MG capsule [AMOXICILLIN (AMOXIL) 500 MG CAPSULE] Take 2,000 mg by mouth as needed (1 hour prior to dentist.).       atorvastatin (LIPITOR) 40 MG tablet TAKE 0.5 TABLETS BY MOUTH DAILY 45 tablet 44    b complex vitamins tablet [B COMPLEX VITAMINS TABLET] Take 1 tablet by mouth every other day.       cholecalciferol, vitamin D3, 1,000 unit tablet [CHOLECALCIFEROL, VITAMIN D3, 1,000 UNIT TABLET] Take 1,000 Units by mouth every other day.       EPINEPHrine (EPIPEN) 0.3 mg/0.3 mL atIn [EPINEPHRINE (EPIPEN) 0.3 MG/0.3 ML ATIN] Inject 0.3 mg into the shoulder, thigh, or buttocks once as needed (allergic reaction).      levothyroxine (SYNTHROID/LEVOTHROID) 100 MCG tablet TAKE 1 TABLET BY MOUTH EVERY DAY 90 tablet 2    metoprolol succinate ER (TOPROL XL) 25 MG 24 hr tablet TAKE 1 TABLET BY MOUTH EVERY DAY 90 tablet 3    MULTIVIT &MINERALS/FERROUS FUM (MULTI VITAMIN ORAL) [MULTIVIT &MINERALS/FERROUS FUM (MULTI VITAMIN ORAL)] Take 1 tablet by mouth every other day. Pt taking multi vitamin without Iron      warfarin ANTICOAGULANT (COUMADIN) 5 MG tablet TAKE 1 TAB (5MG) TO 1&1/2 TABS (7.5MG) BY MOUTH DAILY AS DIRECTED.ADJUST DOSE BASED ON INR RESULTS 120 tablet 1    chlorpheniramine (CHLOR-TRIMETON) 4 mg tablet [CHLORPHENIRAMINE (CHLOR-TRIMETON) 4 MG TABLET] Take 1 tablet (4 mg total) by mouth every 6 (six)  hours as needed for allergies. (Patient not taking: Reported on 7/10/2024) 30 tablet 11       Cardiographics:    Pacemaker interrogation: April 2024   Pacing % /Programmed:  69% @ VVIR 60 bpm  Lead(s): Stable measurements and trends.  Battery longevity: 9 years estimated  Presenting: , VS 60-69 bpm  Atrial high rates: N/A hx persistent AFib  Anticoagulant: Warfarin    Echocardiogram: April 2023  1. Normal left ventricular size and systolic performance with a visually  estimated ejection fraction of 55%.  2. There is abnormal septal motion consistent with ventricular paced rhythm.  3. There is a bio-prosthetic mitral valve (documented 33 mm [employer]  Epic porcine pericardial tissue valve).  Â  Probably normal mitral valve prosthesis function; mean diastolic gradient is  5 mm Hg.  Â  There is trace mitral insufficiency.  4. Mild right ventricular enlargement with borderline reduced right  ventricular systolic performance.  5. There is severe biatrial enlargement.     June 2024  The left ventricle is normal in size with mild concentric left ventricular  hypertrophy.  Left ventricular function is decreased. The ejection fraction is 50-55%  (borderline). Septal motion is consistent with post-operative state.  Normal right ventricle size and systolic function.  Both atria are severely dilated.  There is a bioprosthetic mitral valve (documented 33mm Epic) The mean gradient  is 6 mmHg at a heart rate of 60 bpm.  Compared to prior study, there is no significant change.      Coronary angiogram: August 2017  LM min dz  LAD mid 30%   Circ mild dz with branch supplies PL territory  RCA mid 50% with PDA extends to apex          Lab Results    Chemistry/lipid CBC Cardiac Enzymes/BNP/TSH/INR   Recent Labs   Lab Test 10/04/23  0936   CHOL 131   HDL 49   LDL 72   TRIG 49     Recent Labs   Lab Test 10/04/23  0936 09/28/22  0800 09/15/21  0812   LDL 72 75 74     Recent Labs   Lab Test 10/04/23  0936      POTASSIUM 5.2  "  CHLORIDE 107   CO2 24   GLC 92   BUN 24.6*   CR 0.89   GFRESTIMATED 88   NOVA 9.6     Recent Labs   Lab Test 10/04/23  0936 09/15/21  0812 07/16/20  0752   CR 0.89 1.00 0.86     Recent Labs   Lab Test 09/18/17  0835   A1C 5.5          Recent Labs   Lab Test 10/04/23  0937   WBC 5.6   HGB 12.6*   HCT 38.1*   MCV 98   *     Recent Labs   Lab Test 10/04/23  0937 09/15/21  0812 07/16/20  0752   HGB 12.6* 13.6 12.8*    No results for input(s): \"TROPONINI\" in the last 16387 hours.  Recent Labs   Lab Test 07/16/20  0752 10/14/19  1626 04/10/19  1354   BNP 89* 73 213*     Recent Labs   Lab Test 10/04/23  0936   TSH 1.84     Recent Labs   Lab Test 07/10/24  0818 06/05/24  0731 05/02/24  0732   INR 2.2* 2.4* 2.4*                     "

## 2024-07-16 ENCOUNTER — ANCILLARY PROCEDURE (OUTPATIENT)
Dept: CARDIOLOGY | Facility: CLINIC | Age: 79
End: 2024-07-16
Attending: INTERNAL MEDICINE
Payer: MEDICARE

## 2024-07-16 DIAGNOSIS — I48.19 PERSISTENT ATRIAL FIBRILLATION (H): ICD-10-CM

## 2024-07-16 DIAGNOSIS — Z95.0 PACEMAKER: ICD-10-CM

## 2024-07-16 DIAGNOSIS — I49.5 SICK SINUS SYNDROME (H): ICD-10-CM

## 2024-07-16 LAB
MDC_IDC_EPISODE_DTM: NORMAL
MDC_IDC_EPISODE_DURATION: 11 S
MDC_IDC_EPISODE_DURATION: 11 S
MDC_IDC_EPISODE_DURATION: 12 S
MDC_IDC_EPISODE_DURATION: 12 S
MDC_IDC_EPISODE_DURATION: 13 S
MDC_IDC_EPISODE_DURATION: 14 S
MDC_IDC_EPISODE_DURATION: 15 S
MDC_IDC_EPISODE_DURATION: 18 S
MDC_IDC_EPISODE_ID: NORMAL
MDC_IDC_EPISODE_TYPE: NORMAL
MDC_IDC_EPISODE_TYPE_INDUCED: NO
MDC_IDC_LEAD_CONNECTION_STATUS: NORMAL
MDC_IDC_LEAD_CONNECTION_STATUS: NORMAL
MDC_IDC_LEAD_IMPLANT_DT: NORMAL
MDC_IDC_LEAD_IMPLANT_DT: NORMAL
MDC_IDC_LEAD_LOCATION: NORMAL
MDC_IDC_LEAD_LOCATION: NORMAL
MDC_IDC_LEAD_LOCATION_DETAIL_1: NORMAL
MDC_IDC_LEAD_LOCATION_DETAIL_1: NORMAL
MDC_IDC_LEAD_MFG: NORMAL
MDC_IDC_LEAD_MFG: NORMAL
MDC_IDC_LEAD_MODEL: NORMAL
MDC_IDC_LEAD_MODEL: NORMAL
MDC_IDC_LEAD_POLARITY_TYPE: NORMAL
MDC_IDC_LEAD_POLARITY_TYPE: NORMAL
MDC_IDC_LEAD_SERIAL: NORMAL
MDC_IDC_LEAD_SERIAL: NORMAL
MDC_IDC_MSMT_BATTERY_DTM: NORMAL
MDC_IDC_MSMT_BATTERY_REMAINING_LONGEVITY: 102 MO
MDC_IDC_MSMT_BATTERY_REMAINING_PERCENTAGE: 96 %
MDC_IDC_MSMT_BATTERY_STATUS: NORMAL
MDC_IDC_MSMT_LEADCHNL_RA_IMPEDANCE_VALUE: 508 OHM
MDC_IDC_MSMT_LEADCHNL_RV_IMPEDANCE_VALUE: 443 OHM
MDC_IDC_MSMT_LEADCHNL_RV_PACING_THRESHOLD_AMPLITUDE: 0.7 V
MDC_IDC_MSMT_LEADCHNL_RV_PACING_THRESHOLD_PULSEWIDTH: 0.4 MS
MDC_IDC_PG_IMPLANT_DTM: NORMAL
MDC_IDC_PG_MFG: NORMAL
MDC_IDC_PG_MODEL: NORMAL
MDC_IDC_PG_SERIAL: NORMAL
MDC_IDC_PG_TYPE: NORMAL
MDC_IDC_SESS_CLINIC_NAME: NORMAL
MDC_IDC_SESS_DTM: NORMAL
MDC_IDC_SESS_TYPE: NORMAL
MDC_IDC_SET_BRADY_AT_MODE_SWITCH_RATE: 170 {BEATS}/MIN
MDC_IDC_SET_BRADY_LOWRATE: 60 {BEATS}/MIN
MDC_IDC_SET_BRADY_MAX_SENSOR_RATE: 130 {BEATS}/MIN
MDC_IDC_SET_BRADY_MODE: NORMAL
MDC_IDC_SET_LEADCHNL_RA_SENSING_ADAPTATION_MODE: NORMAL
MDC_IDC_SET_LEADCHNL_RA_SENSING_SENSITIVITY: 1.5 MV
MDC_IDC_SET_LEADCHNL_RV_PACING_AMPLITUDE: 1.2 V
MDC_IDC_SET_LEADCHNL_RV_PACING_CAPTURE_MODE: NORMAL
MDC_IDC_SET_LEADCHNL_RV_PACING_POLARITY: NORMAL
MDC_IDC_SET_LEADCHNL_RV_PACING_PULSEWIDTH: 0.4 MS
MDC_IDC_SET_LEADCHNL_RV_SENSING_ADAPTATION_MODE: NORMAL
MDC_IDC_SET_LEADCHNL_RV_SENSING_POLARITY: NORMAL
MDC_IDC_SET_LEADCHNL_RV_SENSING_SENSITIVITY: 1.5 MV
MDC_IDC_SET_ZONE_DETECTION_INTERVAL: 375 MS
MDC_IDC_SET_ZONE_STATUS: NORMAL
MDC_IDC_SET_ZONE_TYPE: NORMAL
MDC_IDC_SET_ZONE_VENDOR_TYPE: NORMAL
MDC_IDC_STAT_BRADY_DTM_END: NORMAL
MDC_IDC_STAT_BRADY_DTM_START: NORMAL
MDC_IDC_STAT_BRADY_RA_PERCENT_PACED: 0 %
MDC_IDC_STAT_BRADY_RV_PERCENT_PACED: 66 %
MDC_IDC_STAT_EPISODE_RECENT_COUNT: 0
MDC_IDC_STAT_EPISODE_RECENT_COUNT: 0
MDC_IDC_STAT_EPISODE_RECENT_COUNT: 14
MDC_IDC_STAT_EPISODE_RECENT_COUNT_DTM_END: NORMAL
MDC_IDC_STAT_EPISODE_RECENT_COUNT_DTM_START: NORMAL
MDC_IDC_STAT_EPISODE_TYPE: NORMAL
MDC_IDC_STAT_EPISODE_VENDOR_TYPE: NORMAL
MDC_IDC_STAT_EPISODE_VENDOR_TYPE: NORMAL

## 2024-07-16 PROCEDURE — 93294 REM INTERROG EVL PM/LDLS PM: CPT | Performed by: INTERNAL MEDICINE

## 2024-07-16 PROCEDURE — 93296 REM INTERROG EVL PM/IDS: CPT | Performed by: INTERNAL MEDICINE

## 2024-07-22 ENCOUNTER — TRANSFERRED RECORDS (OUTPATIENT)
Dept: HEALTH INFORMATION MANAGEMENT | Facility: CLINIC | Age: 79
End: 2024-07-22
Payer: MEDICARE

## 2024-08-30 DIAGNOSIS — E03.9 ACQUIRED HYPOTHYROIDISM: ICD-10-CM

## 2024-08-30 DIAGNOSIS — I48.19 PERSISTENT ATRIAL FIBRILLATION (H): ICD-10-CM

## 2024-08-30 RX ORDER — METOPROLOL SUCCINATE 25 MG/1
25 TABLET, EXTENDED RELEASE ORAL DAILY
Qty: 90 TABLET | Refills: 3 | Status: SHIPPED | OUTPATIENT
Start: 2024-08-30

## 2024-08-30 RX ORDER — LEVOTHYROXINE SODIUM 100 UG/1
100 TABLET ORAL DAILY
Qty: 90 TABLET | Refills: 0 | Status: SHIPPED | OUTPATIENT
Start: 2024-08-30 | End: 2024-10-04

## 2024-09-04 ENCOUNTER — ANTICOAGULATION THERAPY VISIT (OUTPATIENT)
Dept: ANTICOAGULATION | Facility: CLINIC | Age: 79
End: 2024-09-04

## 2024-09-04 ENCOUNTER — LAB (OUTPATIENT)
Dept: LAB | Facility: CLINIC | Age: 79
End: 2024-09-04
Payer: MEDICARE

## 2024-09-04 ENCOUNTER — ANCILLARY PROCEDURE (OUTPATIENT)
Dept: CARDIOLOGY | Facility: CLINIC | Age: 79
End: 2024-09-04
Attending: INTERNAL MEDICINE
Payer: MEDICARE

## 2024-09-04 ENCOUNTER — TELEPHONE (OUTPATIENT)
Dept: INTERNAL MEDICINE | Facility: CLINIC | Age: 79
End: 2024-09-04

## 2024-09-04 DIAGNOSIS — I25.119 CORONARY ARTERY DISEASE INVOLVING NATIVE CORONARY ARTERY OF NATIVE HEART WITH ANGINA PECTORIS (H): Primary | ICD-10-CM

## 2024-09-04 DIAGNOSIS — I48.19 PERSISTENT ATRIAL FIBRILLATION (H): ICD-10-CM

## 2024-09-04 DIAGNOSIS — Z79.01 LONG TERM (CURRENT) USE OF ANTICOAGULANTS: ICD-10-CM

## 2024-09-04 DIAGNOSIS — Z12.5 SCREENING FOR PROSTATE CANCER: ICD-10-CM

## 2024-09-04 DIAGNOSIS — Z79.01 LONG TERM (CURRENT) USE OF ANTICOAGULANTS: Primary | ICD-10-CM

## 2024-09-04 DIAGNOSIS — Z12.5 SCREENING FOR PROSTATE CANCER: Primary | ICD-10-CM

## 2024-09-04 DIAGNOSIS — I49.5 SICK SINUS SYNDROME (H): ICD-10-CM

## 2024-09-04 DIAGNOSIS — E03.9 ACQUIRED HYPOTHYROIDISM: ICD-10-CM

## 2024-09-04 DIAGNOSIS — D64.9 NORMOCYTIC ANEMIA: ICD-10-CM

## 2024-09-04 DIAGNOSIS — Z95.0 PACEMAKER: ICD-10-CM

## 2024-09-04 DIAGNOSIS — Z95.3 S/P MITRAL VALVE REPLACEMENT WITH TISSUE VALVE: ICD-10-CM

## 2024-09-04 LAB
ALBUMIN SERPL BCG-MCNC: 4.2 G/DL (ref 3.5–5.2)
ALP SERPL-CCNC: 60 U/L (ref 40–150)
ALT SERPL W P-5'-P-CCNC: 28 U/L (ref 0–70)
AST SERPL W P-5'-P-CCNC: 37 U/L (ref 0–45)
BASOPHILS # BLD AUTO: 0 10E3/UL (ref 0–0.2)
BASOPHILS NFR BLD AUTO: 1 %
BILIRUB DIRECT SERPL-MCNC: 0.33 MG/DL (ref 0–0.3)
BILIRUB SERPL-MCNC: 1.3 MG/DL
CHOLEST SERPL-MCNC: 122 MG/DL
EOSINOPHIL # BLD AUTO: 0.1 10E3/UL (ref 0–0.7)
EOSINOPHIL NFR BLD AUTO: 2 %
ERYTHROCYTE [DISTWIDTH] IN BLOOD BY AUTOMATED COUNT: 14.4 % (ref 10–15)
FASTING STATUS PATIENT QL REPORTED: YES
HCT VFR BLD AUTO: 36.3 % (ref 40–53)
HDLC SERPL-MCNC: 51 MG/DL
HGB BLD-MCNC: 12.3 G/DL (ref 13.3–17.7)
IMM GRANULOCYTES # BLD: 0 10E3/UL
IMM GRANULOCYTES NFR BLD: 0 %
INR BLD: 2 (ref 0.9–1.1)
LDLC SERPL CALC-MCNC: 61 MG/DL
LYMPHOCYTES # BLD AUTO: 2.2 10E3/UL (ref 0.8–5.3)
LYMPHOCYTES NFR BLD AUTO: 40 %
MCH RBC QN AUTO: 32.8 PG (ref 26.5–33)
MCHC RBC AUTO-ENTMCNC: 33.9 G/DL (ref 31.5–36.5)
MCV RBC AUTO: 97 FL (ref 78–100)
MONOCYTES # BLD AUTO: 0.5 10E3/UL (ref 0–1.3)
MONOCYTES NFR BLD AUTO: 9 %
NEUTROPHILS # BLD AUTO: 2.5 10E3/UL (ref 1.6–8.3)
NEUTROPHILS NFR BLD AUTO: 48 %
NONHDLC SERPL-MCNC: 71 MG/DL
PLATELET # BLD AUTO: 134 10E3/UL (ref 150–450)
PROT SERPL-MCNC: 6.9 G/DL (ref 6.4–8.3)
PSA SERPL DL<=0.01 NG/ML-MCNC: 0.4 NG/ML (ref 0–6.5)
RBC # BLD AUTO: 3.75 10E6/UL (ref 4.4–5.9)
TRIGL SERPL-MCNC: 49 MG/DL
TSH SERPL DL<=0.005 MIU/L-ACNC: 2.01 UIU/ML (ref 0.3–4.2)
WBC # BLD AUTO: 5.3 10E3/UL (ref 4–11)

## 2024-09-04 PROCEDURE — G0103 PSA SCREENING: HCPCS

## 2024-09-04 PROCEDURE — 36415 COLL VENOUS BLD VENIPUNCTURE: CPT

## 2024-09-04 PROCEDURE — 84443 ASSAY THYROID STIM HORMONE: CPT

## 2024-09-04 PROCEDURE — 36416 COLLJ CAPILLARY BLOOD SPEC: CPT

## 2024-09-04 PROCEDURE — 85610 PROTHROMBIN TIME: CPT

## 2024-09-04 PROCEDURE — 80061 LIPID PANEL: CPT

## 2024-09-04 PROCEDURE — 85025 COMPLETE CBC W/AUTO DIFF WBC: CPT

## 2024-09-04 PROCEDURE — 80076 HEPATIC FUNCTION PANEL: CPT

## 2024-09-04 NOTE — PROGRESS NOTES
ANTICOAGULATION MANAGEMENT     Obed Haley 79 year old male is on warfarin with therapeutic INR result. (Goal INR 2.0-3.0)    Recent labs: (last 7 days)     09/04/24  0705   INR 2.0*       ASSESSMENT     Source(s): Chart Review and Patient/Caregiver Call     Warfarin doses taken: Warfarin taken as instructed  Diet: Increased greens/vitamin K in diet; plans to resume previous intake   Reported has been eating more cabbage salads.  Medication/supplement changes: None noted  New illness, injury, or hospitalization: No  Signs or symptoms of bleeding or clotting: No  Previous result: Therapeutic last 8 INR visits  Additional findings:  INR has been trending lower.       PLAN     Recommended plan for temporary change(s) affecting INR     Dosing Instructions: Continue your current warfarin dose with next INR in 4 weeks       Summary  As of 9/4/2024      Full warfarin instructions:  7.5 mg every Mon, Thu; 5 mg all other days   Next INR check:  10/2/2024               Telephone call with Obed who verbalizes understanding and agrees to plan   - plans to go to [place] in mid-October for the winter.    Check at provider office visit - INR on 10/4/24 at his welcome to [employer] appt with Dr. Nguyen.    Education provided: None required  Taking warfarin: Importance of taking warfarin as instructed  Goal range and lab monitoring: goal range and significance of current result  Dietary considerations: importance of consistent vitamin K intake and impact of vitamin K foods on INR    Plan made per [facility] anticoagulation protocol    Yolanda Krishna, RN  Anticoagulation Clinic  9/4/2024    _______________________________________________________________________     Anticoagulation Episode Summary       Current INR goal:  2.0-3.0   TTR:  87.9% (1 y)   Target end date:  Indefinite   Send INR reminders to:  [facility]    Indications    Long term (current) use of anticoagulants [Z79.01]  Persistent atrial fibrillation (H) [I48.19]  S/P  mitral valve replacement with tissue valve [Z95.3]             Comments:               Anticoagulation Care Providers       Provider Role Specialty Phone number    Franko Franz MD Referring Internal Medicine [phone]    Franko Nguyen MD Referring Internal Medicine [phone]

## 2024-09-29 LAB
MDC_IDC_EPISODE_DTM: NORMAL
MDC_IDC_EPISODE_ID: NORMAL
MDC_IDC_EPISODE_TYPE: NORMAL
MDC_IDC_LEAD_CONNECTION_STATUS: NORMAL
MDC_IDC_LEAD_CONNECTION_STATUS: NORMAL
MDC_IDC_LEAD_IMPLANT_DT: NORMAL
MDC_IDC_LEAD_IMPLANT_DT: NORMAL
MDC_IDC_LEAD_LOCATION: NORMAL
MDC_IDC_LEAD_LOCATION: NORMAL
MDC_IDC_LEAD_LOCATION_DETAIL_1: NORMAL
MDC_IDC_LEAD_LOCATION_DETAIL_1: NORMAL
MDC_IDC_LEAD_MFG: NORMAL
MDC_IDC_LEAD_MFG: NORMAL
MDC_IDC_LEAD_MODEL: NORMAL
MDC_IDC_LEAD_MODEL: NORMAL
MDC_IDC_LEAD_POLARITY_TYPE: NORMAL
MDC_IDC_LEAD_POLARITY_TYPE: NORMAL
MDC_IDC_LEAD_SERIAL: NORMAL
MDC_IDC_LEAD_SERIAL: NORMAL
MDC_IDC_MSMT_BATTERY_DTM: NORMAL
MDC_IDC_MSMT_BATTERY_REMAINING_LONGEVITY: 96 MO
MDC_IDC_MSMT_BATTERY_REMAINING_PERCENTAGE: 96 %
MDC_IDC_MSMT_BATTERY_STATUS: NORMAL
MDC_IDC_MSMT_LEADCHNL_RA_IMPEDANCE_VALUE: 479 OHM
MDC_IDC_MSMT_LEADCHNL_RA_PACING_THRESHOLD_AMPLITUDE: 0.7 V
MDC_IDC_MSMT_LEADCHNL_RA_PACING_THRESHOLD_PULSEWIDTH: 0.4 MS
MDC_IDC_MSMT_LEADCHNL_RV_IMPEDANCE_VALUE: 416 OHM
MDC_IDC_MSMT_LEADCHNL_RV_IMPEDANCE_VALUE: 416 OHM
MDC_IDC_MSMT_LEADCHNL_RV_PACING_THRESHOLD_AMPLITUDE: 0.7 V
MDC_IDC_MSMT_LEADCHNL_RV_PACING_THRESHOLD_AMPLITUDE: 0.7 V
MDC_IDC_MSMT_LEADCHNL_RV_PACING_THRESHOLD_PULSEWIDTH: 0.4 MS
MDC_IDC_MSMT_LEADCHNL_RV_PACING_THRESHOLD_PULSEWIDTH: 0.4 MS
MDC_IDC_MSMT_LEADCHNL_RV_SENSING_INTR_AMPL: 25 MV
MDC_IDC_PG_IMPLANT_DTM: NORMAL
MDC_IDC_PG_MFG: NORMAL
MDC_IDC_PG_MODEL: NORMAL
MDC_IDC_PG_SERIAL: NORMAL
MDC_IDC_PG_TYPE: NORMAL
MDC_IDC_SESS_CLINIC_NAME: NORMAL
MDC_IDC_SESS_DTM: NORMAL
MDC_IDC_SESS_TYPE: NORMAL
MDC_IDC_SET_BRADY_LOWRATE: 60 {BEATS}/MIN
MDC_IDC_SET_BRADY_MAX_SENSOR_RATE: 130 {BEATS}/MIN
MDC_IDC_SET_BRADY_MODE: NORMAL
MDC_IDC_SET_LEADCHNL_RA_SENSING_ADAPTATION_MODE: NORMAL
MDC_IDC_SET_LEADCHNL_RA_SENSING_SENSITIVITY: 1.5 MV
MDC_IDC_SET_LEADCHNL_RV_PACING_AMPLITUDE: 1.3 V
MDC_IDC_SET_LEADCHNL_RV_PACING_CAPTURE_MODE: NORMAL
MDC_IDC_SET_LEADCHNL_RV_PACING_POLARITY: NORMAL
MDC_IDC_SET_LEADCHNL_RV_PACING_PULSEWIDTH: 0.4 MS
MDC_IDC_SET_LEADCHNL_RV_SENSING_ADAPTATION_MODE: NORMAL
MDC_IDC_SET_LEADCHNL_RV_SENSING_POLARITY: NORMAL
MDC_IDC_SET_LEADCHNL_RV_SENSING_SENSITIVITY: 1.5 MV
MDC_IDC_SET_ZONE_DETECTION_INTERVAL: 375 MS
MDC_IDC_SET_ZONE_STATUS: NORMAL
MDC_IDC_SET_ZONE_TYPE: NORMAL
MDC_IDC_SET_ZONE_VENDOR_TYPE: NORMAL
MDC_IDC_STAT_BRADY_DTM_END: NORMAL
MDC_IDC_STAT_BRADY_DTM_START: NORMAL
MDC_IDC_STAT_BRADY_RA_PERCENT_PACED: 0 %
MDC_IDC_STAT_BRADY_RV_PERCENT_PACED: 64 %
MDC_IDC_STAT_EPISODE_RECENT_COUNT: 0
MDC_IDC_STAT_EPISODE_RECENT_COUNT: 0
MDC_IDC_STAT_EPISODE_RECENT_COUNT: 14
MDC_IDC_STAT_EPISODE_RECENT_COUNT_DTM_END: NORMAL
MDC_IDC_STAT_EPISODE_RECENT_COUNT_DTM_START: NORMAL
MDC_IDC_STAT_EPISODE_TYPE: NORMAL
MDC_IDC_STAT_EPISODE_VENDOR_TYPE: NORMAL
MDC_IDC_STAT_EPISODE_VENDOR_TYPE: NORMAL

## 2024-09-29 PROCEDURE — 93280 PM DEVICE PROGR EVAL DUAL: CPT | Performed by: INTERNAL MEDICINE

## 2024-09-30 SDOH — HEALTH STABILITY: PHYSICAL HEALTH: ON AVERAGE, HOW MANY MINUTES DO YOU ENGAGE IN EXERCISE AT THIS LEVEL?: 30 MIN

## 2024-09-30 SDOH — HEALTH STABILITY: PHYSICAL HEALTH: ON AVERAGE, HOW MANY DAYS PER WEEK DO YOU ENGAGE IN MODERATE TO STRENUOUS EXERCISE (LIKE A BRISK WALK)?: 5 DAYS

## 2024-09-30 ASSESSMENT — SOCIAL DETERMINANTS OF HEALTH (SDOH): HOW OFTEN DO YOU GET TOGETHER WITH FRIENDS OR RELATIVES?: MORE THAN THREE TIMES A WEEK

## 2024-10-04 ENCOUNTER — ANTICOAGULATION THERAPY VISIT (OUTPATIENT)
Dept: ANTICOAGULATION | Facility: CLINIC | Age: 79
End: 2024-10-04

## 2024-10-04 ENCOUNTER — OFFICE VISIT (OUTPATIENT)
Dept: INTERNAL MEDICINE | Facility: CLINIC | Age: 79
End: 2024-10-04
Payer: MEDICARE

## 2024-10-04 VITALS
HEIGHT: 72 IN | RESPIRATION RATE: 14 BRPM | WEIGHT: 207 LBS | TEMPERATURE: 98.1 F | HEART RATE: 61 BPM | SYSTOLIC BLOOD PRESSURE: 125 MMHG | DIASTOLIC BLOOD PRESSURE: 72 MMHG | BODY MASS INDEX: 28.04 KG/M2 | OXYGEN SATURATION: 90 %

## 2024-10-04 DIAGNOSIS — I48.19 PERSISTENT ATRIAL FIBRILLATION (H): ICD-10-CM

## 2024-10-04 DIAGNOSIS — Z00.00 ANNUAL PHYSICAL EXAM: Primary | ICD-10-CM

## 2024-10-04 DIAGNOSIS — I34.0 NON-RHEUMATIC MITRAL REGURGITATION: ICD-10-CM

## 2024-10-04 DIAGNOSIS — R17 ELEVATED BILIRUBIN: ICD-10-CM

## 2024-10-04 DIAGNOSIS — H90.3 ASYMMETRICAL SENSORINEURAL HEARING LOSS: ICD-10-CM

## 2024-10-04 DIAGNOSIS — E03.9 ACQUIRED HYPOTHYROIDISM: ICD-10-CM

## 2024-10-04 DIAGNOSIS — R07.9 EXERTIONAL CHEST PAIN: ICD-10-CM

## 2024-10-04 DIAGNOSIS — I25.119 CORONARY ARTERY DISEASE INVOLVING NATIVE CORONARY ARTERY OF NATIVE HEART WITH ANGINA PECTORIS (H): ICD-10-CM

## 2024-10-04 DIAGNOSIS — Z95.0 CARDIAC PACEMAKER IN SITU: ICD-10-CM

## 2024-10-04 DIAGNOSIS — Z79.01 LONG TERM (CURRENT) USE OF ANTICOAGULANTS: Primary | ICD-10-CM

## 2024-10-04 DIAGNOSIS — Z13.6 ENCOUNTER FOR ABDOMINAL AORTIC ANEURYSM (AAA) SCREENING: ICD-10-CM

## 2024-10-04 DIAGNOSIS — I71.43 INFRARENAL ABDOMINAL AORTIC ANEURYSM (AAA) WITHOUT RUPTURE (H): ICD-10-CM

## 2024-10-04 DIAGNOSIS — E78.5 HYPERLIPIDEMIA LDL GOAL <100: ICD-10-CM

## 2024-10-04 DIAGNOSIS — Z79.01 LONG TERM (CURRENT) USE OF ANTICOAGULANTS: ICD-10-CM

## 2024-10-04 DIAGNOSIS — G47.33 OSA ON CPAP: ICD-10-CM

## 2024-10-04 DIAGNOSIS — I72.3 ILIAC ARTERY ANEURYSM, BILATERAL (H): ICD-10-CM

## 2024-10-04 DIAGNOSIS — Z95.3 S/P MITRAL VALVE REPLACEMENT WITH TISSUE VALVE: ICD-10-CM

## 2024-10-04 DIAGNOSIS — D75.89 BICYTOPENIA: ICD-10-CM

## 2024-10-04 LAB — INR BLD: 2.2 (ref 0.9–1.1)

## 2024-10-04 PROCEDURE — 36416 COLLJ CAPILLARY BLOOD SPEC: CPT | Performed by: INTERNAL MEDICINE

## 2024-10-04 PROCEDURE — 85610 PROTHROMBIN TIME: CPT | Performed by: INTERNAL MEDICINE

## 2024-10-04 PROCEDURE — G0439 PPPS, SUBSEQ VISIT: HCPCS | Performed by: INTERNAL MEDICINE

## 2024-10-04 PROCEDURE — 99214 OFFICE O/P EST MOD 30 MIN: CPT | Mod: 25 | Performed by: INTERNAL MEDICINE

## 2024-10-04 RX ORDER — LEVOTHYROXINE SODIUM 100 UG/1
100 TABLET ORAL DAILY
Qty: 90 TABLET | Refills: 4 | Status: SHIPPED | OUTPATIENT
Start: 2024-10-04

## 2024-10-04 RX ORDER — FLUTICASONE PROPIONATE 50 MCG
1 SPRAY, SUSPENSION (ML) NASAL DAILY
COMMUNITY
Start: 2024-10-04

## 2024-10-04 RX ORDER — ATORVASTATIN CALCIUM 20 MG/1
20 TABLET, FILM COATED ORAL DAILY
Qty: 90 TABLET | Refills: 4 | Status: SHIPPED | OUTPATIENT
Start: 2024-10-04

## 2024-10-04 SDOH — HEALTH STABILITY: PHYSICAL HEALTH: ON AVERAGE, HOW MANY MINUTES DO YOU ENGAGE IN EXERCISE AT THIS LEVEL?: 30 MIN

## 2024-10-04 SDOH — HEALTH STABILITY: PHYSICAL HEALTH: ON AVERAGE, HOW MANY DAYS PER WEEK DO YOU ENGAGE IN MODERATE TO STRENUOUS EXERCISE (LIKE A BRISK WALK)?: 5 DAYS

## 2024-10-04 ASSESSMENT — PAIN SCALES - GENERAL: PAINLEVEL: NO PAIN (1)

## 2024-10-04 ASSESSMENT — SOCIAL DETERMINANTS OF HEALTH (SDOH): HOW OFTEN DO YOU GET TOGETHER WITH FRIENDS OR RELATIVES?: MORE THAN THREE TIMES A WEEK

## 2024-10-04 NOTE — PROGRESS NOTES
ANTICOAGULATION MANAGEMENT     Obed SG Edmondsalexa 79 year old male is on warfarin with therapeutic INR result. (Goal INR 2.0-3.0)    Recent labs: (last 7 days)     10/04/24  0841   INR 2.2*       ASSESSMENT     Source(s): Chart Review and Patient/Caregiver Call     Warfarin doses taken: Warfarin taken as instructed  Diet: No new diet changes identified  Medication/supplement changes: None noted  New illness, injury, or hospitalization: No   Annual wellness visit today with Dr. Nguyen - did report exertional chest pain, last stress test was in 2019.  Future referral for lexiscan stress test.  Signs or symptoms of bleeding or clotting: No  Previous result: Therapeutic last 9 INR visits  Additional findings: - plans to go to [place] soon in mid-October for the winter.    - goes to [facility] for his INRs and they fax results for [facility] to dose patient.   - instructed - Obed to call [facility] and leave a message every time he has INRs checked.  At times, they are not faxing results in a timely manner.  He agreed with Plan.       PLAN     Recommended plan for no diet, medication or health factor changes affecting INR     Dosing Instructions: Continue your current warfarin dose with next INR in 8 weeks       Summary  As of 10/4/2024      Full warfarin instructions:  7.5 mg every Mon, Thu; 5 mg all other days   Next INR check:  11/29/2024               Telephone call with Obed who verbalizes understanding and agrees to plan.   - instructed to check INR 2 wks after arriving in [place], to ensure INR stability.    Patient offered & declined to schedule next visit    Education provided: Please call back if any changes to your diet, medications or how you've been taking warfarin  Taking warfarin: Importance of taking warfarin as instructed  Goal range and lab monitoring: goal range and significance of current result    Plan made per [facility] anticoagulation protocol    Yolanda Krishna, RN  10/4/2024  Anticoagulation Clinic  [facility]  routing messages: p [facility]  [facility] patient phone line: [phone]        _______________________________________________________________________     Anticoagulation Episode Summary       Current INR goal:  2.0-3.0   TTR:  88.0% (1 y)   Target end date:  Indefinite   Send INR reminders to:  [facility]    Indications    Long term (current) use of anticoagulants [Z79.01]  Persistent atrial fibrillation (H) [I48.19]  S/P mitral valve replacement with tissue valve [Z95.3]             Comments:               Anticoagulation Care Providers       Provider Role Specialty Phone number    Franko Franz MD Referring Internal Medicine [phone]    Franko Nguyen MD Referring Internal Medicine [phone]

## 2024-10-04 NOTE — PROGRESS NOTES
Preventive Care Visit  [facility]  Franko Nguyen MD, Internal Medicine  Oct 4, 2024      1. Annual physical exam  This is a 79-year-old man with issues as discussed below.  Discussed vaccinations and he defers for now we will get them later in the year    2. Encounter for abdominal aortic aneurysm (AAA) screening  - [place] Aorta Medicare AAA Screening; Future    3. Exertional chest pain  Patient describes an episode of exertional symptoms when removing his stock.  He had some breathing difficulty some chest discomfort and diaphoresis.  He rested and this resolved.  His last stress test was 2019.  I am recommending we repeat this now.  - NM Lexiscan stress test; Future    4. Coronary artery disease involving native coronary artery of native heart with angina pectoris (H)  - NM Lexiscan stress test; Future    5. Hyperlipidemia LDL goal <100  - atorvastatin (LIPITOR) 20 MG tablet; Take 1 tablet (20 mg) by mouth daily.  Dispense: 90 tablet; Refill: 4    6. Persistent atrial fibrillation (H)  - INR point of care (finger stick)    7. Long term (current) use of anticoagulants  - INR point of care (finger stick)    8. Non-rheumatic mitral regurgitation  9. S/P mitral valve replacement with tissue valve  Follows with cardiology, annual echocardiogram    10. Cardiac pacemaker in situ, dual chamber    11. Asymmetrical sensorineural hearing loss    12. KYLE on CPAP    13. Acquired hypothyroidism  - levothyroxine (SYNTHROID/LEVOTHROID) 100 MCG tablet; Take 1 tablet (100 mcg) by mouth daily.  Dispense: 90 tablet; Refill: 4    14. Bicytopenia  He has had mild anemia and thrombocytopenia with normal B12 and iron stores.  He does not drink a lot of alcohol.  His thyroid is now in the normal range.  We will continue to follow this and I suspect he may have some bone marrow suppression, may be mild myelofibrosis.  We discussed that if his counts continue to drop I would have him see a hematologist but I do not think  at this point that a bone marrow biopsy is indicated.    15. Elevated bilirubin  Very slight elevation which is new and so we will do an ultrasound  - US Abdomen Limited; Future    Subjective   Obed is a 79 year old, presenting for the following:  Wellness Visit, Fall (Pt reports back surgeries 1970 and 2003. Pt states after surgery left leg was numb from knee all the way down and pt states that he still has numbness and lack of feeling around ankle region resulting in foot rolling. ), and Multiplie Concerns (- Balance Concerns/- Phlegm/- PSA / Labs)        10/4/2024     7:37 AM   Additional Questions   Roomed by Whitney         Health Care Directive  Patient does not have a Health Care Directive or Living Will: Patient states has Advance Directive and will bring in a copy to clinic.    Fall          10/4/2024   General Health   How would you rate your overall physical health? Good   Feel stress (tense, anxious, or unable to sleep) Only a little      (!) STRESS CONCERN      10/4/2024   Nutrition   Diet: Regular (no restrictions)            10/4/2024   Exercise   Days per week of moderate/strenous exercise 5 days   Average minutes spent exercising at this level 30 min            10/4/2024   Social Factors   Frequency of gathering with friends or relatives More than three times a week   Worry food won't last until get money to buy more No   Food not last or not have enough money for food? No   Do you have housing? (Housing is defined as stable permanent housing and does not include staying ouside in a car, in a tent, in an abandoned building, in an overnight shelter, or couch-surfing.) Yes   Are you worried about losing your housing? No   Lack of transportation? No   Unable to get utilities (heat,electricity)? No            10/4/2024   Fall Risk   Fallen 2 or more times in the past year? Yes   Trouble with walking or balance? Yes   Gait Speed Test (Document in seconds) 3.81   Gait Speed Test Interpretation Less than  or equal to 5.00 seconds - PASS             10/4/2024   Activities of Daily Living- Home Safety   Needs help with the following daily activites None of the above   Safety concerns in the home None of the above            10/4/2024   Dental   Dentist two times every year? Yes            10/4/2024   Hearing Screening   Hearing concerns? (!) I NEED TO ASK PEOPLE TO SPEAK UP OR REPEAT THEMSELVES.    (!) IT'S HARDER TO UNDERSTAND WOMEN'S VOICES THAN MEN'S VOICES.    (!) IT'S HARD TO FOLLOW A CONVERSATION IN A NOISY RESTAURANT OR CROWDED ROOM.    (!) TROUBLE UNDESTANDING A SPEAKER IN A PUBLIC MEETING OR [employer] SERVICE.    (!) TROUBLE UNDERSTANDING SOFT OR WHISPERED SPEECH.       Multiple values from one day are sorted in reverse-chronological order         10/4/2024   Driving Risk Screening   Patient/family members have concerns about driving No            10/4/2024   General Alertness/Fatigue Screening   Have you been more tired than usual lately? No            10/4/2024   Urinary Incontinence Screening   Bothered by leaking urine in past 6 months No               Today's PHQ-2 Score:       10/4/2024     7:15 AM   PHQ-2 (  [employer])   Q1: Little interest or pleasure in doing things 0   Q2: Feeling down, depressed or hopeless 0   PHQ-2 Score 0   Q1: Little interest or pleasure in doing things Not at all   Q2: Feeling down, depressed or hopeless Not at all   PHQ-2 Score 0           10/4/2024   Substance Use   Alcohol more than 3/day or more than 7/wk No   Do you have a current opioid prescription? No   How severe/bad is pain from 1 to 10? 0/10 (No Pain)   Do you use any other substances recreationally? No        Social History     Tobacco Use    Smoking status: Former     Current packs/day: 0.00     Types: Cigarettes     Quit date: 2007     Years since quittin.7    Smokeless tobacco: Never   Vaping Use    Vaping status: Never Used   Substance Use Topics    Alcohol use: Yes     Alcohol/week: 3.0 standard  "drinks of alcohol     Comment: Alcoholic Drinks/day: occasionally    Drug use: No       ASCVD Risk   The ASCVD Risk score (Chelsea INIGUEZ, et al., 2019) failed to calculate for the following reasons:    The valid total cholesterol range is 130 to 320 mg/dL          Reviewed and updated as needed this visit by Provider                    Current providers sharing in care for this patient include:  Patient Care Team:  Franko Nguyen MD as PCP - General  Franko Nguyen MD as Assigned PCP  Paulo Henao MD as Assigned Heart and Vascular Provider    The following health maintenance items are reviewed in Epic and correct as of today:  Health Maintenance   Topic Date Due    RSV VACCINE (1 - 1-dose 75+ series) Never done    INFLUENZA VACCINE (1) 09/01/2024    COVID-19 Vaccine (3 - 2024-25 season) 09/01/2024    MEDICARE ANNUAL WELLNESS VISIT  10/04/2024    ANNUAL REVIEW OF HM ORDERS  10/04/2024    LIPID  09/04/2025    TSH W/FREE T4 REFLEX  09/04/2025    FALL RISK ASSESSMENT  10/04/2025    GLUCOSE  10/04/2026    ADVANCE CARE PLANNING  10/04/2028    DTAP/TDAP/TD IMMUNIZATION (3 - Td or Tdap) 10/03/2033    HEPATITIS C SCREENING  Completed    PHQ-2 (once per calendar year)  Completed    Pneumococcal Vaccine: 65+ Years  Completed    ZOSTER IMMUNIZATION  Completed    HPV IMMUNIZATION  Aged Out    MENINGITIS IMMUNIZATION  Aged Out    RSV MONOCLONAL ANTIBODY  Aged Out    COLORECTAL CANCER SCREENING  Discontinued          Objective    Exam  /72 (BP Location: Left arm, Patient Position: Sitting, Cuff Size: Adult Large)   Pulse 61   Temp 98.1  F (36.7  C) (Tympanic)   Resp 14   Ht 1.829 m (6' 0.01\")   Wt 93.9 kg (207 lb)   SpO2 90%   BMI 28.07 kg/m     Estimated body mass index is 28.07 kg/m  as calculated from the following:    Height as of this encounter: 1.829 m (6' 0.01\").    Weight as of this encounter: 93.9 kg (207 lb).    Physical Exam  EYES: Eyelids, conjunctiva, and sclera were normal. Pupils were " normal. Cornea, iris, and lens were normal bilaterally.  HEAD, EARS, NOSE, MOUTH, AND THROAT: Head and face were normal. Hearing was normal to voice and the ears were normal to external exam. Nose appearance was normal and there was no discharge.   NECK: Neck appearance was normal.   RESPIRATORY: Breathing pattern was normal and the chest moved symmetrically.   Lung sounds were normal and there were no abnormal sounds.  CARDIOVASCULAR: Heart rate and rhythm were normal.  S1 and S2 were normal and there were no extra sounds or murmurs. There was no peripheral edema.  GASTROINTESTINAL: The abdomen was normal in contour.    NEUROLOGIC: The patient was alert and oriented to person, place, time, and circumstance. Speech was normal. Cranial nerves were normal. Motor strength was normal for age. The patient was normally coordinated.  PSYCHIATRIC:  Mood and affect were normal and the patient had normal recent and remote memory. The patient's judgment and insight were normal.         10/4/2024   Mini Cog   Clock Draw Score 2 Normal   3 Item Recall 3 objects recalled   Mini Cog Total Score 5                 Signed Electronically by: Franko Nguyen MD

## 2024-10-04 NOTE — PATIENT INSTRUCTIONS
Patient Education   Preventive Care Advice   This is general advice given by our system to help you stay healthy. However, your care team may have specific advice just for you. Please talk to your care team about your preventive care needs.  Nutrition  Eat 5 or more servings of fruits and vegetables each day.  Try wheat bread, brown rice and whole grain pasta (instead of white bread, rice, and pasta).  Get enough calcium and vitamin D. Check the label on foods and aim for 100% of the RDA (recommended daily allowance).  Lifestyle  Exercise at least 150 minutes each week  (30 minutes a day, 5 days a week).  Do muscle strengthening activities 2 days a week. These help control your weight and prevent disease.  No smoking.  Wear sunscreen to prevent skin cancer.  Have a dental exam and cleaning every 6 months.  Yearly exams  See your health care team every year to talk about:  Any changes in your health.  Any medicines your care team has prescribed.  Preventive care, family planning, and ways to prevent chronic diseases.  Shots (vaccines)   HPV shots (up to age 26), if you've never had them before.  Hepatitis B shots (up to age 59), if you've never had them before.  COVID-19 shot: Get this shot when it's due.  Flu shot: Get a flu shot every year.  Tetanus shot: Get a tetanus shot every 10 years.  Pneumococcal, hepatitis A, and RSV shots: Ask your care team if you need these based on your risk.  Shingles shot (for age 50 and up)  General health tests  Diabetes screening:  Starting at age 35, Get screened for diabetes at least every 3 years.  If you are younger than age 35, ask your care team if you should be screened for diabetes.  Cholesterol test: At age 39, start having a cholesterol test every 5 years, or more often if advised.  Bone density scan (DEXA): At age 50, ask your care team if you should have this scan for osteoporosis (brittle bones).  Hepatitis C: Get tested at least once in your life.  STIs (sexually  transmitted infections)  Before age 24: Ask your care team if you should be screened for STIs.  After age 24: Get screened for STIs if you're at risk. You are at risk for STIs (including HIV) if:  You are sexually active with more than one person.  You don't use condoms every time.  You or a partner was diagnosed with a sexually transmitted infection.  If you are at risk for HIV, ask about PrEP medicine to prevent HIV.  Get tested for HIV at least once in your life, whether you are at risk for HIV or not.  Cancer screening tests  Cervical cancer screening: If you have a cervix, begin getting regular cervical cancer screening tests starting at age 21.  Breast cancer scan (mammogram): If you've ever had breasts, begin having regular mammograms starting at age 40. This is a scan to check for breast cancer.  Colon cancer screening: It is important to start screening for colon cancer at age 45.  Have a colonoscopy test every 10 years (or more often if you're at risk) Or, ask your provider about stool tests like a FIT test every year or Cologuard test every 3 years.  To learn more about your testing options, visit:   .  For help making a decision, visit:   https://bit.ly/hc96562.  Prostate cancer screening test: If you have a prostate, ask your care team if a prostate cancer screening test (PSA) at age 55 is right for you.  Lung cancer screening: If you are a current or former smoker ages 50 to 80, ask your care team if ongoing lung cancer screenings are right for you.  For informational purposes only. Not to replace the advice of your health care provider. Copyright   2023 [facility]. All rights reserved. Clinically reviewed by the [facility]. Maana [id number] - REV 01/24.  Preventing Falls: Care Instructions  Injuries and health problems such as trouble walking or poor eyesight can increase your risk of falling. So can some medicines. But there are things you can do to help  "prevent falls. You can exercise to get stronger. You can also arrange your home to make it safer.    Talk to your doctor about the medicines you take. Ask if any of them increase the risk of falls and whether they can be changed or stopped.   Try to exercise regularly. It can help improve your strength and balance. This can help lower your risk of falling.         Practice fall safety and prevention.   Wear low-heeled shoes that fit well and give your feet good support. Talk to your doctor if you have foot problems that make this hard.  Carry a cellphone or wear a medical alert device that you can use to call for help.  Use stepladders instead of chairs to reach high objects. Don't climb if you're at risk for falls. Ask for help, if needed.  Wear the correct eyeglasses, if you need them.        Make your home safer.   Remove rugs, cords, clutter, and furniture from walkways.  Keep your house well lit. Use night-lights in hallways and bathrooms.  Install and use sturdy handrails on stairways.  Wear nonskid footwear, even inside. Don't walk barefoot or in socks without shoes.        Be safe outside.   Use handrails, curb cuts, and ramps whenever possible.  Keep your hands free by using a shoulder bag or backpack.  Try to walk in well-lit areas. Watch out for uneven ground, changes in pavement, and debris.  Be careful in the winter. Walk on the grass or gravel when sidewalks are slippery. Use de-icer on steps and walkways. Add non-slip devices to shoes.    Put grab bars and nonskid mats in your shower or tub and near the toilet. Try to use a shower chair or bath bench when bathing.   Get into a tub or shower by putting in your weaker leg first. Get out with your strong side first. Have a phone or medical alert device in the bathroom with you.   Where can you learn more?  Go to https://www.[employer].net/patiented  Enter G117 in the search box to learn more about \"Preventing Falls: Care Instructions.\"  Current as of: " July 17, 2023  Content Version: 14.2 2024 [facility] [employer].   Care instructions adapted under license by your healthcare professional. If you have questions about a medical condition or this instruction, always ask your healthcare professional. Healthwise[employer] Incorporated disclaims any warranty or liability for your use of this information.    Hearing Loss: Care Instructions  Overview     Hearing loss is a sudden or slow decrease in how well you hear. It can range from slight to profound. Permanent hearing loss can occur with aging. It also can happen when you are exposed long-term to loud noise. Examples include listening to loud music, riding motorcycles, or being around other loud machines.  Hearing loss can affect your work and home life. It can make you feel lonely or depressed. You may feel that you have lost your independence. But hearing aids and other devices can help you hear better and feel connected to others.  Follow-up care is a key part of your treatment and safety. Be sure to make and go to all appointments, and call your doctor if you are having problems. It's also a good idea to know your test results and keep a list of the medicines you take.  How can you care for yourself at home?  Avoid loud noises whenever possible. This helps keep your hearing from getting worse.  Always wear hearing protection around loud noises.  Wear a hearing aid as directed.  A professional can help you pick a hearing aid that will work best for you.  You can also get hearing aids over the counter for mild to moderate hearing loss.  Have hearing tests as your doctor suggests. They can show whether your hearing has changed. Your hearing aid may need to be adjusted.  Use other devices as needed. These may include:  Telephone amplifiers and hearing aids that can connect to a television, stereo, radio, or microphone.  Devices that use lights or vibrations. These alert you to the doorbell, a ringing telephone, or a baby  "monitor.  Television closed-captioning. This shows the words at the bottom of the screen. Most new TVs can do this.  TTY (text telephone). This lets you type messages back and forth on the telephone instead of talking or listening. These devices are also called TDD. When messages are typed on the keyboard, they are sent over the phone line to a receiving TTY. The message is shown on a monitor.  Use text messaging, social media, and email if it is hard for you to communicate by telephone.  Try to learn a listening technique called speechreading. It is not lipreading. You pay attention to people's gestures, expressions, posture, and tone of voice. These clues can help you understand what a person is saying. Face the person you are talking to, and have them face you. Make sure the lighting is good. You need to see the other person's face clearly.  Think about counseling if you need help to adjust to your hearing loss.  When should you call for help?  Watch closely for changes in your health, and be sure to contact your doctor if:    You think your hearing is getting worse.     You have new symptoms, such as dizziness or nausea.   Where can you learn more?  Go to https://www.[employer].net/patiented  Enter R798 in the search box to learn more about \"Hearing Loss: Care Instructions.\"  Current as of: September 27, 2023  Content Version: 14.2 2024 [facility] [employer].   Care instructions adapted under license by your healthcare professional. If you have questions about a medical condition or this instruction, always ask your healthcare professional. Healthwise, Incorporated disclaims any warranty or liability for your use of this information.       "

## 2024-10-09 ENCOUNTER — HOSPITAL ENCOUNTER (OUTPATIENT)
Dept: ULTRASOUND IMAGING | Facility: HOSPITAL | Age: 79
Discharge: HOME | End: 2024-10-09
Attending: INTERNAL MEDICINE | Admitting: INTERNAL MEDICINE
Payer: MEDICARE

## 2024-10-09 DIAGNOSIS — R17 ELEVATED BILIRUBIN: ICD-10-CM

## 2024-10-09 DIAGNOSIS — Z13.6 ENCOUNTER FOR ABDOMINAL AORTIC ANEURYSM (AAA) SCREENING: ICD-10-CM

## 2024-10-09 PROCEDURE — 76705 ECHO EXAM OF ABDOMEN: CPT

## 2024-10-09 PROCEDURE — 76706 US ABDL AORTA SCREEN AAA: CPT

## 2024-10-10 ENCOUNTER — HOSPITAL ENCOUNTER (OUTPATIENT)
Dept: NUCLEAR MEDICINE | Facility: HOSPITAL | Age: 79
Discharge: HOME | End: 2024-10-10
Attending: INTERNAL MEDICINE
Payer: MEDICARE

## 2024-10-10 ENCOUNTER — HOSPITAL ENCOUNTER (OUTPATIENT)
Dept: CARDIOLOGY | Facility: HOSPITAL | Age: 79
Discharge: HOME | End: 2024-10-10
Attending: INTERNAL MEDICINE
Payer: MEDICARE

## 2024-10-10 ENCOUNTER — TELEPHONE (OUTPATIENT)
Dept: VASCULAR SURGERY | Facility: CLINIC | Age: 79
End: 2024-10-10
Payer: MEDICARE

## 2024-10-10 DIAGNOSIS — I25.119 CORONARY ARTERY DISEASE INVOLVING NATIVE CORONARY ARTERY OF NATIVE HEART WITH ANGINA PECTORIS (H): ICD-10-CM

## 2024-10-10 DIAGNOSIS — R07.9 EXERTIONAL CHEST PAIN: ICD-10-CM

## 2024-10-10 LAB
CV STRESS CURRENT BP HE: NORMAL
CV STRESS CURRENT HR HE: 61
CV STRESS CURRENT HR HE: 62
CV STRESS CURRENT HR HE: 63
CV STRESS CURRENT HR HE: 64
CV STRESS CURRENT HR HE: 65
CV STRESS CURRENT HR HE: 66
CV STRESS CURRENT HR HE: 67
CV STRESS CURRENT HR HE: 68
CV STRESS CURRENT HR HE: 69
CV STRESS CURRENT HR HE: 69
CV STRESS CURRENT HR HE: 70
CV STRESS DEVIATION TIME HE: NORMAL
CV STRESS ECHO PERCENT HR HE: NORMAL
CV STRESS EXERCISE STAGE HE: NORMAL
CV STRESS FINAL RESTING BP HE: NORMAL
CV STRESS FINAL RESTING HR HE: 67
CV STRESS MAX HR HE: 71
CV STRESS MAX TREADMILL GRADE HE: 0
CV STRESS MAX TREADMILL SPEED HE: 0
CV STRESS PEAK DIA BP HE: NORMAL
CV STRESS PEAK SYS BP HE: NORMAL
CV STRESS PHASE HE: NORMAL
CV STRESS PROTOCOL HE: NORMAL
CV STRESS RESTING PT POSITION HE: NORMAL
CV STRESS ST DEVIATION AMOUNT HE: NORMAL
CV STRESS ST DEVIATION ELEVATION HE: NORMAL
CV STRESS ST EVELATION AMOUNT HE: NORMAL
CV STRESS TEST TYPE HE: NORMAL
CV STRESS TOTAL STAGE TIME MIN 1 HE: NORMAL
RATE PRESSURE PRODUCT: NORMAL
STRESS ECHO BASELINE DIASTOLIC HE: 80
STRESS ECHO BASELINE HR: 68
STRESS ECHO BASELINE SYSTOLIC BP: 148
STRESS ECHO CALCULATED PERCENT HR: 50 %
STRESS ECHO LAST STRESS DIASTOLIC BP: 85
STRESS ECHO LAST STRESS HR: 64
STRESS ECHO LAST STRESS SYSTOLIC BP: 143
STRESS ECHO TARGET HR: 141

## 2024-10-10 PROCEDURE — 93016 CV STRESS TEST SUPVJ ONLY: CPT | Performed by: INTERNAL MEDICINE

## 2024-10-10 PROCEDURE — A9500 TC99M SESTAMIBI: HCPCS | Performed by: INTERNAL MEDICINE

## 2024-10-10 PROCEDURE — 78452 HT MUSCLE IMAGE SPECT MULT: CPT | Mod: 26 | Performed by: INTERNAL MEDICINE

## 2024-10-10 PROCEDURE — 250N000011 HC RX IP 250 OP 636: Performed by: INTERNAL MEDICINE

## 2024-10-10 PROCEDURE — 343N000001 HC RX 343 MED OP 636: Performed by: INTERNAL MEDICINE

## 2024-10-10 PROCEDURE — 93017 CV STRESS TEST TRACING ONLY: CPT

## 2024-10-10 PROCEDURE — 93018 CV STRESS TEST I&R ONLY: CPT | Performed by: INTERNAL MEDICINE

## 2024-10-10 PROCEDURE — 78452 HT MUSCLE IMAGE SPECT MULT: CPT

## 2024-10-10 RX ORDER — REGADENOSON 0.08 MG/ML
0.4 INJECTION, SOLUTION INTRAVENOUS ONCE
Status: COMPLETED | OUTPATIENT
Start: 2024-10-10 | End: 2024-10-10

## 2024-10-10 RX ORDER — CAFFEINE 200 MG
200 TABLET ORAL
Status: DISCONTINUED | OUTPATIENT
Start: 2024-10-10 | End: 2024-10-10 | Stop reason: HOSPADM

## 2024-10-10 RX ORDER — CAFFEINE CITRATE 20 MG/ML
60 SOLUTION INTRAVENOUS
Status: DISCONTINUED | OUTPATIENT
Start: 2024-10-10 | End: 2024-10-10 | Stop reason: HOSPADM

## 2024-10-10 RX ORDER — ALBUTEROL SULFATE 0.83 MG/ML
2.5 SOLUTION RESPIRATORY (INHALATION)
Status: DISCONTINUED | OUTPATIENT
Start: 2024-10-10 | End: 2024-10-10 | Stop reason: HOSPADM

## 2024-10-10 RX ORDER — AMINOPHYLLINE 25 MG/ML
50-100 INJECTION, SOLUTION INTRAVENOUS
Status: DISCONTINUED | OUTPATIENT
Start: 2024-10-10 | End: 2024-10-11 | Stop reason: HOSPADM

## 2024-10-10 RX ADMIN — REGADENOSON 0.4 MG: 0.08 INJECTION, SOLUTION INTRAVENOUS at 12:36

## 2024-10-10 RX ADMIN — Medication 29.8 MILLICURIE: at 13:46

## 2024-10-10 RX ADMIN — Medication 8.3 MILLICURIE: at 11:29

## 2024-10-10 NOTE — TELEPHONE ENCOUNTER
Vascular Referral Intake    Referred by:     Franko Nguyen MD    for   I71.43 (ICD-10-CM) - Infrarenal abdominal aortic aneurysm (AAA) without rupture (H)   I72.3 (ICD-10-CM) - Iliac artery aneurysm, bilateral (H)       Specialty: Vascular Surgery    Specific Provider if Necessary:  MD Bessie Lemus or MD Naheed Calderon    Visit Type: New    Time Frame: Next Available    Testing/Imaging Needed Before Consult: Referring ordered CTA    Appt Note: (to be pasted into appt comments, also add where additional information is located, ie, outside images pushed to PACS, in Epic, sent to HIMS, etc)  IMPRESSION:  1.  Infrarenal abdominal aortic aneurysm measuring 3.9 cm.  2.  Bilateral common iliac artery aneurysms measuring 3.3 and 2.5 cm. Recommend CTA for further evaluation of the iliac artery aneurysms

## 2024-10-14 NOTE — TELEPHONE ENCOUNTER
LVMTCB #2 to schedule consult with Dr. Lemus or Dr. Calderon. CTA already scheduled for 10/22. [phone]

## 2024-10-22 ENCOUNTER — HOSPITAL ENCOUNTER (OUTPATIENT)
Dept: CT IMAGING | Facility: HOSPITAL | Age: 79
Discharge: HOME | End: 2024-10-22
Attending: INTERNAL MEDICINE | Admitting: INTERNAL MEDICINE
Payer: MEDICARE

## 2024-10-22 DIAGNOSIS — I72.3 ILIAC ARTERY ANEURYSM, BILATERAL (H): ICD-10-CM

## 2024-10-22 DIAGNOSIS — I71.43 INFRARENAL ABDOMINAL AORTIC ANEURYSM (AAA) WITHOUT RUPTURE (H): ICD-10-CM

## 2024-10-22 LAB
CREAT BLD-MCNC: 1.1 MG/DL (ref 0.7–1.3)
EGFRCR SERPLBLD CKD-EPI 2021: >60 ML/MIN/1.73M2

## 2024-10-22 PROCEDURE — 250N000011 HC RX IP 250 OP 636: Performed by: INTERNAL MEDICINE

## 2024-10-22 PROCEDURE — 82565 ASSAY OF CREATININE: CPT

## 2024-10-22 PROCEDURE — 74174 CTA ABD&PLVS W/CONTRAST: CPT

## 2024-10-22 RX ORDER — IOPAMIDOL 755 MG/ML
90 INJECTION, SOLUTION INTRAVASCULAR ONCE
Status: COMPLETED | OUTPATIENT
Start: 2024-10-22 | End: 2024-10-22

## 2024-10-22 RX ADMIN — IOPAMIDOL 90 ML: 755 INJECTION, SOLUTION INTRAVENOUS at 08:38

## 2024-10-28 ENCOUNTER — OFFICE VISIT (OUTPATIENT)
Dept: VASCULAR SURGERY | Facility: CLINIC | Age: 79
End: 2024-10-28
Attending: INTERNAL MEDICINE
Payer: MEDICARE

## 2024-10-28 VITALS
TEMPERATURE: 98.4 F | HEART RATE: 67 BPM | SYSTOLIC BLOOD PRESSURE: 136 MMHG | OXYGEN SATURATION: 98 % | DIASTOLIC BLOOD PRESSURE: 82 MMHG

## 2024-10-28 DIAGNOSIS — I71.43 INFRARENAL ABDOMINAL AORTIC ANEURYSM (AAA) WITHOUT RUPTURE (H): Primary | ICD-10-CM

## 2024-10-28 DIAGNOSIS — I72.3 ILIAC ARTERY ANEURYSM, BILATERAL (H): ICD-10-CM

## 2024-10-28 ASSESSMENT — PAIN SCALES - GENERAL: PAINLEVEL_OUTOF10: NO PAIN (0)

## 2024-10-28 NOTE — PROGRESS NOTES
Vascular Clinic Rooming Questions      Patient is here for consult for Bilateral common iliac artery aneurysms measuring 3.3 and 2.5 cm.      /82   Pulse 67   Temp 98.4  F (36.9  C)   SpO2 98%     The provider has been notified that the patient has concerns of has zero symptoms wondering if there was a mistake?.     Questions patient would like addressed today are: N/A.    Refills are needed: No    Has homecare services and agency name:  No

## 2024-10-28 NOTE — PATIENT INSTRUCTIONS
This is the plan that was discussed at your appointment.    We will contact you to schedule your 1 year follow-up with Yamileth CORNEJO and ultrasound        I am including additional information on these things and our contact information if you have any questions or concerns.   Please do not hesitate to reach out to us if you felt we did not answer your questions or you are unsure of the treatment plan after your visit today. Our number is [phone].  Thank you for trusting us with your care.         Again thank you for your time.

## 2024-11-06 ENCOUNTER — TELEPHONE (OUTPATIENT)
Dept: ANTICOAGULATION | Facility: CLINIC | Age: 79
End: 2024-11-06
Payer: MEDICARE

## 2024-11-06 ENCOUNTER — TRANSFERRED RECORDS (OUTPATIENT)
Dept: HEALTH INFORMATION MANAGEMENT | Facility: CLINIC | Age: 79
End: 2024-11-06
Payer: MEDICARE

## 2024-11-06 LAB — INR (EXTERNAL): 1.9

## 2024-11-06 NOTE — TELEPHONE ENCOUNTER
Incoming fax from  [facility]    Date of result 11/6    INR result Did not see an INR result    Route result to: jay [facility]

## 2024-11-07 ENCOUNTER — ANTICOAGULATION THERAPY VISIT (OUTPATIENT)
Dept: ANTICOAGULATION | Facility: CLINIC | Age: 79
End: 2024-11-07
Payer: MEDICARE

## 2024-11-07 DIAGNOSIS — Z95.3 S/P MITRAL VALVE REPLACEMENT WITH TISSUE VALVE: ICD-10-CM

## 2024-11-07 DIAGNOSIS — Z79.01 LONG TERM (CURRENT) USE OF ANTICOAGULANTS: Primary | ICD-10-CM

## 2024-11-07 DIAGNOSIS — I48.19 PERSISTENT ATRIAL FIBRILLATION (H): ICD-10-CM

## 2024-11-07 NOTE — PROGRESS NOTES
Incoming fax from  [facility]    Date of result 11/6/24     INR result 1.9    Route result to: jay [facility]

## 2024-11-07 NOTE — TELEPHONE ENCOUNTER
Called and talked with [facility]     - they kane refax INR result.     - reported INR is 1.9                             (Advised to [place] the INR results)

## 2024-11-07 NOTE — PROGRESS NOTES
ANTICOAGULATION MANAGEMENT     Obed Haley 79 year old male is on warfarin with subtherapeutic INR result. (Goal INR 2.0-3.0)    Recent labs: (last 7 days)     11/06/24  1511   INR 1.9       ASSESSMENT     Source(s): Chart Review and Patient/Caregiver Call     Warfarin doses taken: Warfarin taken as instructed  Diet: No new diet changes identified  Medication/supplement changes: None noted  New illness, injury, or hospitalization: Yes:    Consult with [facility] - Dr. Lemus for bilateral AAA without rupture.  Continue to follow-up in one year.  Signs or symptoms of bleeding or clotting: No  Previous result: Therapeutic last 10.INR visits  Additional findings:  Now in [place] for 6 months (oct - April   - reported he is returning in Nov. For Thanksgiving.       PLAN     Recommended plan for ongoing change(s) affecting INR     Dosing Instructions: Increase your warfarin dose (6.2% change) with next INR in 2 weeks       Summary  As of 11/7/2024      Full warfarin instructions:  7.5 mg every Mon, Thu, Sat; 5 mg all other days   Next INR check:  11/21/2024               Telephone call with Obed who verbalizes understanding and agrees to plan    Patient elected to schedule next visit - he will check another INR in 2 wks, before leaving for [facility].    Education provided: Taking warfarin: Importance of taking warfarin as instructed  Goal range and lab monitoring: goal range and significance of current result    Plan made per [facility] anticoagulation protocol    Yolanda Krishna, RN  11/7/2024  Anticoagulation Clinic  [employer] for routing messages: jay [facility] patient phone line: [phone]        _______________________________________________________________________     Anticoagulation Episode Summary       Current INR goal:  2.0-3.0   TTR:  87.8% (1 y)   Target end date:  Indefinite   Send INR reminders to:  [facility]    Indications    Long term (current) use of anticoagulants  [Z79.01]  Persistent atrial fibrillation (H) [I48.19]  S/P mitral valve replacement with tissue valve [Z95.3]             Comments:  --             Anticoagulation Care Providers       Provider Role Specialty Phone number    Franko Franz MD Referring Internal Medicine [phone]    Franko Nguyen MD Referring Internal Medicine [phone]

## 2024-11-07 NOTE — TELEPHONE ENCOUNTER
Updated fax received with INR result. See [facility] encounter 11/7/24.    Luz Ervin RN, BSN  [facility]  [phone]

## 2024-11-13 ENCOUNTER — ANTICOAGULATION THERAPY VISIT (OUTPATIENT)
Dept: ANTICOAGULATION | Facility: CLINIC | Age: 79
End: 2024-11-13
Payer: MEDICARE

## 2024-11-13 ENCOUNTER — TRANSFERRED RECORDS (OUTPATIENT)
Dept: HEALTH INFORMATION MANAGEMENT | Facility: CLINIC | Age: 79
End: 2024-11-13
Payer: MEDICARE

## 2024-11-13 DIAGNOSIS — Z79.01 LONG TERM (CURRENT) USE OF ANTICOAGULANTS: Primary | ICD-10-CM

## 2024-11-13 DIAGNOSIS — Z95.3 S/P MITRAL VALVE REPLACEMENT WITH TISSUE VALVE: ICD-10-CM

## 2024-11-13 DIAGNOSIS — I48.19 PERSISTENT ATRIAL FIBRILLATION (H): ICD-10-CM

## 2024-11-13 LAB — INR (EXTERNAL): 2.4 (ref 0.9–1.1)

## 2024-11-13 NOTE — PROGRESS NOTES
ANTICOAGULATION MANAGEMENT     Obed Haley 79 year old male is on warfarin with therapeutic INR result. (Goal INR 2.0-3.0)    Recent labs: (last 7 days)     11/13/24  0000   INR 2.4*       ASSESSMENT     Source(s): Chart Review  Previous INR was Subtherapeutic  Medication, diet, health changes since last INR chart reviewed; none identified         PLAN     Recommended plan for no diet, medication or health factor changes affecting INR     Dosing Instructions: Continue your current warfarin dose with next INR in 2 weeks       Summary  As of 11/13/2024      Full warfarin instructions:  7.5 mg every Mon, Thu, Sat; 5 mg all other days   Next INR check:  11/27/2024               Sent [employer] message with dosing and follow up instructions    Contact [phone] to schedule and with any changes, questions or concerns.     Education provided: Please call back if any changes to your diet, medications or how you've been taking warfarin    Plan made per [facility] anticoagulation protocol    Hesham Michel RN  11/13/2024  Anticoagulation Clinic  [employer] for routing messages: jay [address]ARDWICK MIDWAY  [facility] patient phone line: [phone]        _______________________________________________________________________     Anticoagulation Episode Summary       Current INR goal:  2.0-3.0   TTR:  87.5% (1 y)   Target end date:  Indefinite   Send INR reminders to:  MULU [facility]WAY    Indications    Long term (current) use of anticoagulants [Z79.01]  Persistent atrial fibrillation (H) [I48.19]  S/P mitral valve replacement with tissue valve [Z95.3]             Comments:  --             Anticoagulation Care Providers       Provider Role Specialty Phone number    Franko Franz MD Referring Internal Medicine [phone]    Franko Nguyen MD Referring Internal Medicine [phone]

## 2024-11-13 NOTE — PROGRESS NOTES
Incoming fax from  [facility]    Date of result 11/13/24    INR result 2.4     [facility]    Anna Garcia, ZACHN, RN

## 2024-11-13 NOTE — PROGRESS NOTES
ANTICOAGULATION MANAGEMENT     Obed Haley 79 year old male is on warfarin with therapeutic INR result. (Goal INR 2.0-3.0)    Recent labs: (last 7 days)     11/13/24  0000   INR 2.4*       ASSESSMENT     Source(s): Chart Review  Previous INR was Subtherapeutic  Medication, diet, health changes since last INR chart reviewed; none identified         PLAN     Unable to reach Obed today.    No instructions provided. Unable to leave voicemail.    Follow up required to confirm warfarin dose taken and assess for changes and discuss dosing instructions and confirm understanding of instructions    Hesham Michel RN  11/13/2024  Anticoagulation Clinic  [employer] for routing messages: jay HARDWICK MIDWAY  [facility] patient phone line: [phone]

## 2024-11-14 ENCOUNTER — TELEPHONE (OUTPATIENT)
Dept: INTERNAL MEDICINE | Facility: CLINIC | Age: 79
End: 2024-11-14
Payer: MEDICARE

## 2024-11-14 NOTE — TELEPHONE ENCOUNTER
Patient Returning Call    Reason for call:  Patient returning INR nurse call    Information relayed to patient:  message sent    Patient has additional questions:  No      Could we send this information to you in [facility] [employer] or would you prefer to receive a phone call?:   Patient would prefer a phone call   Okay to leave a detailed message?: Yes at Cell number on file:    Telephone Information:   Mobile [phone]

## 2024-11-22 ENCOUNTER — LAB (OUTPATIENT)
Dept: LAB | Facility: CLINIC | Age: 79
End: 2024-11-22
Payer: MEDICARE

## 2024-11-22 DIAGNOSIS — I48.19 PERSISTENT ATRIAL FIBRILLATION (H): ICD-10-CM

## 2024-11-22 DIAGNOSIS — Z79.01 LONG TERM (CURRENT) USE OF ANTICOAGULANTS: ICD-10-CM

## 2024-11-22 LAB — INR BLD: 1.9 (ref 0.9–1.1)

## 2024-11-22 PROCEDURE — 36416 COLLJ CAPILLARY BLOOD SPEC: CPT

## 2024-11-22 PROCEDURE — 85610 PROTHROMBIN TIME: CPT

## 2024-12-09 ENCOUNTER — ANCILLARY PROCEDURE (OUTPATIENT)
Dept: CARDIOLOGY | Facility: CLINIC | Age: 79
End: 2024-12-09
Attending: INTERNAL MEDICINE
Payer: MEDICARE

## 2024-12-09 DIAGNOSIS — I49.5 SICK SINUS SYNDROME (H): ICD-10-CM

## 2024-12-09 DIAGNOSIS — I48.19 PERSISTENT ATRIAL FIBRILLATION (H): ICD-10-CM

## 2024-12-09 DIAGNOSIS — Z95.0 PACEMAKER: ICD-10-CM

## 2024-12-10 LAB
MDC_IDC_EPISODE_DTM: NORMAL
MDC_IDC_EPISODE_DURATION: 14 S
MDC_IDC_EPISODE_DURATION: 15 S
MDC_IDC_EPISODE_DURATION: 22 S
MDC_IDC_EPISODE_ID: NORMAL
MDC_IDC_EPISODE_TYPE: NORMAL
MDC_IDC_EPISODE_TYPE_INDUCED: NO
MDC_IDC_LEAD_CONNECTION_STATUS: NORMAL
MDC_IDC_LEAD_CONNECTION_STATUS: NORMAL
MDC_IDC_LEAD_IMPLANT_DT: NORMAL
MDC_IDC_LEAD_IMPLANT_DT: NORMAL
MDC_IDC_LEAD_LOCATION: NORMAL
MDC_IDC_LEAD_LOCATION: NORMAL
MDC_IDC_LEAD_LOCATION_DETAIL_1: NORMAL
MDC_IDC_LEAD_LOCATION_DETAIL_1: NORMAL
MDC_IDC_LEAD_MFG: NORMAL
MDC_IDC_LEAD_MFG: NORMAL
MDC_IDC_LEAD_MODEL: NORMAL
MDC_IDC_LEAD_MODEL: NORMAL
MDC_IDC_LEAD_POLARITY_TYPE: NORMAL
MDC_IDC_LEAD_POLARITY_TYPE: NORMAL
MDC_IDC_LEAD_SERIAL: NORMAL
MDC_IDC_LEAD_SERIAL: NORMAL
MDC_IDC_MSMT_BATTERY_DTM: NORMAL
MDC_IDC_MSMT_BATTERY_REMAINING_LONGEVITY: 96 MO
MDC_IDC_MSMT_BATTERY_REMAINING_PERCENTAGE: 92 %
MDC_IDC_MSMT_BATTERY_STATUS: NORMAL
MDC_IDC_MSMT_LEADCHNL_RA_IMPEDANCE_VALUE: 470 OHM
MDC_IDC_MSMT_LEADCHNL_RV_IMPEDANCE_VALUE: 418 OHM
MDC_IDC_MSMT_LEADCHNL_RV_PACING_THRESHOLD_AMPLITUDE: 0.8 V
MDC_IDC_MSMT_LEADCHNL_RV_PACING_THRESHOLD_PULSEWIDTH: 0.4 MS
MDC_IDC_PG_IMPLANT_DTM: NORMAL
MDC_IDC_PG_MFG: NORMAL
MDC_IDC_PG_MODEL: NORMAL
MDC_IDC_PG_SERIAL: NORMAL
MDC_IDC_PG_TYPE: NORMAL
MDC_IDC_SESS_CLINIC_NAME: NORMAL
MDC_IDC_SESS_DTM: NORMAL
MDC_IDC_SESS_TYPE: NORMAL
MDC_IDC_SET_BRADY_AT_MODE_SWITCH_RATE: 170 {BEATS}/MIN
MDC_IDC_SET_BRADY_LOWRATE: 60 {BEATS}/MIN
MDC_IDC_SET_BRADY_MAX_SENSOR_RATE: 130 {BEATS}/MIN
MDC_IDC_SET_BRADY_MODE: NORMAL
MDC_IDC_SET_LEADCHNL_RA_SENSING_ADAPTATION_MODE: NORMAL
MDC_IDC_SET_LEADCHNL_RA_SENSING_SENSITIVITY: 1.5 MV
MDC_IDC_SET_LEADCHNL_RV_PACING_AMPLITUDE: 1.2 V
MDC_IDC_SET_LEADCHNL_RV_PACING_CAPTURE_MODE: NORMAL
MDC_IDC_SET_LEADCHNL_RV_PACING_POLARITY: NORMAL
MDC_IDC_SET_LEADCHNL_RV_PACING_PULSEWIDTH: 0.4 MS
MDC_IDC_SET_LEADCHNL_RV_SENSING_ADAPTATION_MODE: NORMAL
MDC_IDC_SET_LEADCHNL_RV_SENSING_POLARITY: NORMAL
MDC_IDC_SET_LEADCHNL_RV_SENSING_SENSITIVITY: 1.5 MV
MDC_IDC_SET_ZONE_DETECTION_INTERVAL: 375 MS
MDC_IDC_SET_ZONE_STATUS: NORMAL
MDC_IDC_SET_ZONE_TYPE: NORMAL
MDC_IDC_SET_ZONE_VENDOR_TYPE: NORMAL
MDC_IDC_STAT_BRADY_DTM_END: NORMAL
MDC_IDC_STAT_BRADY_DTM_START: NORMAL
MDC_IDC_STAT_BRADY_RA_PERCENT_PACED: 0 %
MDC_IDC_STAT_BRADY_RV_PERCENT_PACED: 64 %
MDC_IDC_STAT_EPISODE_RECENT_COUNT: 0
MDC_IDC_STAT_EPISODE_RECENT_COUNT: 0
MDC_IDC_STAT_EPISODE_RECENT_COUNT: 3
MDC_IDC_STAT_EPISODE_RECENT_COUNT_DTM_END: NORMAL
MDC_IDC_STAT_EPISODE_RECENT_COUNT_DTM_START: NORMAL
MDC_IDC_STAT_EPISODE_TYPE: NORMAL
MDC_IDC_STAT_EPISODE_VENDOR_TYPE: NORMAL
MDC_IDC_STAT_EPISODE_VENDOR_TYPE: NORMAL

## 2024-12-10 PROCEDURE — 93296 REM INTERROG EVL PM/IDS: CPT | Performed by: INTERNAL MEDICINE

## 2024-12-10 PROCEDURE — 93294 REM INTERROG EVL PM/LDLS PM: CPT | Performed by: INTERNAL MEDICINE

## 2024-12-19 NOTE — OP NOTE
DATE OF SERVICE: 09/19/2017    DATE OF OPERATION:  09/19/2017    TITLE OF PROCEDURE:  1.  Establishment of cardiopulmonary bypass.    2.  Mitral valve replacement using a 33 mm St. Robin Epic biologic mitral valve.  3.  Primary closure of a patent foramen ovale.    SURGEONS:  Monica Swenson MD, attending surgeon; Deneen RINCON, first  assistant.    ANESTHESIA:  General endotracheal.    SKIN PREP:  Betadine and DuraPrep.    INCISIONS:  Median sternotomy.    DRAINS:  Two 36-[place] mediastinal.    CULTURE:  None.    SPECIMEN:  P2 and A2 segments of the mitral valve.    CLOSURE:  Routine.    PREOPERATIVE DIAGNOSES:  1.  Severe mitral regurgitation.  2.  Normal pulmonary artery pressures.  3.  Enlarged heart.    POSTOPERATIVE DIAGNOSES:  1.  Severe mitral regurgitation.    2.  Normal pulmonary artery pressures.  3.  Enlarged heart.  4.  Patent foramen ovale.    BRIEF HISTORY:  Mr. Haley is a 72-year-old gentleman who has had progressive  dyspnea on exertion.  He has had some element of mitral regurgitation, but it is now  severe.  He does not have any obstructive coronary artery disease.  The above  procedure was planned.    FINDINGS AT OPERATION:  The patient's right atrium and left atrium were enlarged.   His ascending aorta was somewhat enlarged and apparently somewhat thin.  He did not  have aortic regurgitation.  The pulmonary artery pressures were normal.  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  The annulus sized  to a 33 mm St. Robin Epic biologic mitral valve.  Of note, the patient also had a  patent foramen ovale  that measured approximately 6 mm in its greatest  dimension and involved the superior rim of the fossil ovalis.    PROCEDURE:  The patient arrived in the operating room and an arterial line was  placed.  Satisfactory general endotracheal anesthesia was induced.  An OG tube  Name: Neelima Deal  : 1940  MRN: [id number]    Oncology Navigation Follow-Up Note    Contact Type:  Telephone    Notes: Writer spoke with t who reports she has \"soreness in her insides\" and says it isnt anything she needs to take meds for since it is tolerable. She reports this comes and goes. Writer encouraged her to monitor and if symptoms worsen to call triage or navigator. Pt verbalized understanding. She is aware of upcoming appts and denied needs at this time. Encouraged pt to call navigator as needed with any questions, concerns or barriers. Confirmed pt has navigator's contact information.         Electronically signed by Kirsten Osborn RN on 2024 at 10:27 AM     was  inserted followed by a transesophageal echo probe, a Newfane-Daniela catheter and a Nelson  catheter.  The patient's neck, chest, abdomen and both thighs were prepped and  draped in a standard sterile fashion.  A complete median sternotomy was made.  A  Rodas retractor was inserted.  The pericardium was opened and tented up on  pericardial sutures.  The aorta was  from the pulmonary artery.   Pursestring sutures were placed in the ascending aorta, the superior vena cava and  at the cavoatrial junction with the inferior vena cava.  Heparin was administered.   When the ACT was appropriate cannulation was performed and cardiopulmonary bypass  established.  A pulmonary artery vent was inserted.  The aorta was crossclamped and  1200 mL of cold blood cardioplegic solution was administered in an antegrade  fashion.  A good arrest was achieved.  Cold topical saline and slush was applied  intermittently during the period of aortic crossclamp.  Caval snares were placed  around the superior vena cava and inferior vena cava and the patient was placed on  total cardiopulmonary bypass.  The patient was rotated away from the surgeon.  A  right atriotomy incision was made that extended superiorly into the dome of the left  atrium and then inferiorly into the fossa ovalis.  While bisecting the fossa ovalis  it was clear that there was a patent foramen ovale volume measuring approximately 6  mm involving its superior rim. Once the left atriotomy was completed, there was good  visualization through the right and left atria into the mitral valve.  The findings  were as described above.  The P2 segment scallop was excised as was the A2 segment  scallop.  The annulus then sized to a 33 mm St. Robin Epic biologic mitral valve.   The annulus was then rimmed with 21, 2-0 Ethibond pledgeted sutures passed from the  atrial to the ventricular surface of the annulus.  The 33 mm St. Robin Epic biologic  mitral valve was appropriately  soaked and then brought onto the operative field.   The annular sutures were passed through its sewing ring, 7 along each side.  Once  this was accomplished, the valve was lowered into place and its housing apparatus  removed.  It seated well using the [place] all 21 annular sutures were secured  without difficulty.  Of note, throughout the period of aortic crossclamp  approximately every 20 minutes, the patient received a bolus dose of cold blood  cardioplegia in an antegrade fashion or slow continuous cold blood cardioplegia was  administered in an antegrade fashion. As the valve began to seat, gentle warming was  begun.  Once the valve was seated a red Ricci catheter was inserted into the left  ventricle and saline was injected into the left ventricle. The valve was clearly  seated well and there was no leak.  The atrial septum was closed in 2 layers using  pledgeted 4-0 Prolene and then the dome of the left atrium was closed again using  pledgeted 4-0 Prolene.  The right atriotomy was then closed in 2 layers using  pledgeted 4-0 Prolene.  Once the right atriotomy closure was completed, a hot shot  was administered in an antegrade fashion.  De-airing maneuvers were performed and  the aortic crossclamp was released.  The aortic crossclamp time was 1 hour and 22  minutes.  The patient required defibrillation twice.  Ventricular and atrial  pacing wires were applied and the patient was AV sequentially paced at a rate of 90.   The pulmonary artery vent was removed and that site repaired in 2 layers using  running 4-0 Prolene.  A period of re-perfusion and deairing ensued.  Gentle  ventilation was once more begun.  The heart was given a few shakes to help mobilize  air in the apex and the left atrial appendage was invaginated to remove air.  After  a period of time it was clear that adequate deairing had been performed.  The  patient was placed on low-dose epinephrine.  When he was warm the heart was allowed  to  fill and eject and the patient  from cardiopulmonary bypass without  difficulty.  Total time on cardiopulmonary bypass was 2 hours 1 minute.  The patient  was decannulated and the cannulation sites oversewn with 4-0 Prolene.  Protamine was  administered to reverse the heparin.  Hemostasis was satisfactory with additional  pledgetted sutures at the site of the root vent and the aortic cannulation  site.  The mediastinum was drained with two 36-[place] chest tubes.  The  sternum was approximated with #6 stainless steel sternal wires as well as 3 double  wires.  The sternotomy wound was irrigated with warm antibiotic containing solution.   It was closed in 4 layers using 2 layers of running 0 Vicryl and an additional  layer of 2-0 Vicryl and a subcuticular stitch of 4-0 Monocryl.  Dermabond was  applied to the skin.  The sponge, needle and instrument counts were reported as  correct.  The estimated blood loss was 650 mL.  Mr. Haley was brought to the  intensive care unit in critical but stable condition having tolerated the procedure  well.      MD emma KIMBALL 09/19/2017 13:51:10  T 09/19/2017 14:25:11  R 09/19/2017 14:25:11  [id number]        cc: SUZIE FREDERICK MD

## 2025-02-04 ENCOUNTER — TELEPHONE (OUTPATIENT)
Dept: ANTICOAGULATION | Facility: CLINIC | Age: 80
End: 2025-02-04
Payer: MEDICARE

## 2025-02-04 ENCOUNTER — ANTICOAGULATION THERAPY VISIT (OUTPATIENT)
Dept: ANTICOAGULATION | Facility: CLINIC | Age: 80
End: 2025-02-04
Payer: MEDICARE

## 2025-02-04 DIAGNOSIS — Z95.3 S/P MITRAL VALVE REPLACEMENT WITH TISSUE VALVE: ICD-10-CM

## 2025-02-04 DIAGNOSIS — Z79.01 LONG TERM (CURRENT) USE OF ANTICOAGULANTS: Primary | ICD-10-CM

## 2025-02-04 DIAGNOSIS — I48.19 PERSISTENT ATRIAL FIBRILLATION (H): ICD-10-CM

## 2025-02-04 LAB — INR (EXTERNAL): 2.5 (ref 0.9–1.1)

## 2025-02-04 NOTE — TELEPHONE ENCOUNTER
ANTICOAGULATION     Obed Haley is overdue for an INR check.     Spoke with Obed and scheduled lab appointment on going in today to check his INR.   (If he can get in)    Yolanda Krishna, RN  2/4/2025  Anticoagulation Clinic  [facility] for routing messages: jay HARDWICK MIDWAY  [facility] patient phone line: [phone]

## 2025-02-04 NOTE — PROGRESS NOTES
Incoming fax from  [facility]    Date of result 2/4/25    INR result 2.5    Route result to: jay HARDWICK MIDWAY      Deneen Carranza RN, BSN  Anticoagulation Clinic

## 2025-02-05 NOTE — PROGRESS NOTES
ANTICOAGULATION MANAGEMENT     Obed Haley 79 year old male is on warfarin with therapeutic INR result. (Goal INR 2.0-3.0)    Recent labs: (last 7 days)     02/04/25  1350   INR 2.5*       ASSESSMENT     Source(s): Chart Reviewed     Medication/supplement changes: None noted  New illness, injury, or hospitalization: No  Previous result: Therapeutic last 2 INR visits  Additional findings:  In [place] from Jan - April.       PLAN     Recommended plan for no diet, medication or health factor changes affecting INR     Dosing Instructions: Continue your current warfarin dose with next INR in 4-5 weeks       Summary  As of 2/4/2025      Full warfarin instructions:  7.5 mg every Mon, Thu, Sat; 5 mg all other days   Next INR check:  3/11/2025               Detailed voice message left for Obed with dosing instructions and follow up date.     Contact [phone] to schedule and with any changes, questions or concerns.     Education provided: Please call back if any changes to your diet, medications or how you've been taking warfarin  Taking warfarin: Importance of taking warfarin as instructed  Goal range and lab monitoring: goal range and significance of current result  Dietary considerations: importance of consistent vitamin K intake    Plan made per [facility] anticoagulation protocol    Yolanda Krishna, RN  2/5/2025  Anticoagulation Clinic  [employer] for routing messages: jay HARDWICK MIDWAY  [facility] patient phone line: [phone]        _______________________________________________________________________     Anticoagulation Episode Summary       Current INR goal:  2.0-3.0   TTR:  95.4% (1 y)   Target end date:  Indefinite   Send INR reminders to:  MULU MIDWAY    Indications    Long term (current) use of anticoagulants [Z79.01]  Persistent atrial fibrillation (H) [I48.19]  S/P mitral valve replacement with tissue valve [Z95.3]             Comments:  --             Anticoagulation Care Providers       Provider Role  Specialty Phone number    Franko Franz MD Referring Internal Medicine [phone]    Franko Nguyen MD Referring Internal Medicine [phone]

## 2025-03-04 ENCOUNTER — ANTICOAGULATION THERAPY VISIT (OUTPATIENT)
Dept: ANTICOAGULATION | Facility: CLINIC | Age: 80
End: 2025-03-04
Payer: MEDICARE

## 2025-03-04 DIAGNOSIS — Z95.3 S/P MITRAL VALVE REPLACEMENT WITH TISSUE VALVE: ICD-10-CM

## 2025-03-04 DIAGNOSIS — I48.19 PERSISTENT ATRIAL FIBRILLATION (H): ICD-10-CM

## 2025-03-04 DIAGNOSIS — Z79.01 LONG TERM (CURRENT) USE OF ANTICOAGULANTS: Primary | ICD-10-CM

## 2025-03-04 LAB — INR (EXTERNAL): 3.3

## 2025-03-04 NOTE — PROGRESS NOTES
ANTICOAGULATION MANAGEMENT     Obed Haley 79 year old male is on warfarin with supratherapeutic INR result. (Goal INR 2.0-3.0)    Recent labs: (last 7 days)     03/04/25  0000   INR 3.3       ASSESSMENT     Source(s): Chart Review and Patient/Caregiver Call     Warfarin doses taken: Warfarin taken as instructed  Diet: Decreased greens/vitamin K in diet; plans to resume previous intake  Medication/supplement changes: None noted  New illness, injury, or hospitalization: No  Signs or symptoms of bleeding or clotting: No  Previous result: Therapeutic last 2(+) visits  Additional findings: None       PLAN     Recommended plan for temporary change(s) affecting INR     Dosing Instructions: partial hold then continue your current warfarin dose with next INR in 2 weeks       Summary  As of 3/4/2025      Full warfarin instructions:  3/4: 2.5 mg; Otherwise 7.5 mg every Mon, Thu, Sat; 5 mg all other days   Next INR check:  3/18/2025               Telephone call with Obed who verbalizes understanding and agrees to plan    Patient using outside facility for labs    Education provided: Please call back if any changes to your diet, medications or how you've been taking warfarin  Contact [phone] with any changes, questions or concerns.     Plan made per [facility] anticoagulation protocol    Heshma Michel RN  3/4/2025  Anticoagulation Clinic  [employer] for routing messages: [facility]  [facility] patient phone line: [phone]        _______________________________________________________________________     Anticoagulation Episode Summary       Current INR goal:  2.0-3.0   TTR:  92.6% (1 y)   Target end date:  Indefinite   Send INR reminders to:  [facility]AG [facility]    Indications    Long term (current) use of anticoagulants [Z79.01]  Persistent atrial fibrillation (H) [I48.19]  S/P mitral valve replacement with tissue valve [Z95.3]             Comments:  --             Anticoagulation Care Providers       Provider Role Specialty  Phone number    Franko Franz MD Referring Internal Medicine [phone]    Franko Nguyen MD Referring Internal Medicine [phone]

## 2025-03-04 NOTE — PROGRESS NOTES
Incoming fax from  [facility]    Date of result 3/4/25    INR result 3.3    Route result to: jay HARDWICK MIDWAY

## 2025-03-06 ENCOUNTER — DOCUMENTATION ONLY (OUTPATIENT)
Dept: ANTICOAGULATION | Facility: CLINIC | Age: 80
End: 2025-03-06
Payer: MEDICARE

## 2025-03-06 DIAGNOSIS — Z95.3 S/P MITRAL VALVE REPLACEMENT WITH TISSUE VALVE: ICD-10-CM

## 2025-03-06 DIAGNOSIS — I48.92 ATRIAL FLUTTER (H): ICD-10-CM

## 2025-03-06 DIAGNOSIS — Z79.01 LONG TERM (CURRENT) USE OF ANTICOAGULANTS: ICD-10-CM

## 2025-03-06 DIAGNOSIS — I48.19 PERSISTENT ATRIAL FIBRILLATION (H): Primary | ICD-10-CM

## 2025-03-11 DIAGNOSIS — I48.91 A-FIB (H): Primary | ICD-10-CM

## 2025-03-12 ENCOUNTER — DOCUMENTATION ONLY (OUTPATIENT)
Dept: ANTICOAGULATION | Facility: CLINIC | Age: 80
End: 2025-03-12
Payer: MEDICARE

## 2025-03-12 DIAGNOSIS — I48.19 PERSISTENT ATRIAL FIBRILLATION (H): ICD-10-CM

## 2025-03-12 DIAGNOSIS — Z95.3 S/P MITRAL VALVE REPLACEMENT WITH TISSUE VALVE: ICD-10-CM

## 2025-03-12 DIAGNOSIS — Z79.01 LONG TERM (CURRENT) USE OF ANTICOAGULANTS: Primary | ICD-10-CM

## 2025-03-12 NOTE — PROGRESS NOTES
ANTICOAGULATION CLINIC REFERRAL RENEWAL REQUEST       An annual renewal order is required for all patients referred to [facility] Anticoagulation Clinic.?  Please review and sign the pended referral order for Obed Haley.       ANTICOAGULATION SUMMARY      Warfarin indication(s)     - Persistent / permanent / Paroxysmal Atrial Fibrillation  - Persistent Atrial Flutter  - S/p MVR  - (St. Robin tissue valve) in 9/2017.     Mechanical heart valve present?  NO       Current goal range   INR: 2.0-3.0     Goal appropriate for indication? Goal INR 2-3, standard for indication(s) above     Time in Therapeutic Range (TTR)  (Goal > 60%) 92.6%       Office visit with referring provider's group within last year yes on 10/28/24       Michell Ta RN  [facility] Anticoagulation Clinic

## 2025-03-21 ENCOUNTER — ANCILLARY PROCEDURE (OUTPATIENT)
Dept: CARDIOLOGY | Facility: CLINIC | Age: 80
End: 2025-03-21
Attending: INTERNAL MEDICINE
Payer: MEDICARE

## 2025-03-21 DIAGNOSIS — I49.5 SICK SINUS SYNDROME (H): ICD-10-CM

## 2025-03-21 DIAGNOSIS — Z95.0 PACEMAKER: ICD-10-CM

## 2025-03-21 LAB
INR (EXTERNAL): 2.8
MDC_IDC_EPISODE_DTM: NORMAL
MDC_IDC_EPISODE_DURATION: 12 S
MDC_IDC_EPISODE_DURATION: 14 S
MDC_IDC_EPISODE_DURATION: 17 S
MDC_IDC_EPISODE_ID: NORMAL
MDC_IDC_EPISODE_TYPE: NORMAL
MDC_IDC_EPISODE_TYPE_INDUCED: NO
MDC_IDC_LEAD_CONNECTION_STATUS: NORMAL
MDC_IDC_LEAD_CONNECTION_STATUS: NORMAL
MDC_IDC_LEAD_IMPLANT_DT: NORMAL
MDC_IDC_LEAD_IMPLANT_DT: NORMAL
MDC_IDC_LEAD_LOCATION: NORMAL
MDC_IDC_LEAD_LOCATION: NORMAL
MDC_IDC_LEAD_LOCATION_DETAIL_1: NORMAL
MDC_IDC_LEAD_LOCATION_DETAIL_1: NORMAL
MDC_IDC_LEAD_MFG: NORMAL
MDC_IDC_LEAD_MFG: NORMAL
MDC_IDC_LEAD_MODEL: NORMAL
MDC_IDC_LEAD_MODEL: NORMAL
MDC_IDC_LEAD_POLARITY_TYPE: NORMAL
MDC_IDC_LEAD_POLARITY_TYPE: NORMAL
MDC_IDC_LEAD_SERIAL: NORMAL
MDC_IDC_LEAD_SERIAL: NORMAL
MDC_IDC_MSMT_BATTERY_DTM: NORMAL
MDC_IDC_MSMT_BATTERY_REMAINING_LONGEVITY: 90 MO
MDC_IDC_MSMT_BATTERY_REMAINING_PERCENTAGE: 88 %
MDC_IDC_MSMT_BATTERY_STATUS: NORMAL
MDC_IDC_MSMT_LEADCHNL_RA_IMPEDANCE_VALUE: 415 OHM
MDC_IDC_MSMT_LEADCHNL_RV_IMPEDANCE_VALUE: 415 OHM
MDC_IDC_MSMT_LEADCHNL_RV_PACING_THRESHOLD_AMPLITUDE: 0.8 V
MDC_IDC_MSMT_LEADCHNL_RV_PACING_THRESHOLD_PULSEWIDTH: 0.4 MS
MDC_IDC_PG_IMPLANT_DTM: NORMAL
MDC_IDC_PG_MFG: NORMAL
MDC_IDC_PG_MODEL: NORMAL
MDC_IDC_PG_SERIAL: NORMAL
MDC_IDC_PG_TYPE: NORMAL
MDC_IDC_SESS_CLINIC_NAME: NORMAL
MDC_IDC_SESS_DTM: NORMAL
MDC_IDC_SESS_TYPE: NORMAL
MDC_IDC_SET_BRADY_AT_MODE_SWITCH_RATE: 170 {BEATS}/MIN
MDC_IDC_SET_BRADY_LOWRATE: 60 {BEATS}/MIN
MDC_IDC_SET_BRADY_MAX_SENSOR_RATE: 130 {BEATS}/MIN
MDC_IDC_SET_BRADY_MODE: NORMAL
MDC_IDC_SET_LEADCHNL_RA_SENSING_ADAPTATION_MODE: NORMAL
MDC_IDC_SET_LEADCHNL_RA_SENSING_SENSITIVITY: 1.5 MV
MDC_IDC_SET_LEADCHNL_RV_PACING_AMPLITUDE: 1.2 V
MDC_IDC_SET_LEADCHNL_RV_PACING_CAPTURE_MODE: NORMAL
MDC_IDC_SET_LEADCHNL_RV_PACING_POLARITY: NORMAL
MDC_IDC_SET_LEADCHNL_RV_PACING_PULSEWIDTH: 0.4 MS
MDC_IDC_SET_LEADCHNL_RV_SENSING_ADAPTATION_MODE: NORMAL
MDC_IDC_SET_LEADCHNL_RV_SENSING_POLARITY: NORMAL
MDC_IDC_SET_LEADCHNL_RV_SENSING_SENSITIVITY: 1.5 MV
MDC_IDC_SET_ZONE_DETECTION_INTERVAL: 375 MS
MDC_IDC_SET_ZONE_STATUS: NORMAL
MDC_IDC_SET_ZONE_TYPE: NORMAL
MDC_IDC_SET_ZONE_VENDOR_TYPE: NORMAL
MDC_IDC_STAT_BRADY_DTM_END: NORMAL
MDC_IDC_STAT_BRADY_DTM_START: NORMAL
MDC_IDC_STAT_BRADY_RA_PERCENT_PACED: 0 %
MDC_IDC_STAT_BRADY_RV_PERCENT_PACED: 64 %
MDC_IDC_STAT_EPISODE_RECENT_COUNT: 0
MDC_IDC_STAT_EPISODE_RECENT_COUNT: 0
MDC_IDC_STAT_EPISODE_RECENT_COUNT: 3
MDC_IDC_STAT_EPISODE_RECENT_COUNT_DTM_END: NORMAL
MDC_IDC_STAT_EPISODE_RECENT_COUNT_DTM_START: NORMAL
MDC_IDC_STAT_EPISODE_TYPE: NORMAL
MDC_IDC_STAT_EPISODE_VENDOR_TYPE: NORMAL
MDC_IDC_STAT_EPISODE_VENDOR_TYPE: NORMAL

## 2025-03-21 PROCEDURE — 93296 REM INTERROG EVL PM/IDS: CPT | Performed by: INTERNAL MEDICINE

## 2025-03-21 PROCEDURE — 93294 REM INTERROG EVL PM/LDLS PM: CPT | Performed by: INTERNAL MEDICINE

## 2025-03-24 ENCOUNTER — ANTICOAGULATION THERAPY VISIT (OUTPATIENT)
Dept: ANTICOAGULATION | Facility: CLINIC | Age: 80
End: 2025-03-24
Payer: MEDICARE

## 2025-03-24 DIAGNOSIS — Z79.01 LONG TERM (CURRENT) USE OF ANTICOAGULANTS: Primary | ICD-10-CM

## 2025-03-24 DIAGNOSIS — Z95.3 S/P MITRAL VALVE REPLACEMENT WITH TISSUE VALVE: ICD-10-CM

## 2025-03-24 DIAGNOSIS — I48.19 PERSISTENT ATRIAL FIBRILLATION (H): ICD-10-CM

## 2025-03-24 NOTE — PROGRESS NOTES
ANTICOAGULATION MANAGEMENT     Obed Haley 79 year old male is on warfarin with therapeutic INR result. (Goal INR 2.0-3.0)    Recent labs: (last 7 days)     03/21/25  0000   INR 2.8       ASSESSMENT     Source(s): Chart Review  Previous INR was Supratherapeutic  Medication, diet, health changes since last INR chart reviewed; none identified         PLAN     Recommended plan for no diet, medication or health factor changes affecting INR     Dosing Instructions: Continue your current warfarin dose with next INR in 3 weeks       Summary  As of 3/24/2025      Full warfarin instructions:  7.5 mg every Mon, Thu, Sat; 5 mg all other days   Next INR check:  4/11/2025               Detailed voice message left for Obed with dosing instructions and follow up date.   Sent [employer] message with dosing and follow up instructions    Patient using outside facility for labs    Education provided: Please call back if any changes to your diet, medications or how you've been taking warfarin  Goal range and lab monitoring: goal range and significance of current result  Importance of notifying anticoagulation clinic for: changes in medications; a sooner lab recheck maybe needed  Written instructions provided  Contact [phone] with any changes, questions or concerns.     Plan made per [facility] anticoagulation protocol    Alexandria Peter RN  3/24/2025  Anticoagulation Clinic  [employer] for routing messages: jay HARDWICK [address]WAY  [facility] patient phone line: [phone]        _______________________________________________________________________     Anticoagulation Episode Summary       Current INR goal:  2.0-3.0   TTR:  89.7% (1 y)   Target end date:  Indefinite   Send INR reminders to:  MULU [facility]    Indications    Long term (current) use of anticoagulants [Z79.01]  Persistent atrial fibrillation (H) [I48.19]  S/P mitral valve replacement with tissue valve [Z95.3]             Comments:  --             Anticoagulation Care Providers        Provider Role Specialty Phone number    Franko Nguyen MD Referring Internal Medicine [phone]

## 2025-03-24 NOTE — PROGRESS NOTES
Incoming fax from  [facility]     Date of result 3/21/25    INR result 2.8    Route result to: jay HARDWICK [facility]WAY

## 2025-03-29 NOTE — TELEPHONE ENCOUNTER
ANTICOAGULATION  MANAGEMENT    Assessment     Today's INR result of 2.00 is Therapeutic (goal INR of 2.0-3.0)        Warfarin taken as previously instructed    No new diet changes affecting INR    No new medication/supplements affecting INR    Continues to tolerate warfarin with no reported s/s of bleeding or thromboembolism     Previous INR was Therapeutic at 2.40 on 10/23/19.    Drove to [place] in November for one month (or longer).  They found a place in [place] and will close in Spring. Will still spend summers in [place].    Plan:     Spoke with Obed regarding INR result and instructed:     Warfarin Dosing Instructions:   (has 5mg tabs)   - Continue current warfarin dose 7.5 mg daily on Wed/Fri; and 5 mg daily rest of week.    Instructed patient to follow up no later than:  4-6 wks.   - scheduled on 1/22/2020 @ [facility].    Education provided: importance of consistent vitamin K intake, target INR goal and significance of current INR result, importance of notifying clinic for changes in medications, monitoring for bleeding signs and symptoms, when to seek medical attention/emergency care and importance of notifying clinic for diarrhea, nausea/vomiting, reduced intake and/or illness    Obed verbalizes understanding and agrees to warfarin dosing plan.    Instructed to call the [facility] for any changes, questions or concerns. (#[phone])   ?   Yolanda Krishna RN    Subjective/Objective:      Obed Haley, a 74 y.o. male is on warfarin.     Obed reports:     Home warfarin dose: verbally confirmed home dose with Obed and updated on anticoagulation calendar     Missed doses: No     Medication changes:  No     S/S of bleeding or thromboembolism:  No     New Injury or illness:  No     Changes in diet or alcohol consumption:  No     Upcoming surgery, procedure or cardioversion:  No    Anticoagulation Episode Summary     Current INR goal:   2.0-3.0   TTR:   79.6 % (1 y)   Next INR check:   1/21/2020   INR from last  Thank you for seeking care at [facility].  You have been seen and evaluated. Your findings are consistent with sinusitis. Take the antibiotics and use the zyrtec and flonase as prescribed. You can use Afrin for 3 days but no longer than that as it can cause you to become addicted to/dependent on the medicine.    We discussed the results of your workup   Please read the instructions provided   If given prescriptions, take as instructed    Remember, your care process does not end after your visit today. Please follow-up with your doctor within 1-2 days for a follow-up check to ensure you are  improving, to see if you need any further evaluation/testing, or to evaluate for any alternate diagnoses.     Please return to the emergency department if you develop severe/worsening headache, vision changes, slurred speech, inability to swallow, chest pain, difficulty breathing, inability to drink liquids without vomiting, one sided numbness or weakness, slurred speech, severe headache, or if you develop any other new or concerning symptoms as these could be signs of more serious medical illness.    We hope you feel better.     check:   2.00 (12/10/2019)   Weekly max warfarin dose:      Target end date:   9/29/2017   INR check location:      Preferred lab:      Send INR reminders to:   [facility]       Comments:            Anticoagulation Care Providers     Provider Role Specialty Phone number    Franko Franz MD Referring Internal Medicine [phone]

## 2025-05-06 ENCOUNTER — LAB (OUTPATIENT)
Dept: LAB | Facility: HOSPITAL | Age: 80
End: 2025-05-06
Payer: MEDICARE

## 2025-05-06 ENCOUNTER — ANTICOAGULATION THERAPY VISIT (OUTPATIENT)
Dept: ANTICOAGULATION | Facility: CLINIC | Age: 80
End: 2025-05-06

## 2025-05-06 ENCOUNTER — TELEPHONE (OUTPATIENT)
Dept: MRI IMAGING | Facility: HOSPITAL | Age: 80
End: 2025-05-06
Payer: MEDICARE

## 2025-05-06 ENCOUNTER — TRANSFERRED RECORDS (OUTPATIENT)
Dept: HEALTH INFORMATION MANAGEMENT | Facility: CLINIC | Age: 80
End: 2025-05-06

## 2025-05-06 ENCOUNTER — LAB REQUISITION (OUTPATIENT)
Dept: LAB | Facility: HOSPITAL | Age: 80
End: 2025-05-06
Payer: MEDICARE

## 2025-05-06 DIAGNOSIS — Z79.01 LONG TERM (CURRENT) USE OF ANTICOAGULANTS: Primary | ICD-10-CM

## 2025-05-06 DIAGNOSIS — I48.19 PERSISTENT ATRIAL FIBRILLATION (H): ICD-10-CM

## 2025-05-06 DIAGNOSIS — Z95.3 S/P MITRAL VALVE REPLACEMENT WITH TISSUE VALVE: ICD-10-CM

## 2025-05-06 DIAGNOSIS — I48.92 ATRIAL FLUTTER (H): ICD-10-CM

## 2025-05-06 DIAGNOSIS — Z79.01 LONG TERM (CURRENT) USE OF ANTICOAGULANTS: ICD-10-CM

## 2025-05-06 LAB
CRP SERPL-MCNC: <3 MG/L
ERYTHROCYTE [DISTWIDTH] IN BLOOD BY AUTOMATED COUNT: 14.1 % (ref 10–15)
ERYTHROCYTE [SEDIMENTATION RATE] IN BLOOD BY WESTERGREN METHOD: 8 MM/HR (ref 0–20)
HCT VFR BLD AUTO: 37.1 % (ref 40–53)
HGB BLD-MCNC: 12.4 G/DL (ref 13.3–17.7)
INR PPP: 2.77 (ref 0.85–1.15)
MCH RBC QN AUTO: 32.7 PG (ref 26.5–33)
MCHC RBC AUTO-ENTMCNC: 33.4 G/DL (ref 31.5–36.5)
MCV RBC AUTO: 98 FL (ref 78–100)
PLATELET # BLD AUTO: 129 10E3/UL (ref 150–450)
PROTHROMBIN TIME: 29 SECONDS (ref 11.8–14.8)
RBC # BLD AUTO: 3.79 10E6/UL (ref 4.4–5.9)
WBC # BLD AUTO: 6.2 10E3/UL (ref 4–11)

## 2025-05-06 PROCEDURE — 85610 PROTHROMBIN TIME: CPT

## 2025-05-06 NOTE — PROGRESS NOTES
ANTICOAGULATION MANAGEMENT     Obed Halye 80 year old male is on warfarin with therapeutic INR result. (Goal INR 2.0-3.0)    Recent labs: (last 7 days)     05/06/25  1022   INR 2.77*       ASSESSMENT     Source(s): Chart Review and Patient/Caregiver Call     Warfarin doses taken: Warfarin taken as instructed  Diet: No new diet changes identified  Medication/supplement changes: None noted  New illness, injury, or hospitalization: No  Signs or symptoms of bleeding or clotting: No  Previous result: Subtherapeutic at 1.8 on 4/25/25.  Additional findings: None       PLAN     Recommended plan for no diet, medication or health factor changes affecting INR     Dosing Instructions: Continue your current warfarin dose with next INR in 2 weeks       Summary  As of 5/6/2025      Full warfarin instructions:  7.5 mg every Mon, Thu, Sat; 5 mg all other days   Next INR check:  5/20/2025               Telephone call with Obed who verbalizes understanding and agrees to plan    Lab visit scheduled - INR on 5/23/25 at one year follow-up with [facility] @ [facility]    Education provided: Taking warfarin: Importance of taking warfarin as instructed  Goal range and lab monitoring: goal range and significance of current result    Plan made per [facility] anticoagulation protocol    Yolanda Krishna RN  5/6/2025  Anticoagulation Clinic  Ruby Ribbon for routing messages: p [facility]  [facility] patient phone line: [phone]        _______________________________________________________________________     Anticoagulation Episode Summary       Current INR goal:  2.0-3.0   TTR:  87.2% (1 y)   Target end date:  Indefinite   Send INR reminders to:  [facility]    Indications    Long term (current) use of anticoagulants [Z79.01]  Persistent atrial fibrillation (H) [I48.19]  S/P mitral valve replacement with tissue valve [Z95.3]             Comments:  --             Anticoagulation Care Providers       Provider Role Specialty Phone number     Franko Nguyen MD [facility] Internal Medicine [phone]

## 2025-05-13 ENCOUNTER — TELEPHONE (OUTPATIENT)
Dept: MEDSURG UNIT | Facility: CLINIC | Age: 80
End: 2025-05-13
Payer: MEDICARE

## 2025-05-14 DIAGNOSIS — I48.0 PAROXYSMAL ATRIAL FIBRILLATION (H): ICD-10-CM

## 2025-05-14 DIAGNOSIS — Z79.01 LONG TERM CURRENT USE OF ANTICOAGULANT THERAPY: ICD-10-CM

## 2025-05-14 DIAGNOSIS — Z95.3 S/P MITRAL VALVE REPLACEMENT WITH TISSUE VALVE: ICD-10-CM

## 2025-05-14 RX ORDER — WARFARIN SODIUM 5 MG/1
TABLET ORAL
Qty: 120 TABLET | Refills: 1 | Status: SHIPPED | OUTPATIENT
Start: 2025-05-14

## 2025-05-16 ENCOUNTER — HOSPITAL ENCOUNTER (OUTPATIENT)
Dept: GENERAL RADIOLOGY | Facility: CLINIC | Age: 80
Discharge: HOME | End: 2025-05-16
Attending: OPHTHALMOLOGY
Payer: MEDICARE

## 2025-05-16 ENCOUNTER — HOSPITAL ENCOUNTER (OUTPATIENT)
Dept: MRI IMAGING | Facility: CLINIC | Age: 80
Discharge: HOME | End: 2025-05-16
Attending: OPHTHALMOLOGY
Payer: MEDICARE

## 2025-05-16 ENCOUNTER — HOSPITAL ENCOUNTER (OUTPATIENT)
Facility: CLINIC | Age: 80
Discharge: HOME | End: 2025-05-16
Admitting: RADIOLOGY
Payer: MEDICARE

## 2025-05-16 VITALS — HEART RATE: 80 BPM | OXYGEN SATURATION: 96 %

## 2025-05-16 DIAGNOSIS — Z95.0 PACEMAKER: ICD-10-CM

## 2025-05-16 DIAGNOSIS — G45.3 AMAUROSIS FUGAX: ICD-10-CM

## 2025-05-16 PROCEDURE — 70547 MR ANGIOGRAPHY NECK W/O DYE: CPT

## 2025-05-16 PROCEDURE — 70553 MRI BRAIN STEM W/O & W/DYE: CPT

## 2025-05-16 PROCEDURE — 999N000154 HC STATISTIC RADIOLOGY XRAY, US, CT, MAR, NM

## 2025-05-16 PROCEDURE — A9585 GADOBUTROL INJECTION: HCPCS | Performed by: OPHTHALMOLOGY

## 2025-05-16 PROCEDURE — 70544 MR ANGIOGRAPHY HEAD W/O DYE: CPT | Mod: XU

## 2025-05-16 PROCEDURE — 71046 X-RAY EXAM CHEST 2 VIEWS: CPT

## 2025-05-16 PROCEDURE — 255N000002 HC RX 255 OP 636: Performed by: OPHTHALMOLOGY

## 2025-05-16 RX ORDER — GADOBUTROL 604.72 MG/ML
10 INJECTION INTRAVENOUS ONCE
Status: COMPLETED | OUTPATIENT
Start: 2025-05-16 | End: 2025-05-16

## 2025-05-16 RX ADMIN — GADOBUTROL 10 ML: 604.72 INJECTION INTRAVENOUS at 12:36

## 2025-05-16 ASSESSMENT — ACTIVITIES OF DAILY LIVING (ADL)
ADLS_ACUITY_SCORE: 41
ADLS_ACUITY_SCORE: 41

## 2025-05-16 NOTE — PROGRESS NOTES
Patient has a [employer] pacemaker set at VVIR 60.  MRI settings are at VOO 80.  Patient has chrinic a-fib and is anticoagulated on coumadin.

## 2025-05-20 ENCOUNTER — HOSPITAL ENCOUNTER (OUTPATIENT)
Dept: CARDIOLOGY | Facility: HOSPITAL | Age: 80
Discharge: HOME | End: 2025-05-20
Attending: INTERNAL MEDICINE
Payer: MEDICARE

## 2025-05-20 ENCOUNTER — RESULTS FOLLOW-UP (OUTPATIENT)
Dept: CARDIOLOGY | Facility: CLINIC | Age: 80
End: 2025-05-20

## 2025-05-20 DIAGNOSIS — Z95.3 S/P MITRAL VALVE REPLACEMENT WITH TISSUE VALVE: ICD-10-CM

## 2025-05-20 LAB — LVEF ECHO: NORMAL

## 2025-05-20 PROCEDURE — 93306 TTE W/DOPPLER COMPLETE: CPT

## 2025-05-20 PROCEDURE — 93306 TTE W/DOPPLER COMPLETE: CPT | Mod: 26 | Performed by: INTERNAL MEDICINE

## 2025-05-23 ENCOUNTER — LAB (OUTPATIENT)
Dept: CARDIOLOGY | Facility: CLINIC | Age: 80
End: 2025-05-23
Payer: MEDICARE

## 2025-05-23 ENCOUNTER — ORDERS ONLY (AUTO-RELEASED) (OUTPATIENT)
Dept: CARDIOLOGY | Facility: CLINIC | Age: 80
End: 2025-05-23

## 2025-05-23 DIAGNOSIS — I48.21 PERMANENT ATRIAL FIBRILLATION (H): ICD-10-CM

## 2025-05-23 DIAGNOSIS — Z79.01 LONG TERM (CURRENT) USE OF ANTICOAGULANTS: Primary | ICD-10-CM

## 2025-05-23 LAB — INR POINT OF CARE: 2.2 (ref 0.9–1.1)

## 2025-05-27 ENCOUNTER — TRANSFERRED RECORDS (OUTPATIENT)
Dept: HEALTH INFORMATION MANAGEMENT | Facility: CLINIC | Age: 80
End: 2025-05-27
Payer: MEDICARE

## 2025-06-26 LAB — CV ZIO PRELIM RESULTS: NORMAL

## 2025-06-27 ENCOUNTER — RESULTS FOLLOW-UP (OUTPATIENT)
Dept: CARDIOLOGY | Facility: CLINIC | Age: 80
End: 2025-06-27

## 2025-07-01 ENCOUNTER — ANCILLARY PROCEDURE (OUTPATIENT)
Dept: CARDIOLOGY | Facility: CLINIC | Age: 80
End: 2025-07-01
Attending: INTERNAL MEDICINE
Payer: MEDICARE

## 2025-07-01 DIAGNOSIS — Z95.0 PACEMAKER: ICD-10-CM

## 2025-07-01 DIAGNOSIS — I49.5 SICK SINUS SYNDROME (H): ICD-10-CM

## 2025-07-02 LAB
MDC_IDC_EPISODE_DTM: NORMAL
MDC_IDC_EPISODE_DURATION: 12 S
MDC_IDC_EPISODE_DURATION: 13 S
MDC_IDC_EPISODE_DURATION: 14 S
MDC_IDC_EPISODE_DURATION: 15 S
MDC_IDC_EPISODE_DURATION: 15 S
MDC_IDC_EPISODE_DURATION: 16 S
MDC_IDC_EPISODE_DURATION: 2536 S
MDC_IDC_EPISODE_ID: NORMAL
MDC_IDC_EPISODE_TYPE: NORMAL
MDC_IDC_EPISODE_TYPE_INDUCED: NO
MDC_IDC_LEAD_CONNECTION_STATUS: NORMAL
MDC_IDC_LEAD_CONNECTION_STATUS: NORMAL
MDC_IDC_LEAD_IMPLANT_DT: NORMAL
MDC_IDC_LEAD_IMPLANT_DT: NORMAL
MDC_IDC_LEAD_LOCATION: NORMAL
MDC_IDC_LEAD_LOCATION: NORMAL
MDC_IDC_LEAD_LOCATION_DETAIL_1: NORMAL
MDC_IDC_LEAD_LOCATION_DETAIL_1: NORMAL
MDC_IDC_LEAD_MFG: NORMAL
MDC_IDC_LEAD_MFG: NORMAL
MDC_IDC_LEAD_MODEL: NORMAL
MDC_IDC_LEAD_MODEL: NORMAL
MDC_IDC_LEAD_POLARITY_TYPE: NORMAL
MDC_IDC_LEAD_POLARITY_TYPE: NORMAL
MDC_IDC_LEAD_SERIAL: NORMAL
MDC_IDC_LEAD_SERIAL: NORMAL
MDC_IDC_MSMT_BATTERY_DTM: NORMAL
MDC_IDC_MSMT_BATTERY_REMAINING_LONGEVITY: 84 MO
MDC_IDC_MSMT_BATTERY_REMAINING_PERCENTAGE: 83 %
MDC_IDC_MSMT_BATTERY_STATUS: NORMAL
MDC_IDC_MSMT_LEADCHNL_RA_IMPEDANCE_VALUE: 446 OHM
MDC_IDC_MSMT_LEADCHNL_RV_IMPEDANCE_VALUE: 442 OHM
MDC_IDC_MSMT_LEADCHNL_RV_PACING_THRESHOLD_AMPLITUDE: 0.7 V
MDC_IDC_MSMT_LEADCHNL_RV_PACING_THRESHOLD_PULSEWIDTH: 0.4 MS
MDC_IDC_PG_IMPLANT_DTM: NORMAL
MDC_IDC_PG_MFG: NORMAL
MDC_IDC_PG_MODEL: NORMAL
MDC_IDC_PG_SERIAL: NORMAL
MDC_IDC_PG_TYPE: NORMAL
MDC_IDC_SESS_CLINIC_NAME: NORMAL
MDC_IDC_SESS_DTM: NORMAL
MDC_IDC_SESS_TYPE: NORMAL
MDC_IDC_SET_BRADY_AT_MODE_SWITCH_RATE: 170 {BEATS}/MIN
MDC_IDC_SET_BRADY_LOWRATE: 60 {BEATS}/MIN
MDC_IDC_SET_BRADY_MAX_SENSOR_RATE: 130 {BEATS}/MIN
MDC_IDC_SET_BRADY_MODE: NORMAL
MDC_IDC_SET_LEADCHNL_RA_SENSING_ADAPTATION_MODE: NORMAL
MDC_IDC_SET_LEADCHNL_RA_SENSING_SENSITIVITY: 1.5 MV
MDC_IDC_SET_LEADCHNL_RV_PACING_AMPLITUDE: 1.2 V
MDC_IDC_SET_LEADCHNL_RV_PACING_CAPTURE_MODE: NORMAL
MDC_IDC_SET_LEADCHNL_RV_PACING_POLARITY: NORMAL
MDC_IDC_SET_LEADCHNL_RV_PACING_PULSEWIDTH: 0.4 MS
MDC_IDC_SET_LEADCHNL_RV_SENSING_ADAPTATION_MODE: NORMAL
MDC_IDC_SET_LEADCHNL_RV_SENSING_POLARITY: NORMAL
MDC_IDC_SET_LEADCHNL_RV_SENSING_SENSITIVITY: 1.5 MV
MDC_IDC_SET_ZONE_DETECTION_INTERVAL: 375 MS
MDC_IDC_SET_ZONE_STATUS: NORMAL
MDC_IDC_SET_ZONE_TYPE: NORMAL
MDC_IDC_SET_ZONE_VENDOR_TYPE: NORMAL
MDC_IDC_STAT_BRADY_DTM_END: NORMAL
MDC_IDC_STAT_BRADY_DTM_START: NORMAL
MDC_IDC_STAT_BRADY_RA_PERCENT_PACED: 0 %
MDC_IDC_STAT_BRADY_RV_PERCENT_PACED: 64 %
MDC_IDC_STAT_EPISODE_RECENT_COUNT: 0
MDC_IDC_STAT_EPISODE_RECENT_COUNT: 1
MDC_IDC_STAT_EPISODE_RECENT_COUNT: 6
MDC_IDC_STAT_EPISODE_RECENT_COUNT_DTM_END: NORMAL
MDC_IDC_STAT_EPISODE_RECENT_COUNT_DTM_START: NORMAL
MDC_IDC_STAT_EPISODE_TYPE: NORMAL
MDC_IDC_STAT_EPISODE_VENDOR_TYPE: NORMAL
MDC_IDC_STAT_EPISODE_VENDOR_TYPE: NORMAL

## 2025-07-02 PROCEDURE — 93296 REM INTERROG EVL PM/IDS: CPT | Performed by: INTERNAL MEDICINE

## 2025-07-02 PROCEDURE — 93294 REM INTERROG EVL PM/LDLS PM: CPT | Performed by: INTERNAL MEDICINE

## 2025-07-30 ENCOUNTER — LAB (OUTPATIENT)
Dept: LAB | Facility: CLINIC | Age: 80
End: 2025-07-30
Payer: MEDICARE

## 2025-07-30 ENCOUNTER — ANTICOAGULATION THERAPY VISIT (OUTPATIENT)
Dept: ANTICOAGULATION | Facility: CLINIC | Age: 80
End: 2025-07-30

## 2025-07-30 DIAGNOSIS — Z79.01 LONG TERM (CURRENT) USE OF ANTICOAGULANTS: Primary | ICD-10-CM

## 2025-07-30 DIAGNOSIS — I48.19 PERSISTENT ATRIAL FIBRILLATION (H): ICD-10-CM

## 2025-07-30 DIAGNOSIS — I48.91 A-FIB (H): ICD-10-CM

## 2025-07-30 DIAGNOSIS — Z95.3 S/P MITRAL VALVE REPLACEMENT WITH TISSUE VALVE: ICD-10-CM

## 2025-07-30 LAB — INR BLD: 2.1 (ref 0.9–1.1)

## 2025-07-30 PROCEDURE — 36416 COLLJ CAPILLARY BLOOD SPEC: CPT

## 2025-07-30 PROCEDURE — 85610 PROTHROMBIN TIME: CPT

## 2025-07-30 NOTE — PROGRESS NOTES
ANTICOAGULATION MANAGEMENT     Obed Haley 80 year old male is on warfarin with therapeutic INR result. (Goal INR 2.0-3.0)    Recent labs: (last 7 days)     07/30/25  0715   INR 2.1*       ASSESSMENT     Source(s): Chart Review and Patient/Caregiver Call     Warfarin doses taken: Warfarin taken as instructed  Diet: Increased greens/vitamin K in diet; plans to resume previous intake  Medication/supplement changes: None noted  New illness, injury, or hospitalization: No  Signs or symptoms of bleeding or clotting: No  Previous result: Therapeutic last 2(+) visits  Additional findings: None       PLAN     Recommended plan for temporary change(s) affecting INR     Dosing Instructions: Continue your current warfarin dose with next INR in 6 weeks       Summary  As of 7/30/2025      Full warfarin instructions:  7.5 mg every Mon, Thu, Sat; 5 mg all other days   Next INR check:  9/10/2025               Telephone call with Obed who verbalizes understanding and agrees to plan    Patient offered & declined to schedule next visit    Education provided: Contact [phone] with any changes, questions or concerns.     Plan made per [facility] anticoagulation protocol    Brandi Nieto RN  7/30/2025  Anticoagulation Clinic  [employer] for routing messages: jay [facility]  [facility] patient phone line: [phone]        _______________________________________________________________________     Anticoagulation Episode Summary       Current INR goal:  2.0-3.0   TTR:  87.2% (1 y)   Target end date:  Indefinite   Send INR reminders to:  [facility]    Indications    Long term (current) use of anticoagulants [Z79.01]  Persistent atrial fibrillation (H) [I48.19]  S/P mitral valve replacement with tissue valve [Z95.3]             Comments:  --             Anticoagulation Care Providers       Provider Role Specialty Phone number    Franko Nguyen MD Referring Internal Medicine [phone]

## 2025-08-12 ENCOUNTER — TELEPHONE (OUTPATIENT)
Dept: INTERNAL MEDICINE | Facility: CLINIC | Age: 80
End: 2025-08-12
Payer: MEDICARE

## 2025-08-26 ENCOUNTER — LAB (OUTPATIENT)
Dept: LAB | Facility: CLINIC | Age: 80
End: 2025-08-26
Payer: MEDICARE

## 2025-08-26 ENCOUNTER — ANTICOAGULATION THERAPY VISIT (OUTPATIENT)
Dept: ANTICOAGULATION | Facility: CLINIC | Age: 80
End: 2025-08-26

## 2025-08-26 DIAGNOSIS — I48.19 PERSISTENT ATRIAL FIBRILLATION (H): ICD-10-CM

## 2025-08-26 DIAGNOSIS — Z79.01 LONG TERM (CURRENT) USE OF ANTICOAGULANTS: ICD-10-CM

## 2025-08-26 DIAGNOSIS — Z95.3 S/P MITRAL VALVE REPLACEMENT WITH TISSUE VALVE: ICD-10-CM

## 2025-08-26 DIAGNOSIS — E03.9 ACQUIRED HYPOTHYROIDISM: ICD-10-CM

## 2025-08-26 DIAGNOSIS — Z79.01 LONG TERM (CURRENT) USE OF ANTICOAGULANTS: Primary | ICD-10-CM

## 2025-08-26 DIAGNOSIS — Z13.6 SCREENING FOR CARDIOVASCULAR CONDITION: Primary | ICD-10-CM

## 2025-08-26 DIAGNOSIS — I25.119 CORONARY ARTERY DISEASE INVOLVING NATIVE CORONARY ARTERY OF NATIVE HEART WITH ANGINA PECTORIS: ICD-10-CM

## 2025-08-26 LAB — INR BLD: 2.6 (ref 0.9–1.1)

## 2025-08-26 PROCEDURE — 85610 PROTHROMBIN TIME: CPT

## 2025-08-26 PROCEDURE — 36416 COLLJ CAPILLARY BLOOD SPEC: CPT

## 2025-08-28 ENCOUNTER — LAB (OUTPATIENT)
Dept: LAB | Facility: CLINIC | Age: 80
End: 2025-08-28
Payer: MEDICARE

## 2025-08-28 ENCOUNTER — ANTICOAGULATION THERAPY VISIT (OUTPATIENT)
Dept: ANTICOAGULATION | Facility: CLINIC | Age: 80
End: 2025-08-28

## 2025-08-28 DIAGNOSIS — I48.19 PERSISTENT ATRIAL FIBRILLATION (H): ICD-10-CM

## 2025-08-28 DIAGNOSIS — Z79.01 LONG TERM (CURRENT) USE OF ANTICOAGULANTS: Primary | ICD-10-CM

## 2025-08-28 DIAGNOSIS — Z95.3 S/P MITRAL VALVE REPLACEMENT WITH TISSUE VALVE: ICD-10-CM

## 2025-08-28 DIAGNOSIS — Z79.01 LONG TERM (CURRENT) USE OF ANTICOAGULANTS: ICD-10-CM

## 2025-08-28 LAB — INR BLD: 2 (ref 0.9–1.1)

## 2025-09-04 DIAGNOSIS — I49.5 SICK SINUS SYNDROME (H): ICD-10-CM

## 2025-09-04 DIAGNOSIS — Z95.0 CARDIAC PACEMAKER IN SITU: Primary | ICD-10-CM

## 2025-11-06 NOTE — PROGRESS NOTES
VASCULAR SURGERY CLINIC CONSULTATION    VASCULAR SURGEON: Bessie Lemus MD, RPVI       Obed Haley   Medical Record #:  [id number]  YOB: 1945  Age:  80 year old     Date of Service: 10/28/2024    PRIMARY CARE PROVIDER: Franko Nguyen      ASSESSMENT AND PLAN:  80-year-old male with small infrarenal abdominal aneurysm and bilateral common iliac artery aneurysm.  All 3 of them are small for any intervention.  Patient will need annual surveillance.  HPI:  80-year-old male who comes to vascular surgery clinic to evaluate infrarenal aortic aneurysm.        REVIEW OF SYSTEMS:    A 12 point ROS was reviewed and is negative .     PHH:    Past Medical History:   Diagnosis Date    Arthritis     High cholesterol     MVP (mitral valve prolapse)     Nonocclusive coronary atherosclerosis of native coronary artery 08/03/2017    Sleep apnea, obstructive     uses CPAP          Past Surgical History:   Procedure Laterality Date    ARTHROSCOPY KNEE  01/01/2007    left    CARDIOVERSION  11/23/2020    COLONOSCOPY      EP PACEMAKER INSERT Left 09/25/2017    Procedure: EP Pacemaker Insertion;  Surgeon: Reji Whittington MD;  Location: [facility] Cath Lab;  Service:     EYE SURGERY      LAMINECTOMY  1970, 2003    lumbar X2    AL CATH PLACEMENT & NJX CORONARY ART ANGIO IMG S&I N/A 08/03/2017    Procedure: Coronary Angiogram;  Surgeon: Abhinav Redmond MD;  Location: [facility] Cath Lab;  Service: Cardiology    AL L HRT CATH W/NJX L VENTRICULOGRAPHY IMG S&I N/A 08/03/2017    Procedure: Left Heart Catheterization with Left Ventriculogram;  Surgeon: Abhinav Redmond MD;  Location: [facility] Cath Lab;  Service: Cardiology    AL RIGHT HEART CATH O2 SATURATION & CARDIAC OUTPUT N/A 08/03/2017    Procedure: Right Heart Catheterization;  Surgeon: Abhinav Redmond MD;  Location: [facility] Cath Lab;  Service: Cardiology    REPLACE VALVE MITRAL N/A 09/19/2017    Procedure: MITRAL VALVE REPLACEMENT,  ANESTHESIA TRANSESOPHAGEAL ECHOCARDIOGRAM, CLOSURE OF THE PFO;  Surgeon: Monica Swenson MD;  Location: [facility];  Service:        ALLERGIES:  Bee venom protein (honey bee) [bee venom]    MEDS:  Current Medications[1]    SOCIAL HABITS:    History   Smoking Status    Former    Types: Cigarettes   Smokeless Tobacco    Never     Social History    Substance and Sexual Activity      Alcohol use: Yes        Alcohol/week: 3.0 standard drinks of alcohol        Comment: Alcoholic Drinks/day: occasionally      History   Drug Use No       FAMILY HISTORY:    Family History   Problem Relation Age of Onset    Breast Cancer Mother     Other - See Comments Father         train acciendt    Cerebral aneurysm Sister     No Known Problems Sister         [place]    Colon Cancer Maternal Grandmother     No Known Problems Son     Lymphoma Son     Breast Cancer Other     Breast Cancer Other        PE:  /82   Pulse 67   Temp 98.4  F (36.9  C)   SpO2 98%   Wt Readings from Last 1 Encounters:   10/17/25 93.4 kg (206 lb)     There is no height or weight on file to calculate BMI.    EXAM:  GENERAL: This is a well-developed 80 year old male who appears his stated age  EYES: Grossly normal.  MOUTH: Buccal mucosa normal   CARDIAC: Normal   CHEST/LUNG: Clear to auscultation bilaterally  GASTROINTESINAL soft nontender nondistended  MUSCULOSKELETAL: Grossly normal and both lower extremities are intact.  HEME/LYMPH: No lymphedema  NEUROLOGIC: Focally intact, Alert and oriented x 3.   PSYCH: appropriate affect            DIAGNOSTIC STUDIES:     Images:      LABS:          Bessie Lemus MD, [facility]  VASCULAR SURGERY         [1]   Current Outpatient Medications:     b complex vitamins tablet, [B COMPLEX VITAMINS TABLET] Take 1 tablet by mouth every other day. , Disp: , Rfl:     chlorpheniramine (CHLOR-TRIMETON) 4 mg tablet, [CHLORPHENIRAMINE (CHLOR-TRIMETON) 4 MG TABLET] Take 1 tablet (4 mg total) by mouth every 6 (six) hours as  needed for allergies., Disp: 30 tablet, Rfl: 11    cholecalciferol, vitamin D3, 1,000 unit tablet, [CHOLECALCIFEROL, VITAMIN D3, 1,000 UNIT TABLET] Take 1,000 Units by mouth every other day. , Disp: , Rfl:     EPINEPHrine (EPIPEN) 0.3 mg/0.3 mL atIn, [EPINEPHRINE (EPIPEN) 0.3 MG/0.3 ML ATIN] Inject 0.3 mg into the shoulder, thigh, or buttocks once as needed (allergic reaction)., Disp: , Rfl:     fluticasone (FLONASE) 50 MCG/ACT nasal spray, Spray 1 spray into both nostrils daily., Disp: , Rfl:     levothyroxine (SYNTHROID/LEVOTHROID) 100 MCG tablet, Take 1 tablet (100 mcg) by mouth daily., Disp: 90 tablet, Rfl: 4    MULTIVIT &MINERALS/FERROUS FUM (MULTI VITAMIN ORAL), [MULTIVIT &MINERALS/FERROUS FUM (MULTI VITAMIN ORAL)] Take 1 tablet by mouth every other day. Pt taking multi vitamin without Iron, Disp: , Rfl:     amoxicillin (AMOXIL) 500 MG capsule, Take 4 capsules (2,000 mg) by mouth as needed (30 to 60 minutes prior to dental procedure)., Disp: 4 capsule, Rfl: 0    atorvastatin (LIPITOR) 20 MG tablet, Take 1 tablet (20 mg) by mouth daily., Disp: 90 tablet, Rfl: 4    warfarin ANTICOAGULANT (COUMADIN) 5 MG tablet, TAKE 1 TAB (5MG) TO 1&1/2 TABS (7.5MG) BY MOUTH DAILY AS DIRECTED.ADJUST DOSE BASED ON INR RESULTS, Disp: 120 tablet, Rfl: 1